# Patient Record
Sex: MALE | Race: BLACK OR AFRICAN AMERICAN | Employment: OTHER | URBAN - METROPOLITAN AREA
[De-identification: names, ages, dates, MRNs, and addresses within clinical notes are randomized per-mention and may not be internally consistent; named-entity substitution may affect disease eponyms.]

---

## 2017-08-28 ENCOUNTER — HOSPITAL ENCOUNTER (INPATIENT)
Age: 72
LOS: 2 days | Discharge: HOME | DRG: 247 | End: 2017-08-30
Attending: EMERGENCY MEDICINE | Admitting: INTERNAL MEDICINE
Payer: MEDICARE

## 2017-08-28 ENCOUNTER — APPOINTMENT (OUTPATIENT)
Dept: GENERAL RADIOLOGY | Age: 72
DRG: 247 | End: 2017-08-28
Attending: EMERGENCY MEDICINE
Payer: MEDICARE

## 2017-08-28 DIAGNOSIS — R07.9 CHEST PAIN, UNSPECIFIED TYPE: Primary | ICD-10-CM

## 2017-08-28 PROBLEM — I20.0 UNSTABLE ANGINA (HCC): Status: ACTIVE | Noted: 2017-08-28

## 2017-08-28 LAB
ALBUMIN SERPL-MCNC: 3 G/DL (ref 3.2–4.6)
ALBUMIN/GLOB SERPL: 0.5 {RATIO} (ref 1.2–3.5)
ALP SERPL-CCNC: 105 U/L (ref 50–136)
ALT SERPL-CCNC: 110 U/L (ref 12–65)
ANION GAP SERPL CALC-SCNC: 8 MMOL/L (ref 7–16)
AST SERPL-CCNC: 113 U/L (ref 15–37)
BASOPHILS # BLD: 0 K/UL (ref 0–0.2)
BASOPHILS NFR BLD: 0 % (ref 0–2)
BILIRUB SERPL-MCNC: 0.5 MG/DL (ref 0.2–1.1)
BUN SERPL-MCNC: 14 MG/DL (ref 8–23)
CALCIUM SERPL-MCNC: 7.9 MG/DL (ref 8.3–10.4)
CHLORIDE SERPL-SCNC: 107 MMOL/L (ref 98–107)
CO2 SERPL-SCNC: 25 MMOL/L (ref 21–32)
CREAT SERPL-MCNC: 1.3 MG/DL (ref 0.8–1.5)
DIFFERENTIAL METHOD BLD: ABNORMAL
EOSINOPHIL # BLD: 0.1 K/UL (ref 0–0.8)
EOSINOPHIL NFR BLD: 2 % (ref 0.5–7.8)
ERYTHROCYTE [DISTWIDTH] IN BLOOD BY AUTOMATED COUNT: 14.8 % (ref 11.9–14.6)
GLOBULIN SER CALC-MCNC: 6 G/DL (ref 2.3–3.5)
GLUCOSE SERPL-MCNC: 88 MG/DL (ref 65–100)
HCT VFR BLD AUTO: 34.6 % (ref 41.1–50.3)
HGB BLD-MCNC: 12.2 G/DL (ref 13.6–17.2)
IMM GRANULOCYTES # BLD: 0 K/UL (ref 0–0.5)
IMM GRANULOCYTES NFR BLD: 0.5 % (ref 0–5)
LYMPHOCYTES # BLD: 1.1 K/UL (ref 0.5–4.6)
LYMPHOCYTES NFR BLD: 28 % (ref 13–44)
MCH RBC QN AUTO: 26.9 PG (ref 26.1–32.9)
MCHC RBC AUTO-ENTMCNC: 35.3 G/DL (ref 31.4–35)
MCV RBC AUTO: 76.4 FL (ref 79.6–97.8)
MONOCYTES # BLD: 0.7 K/UL (ref 0.1–1.3)
MONOCYTES NFR BLD: 17 % (ref 4–12)
NEUTS SEG # BLD: 2 K/UL (ref 1.7–8.2)
NEUTS SEG NFR BLD: 53 % (ref 43–78)
PLATELET # BLD AUTO: 173 K/UL (ref 150–450)
PMV BLD AUTO: 10.6 FL (ref 10.8–14.1)
POTASSIUM SERPL-SCNC: 4.4 MMOL/L (ref 3.5–5.1)
PROT SERPL-MCNC: 9 G/DL (ref 6.3–8.2)
RBC # BLD AUTO: 4.53 M/UL (ref 4.23–5.67)
SODIUM SERPL-SCNC: 140 MMOL/L (ref 136–145)
TROPONIN I BLD-MCNC: 0.04 NG/ML (ref 0–0.08)
WBC # BLD AUTO: 3.8 K/UL (ref 4.3–11.1)

## 2017-08-28 PROCEDURE — 84484 ASSAY OF TROPONIN QUANT: CPT

## 2017-08-28 PROCEDURE — 65660000000 HC RM CCU STEPDOWN

## 2017-08-28 PROCEDURE — 96374 THER/PROPH/DIAG INJ IV PUSH: CPT | Performed by: EMERGENCY MEDICINE

## 2017-08-28 PROCEDURE — 74011250636 HC RX REV CODE- 250/636: Performed by: NURSE PRACTITIONER

## 2017-08-28 PROCEDURE — 99285 EMERGENCY DEPT VISIT HI MDM: CPT | Performed by: EMERGENCY MEDICINE

## 2017-08-28 PROCEDURE — 85025 COMPLETE CBC W/AUTO DIFF WBC: CPT | Performed by: EMERGENCY MEDICINE

## 2017-08-28 PROCEDURE — 71010 XR CHEST PORT: CPT

## 2017-08-28 PROCEDURE — 80053 COMPREHEN METABOLIC PANEL: CPT | Performed by: EMERGENCY MEDICINE

## 2017-08-28 PROCEDURE — 93005 ELECTROCARDIOGRAM TRACING: CPT | Performed by: EMERGENCY MEDICINE

## 2017-08-28 PROCEDURE — 74011250637 HC RX REV CODE- 250/637: Performed by: EMERGENCY MEDICINE

## 2017-08-28 RX ORDER — HEPARIN SODIUM 5000 [USP'U]/ML
4000 INJECTION, SOLUTION INTRAVENOUS; SUBCUTANEOUS ONCE
Status: COMPLETED | OUTPATIENT
Start: 2017-08-28 | End: 2017-08-28

## 2017-08-28 RX ORDER — HEPARIN SODIUM 5000 [USP'U]/100ML
12-25 INJECTION, SOLUTION INTRAVENOUS
Status: DISCONTINUED | OUTPATIENT
Start: 2017-08-28 | End: 2017-08-29

## 2017-08-28 RX ADMIN — HEPARIN SODIUM AND DEXTROSE 12 UNITS/KG/HR: 5000; 5 INJECTION INTRAVENOUS at 23:45

## 2017-08-28 RX ADMIN — NITROGLYCERIN 1 INCH: 20 OINTMENT TOPICAL at 21:50

## 2017-08-28 RX ADMIN — HEPARIN SODIUM 4000 UNITS: 5000 INJECTION, SOLUTION INTRAVENOUS; SUBCUTANEOUS at 23:45

## 2017-08-28 NOTE — IP AVS SNAPSHOT
Jarad Elders 
 
 
 [address] 
[phone] Patient: Cristiane Camarillo MRN: [id number] ZNV:1/0/0630 You are allergic to the following No active allergies Recent Documentation Height Weight BMI Smoking Status 1.727 m 63 kg 21.13 kg/m2 Light Tobacco Smoker Emergency Contacts Name Discharge Info Relation Home Work Mobile Patti Quesada  Other Relative [6] [phone] Angle Lee  Child [2] [phone] About your hospitalization You were admitted on:  August 28, 2017 You last received care in the:  [facility] 3 TELEMETRY You were discharged on:  August 30, 2017 Unit phone number:  [phone] Why you were hospitalized Your primary diagnosis was:  Unstable Angina (Hcc) Your diagnoses also included:  Hyperlipidemia, Coronary Atherosclerosis Of Native Coronary Vessel Providers Seen During Your Hospitalizations Provider Role Specialty Primary office phone Raul Haywood MD Attending Provider Emergency Medicine [phone] Lucy Metcalf DO Attending Provider Cardiology [phone] Your Primary Care Physician (PCP) Primary Care Physician Office Phone Office Fax OTHER, PHYS ** None ** ** None ** Follow-up Information Follow up With Details Comments Contact Info Muna Alonso MD On 9/6/2017 Follow up on Wednesday, September 6th at 9:00am THE [facility] AT [place]). [address] [facility] [address] [facility] [address] 
[phone] Primary Care Physician  As needed Your Appointments Wednesday September 06, 2017  9:00 AM EDT TRANSITIONAL CARE MANAGEMENT with Muna Alonso MD  
[address] ([address] [place] [address]) 2 [facility] 
[address]  
[phone] Current Discharge Medication List  
  
START taking these medications Dose & Instructions Dispensing Information Comments Morning Noon Evening Bedtime  
 aspirin 81 mg chewable tablet Dose:  81 mg Take 1 Tab by mouth daily. Refills:  0  
     
  
   
   
   
  
 atorvastatin 80 mg tablet Commonly known as:  LIPITOR Dose:  80 mg Take 1 Tab by mouth nightly. Quantity:  30 Tab Refills:  11  
     
   
   
   
  
  
 lisinopril 5 mg tablet Commonly known as:  Jorgito Odalys Dose:  5 mg Take 1 Tab by mouth daily. Quantity:  30 Tab Refills:  6  
     
  
   
   
   
  
 metoprolol tartrate 25 mg tablet Commonly known as:  LOPRESSOR Dose:  25 mg Take 1 Tab by mouth two (2) times a day. Quantity:  60 Tab Refills:  6  
     
  
   
   
  
   
  
 prasugrel 10 mg tablet Commonly known as:  EFFIENT Dose:  10 mg Take 1 Tab by mouth daily. Quantity:  30 Tab Refills:  11 CONTINUE these medications which have NOT CHANGED Dose & Instructions Dispensing Information Comments Morning Noon Evening Bedtime  
 nitroglycerin 0.4 mg SL tablet Commonly known as:  NITROSTAT Notes to Patient:  As needed. Dose:  0.4 mg  
1 Tab by SubLINGual route every five (5) minutes as needed for Chest Pain. Quantity:  2 Bottle Refills:  4 Where to Get Your Medications Information on where to get these meds will be given to you by the nurse or doctor. ! Ask your nurse or doctor about these medications  
  atorvastatin 80 mg tablet  
 lisinopril 5 mg tablet  
 metoprolol tartrate 25 mg tablet  
 nitroglycerin 0.4 mg SL tablet  
 prasugrel 10 mg tablet Discharge Instructions Cardiac Catheterization/Angiography Discharge Instructions *Check the puncture site frequently for swelling or bleeding.  If you see any bleeding, lie down and apply pressure over the area with a clean town or washcloth. Notify your doctor for any redness, swelling, drainage or oozing from the puncture site. Notify your doctor for any fever or chills. *If the leg or arm with the puncture becomes cold, numb or painful, call [facility] Cardiology at  [phone]. *Activity should be limited for the next 48 hours. Climb stairs as little as possible and avoid any stooping, bending or strenuous activity for 48 hours. No heavy lifting (anything over 10 pounds) for three days. *Do not drive for 48 hours. *You may resume your usual diet. Drink more fluids than usual. 
 
*Have a responsible person drive you home and stay with you for at least 24 hours after your heart catheterization/angiography. *You may remove the bandage from your Right wrist in 24 hours. You may shower in 24 hours. No tub baths, hot tubs or swimming for one week. Do not place any lotions, creams, powders, ointments over the puncture site for one week. You may place a clean band-aid over the puncture site each day for 5 days. Change this daily. Percutaneous Coronary Intervention: What to Expect at [facility] Your Recovery Percutaneous coronary intervention (PCI) is the name for procedures that are used to open a narrowed or blocked coronary artery. The two most common PCI procedures are coronary angioplasty and coronary stent placement. Your groin or arm may have a bruise and feel sore for a day or two after a percutaneous coronary intervention (PCI). You can do light activities around the house, but nothing strenuous for several days. This care sheet gives you a general idea about how long it will take for you to recover. But each person recovers at a different pace. Follow the steps below to get better as quickly as possible. How can you care for yourself at home? Activity · If the doctor gave you a sedative: ¨ For 24 hours, don't do anything that requires attention to detail.  It takes time for the medicine's effects to completely wear off. ¨ For your safety, do not drive or operate any machinery that could be dangerous. Wait until the medicine wears off and you can think clearly and react easily. · Do not do strenuous exercise and do not lift, pull, or push anything heavy until your doctor says it is okay. This may be for a day or two. You can walk around the house and do light activity, such as cooking. · If the catheter was placed in your groin, try not to walk up stairs for the first couple of days. · If the catheter was placed in your arm near your wrist, do not bend your wrist deeply for the first couple of days. Be careful using your hand to get into and out of a chair or bed. · Carry your stent identification card with you at all times. · If your doctor recommends it, get more exercise. Walking is a good choice. Bit by bit, increase the amount you walk every day. Try for at least 30 minutes on most days of the week. Diet · Drink plenty of fluids to help your body flush out the dye. If you have kidney, heart, or liver disease and have to limit fluids, talk with your doctor before you increase the amount of fluids you drink. · Keep eating a heart-healthy diet that has lots of fruits, vegetables, and whole grains. If you have not been eating this way, talk to your doctor. You also may want to talk to a dietitian. This expert can help you to learn about healthy foods and plan meals. Medicines · Your doctor will tell you if and when you can restart your medicines. He or she will also give you instructions about taking any new medicines. · If you take blood thinners, such as warfarin (Coumadin), clopidogrel (Plavix), or aspirin, be sure to talk to your doctor. He or she will tell you if and when to start taking those medicines again. Make sure that you understand exactly what your doctor wants you to do. · Your doctor will prescribe blood-thinning medicines.  You will likely take aspirin plus another antiplatelet, such as clopidogrel (Plavix). It is very important that you take these medicines exactly as directed. These medicines help keep the coronary artery open and reduce your risk of a heart attack. · Call your doctor if you think you are having a problem with your medicine. Care of the catheter site · For 1 or 2 days, keep a bandage over the spot where the catheter was inserted. The bandage probably will fall off in this time. · Put ice or a cold pack on the area for 10 to 20 minutes at a time to help with soreness or swelling. Put a thin cloth between the ice and your skin. · You may shower 24 to 48 hours after the procedure, if your doctor okays it. Pat the incision dry. · Do not soak the catheter site until it is healed. Don't take a bath for 1 week, or until your doctor tells you it is okay. Follow-up care is a key part of your treatment and safety. Be sure to make and go to all appointments, and call your doctor if you are having problems. It's also a good idea to know your test results and keep a list of the medicines you take. When should you call for help? Call 911 anytime you think you may need emergency care. For example, call if: 
· You passed out (lost consciousness). · You have severe trouble breathing. · You have sudden chest pain and shortness of breath, or you cough up blood. · You have symptoms of a heart attack, such as: ¨ Chest pain or pressure. ¨ Sweating. ¨ Shortness of breath. ¨ Nausea or vomiting. ¨ Pain that spreads from the chest to the neck, jaw, or one or both shoulders or arms. ¨ Dizziness or lightheadedness. ¨ A fast or uneven pulse. After calling 911, chew 1 adult-strength aspirin. Wait for an ambulance. Do not try to drive yourself. · You have been diagnosed with angina, and you have angina symptoms that do not go away with rest or are not getting better within 5 minutes after you take one dose of nitroglycerin. Call your doctor now or seek immediate medical care if: 
· You are bleeding from the area where the catheter was put in your artery. · You have a fast-growing, painful lump at the catheter site. · You have signs of infection, such as: 
¨ Increased pain, swelling, warmth, or redness. ¨ Red streaks leading from the catheter site. ¨ Pus draining from the catheter site. ¨ A fever. · Your leg or arm looks blue or feels cold, numb, or tingly. Watch closely for changes in your health, and be sure to contact your doctor if you have any problems. Where can you learn more? Go to http://huan-nando.info/. Enter Z502 in the search box to learn more about \"Percutaneous Coronary Intervention: What to Expect at Home. \" Current as of: January 13, 2017 Content Version: 11.3 © 1503-4584 [employer]. Care instructions adapted under license by [employer] (which disclaims liability or warranty for this information). If you have questions about a medical condition or this instruction, always ask your healthcare professional. [place] [address] any warranty or liability for your use of this information. Heart Attack: Care Instructions Your Care Instructions A heart attack (myocardial infarction, or MI) occurs when one or more of the coronary arteries, which supply the heart with oxygen-rich blood, is blocked. A blockage usually occurs when plaque inside the artery breaks open and a blood clot forms in the artery. After a heart attack, you may be worried about your future. Over the next several weeks, your heart will start to heal. Though it can be hard to break old habits, you can prevent another heart attack by making some lifestyle changes and by taking medicines. You may use this information for ideas about what to do at home to speed your recovery. Follow-up care is a key part of your treatment and safety.  Be sure to make and go to all appointments, and call your doctor if you are having problems. It's also a good idea to know your test results and keep a list of the medicines you take. How can you care for yourself at home? Activity · Until your doctor says it is okay, do not do strenuous exercise. And do not lift, pull, or push anything heavy. Ask your doctor what types of activities are safe for you. · If your doctor has not set you up with a cardiac rehabilitation (rehab) program, talk to him or her about whether that is right for you. Cardiac rehab includes supervised exercise. It also includes help with diet and lifestyle changes and emotional support. It may reduce your risk of future heart problems. · Increase your activities slowly. Take short rest breaks when you get tired. · If your doctor recommends it, get more exercise. Walking is a good choice. Bit by bit, increase the amount you walk every day. Try for at least 30 minutes on most days of the week. You also may want to swim, bike, or do other activities. Talk with your doctor before you start an exercise program to make sure it is safe for you. · Ask your doctor when you can drive, go back to work, and do other daily activities again. · You can have sex as soon as you feel ready for it. Often this means when you can easily walk around or climb stairs. Talk with your doctor if you have any concerns. If you are taking nitroglycerin, do not take erection-enhancing medicine such as sildenafil (Viagra), tadalafil (Cialis), or vardenafil (Levitra) . Lifestyle changes · Do not smoke. Smoking increases your risk of another heart attack. If you need help quitting, talk to your doctor about stop-smoking programs and medicines. These can increase your chances of quitting for good. · Eat a heart-healthy diet that is low in saturated fat and salt, and is full of fruits, vegetables and whole-grains.  Eat at least two servings of fish each week. You may get more details about how to eat healthy. But these tips can help you get started. · Stay at a healthy weight, or lose weight if you need to. Medicines · Be safe with medicines. Take your medicines exactly as prescribed. Call your doctor if you think you are having a problem with your medicine. You will get more details on the specific medicines your doctor prescribes. Do not stop taking your medicine unless your doctor tells you to. Not taking your medicine might raise your risk of having another heart attack. · You may need several medicines to help lower your risk of another heart attack. These include: ¨ Blood pressure medicines such as angiotensin-converting enzyme (ACE) inhibitors, ARBs (angiotensin II receptor blockers), and beta-blockers. ¨ Cholesterol medicine called statins. ¨ Aspirin and other blood thinners. These prevent blood clots that can cause a heart attack. · If your doctor has given you nitroglycerin, keep it with you at all times. If you have angina symptoms, such as chest pain or pressure, sit down and rest. Take the first dose of nitroglycerin as directed. If symptoms get worse or are not getting better within 5 minutes, call 911 right away. Stay on the phone. The emergency  will tell you what to do. · Do not take any over-the-counter medicines, vitamins, or herbal products without talking to your doctor first. 
Staying healthy · Manage other health conditions such as high blood pressure and diabetes. · Avoid colds and flu. Get a pneumococcal vaccine shot. If you have had one before, ask your doctor whether you need another dose. Get the flu vaccine every year. If you must be around people with colds or flu, wash your hands often. · Be sure to tell your doctor about any angina symptoms you have had, even if they went away. Pay attention to your angina symptoms. Know what is typical for you and learn how to control it. Know when to call for help. · Talk to your family, friends, or a counselor about your feelings. It is normal to feel frightened, angry, hopeless, helpless, and even guilty. Talking openly about bad feelings can help you cope. If you have symptoms of depression, talk to your doctor. When should you call for help? Call 911 anytime you think you may need emergency care. For example, call if: 
· You have symptoms of a heart attack. These may include: ¨ Chest pain or pressure, or a strange feeling in the chest. 
¨ Sweating. ¨ Shortness of breath. ¨ Nausea or vomiting. ¨ Pain, pressure, or a strange feeling in the back, neck, jaw, or upper belly or in one or both shoulders or arms. ¨ Lightheadedness or sudden weakness. ¨ A fast or irregular heartbeat. After you call 911, the  may tell you to chew 1 adult-strength or 2 to 4 low-dose aspirin. Wait for an ambulance. Do not try to drive yourself. · You have angina symptoms (such as chest pain or pressure) that do not go away with rest or are not getting better within 5 minutes after you take a dose of nitroglycerin. · You passed out (lost consciousness). · You feel like you are having another heart attack. Call your doctor now or seek immediate medical care if: 
· You are having angina symptoms, such as chest pain or pressure, more often than usual, or the symptoms are different or worse than usual. 
· You have new or increased shortness of breath. · You are dizzy or lightheaded, or you feel like you may faint. Watch closely for changes in your health, and be sure to contact your doctor if you have any problems. Where can you learn more? Go to http://huan-nando.info/. Enter [id number] in the search box to learn more about \"Heart Attack: Care Instructions. \" Current as of: August 8, 2016 Content Version: 11.3 © 3297-4970 [employer].  Care instructions adapted under license by [employer] (which disclaims liability or warranty for this information). If you have questions about a medical condition or this instruction, always ask your healthcare professional. [address] any warranty or liability for your use of this information. Reducing Heart Attack Risk With Daily Medicine: Care Instructions Your Care Instructions Heart disease is the number one cause of death. If you are at risk for heart disease, there are many medicines that can reduce your risk. These include: · ACE inhibitors. These are a type of blood pressure medicine. They can reduce the risk of heart attacks and strokes if you are at high risk. · Statin medicines. These lower cholesterol. They can also reduce the risk of heart disease and strokes. · Aspirin. It can help certain people lower their risk of a heart attack or stroke. · Beta-blocker medicines. These are a type of blood pressure and heart medicine. They can reduce the chance of early death if you have had a heart attack. All medicines can cause side effects. So it is important to understand the pros and cons of any medicine you take. It is also important to take your medicines exactly as your doctor tells you to. Follow-up care is a key part of your treatment and safety. Be sure to make and go to all appointments, and call your doctor if you are having problems. It's also a good idea to know your test results and keep a list of the medicines you take. ACE inhibitors ACE (angiotensin-converting enzyme) inhibitors are used for three main reasons. They lower blood pressure, protect the kidneys, and prevent heart attacks and strokes. Examples include benazepril (Lotensin), lisinopril (Prinivil, Zestril), and ramipril (Altace). Before you start taking an ACE inhibitor, make sure your doctor knows if: 
· You are taking a water pill (diuretic). · You are taking potassium pills or using salt substitutes. · You are pregnant or breastfeeding. · You have had a kidney transplant or other kidney problems. ACE inhibitors can cause side effects. Call your doctor right away if you have: · Trouble breathing. · Swelling in your face, head, neck, or tongue. · Dizziness or lightheadedness. · A dry cough. Statins Statins lower cholesterol. Examples include atorvastatin (Lipitor), lovastatin (Mevacor), pravastatin (Pravachol), and simvastatin (Zocor). Before you start taking a statin, make sure your doctor knows if: 
· You have had a kidney transplant or other kidney problems. · You have liver disease. · You take any other prescription medicine, over-the-counter medicine, vitamins, supplements, or herbal remedies. · You are pregnant or breastfeeding. Statins can cause side effects. Call your doctor right away if you have: · New, severe muscle aches. · Brown urine. Aspirin Taking an aspirin every day can lower your risk for a heart attack. A heart attack occurs when a blood vessel in the heart gets blocked. When this happens, oxygen can't get to the heart muscle, and part of the heart dies. Aspirin can help prevent blood clots that can block the blood vessels. Talk to your doctor before you start taking aspirin every day. He or she may recommend that you take one low-dose aspirin (81 mg) tablet each day, with a meal and a full glass of water. Taking aspirin isn't right for everyone, because it can cause serious bleeding. And you may not be able to use aspirin if you: 
· Have asthma. · Have an ulcer or other stomach problem. · Take some other medicine (called a blood thinner) that prevents blood clots. · Are allergic to aspirin. Before having a surgery or procedure, tell your doctor or dentist that you take aspirin. He or she will tell you if you should stop taking aspirin beforehand. Make sure that you understand exactly what your doctor wants you to do. Aspirin can cause side effects. Call your doctor right away if you have: · Unusual bleeding or bruising. · Nausea, vomiting, or heartburn. · Black or bloody stools. Beta-blockers Beta-blockers are used for three main reasons. They lower blood pressure, relieve angina symptoms (such as chest pain or pressure), and reduce the chances of a second heart attack. They include atenolol (Tenormin), carvedilol (Coreg), and metoprolol (Lopressor). Before you start taking a beta-blocker, make sure your doctor knows if you have: · Severe asthma or frequent asthma attacks. · A very slow pulse (less than 55 beats a minute). Beta-blockers can cause side effects. Call your doctor right away if you have: · Wheezing or trouble breathing. · Dizziness or lightheadedness. · Asthma that gets worse. When should you call for help? Call 911 anytime you think you may need emergency care. For example, call if: 
· You passed out (lost consciousness). Call your doctor now or seek immediate medical care if: 
· You are wheezing or have trouble breathing. · You have swelling in your face, head, neck, or tongue. · You are dizzy or lightheaded, or you feel like you may faint. · You have severe muscle pain, weakness, or brown urine. · You have vision problems. · You have new bruises or blood spots under your skin. · Your stools are black and tarlike or have streaks of blood. Watch closely for changes in your health, and be sure to contact your doctor if: 
· You have ringing in your ears. · You feel very tired. · You have gas, constipation, or an upset stomach. Where can you learn more? Go to http://huan-nando.info/. Enter R428 in the search box to learn more about \"Reducing Heart Attack Risk With Daily Medicine: Care Instructions. \" Current as of: September 21, 2016 Content Version: 11.3 © 9697-8292 [employer].  Care instructions adapted under license by [employer] (which disclaims liability or warranty for this information). If you have questions about a medical condition or this instruction, always ask your healthcare professional. [address] any warranty or liability for your use of this information. Heart-Healthy Diet: Care Instructions Your Care Instructions A heart-healthy diet has lots of vegetables, fruits, nuts, beans, and whole grains, and is low in salt. It limits foods that are high in saturated fat, such as meats, cheeses, and fried foods. It may be hard to change your diet, but even small changes can lower your risk of heart attack and heart disease. Follow-up care is a key part of your treatment and safety. Be sure to make and go to all appointments, and call your doctor if you are having problems. It's also a good idea to know your test results and keep a list of the medicines you take. How can you care for yourself at home? Watch your portions · Learn what a serving is. A \"serving\" and a \"portion\" are not always the same thing. Make sure that you are not eating larger portions than are recommended. For example, a serving of pasta is ½ cup. A serving size of meat is 2 to 3 ounces. A 3-ounce serving is about the size of a deck of cards. Measure serving sizes until you are good at [place]" them. Keep in mind that restaurants often serve portions that are 2 or 3 times the size of one serving. · To keep your energy level up and keep you from feeling hungry, eat often but in smaller portions. · Eat only the number of calories you need to stay at a healthy weight. If you need to lose weight, eat fewer calories than your body burns (through exercise and other physical activity). Eat more fruits and vegetables · Eat a variety of fruit and vegetables every day. Dark green, deep orange, red, or yellow fruits and vegetables are especially good for you. Examples include spinach, carrots, peaches, and berries. · Keep carrots, celery, and other veggies handy for snacks. Buy fruit that is in season and store it where you can see it so that you will be tempted to eat it. · Cook dishes that have a lot of veggies in them, such as stir-fries and soups. Limit saturated and trans fat · Read food labels, and try to avoid saturated and trans fats. They increase your risk of heart disease. Trans fat is found in many processed foods such as cookies and crackers. · Use olive or canola oil when you cook. Try cholesterol-lowering spreads, such as Benecol or Take Control. · Bake, broil, grill, or steam foods instead of frying them. · Choose lean meats instead of high-fat meats such as hot dogs and sausages. Cut off all visible fat when you prepare meat. · Eat fish, skinless poultry, and meat alternatives such as soy products instead of high-fat meats. Soy products, such as tofu, may be especially good for your heart. · Choose low-fat or fat-free milk and dairy products. Eat fish · Eat at least two servings of fish a week. Certain fish, such as salmon and tuna, contain omega-3 fatty acids, which may help reduce your risk of heart attack. Eat foods high in fiber · Eat a variety of grain products every day. Include whole-grain foods that have lots of fiber and nutrients. Examples of whole-grain foods include oats, whole wheat bread, and brown rice. · Buy whole-grain breads and cereals, instead of white bread or pastries. Limit salt and sodium · Limit how much salt and sodium you eat to help lower your blood pressure. · Taste food before you salt it. Add only a little salt when you think you need it. With time, your taste buds will adjust to less salt. · Eat fewer snack items, fast foods, and other high-salt, processed foods. Check food labels for the amount of sodium in packaged foods. · Choose low-sodium versions of canned goods (such as soups, vegetables, and beans). Limit sugar · Limit drinks and foods with added sugar. These include candy, desserts, and soda pop. Limit alcohol · Limit alcohol to no more than 2 drinks a day for men and 1 drink a day for women. Too much alcohol can cause health problems. When should you call for help? Watch closely for changes in your health, and be sure to contact your doctor if: 
· You would like help planning heart-healthy meals. Where can you learn more? Go to http://huan-nando.info/. Enter V137 in the search box to learn more about \"Heart-Healthy Diet: Care Instructions. \" Current as of: April 3, 2017 Content Version: 11.3 © 6397-9083 [employer]. Care instructions adapted under license by [employer] (which disclaims liability or warranty for this information). If you have questions about a medical condition or this instruction, always ask your healthcare professional. [address] any warranty or liability for your use of this information. Heart-Healthy Diet: Care Instructions Your Care Instructions A heart-healthy diet has lots of vegetables, fruits, nuts, beans, and whole grains, and is low in salt. It limits foods that are high in saturated fat, such as meats, cheeses, and fried foods. It may be hard to change your diet, but even small changes can lower your risk of heart attack and heart disease. Follow-up care is a key part of your treatment and safety. Be sure to make and go to all appointments, and call your doctor if you are having problems. It's also a good idea to know your test results and keep a list of the medicines you take. How can you care for yourself at home? Watch your portions · Learn what a serving is. A \"serving\" and a \"portion\" are not always the same thing. Make sure that you are not eating larger portions than are recommended. For example, a serving of pasta is ½ cup.  A serving size of meat is 2 to 3 ounces. A 3-ounce serving is about the size of a deck of cards. Measure serving sizes until you are good at [place]" them. Keep in mind that restaurants often serve portions that are 2 or 3 times the size of one serving. · To keep your energy level up and keep you from feeling hungry, eat often but in smaller portions. · Eat only the number of calories you need to stay at a healthy weight. If you need to lose weight, eat fewer calories than your body burns (through exercise and other physical activity). Eat more fruits and vegetables · Eat a variety of fruit and vegetables every day. Dark green, deep orange, red, or yellow fruits and vegetables are especially good for you. Examples include spinach, carrots, peaches, and berries. · Keep carrots, celery, and other veggies handy for snacks. Buy fruit that is in season and store it where you can see it so that you will be tempted to eat it. · Cook dishes that have a lot of veggies in them, such as stir-fries and soups. Limit saturated and trans fat · Read food labels, and try to avoid saturated and trans fats. They increase your risk of heart disease. Trans fat is found in many processed foods such as cookies and crackers. · Use olive or canola oil when you cook. Try cholesterol-lowering spreads, such as Benecol or Take Control. · Bake, broil, grill, or steam foods instead of frying them. · Choose lean meats instead of high-fat meats such as hot dogs and sausages. Cut off all visible fat when you prepare meat. · Eat fish, skinless poultry, and meat alternatives such as soy products instead of high-fat meats. Soy products, such as tofu, may be especially good for your heart. · Choose low-fat or fat-free milk and dairy products. Eat fish · Eat at least two servings of fish a week. Certain fish, such as salmon and tuna, contain omega-3 fatty acids, which may help reduce your risk of heart attack. Eat foods high in fiber · Eat a variety of grain products every day. Include whole-grain foods that have lots of fiber and nutrients. Examples of whole-grain foods include oats, whole wheat bread, and brown rice. · Buy whole-grain breads and cereals, instead of white bread or pastries. Limit salt and sodium · Limit how much salt and sodium you eat to help lower your blood pressure. · Taste food before you salt it. Add only a little salt when you think you need it. With time, your taste buds will adjust to less salt. · Eat fewer snack items, fast foods, and other high-salt, processed foods. Check food labels for the amount of sodium in packaged foods. · Choose low-sodium versions of canned goods (such as soups, vegetables, and beans). Limit sugar · Limit drinks and foods with added sugar. These include candy, desserts, and soda pop. Limit alcohol · Limit alcohol to no more than 2 drinks a day for men and 1 drink a day for women. Too much alcohol can cause health problems. When should you call for help? Watch closely for changes in your health, and be sure to contact your doctor if: 
· You would like help planning heart-healthy meals. Where can you learn more? Go to http://huan-nando.info/. Enter V137 in the search box to learn more about \"Heart-Healthy Diet: Care Instructions. \" Current as of: April 3, 2017 Content Version: 11.3 © 0433-7511 [employer]. Care instructions adapted under license by [employer] (which disclaims liability or warranty for this information). If you have questions about a medical condition or this instruction, always ask your healthcare professional. [place] [address] any warranty or liability for your use of this information. Heart Attack: Care Instructions Your Care Instructions A heart attack (myocardial infarction, or MI) occurs when one or more of the coronary arteries, which supply the heart with oxygen-rich blood, is blocked. A blockage usually occurs when plaque inside the artery breaks open and a blood clot forms in the artery. After a heart attack, you may be worried about your future. Over the next several weeks, your heart will start to heal. Though it can be hard to break old habits, you can prevent another heart attack by making some lifestyle changes and by taking medicines. You may use this information for ideas about what to do at home to speed your recovery. Follow-up care is a key part of your treatment and safety. Be sure to make and go to all appointments, and call your doctor if you are having problems. It's also a good idea to know your test results and keep a list of the medicines you take. How can you care for yourself at home? Activity · Until your doctor says it is okay, do not do strenuous exercise. And do not lift, pull, or push anything heavy. Ask your doctor what types of activities are safe for you. · If your doctor has not set you up with a cardiac rehabilitation (rehab) program, talk to him or her about whether that is right for you. Cardiac rehab includes supervised exercise. It also includes help with diet and lifestyle changes and emotional support. It may reduce your risk of future heart problems. · Increase your activities slowly. Take short rest breaks when you get tired. · If your doctor recommends it, get more exercise. Walking is a good choice. Bit by bit, increase the amount you walk every day. Try for at least 30 minutes on most days of the week. You also may want to swim, bike, or do other activities. Talk with your doctor before you start an exercise program to make sure it is safe for you. · Ask your doctor when you can drive, go back to work, and do other daily activities again. · You can have sex as soon as you feel ready for it. Often this means when you can easily walk around or climb stairs.  Talk with your doctor if you have any concerns. If you are taking nitroglycerin, do not take erection-enhancing medicine such as sildenafil (Viagra), tadalafil (Cialis), or vardenafil (Levitra) . Lifestyle changes · Do not smoke. Smoking increases your risk of another heart attack. If you need help quitting, talk to your doctor about stop-smoking programs and medicines. These can increase your chances of quitting for good. · Eat a heart-healthy diet that is low in saturated fat and salt, and is full of fruits, vegetables and whole-grains. Eat at least two servings of fish each week. You may get more details about how to eat healthy. But these tips can help you get started. · Stay at a healthy weight, or lose weight if you need to. Medicines · Be safe with medicines. Take your medicines exactly as prescribed. Call your doctor if you think you are having a problem with your medicine. You will get more details on the specific medicines your doctor prescribes. Do not stop taking your medicine unless your doctor tells you to. Not taking your medicine might raise your risk of having another heart attack. · You may need several medicines to help lower your risk of another heart attack. These include: ¨ Blood pressure medicines such as angiotensin-converting enzyme (ACE) inhibitors, ARBs (angiotensin II receptor blockers), and beta-blockers. ¨ Cholesterol medicine called statins. ¨ Aspirin and other blood thinners. These prevent blood clots that can cause a heart attack. · If your doctor has given you nitroglycerin, keep it with you at all times. If you have angina symptoms, such as chest pain or pressure, sit down and rest. Take the first dose of nitroglycerin as directed. If symptoms get worse or are not getting better within 5 minutes, call 911 right away. Stay on the phone. The emergency  will tell you what to do.  
· Do not take any over-the-counter medicines, vitamins, or herbal products without talking to your doctor first. 
 Staying healthy · Manage other health conditions such as high blood pressure and diabetes. · Avoid colds and flu. Get a pneumococcal vaccine shot. If you have had one before, ask your doctor whether you need another dose. Get the flu vaccine every year. If you must be around people with colds or flu, wash your hands often. · Be sure to tell your doctor about any angina symptoms you have had, even if they went away. Pay attention to your angina symptoms. Know what is typical for you and learn how to control it. Know when to call for help. · Talk to your family, friends, or a counselor about your feelings. It is normal to feel frightened, angry, hopeless, helpless, and even guilty. Talking openly about bad feelings can help you cope. If you have symptoms of depression, talk to your doctor. When should you call for help? Call 911 anytime you think you may need emergency care. For example, call if: 
· You have symptoms of a heart attack. These may include: ¨ Chest pain or pressure, or a strange feeling in the chest. 
¨ Sweating. ¨ Shortness of breath. ¨ Nausea or vomiting. ¨ Pain, pressure, or a strange feeling in the back, neck, jaw, or upper belly or in one or both shoulders or arms. ¨ Lightheadedness or sudden weakness. ¨ A fast or irregular heartbeat. After you call 911, the  may tell you to chew 1 adult-strength or 2 to 4 low-dose aspirin. Wait for an ambulance. Do not try to drive yourself. · You have angina symptoms (such as chest pain or pressure) that do not go away with rest or are not getting better within 5 minutes after you take a dose of nitroglycerin. · You passed out (lost consciousness). · You feel like you are having another heart attack.  
Call your doctor now or seek immediate medical care if: 
· You are having angina symptoms, such as chest pain or pressure, more often than usual, or the symptoms are different or worse than usual. 
 · You have new or increased shortness of breath. · You are dizzy or lightheaded, or you feel like you may faint. Watch closely for changes in your health, and be sure to contact your doctor if you have any problems. Where can you learn more? Go to http://huan-nando.info/. Enter [id number] in the search box to learn more about \"Heart Attack: Care Instructions. \" Current as of: August 8, 2016 Content Version: 11.3 © 5711-0276 [employer]. Care instructions adapted under license by [employer] (which disclaims liability or warranty for this information). If you have questions about a medical condition or this instruction, always ask your healthcare professional. [address] any warranty or liability for your use of this information. DISCHARGE SUMMARY from Nurse The following personal items are in your possession at time of discharge: 
 
Dental Appliances: Uppers Home Medications: None Jewelry: None Clothing: At bedside, With patient Other Valuables: None PATIENT INSTRUCTIONS: 
 
 
F-face looks uneven A-arms unable to move or move unevenly S-speech slurred or non-existent T-time-call 911 as soon as signs and symptoms begin-DO NOT go Back to bed or wait to see if you get better-TIME IS BRAIN. Warning Signs of HEART ATTACK Call 911 if you have these symptoms: 
? Chest discomfort. Most heart attacks involve discomfort in the center of the chest that lasts more than a few minutes, or that goes away and comes back. It can feel like uncomfortable pressure, squeezing, fullness, or pain. ? Discomfort in other areas of the upper body. Symptoms can include pain or discomfort in one or both arms, the back, neck, jaw, or stomach. ? Shortness of breath with or without chest discomfort. ? Other signs may include breaking out in a cold sweat, nausea, or lightheadedness. Don't wait more than five minutes to call [address]! Fast action can save your life. Calling 911 is almost always the fastest way to get lifesaving treatment. Emergency Medical Services staff can begin treatment when they arrive  up to an hour sooner than if someone gets to the hospital by car. The discharge information has been reviewed with the patient. The patient verbalized understanding. Discharge medications reviewed with the patient and appropriate educational materials and side effects teaching were provided. Discharge Orders Procedure Order Date Status Priority Quantity Spec Type Associated Dx REFERRAL TO [facility] 08/30/17 0940 Normal Routine 1 Mama Announcement We are excited to announce that we are making your provider's discharge notes available to you in Mama. You will see these notes when they are completed and signed by the physician that discharged you from your recent hospital stay. If you have any questions or concerns about any information you see in Mama, please call the Health Information Department where you were seen or reach out to your Primary Care Provider for more information about your plan of care. Introducing [facility] SERVICES! [facility] introduces Mama patient portal. Now you can access parts of your medical record, email your doctor's office, and request medication refills online. 1. In your internet browser, go to [email or web address]://[email or web address]. [employer]hart 2. Click on the First Time User? Click Here link in the Sign In box. You will see the New Member Sign Up page. 3. Enter your Fanattac Access Code exactly as it appears below. You will not need to use this code after youve completed the sign-up process. If you do not sign up before the expiration date, you must request a new code. · Fanattac Access Code: [email or web address] Expires: 11/26/2017  9:37 PM 
 
4. Enter the last four digits of your Social Security Number (xxxx) and Date of Birth (mm/dd/yyyy) as indicated and click Submit. You will be taken to the next sign-up page. 5. Create a Fanattac ID. This will be your Fanattac login ID and cannot be changed, so think of one that is secure and easy to remember. 6. Create a Fanattac password. You can change your password at any time. 7. Enter your Password Reset Question and Answer. This can be used at a later time if you forget your password. 8. Enter your e-mail address. You will receive e-mail notification when new information is available in [address]. 9. Click Sign Up. You can now view and download portions of your medical record. 10. Click the Download Summary menu link to download a portable copy of your medical information. If you have questions, please visit the Frequently Asked Questions section of the Fanattac website. Remember, Fanattac is NOT to be used for urgent needs. For medical emergencies, dial 911. Now available from your iPhone and Android! General Information Please provide this summary of care documentation to your next provider. Patient Signature:  ____________________________________________________________ Date:  ____________________________________________________________  
  
[place] Thomas Provider Signature:  ____________________________________________________________ Date:  ____________________________________________________________

## 2017-08-28 NOTE — IP AVS SNAPSHOT
Home 
 
 
 [address] [place] [address] 
[phone] Patient: Daniel Roberts MRN: [id number] Current Discharge Medication List  
  
START taking these medications Dose & Instructions Dispensing Information Comments Morning Noon Evening Bedtime  
 aspirin 81 mg chewable tablet Dose:  81 mg Take 1 Tab by mouth daily. Refills:  0  
     
  
   
   
   
  
 atorvastatin 80 mg tablet Commonly known as:  LIPITOR Dose:  80 mg Take 1 Tab by mouth nightly. Quantity:  30 Tab Refills:  11  
     
   
   
   
  
  
 lisinopril 5 mg tablet Commonly known as:  [place] Fairy Dose:  5 mg Take 1 Tab by mouth daily. Quantity:  30 Tab Refills:  6  
     
  
   
   
   
  
 metoprolol tartrate 25 mg tablet Commonly known as:  LOPRESSOR Dose:  25 mg Take 1 Tab by mouth two (2) times a day. Quantity:  60 Tab Refills:  6  
     
  
   
   
  
   
  
 prasugrel 10 mg tablet Commonly known as:  EFFIENT Dose:  10 mg Take 1 Tab by mouth daily. Quantity:  30 Tab Refills:  11 CONTINUE these medications which have NOT CHANGED Dose & Instructions Dispensing Information Comments Morning Noon Evening Bedtime  
 nitroglycerin 0.4 mg SL tablet Commonly known as:  NITROSTAT Notes to Patient:  As needed. Dose:  0.4 mg  
1 Tab by SubLINGual route every five (5) minutes as needed for Chest Pain. Quantity:  2 Bottle Refills:  4 Where to Get Your Medications Information on where to get these meds will be given to you by the nurse or doctor. ! Ask your nurse or doctor about these medications  
  atorvastatin 80 mg tablet  
 lisinopril 5 mg tablet  
 metoprolol tartrate 25 mg tablet  
 nitroglycerin 0.4 mg SL tablet  
 prasugrel 10 mg tablet

## 2017-08-29 LAB
ANION GAP SERPL CALC-SCNC: 8 MMOL/L (ref 7–16)
APTT PPP: 101.7 SEC (ref 23.5–31.7)
APTT PPP: 84.3 SEC (ref 23.5–31.7)
ATRIAL RATE: 69 BPM
BUN SERPL-MCNC: 14 MG/DL (ref 8–23)
CALCIUM SERPL-MCNC: 7.7 MG/DL (ref 8.3–10.4)
CALCULATED P AXIS, ECG09: 56 DEGREES
CALCULATED R AXIS, ECG10: 12 DEGREES
CALCULATED T AXIS, ECG11: 69 DEGREES
CHLORIDE SERPL-SCNC: 109 MMOL/L (ref 98–107)
CHOLEST SERPL-MCNC: 116 MG/DL
CO2 SERPL-SCNC: 24 MMOL/L (ref 21–32)
CREAT SERPL-MCNC: 1.24 MG/DL (ref 0.8–1.5)
DIAGNOSIS, 93000: NORMAL
ERYTHROCYTE [DISTWIDTH] IN BLOOD BY AUTOMATED COUNT: 14.7 % (ref 11.9–14.6)
GLUCOSE SERPL-MCNC: 104 MG/DL (ref 65–100)
HCT VFR BLD AUTO: 31.6 % (ref 41.1–50.3)
HDLC SERPL-MCNC: 41 MG/DL (ref 40–60)
HDLC SERPL: 2.8 {RATIO}
HGB BLD-MCNC: 11.1 G/DL (ref 13.6–17.2)
LDLC SERPL CALC-MCNC: 55 MG/DL
LIPID PROFILE,FLP: NORMAL
MCH RBC QN AUTO: 26.5 PG (ref 26.1–32.9)
MCHC RBC AUTO-ENTMCNC: 35.1 G/DL (ref 31.4–35)
MCV RBC AUTO: 75.4 FL (ref 79.6–97.8)
P-R INTERVAL, ECG05: 162 MS
PLATELET # BLD AUTO: 182 K/UL (ref 150–450)
PMV BLD AUTO: 10.8 FL (ref 10.8–14.1)
POTASSIUM SERPL-SCNC: 3.5 MMOL/L (ref 3.5–5.1)
Q-T INTERVAL, ECG07: 396 MS
QRS DURATION, ECG06: 92 MS
QTC CALCULATION (BEZET), ECG08: 424 MS
RBC # BLD AUTO: 4.19 M/UL (ref 4.23–5.67)
SODIUM SERPL-SCNC: 141 MMOL/L (ref 136–145)
TRIGL SERPL-MCNC: 100 MG/DL (ref 35–150)
TROPONIN I SERPL-MCNC: 0.67 NG/ML (ref 0.02–0.05)
VENTRICULAR RATE, ECG03: 69 BPM
VLDLC SERPL CALC-MCNC: 20 MG/DL (ref 6–23)
WBC # BLD AUTO: 3.5 K/UL (ref 4.3–11.1)

## 2017-08-29 PROCEDURE — 74011250637 HC RX REV CODE- 250/637: Performed by: NURSE PRACTITIONER

## 2017-08-29 PROCEDURE — C1874 STENT, COATED/COV W/DEL SYS: HCPCS

## 2017-08-29 PROCEDURE — 77030029997 HC DEV COM RDL R BND TELE -B

## 2017-08-29 PROCEDURE — 99407 BEHAV CHNG SMOKING > 10 MIN: CPT

## 2017-08-29 PROCEDURE — 74011000258 HC RX REV CODE- 258: Performed by: INTERNAL MEDICINE

## 2017-08-29 PROCEDURE — 85730 THROMBOPLASTIN TIME PARTIAL: CPT | Performed by: INTERNAL MEDICINE

## 2017-08-29 PROCEDURE — 74011250637 HC RX REV CODE- 250/637: Performed by: INTERNAL MEDICINE

## 2017-08-29 PROCEDURE — 027034Z DILATION OF CORONARY ARTERY, ONE ARTERY WITH DRUG-ELUTING INTRALUMINAL DEVICE, PERCUTANEOUS APPROACH: ICD-10-PCS | Performed by: INTERNAL MEDICINE

## 2017-08-29 PROCEDURE — C1769 GUIDE WIRE: HCPCS

## 2017-08-29 PROCEDURE — 93458 L HRT ARTERY/VENTRICLE ANGIO: CPT

## 2017-08-29 PROCEDURE — 84484 ASSAY OF TROPONIN QUANT: CPT | Performed by: NURSE PRACTITIONER

## 2017-08-29 PROCEDURE — 4A023N7 MEASUREMENT OF CARDIAC SAMPLING AND PRESSURE, LEFT HEART, PERCUTANEOUS APPROACH: ICD-10-PCS | Performed by: INTERNAL MEDICINE

## 2017-08-29 PROCEDURE — 80048 BASIC METABOLIC PNL TOTAL CA: CPT | Performed by: NURSE PRACTITIONER

## 2017-08-29 PROCEDURE — C1887 CATHETER, GUIDING: HCPCS

## 2017-08-29 PROCEDURE — 77030015766

## 2017-08-29 PROCEDURE — 74011250636 HC RX REV CODE- 250/636: Performed by: INTERNAL MEDICINE

## 2017-08-29 PROCEDURE — 74011000250 HC RX REV CODE- 250: Performed by: INTERNAL MEDICINE

## 2017-08-29 PROCEDURE — B2151ZZ FLUOROSCOPY OF LEFT HEART USING LOW OSMOLAR CONTRAST: ICD-10-PCS | Performed by: INTERNAL MEDICINE

## 2017-08-29 PROCEDURE — 85027 COMPLETE CBC AUTOMATED: CPT | Performed by: NURSE PRACTITIONER

## 2017-08-29 PROCEDURE — 65660000000 HC RM CCU STEPDOWN

## 2017-08-29 PROCEDURE — 80061 LIPID PANEL: CPT | Performed by: NURSE PRACTITIONER

## 2017-08-29 PROCEDURE — 99152 MOD SED SAME PHYS/QHP 5/>YRS: CPT

## 2017-08-29 PROCEDURE — 36415 COLL VENOUS BLD VENIPUNCTURE: CPT | Performed by: NURSE PRACTITIONER

## 2017-08-29 PROCEDURE — B2111ZZ FLUOROSCOPY OF MULTIPLE CORONARY ARTERIES USING LOW OSMOLAR CONTRAST: ICD-10-PCS | Performed by: INTERNAL MEDICINE

## 2017-08-29 PROCEDURE — 99153 MOD SED SAME PHYS/QHP EA: CPT

## 2017-08-29 PROCEDURE — 74011636320 HC RX REV CODE- 636/320: Performed by: INTERNAL MEDICINE

## 2017-08-29 PROCEDURE — 77030004534 HC CATH ANGI DX INFN CARD -A

## 2017-08-29 PROCEDURE — 74011250636 HC RX REV CODE- 250/636: Performed by: NURSE PRACTITIONER

## 2017-08-29 PROCEDURE — 92928 PRQ TCAT PLMT NTRAC ST 1 LES: CPT

## 2017-08-29 PROCEDURE — 74011250636 HC RX REV CODE- 250/636

## 2017-08-29 PROCEDURE — C1894 INTRO/SHEATH, NON-LASER: HCPCS

## 2017-08-29 PROCEDURE — 77030012468 HC VLV BLEEDBK CNTRL ABBT -B

## 2017-08-29 RX ORDER — ONDANSETRON 2 MG/ML
4 INJECTION INTRAMUSCULAR; INTRAVENOUS
Status: DISCONTINUED | OUTPATIENT
Start: 2017-08-29 | End: 2017-08-30 | Stop reason: HOSPADM

## 2017-08-29 RX ORDER — GUAIFENESIN 100 MG/5ML
81 LIQUID (ML) ORAL DAILY
Status: DISCONTINUED | OUTPATIENT
Start: 2017-08-29 | End: 2017-08-30 | Stop reason: HOSPADM

## 2017-08-29 RX ORDER — METOPROLOL TARTRATE 25 MG/1
25 TABLET, FILM COATED ORAL 2 TIMES DAILY
Status: DISCONTINUED | OUTPATIENT
Start: 2017-08-29 | End: 2017-08-30 | Stop reason: HOSPADM

## 2017-08-29 RX ORDER — SODIUM CHLORIDE 0.9 % (FLUSH) 0.9 %
5-10 SYRINGE (ML) INJECTION AS NEEDED
Status: DISCONTINUED | OUTPATIENT
Start: 2017-08-29 | End: 2017-08-30 | Stop reason: HOSPADM

## 2017-08-29 RX ORDER — GUAIFENESIN 100 MG/5ML
81 LIQUID (ML) ORAL DAILY
Status: DISCONTINUED | OUTPATIENT
Start: 2017-08-30 | End: 2017-08-30

## 2017-08-29 RX ORDER — LIDOCAINE HYDROCHLORIDE 20 MG/ML
60-200 INJECTION, SOLUTION INFILTRATION; PERINEURAL
Status: DISCONTINUED | OUTPATIENT
Start: 2017-08-29 | End: 2017-08-29

## 2017-08-29 RX ORDER — MORPHINE SULFATE 2 MG/ML
2 INJECTION, SOLUTION INTRAMUSCULAR; INTRAVENOUS
Status: DISCONTINUED | OUTPATIENT
Start: 2017-08-29 | End: 2017-08-30 | Stop reason: HOSPADM

## 2017-08-29 RX ORDER — PHENYLEPHRINE HYDROCHLORIDE 10 MG/ML
100 INJECTION INTRAVENOUS
Status: DISCONTINUED | OUTPATIENT
Start: 2017-08-29 | End: 2017-08-29

## 2017-08-29 RX ORDER — MIDAZOLAM HYDROCHLORIDE 1 MG/ML
.5-2 INJECTION, SOLUTION INTRAMUSCULAR; INTRAVENOUS
Status: DISCONTINUED | OUTPATIENT
Start: 2017-08-29 | End: 2017-08-29

## 2017-08-29 RX ORDER — HEPARIN SODIUM 200 [USP'U]/100ML
3 INJECTION, SOLUTION INTRAVENOUS CONTINUOUS
Status: DISCONTINUED | OUTPATIENT
Start: 2017-08-29 | End: 2017-08-29

## 2017-08-29 RX ORDER — PRASUGREL 10 MG/1
10 TABLET, FILM COATED ORAL DAILY
Status: DISCONTINUED | OUTPATIENT
Start: 2017-08-30 | End: 2017-08-30 | Stop reason: HOSPADM

## 2017-08-29 RX ORDER — SODIUM CHLORIDE 0.9 % (FLUSH) 0.9 %
5-10 SYRINGE (ML) INJECTION EVERY 8 HOURS
Status: DISCONTINUED | OUTPATIENT
Start: 2017-08-29 | End: 2017-08-30 | Stop reason: HOSPADM

## 2017-08-29 RX ORDER — SODIUM CHLORIDE 9 MG/ML
75 INJECTION, SOLUTION INTRAVENOUS CONTINUOUS
Status: DISCONTINUED | OUTPATIENT
Start: 2017-08-29 | End: 2017-08-30 | Stop reason: HOSPADM

## 2017-08-29 RX ORDER — FENTANYL CITRATE 50 UG/ML
25-100 INJECTION, SOLUTION INTRAMUSCULAR; INTRAVENOUS
Status: DISCONTINUED | OUTPATIENT
Start: 2017-08-29 | End: 2017-08-29

## 2017-08-29 RX ORDER — PRASUGREL 10 MG/1
60 TABLET, FILM COATED ORAL ONCE
Status: COMPLETED | OUTPATIENT
Start: 2017-08-29 | End: 2017-08-29

## 2017-08-29 RX ORDER — NITROGLYCERIN 0.4 MG/1
0.4 TABLET SUBLINGUAL
Status: DISCONTINUED | OUTPATIENT
Start: 2017-08-29 | End: 2017-08-30 | Stop reason: HOSPADM

## 2017-08-29 RX ADMIN — NITROGLYCERIN 1 INCH: 20 OINTMENT TOPICAL at 01:39

## 2017-08-29 RX ADMIN — LIDOCAINE HYDROCHLORIDE 60 MG: 20 INJECTION, SOLUTION INFILTRATION; PERINEURAL at 11:05

## 2017-08-29 RX ADMIN — METOPROLOL TARTRATE 25 MG: 25 TABLET ORAL at 21:54

## 2017-08-29 RX ADMIN — IOPAMIDOL 120 ML: 755 INJECTION, SOLUTION INTRAVENOUS at 11:34

## 2017-08-29 RX ADMIN — Medication 10 ML: at 13:38

## 2017-08-29 RX ADMIN — PHENYLEPHRINE HYDROCHLORIDE 100 MCG: 10 INJECTION INTRAVENOUS at 11:14

## 2017-08-29 RX ADMIN — HEPARIN SODIUM 3 UNITS/HR: 200 INJECTION, SOLUTION INTRAVENOUS at 11:08

## 2017-08-29 RX ADMIN — ASPIRIN 81 MG 81 MG: 81 TABLET ORAL at 08:43

## 2017-08-29 RX ADMIN — SODIUM CHLORIDE 75 ML/HR: 900 INJECTION, SOLUTION INTRAVENOUS at 01:40

## 2017-08-29 RX ADMIN — FENTANYL CITRATE 50 MCG: 50 INJECTION, SOLUTION INTRAMUSCULAR; INTRAVENOUS at 11:05

## 2017-08-29 RX ADMIN — NITROGLYCERIN 1 INCH: 20 OINTMENT TOPICAL at 05:43

## 2017-08-29 RX ADMIN — HEPARIN SODIUM 2 ML: 10000 INJECTION, SOLUTION INTRAVENOUS; SUBCUTANEOUS at 11:08

## 2017-08-29 RX ADMIN — METOPROLOL TARTRATE 25 MG: 25 TABLET ORAL at 01:39

## 2017-08-29 RX ADMIN — Medication 10 ML: at 01:40

## 2017-08-29 RX ADMIN — Medication 10 ML: at 05:44

## 2017-08-29 RX ADMIN — METOPROLOL TARTRATE 25 MG: 25 TABLET ORAL at 08:44

## 2017-08-29 RX ADMIN — HEPARIN SODIUM 3 UNITS/HR: 200 INJECTION, SOLUTION INTRAVENOUS at 11:05

## 2017-08-29 RX ADMIN — BIVALIRUDIN 1.75 MG/KG/HR: 250 INJECTION, POWDER, LYOPHILIZED, FOR SOLUTION INTRAVENOUS at 11:19

## 2017-08-29 RX ADMIN — MIDAZOLAM HYDROCHLORIDE 2 MG: 1 INJECTION, SOLUTION INTRAMUSCULAR; INTRAVENOUS at 11:05

## 2017-08-29 RX ADMIN — PRASUGREL HYDROCHLORIDE 60 MG: 10 TABLET, FILM COATED ORAL at 11:34

## 2017-08-29 NOTE — PROGRESS NOTES
TRANSFER - IN REPORT:    Verbal report received from Velma Dominguez RN on Aldean Bickers being received from ED for routine progression of care      Report consisted of patients Situation, Background, Assessment and Recommendations(SBAR). Information from the following report(s) ED Summary and Cardiac Rhythm SR was reviewed with the receiving nurse. Opportunity for questions and clarification was provided. Assessment completed upon patients arrival to unit and care assumed. Patients skin clean and dry with no visible signs of breakdown. Bilateral Heels dry and flaky but intact. Sacrum clean and dry. Patient oriented to room and call light.  Pt encouraged to call with needs

## 2017-08-29 NOTE — PROCEDURES
[address] 43       Name:  Desi Mcdonald   MR#:  [id number]   :  1945   Account #:  [de-identified]   Date of Adm:  2017       PROCEDURE:   1. Left heart catheterization. 2. Selective coronary angiography. 3. Left ventriculogram with percutaneous intervention of infarct   related OM. COMPLICATIONS: None. INDICATIONS: Non-STEMI. TIME: 11:05 to 11:35. SEDATION: Two mg of Versed and 50 mcg fentanyl were given by   Francine Clark RN.    HEMODYNAMICS: Aortic pressure 120/66. LVEDP of 12. TOTAL CONTRAST: 120 mL. ACCESS: Right radial access was used. A TIG 4, JL 3.5 and   pigtail were used for diagnostic images. Intervention was done with a JL 3.5 guide and a 3.0 x 20 Synergy   drug-eluting stent. FINDINGS: Left ventriculogram done in SEWELL projection shows   global hypokinesis, ejection fraction 40%. No gradient on   pullback. There is slow filling of all the coronaries indicative   of diffuse endothelial dysfunction. The left main arises normally, bifurcates into an LAD and   circumflex. Left main is large and normal.      The LAD courses to the apex, supplies 2 diagonals. The LAD and   diagonals have diffuse moderate 30% plaquing, but no high-grade   or culprit lesions. The circumflex artery courses in the AV groove. It is a large   caliber vessel that supplies significant small OM1,   a significant OM2, and significant OM3. The circumflex has   diffuse mild nonobstructive disease. OM1 and OM2 have mild to   moderate disease. OM3 which is actually in the mid portion of   the circumflex has a subtotal occlusion with DIRK 1 flow. The   terminal circumflex has mild to moderate 30% to 40% disease. The right coronary artery arises in a normal fashion. It is a   large, dominant artery, again with slow filling and diffuse   moderate 30% disease. There are areas in the mid right of 50%   areas of stenosis.       The patient was given therapeutic Angiomax dose and a Prowater   wire was placed into the terminal portion of the OM3, i.e. the   culprit infarct-related artery. Direct stenting was performed with   a 2.5 x 20 Synergy LENA deployed to 22 atmospheres with excellent   results and total effacement of the stenosis and area of   occlusion. There was DIRK-3 flow after stenting. CONCLUSIONS: Diffuse moderate coronary artery disease with an   occluded mid vessel OM. This vessel underwent stenting with a   2.5 x 20 Synergy LENA. The patient will be treated with dual   antiplatelet therapy.         MD Carolina Cabral / Gray Barrera   D:  08/29/2017   11:43   T:  08/29/2017   11:57   Job #:  [id number]

## 2017-08-29 NOTE — PROGRESS NOTES
Patient returned to room 309 via hospital transport and cath lab RN. Right radial site remains intact. Sumaya JOLLY present and agreed with site.

## 2017-08-29 NOTE — PROGRESS NOTES
8/29/2017 6:42 AM    Admit Date: 8/28/2017    Admit Diagnosis: Unstable angina (HCC)      Subjective:    Patient with ACS. ccc 4 angina.  For cath today    Objective:      Visit Vitals    BP 99/56 (BP 1 Location: Left arm, BP Patient Position: At rest)    Pulse (!) 51    Temp 97.4 °F (36.3 °C)    Resp 16    Ht 5' 8\" (1.727 m)    Wt 63.1 kg (139 lb 1.6 oz)    SpO2 98%    BMI 21.15 kg/m2       ROS:  General ROS: negative for - chills  Hematological and Lymphatic ROS: negative for - blood clots or jaundice  Respiratory ROS: no cough, shortness of breath, or wheezing  Cardiovascular ROS: no chest pain or dyspnea on exertion  Gastrointestinal ROS: no abdominal pain, change in bowel habits, or black or bloody stools  Neurological ROS: no TIA or stroke symptoms    Physical Exam:    Physical Examination: General appearance - alert, well appearing, and in no distress  Mental status - alert, oriented to person, place, and time  Eyes - pupils equal and reactive, extraocular eye movements intact  Neck/lymph - supple, no significant adenopathy  Chest/CV - clear to auscultation, no wheezes, rales or rhonchi, symmetric air entry  Heart - normal rate, regular rhythm, normal S1, S2, no murmurs, rubs, clicks or gallops  Abdomen/GI - soft, nontender, nondistended, no masses or organomegaly  Musculoskeletal - no joint tenderness, deformity or swelling  Extremities - peripheral pulses normal, no pedal edema, no clubbing or cyanosis  Skin - normal coloration and turgor, no rashes, no suspicious skin lesions noted    Current Facility-Administered Medications   Medication Dose Route Frequency    sodium chloride (NS) flush 5-10 mL  5-10 mL IntraVENous Q8H    sodium chloride (NS) flush 5-10 mL  5-10 mL IntraVENous PRN    aspirin chewable tablet 81 mg  81 mg Oral DAILY    nitroglycerin (NITROBID) 2 % ointment 1 Inch  1 Inch Topical Q6H    nitroglycerin (NITROSTAT) tablet 0.4 mg  0.4 mg SubLINGual Q5MIN PRN    morphine injection 2 mg  2 mg IntraVENous Q4H PRN    ondansetron (ZOFRAN) injection 4 mg  4 mg IntraVENous Q4H PRN    0.9% sodium chloride infusion  75 mL/hr IntraVENous CONTINUOUS    metoprolol tartrate (LOPRESSOR) tablet 25 mg  25 mg Oral BID    heparin 25,000 units in dextrose 500 mL infusion  12-25 Units/kg/hr IntraVENous TITRATE       Data Review:   @LABRCNT(Na,K,BUN,CREA,WBC,HGB,HCT,PLT,INR,TRP,TCHOL*,Triglyceride*,LDL*,LDLCPOC HDL*,HDL])@    TELEMETRY: nsr    Assessment/Plan:     Principal Problem:    Unstable angina ([facility] [address] (8/28/2017) Risk and benefits discussed. Risk of bleeding, infection, vascular injury, kidney failure or CVA reviewed. Risk of emergent surgery or death discussed. Active Problems:    Coronary atherosclerosis of native coronary vessel () The current medical regimen is effective;  continue present plan and medications. Hyperlipidemia () The current medical regimen is effective;  continue present plan and medications.             Prabhu oCrdova MD

## 2017-08-29 NOTE — PROGRESS NOTES
Radial compression band removed at 1725 after slowly reducing air from 8 cc to zero as per hospital protocol. No bleeding or hematoma noted. 2 x 2 gauze with tegaderm placed over puncture site. The affected extremity is warm and dry to the touch. Frequent vital signs documented per flowheet. Patient instructed to call if any bleeding noted on gauze. Patient verbalized understanding the nursing instructions.

## 2017-08-29 NOTE — PROGRESS NOTES
Problem: Unstable angina/NSTEMI: Day of Admission/Day 1  Goal: Activity/Safety  Outcome: Resolved/Met Date Met:  08/29/17  Bed low and locked, call light in reach, gripper socks on

## 2017-08-29 NOTE — PROGRESS NOTES
Patient received from [facility] cath lab RN. Patient alert with no complaints. Right radial site intact with no bleeding or hematoma noted. Will continue to monitor patient until completion of angiomax. Will return to room 309 at that time.

## 2017-08-29 NOTE — PROGRESS NOTES
TRANSFER - IN REPORT:    Verbal report received from Monica Vazquez RN on Pawan Gentile being received from cath lab for routine post - op      Report consisted of patients Situation, Background, Assessment and Recommendations(SBAR). Information from the following report(s) SBAR, Kardex, Procedure Summary and MAR was reviewed with the receiving nurse. Opportunity for questions and clarification was provided. Assessment completed upon patients arrival to unit and care assumed. Patient received back to floor, reassessment completed, right radial cath site assessed with cath lab nurse, patient educated on limited movement of right arm. Bed in low and locked position, family at bed side, call light within reach.

## 2017-08-29 NOTE — ED TRIAGE NOTES
Pt arrives via EMS for c/o chest pain. States first episode started around 5 pm. States was moving logs around when pain started. States took one nitro and pain went away. States second episode at 830 pm which was relieved with one nitro. EMS states 324 asa received.

## 2017-08-29 NOTE — ED PROVIDER NOTES
HPI Comments: 67 marrow history of CAD, prior MI with stent iin 2014 here with complain of chest pain. Was in his yard lifting some logs when he started having midsternal chest pain with associated diaphoresis and felt nauseous. He stopped took sublingual nitroglycerin and it went away. This episode occurred at 4 PM.  Shortly after that chest pain returned which he took another sublingual nitroglycerin and it again resolved. He decided to come to the emergency department. On the way in started having chest pain again. Currently 5 out of 10. Denies any fever, coughing, leg swelling. Was previously on a blood thinner but is no longer on blood thinner. Patient is a 67 y.o. male presenting with chest pain. The history is provided by the patient. Chest Pain (Angina)    Associated symptoms include diaphoresis. Pertinent negatives include no cough and no shortness of breath. 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 [facility]UL[facility]. PET positive.  Navigational bronch 10/16/13--non-diagnositic     Near syncope 1/21/2015    NSTEMI (non-ST elevated myocardial infarction) ([facility] [address] 12/31/2014    Pulmonary tuberculosis     S/P PTCA (percutaneous transluminal coronary angioplasty)     Shortness of breath 1/21/2015    TB peritonitis 5/2/2014       Past Surgical History:   Procedure Laterality Date    ABDOMEN SURGERY PROC UNLISTED      hernia    CHEST SURGERY PROCEDURE UNLISTED  11/2013    cervical mediastinal bx    HX OTHER SURGICAL      LUNG NODULE BIOPSY, TB    HX PTCA      TN LEFT HEART CATH,PERCUTANEOUS  12/31/2014    Xience stents to 2nd OM and pCirc         Family History:   Problem Relation Age of Onset    Hypertension Father      STROKE    Stroke Mother      HYPERTENSION    Hypertension Sister      3 LIVING SISTERS    Heart Disease Sister      stent- no MI    Other Sister     Other Child      6 LOCAL, ONE IN [place] IS RN    Other Brother        Social History     Social History    Marital status: UNKNOWN     Spouse name: N/A    Number of children: N/A    Years of education: N/A     Occupational History    Not on file. Social History Main Topics    Smoking status: Light Tobacco Smoker     Packs/day: 1.00     Years: 45.00     Types: Cigarettes    Smokeless tobacco: Never Used      Comment: QUIT 12/14    Alcohol use No      Comment: QUIT 12/14    Drug use: Yes     Special: Marijuana    Sexual activity: Not on file      Comment: not since November     Other Topics Concern    Not on file     Social History Narrative    1/21/15:  PATIENT IS SINGLE, LIVES WITH BROTHER. RETIRED FROM [employer]. HAS SEVEN CHILDREN, 6 LOCAL. ONE DAUGHTER IN [place] IS AN RN.           ALLERGIES: Review of patient's allergies indicates no known allergies. Review of Systems   Constitutional: Positive for diaphoresis. HENT: Negative. Eyes: Negative. Respiratory: Negative for cough, shortness of breath and wheezing. Cardiovascular: Positive for chest pain. Gastrointestinal: Negative. Genitourinary: Negative. Musculoskeletal: Negative. Skin: Negative. Neurological: Negative. Psychiatric/Behavioral: Negative.         Vitals:    08/28/17 2141 08/28/17 2159 08/28/17 2219 08/28/17 2226   BP: 144/86 141/89 141/86    Pulse: 68 64 62 65   Resp: 20 15  17   Temp: 98.5 °F (36.9 °C)      SpO2: 99% 98% 96% 98%   Weight: 68 kg (150 lb)      Height: 5' 8\" (1.727 m)               Physical Exam   Constitutional: He is oriented to person, place, and time. He appears well-developed and well-nourished. No distress. HENT:   Head: Normocephalic and atraumatic. Eyes: Conjunctivae and EOM are normal. Pupils are equal, round, and reactive to light. Neck: Normal range of motion. Neck supple. Cardiovascular: Normal rate, regular rhythm, normal heart sounds and intact distal pulses. Exam reveals no friction rub. No murmur heard. Pulmonary/Chest: Effort normal and breath sounds normal. No respiratory distress. He has no wheezes. He has no rales. Abdominal: Soft. Bowel sounds are normal. He exhibits no distension. There is no tenderness. There is no rebound and no guarding. Musculoskeletal: Normal range of motion. He exhibits no edema or deformity. Neurological: He is alert and oriented to person, place, and time. No cranial nerve deficit. Coordination normal.   Skin: Skin is warm and dry. No rash noted. He is not diaphoretic. No erythema. No pallor. Psychiatric: He has a normal mood and affect. MDM  Number of Diagnoses or Management Options  Diagnosis management comments: eKG obtained and interpreted by me. Rate of 69 sinus rhythm normal axis and interval.  There is no ST changes consistent with acute STEMI ischemia. Nonspecific T-wave changes. No ectopy or Brugada noted. Troponin negative. Chest x-ray without signs of pneumonia, consolidation. No pneumothorax. He has no clinical history that is consistent with pneumonia. Last cardiology visit appeared to be in April 2016. Nitropaste placed. 4 baby aspirin has been given by EMS. He is now pain-free. Multiple risk factor. Recommend admission.   Spoke with cardiology who has agreed with the plan and will come to evaluate the patient. Dragon voice recognition software was used to create this note. Although the note has been reviewed and corrected where necessary, additional errors may have been overlooked and remain in the text. Amount and/or Complexity of Data Reviewed  Clinical lab tests: reviewed and ordered  Tests in the radiology section of CPT®: ordered and reviewed      ED Course     Results for orders placed or performed during the hospital encounter of 90/85/06   METABOLIC PANEL, COMPREHENSIVE   Result Value Ref Range    Sodium 140 136 - 145 mmol/L    Potassium 4.4 3.5 - 5.1 mmol/L    Chloride 107 98 - 107 mmol/L    CO2 25 21 - 32 mmol/L    Anion gap 8 7 - 16 mmol/L    Glucose 88 65 - 100 mg/dL    BUN 14 8 - 23 MG/DL    Creatinine 1.30 0.8 - 1.5 MG/DL    GFR est AA >60 >60 ml/min/1.73m2    GFR est non-AA 58 (L) >60 ml/min/1.73m2    Calcium 7.9 (L) 8.3 - 10.4 MG/DL    Bilirubin, total 0.5 0.2 - 1.1 MG/DL    ALT (SGPT) 110 (H) 12 - 65 U/L    AST (SGOT) 113 (H) 15 - 37 U/L    Alk. phosphatase 105 50 - 136 U/L    Protein, total 9.0 (H) 6.3 - 8.2 g/dL    Albumin 3.0 (L) 3.2 - 4.6 g/dL    Globulin 6.0 (H) 2.3 - 3.5 g/dL    A-G Ratio 0.5 (L) 1.2 - 3.5     CBC WITH AUTOMATED DIFF   Result Value Ref Range    WBC 3.8 (L) 4.3 - 11.1 K/uL    RBC 4.53 4.23 - 5.67 M/uL    HGB 12.2 (L) 13.6 - 17.2 g/dL    HCT 34.6 (L) 41.1 - 50.3 %    MCV 76.4 (L) 79.6 - 97.8 FL    MCH 26.9 26.1 - 32.9 PG    MCHC 35.3 (H) 31.4 - 35.0 g/dL    RDW 14.8 (H) 11.9 - 14.6 %    PLATELET 784 593 - 524 K/uL    MPV 10.6 (L) 10.8 - 14.1 FL    DF AUTOMATED      NEUTROPHILS 53 43 - 78 %    LYMPHOCYTES 28 13 - 44 %    MONOCYTES 17 (H) 4.0 - 12.0 %    EOSINOPHILS 2 0.5 - 7.8 %    BASOPHILS 0 0.0 - 2.0 %    IMMATURE GRANULOCYTES 0.5 0.0 - 5.0 %    ABS. NEUTROPHILS 2.0 1.7 - 8.2 K/UL    ABS. LYMPHOCYTES 1.1 0.5 - 4.6 K/UL    ABS. MONOCYTES 0.7 0.1 - 1.3 K/UL    ABS. EOSINOPHILS 0.1 0.0 - 0.8 K/UL    ABS. BASOPHILS 0.0 0.0 - 0.2 K/UL    ABS. IMM.  GRANS. 0.0 0.0 - 0.5 K/UL   POC TROPONIN-I   Result Value Ref Range    Troponin-I (POC) 0.04 0.0 - 0.08 ng/ml   EKG, 12 LEAD, INITIAL   Result Value Ref Range    Ventricular Rate 69 BPM    Atrial Rate 69 BPM    P-R Interval 162 ms    QRS Duration 92 ms    Q-T Interval 396 ms    QTC Calculation (Bezet) 424 ms    Calculated P Axis 56 degrees    Calculated R Axis 12 degrees    Calculated T Axis 69 degrees    Diagnosis       !! AGE AND GENDER SPECIFIC ECG ANALYSIS !!   Normal sinus rhythm  Nonspecific T wave abnormality  Abnormal ECG  When compared with ECG of 01-JAN-2015 06:45,  T wave inversion no longer evident in Anterior leads           Procedures

## 2017-08-29 NOTE — PROGRESS NOTES
Verbal and bedside report received from Riley Cooney RN and Sarika Aparicio RN.  Right radial site visualized at bedside

## 2017-08-29 NOTE — PROGRESS NOTES
Bedside and Verbal shift change report given to Stan Bashir RN (oncoming nurse) by self   Maria Teresa Argue nurse). Report included the following information SBAR, Kardex, Procedure Summary, Intake/Output and MAR.

## 2017-08-29 NOTE — PROCEDURES
Cardiac Catheterization Procedure Note    Patient ID:     Name: Bird Colmenares   Medical Record Number: [id number]   YOB: 1945    Date of Procedure: 8/29/2017    Physician: Zane Pastrana MD    Referring mic    Indications: This is a 67 yrs male who presents with nstemi. Blood loss less than 5 ml    Sedation. Pt received 2 mg versed and 25 mcg fentanyl for monitored conscious sedation in 2 15 minute intervals. Specimen: None    No complications    No assistants    Time out, Mallampati, and ASA performed    Procedure:  After informed consent, patient was prepped and draped in the usual sterile fashion. The right wrist was infiltrated with lidocaine. The right radial artery was accessed via the modified Seldinger technique with a 6 Serbian sheath. 120cc Visipaque contrast were utilized for the entire procedure. no closure device used    Catheters. FINDINGS    Left Ventricle: 40  LVEDP: 12    Left Main:ok    Left Anterior descending coronary artery: diffuse mild disease    Left Circumflex coronary artery: mild disease.  OM1 occluded    Right coronary artery: diffuse moderate disease      Graft anatomy: na    Intervention if done: stent to om1 2.5x20 dieter    Conclusions: one vessel stenting    Recommentations: dapt    No complications      Signed By: Zane Pastrana MD

## 2017-08-29 NOTE — PROGRESS NOTES
Spoke with Kristen Alvarez in Lab regarding Patients PTT that was drawn at 0600. Per Kristen Alvarez, she would check on PTT.  Will pass to next shift to continue to monitor for results

## 2017-08-29 NOTE — PROGRESS NOTES
Bedside and Verbal shift change report given to Self (oncoming nurse) by Jalil Layton RN (offgoing nurse). Report included the following information SBAR, Kardex, ED Summary, Procedure Summary, Intake/Output, MAR, Recent Results and Cardiac Rhythm NSR.

## 2017-08-29 NOTE — ED NOTES
TRANSFER - OUT REPORT:    Verbal report given to Itzel Shanks RN(name) on Karina Barr  being transferred to [facility](unit) for routine progression of care       Report consisted of patients Situation, Background, Assessment and   Recommendations(SBAR). Information from the following report(s) SBAR, ED Summary, [facility] and Recent Results was reviewed with the receiving nurse. Lines:   Peripheral IV 01/23/15 Left Forearm (Active)       Peripheral IV 08/28/17 Left Arm (Active)        Opportunity for questions and clarification was provided.       Patient transported with:   Registered Nurse

## 2017-08-29 NOTE — H&P
[address] Cardiology History & Physical      Date of  Admission: 8/28/2017  9:42 PM     Primary Care Physician: Unknown  Primary Cardiologist: Dr. Pantoja Most  Admitting Physician: Dr. Shantanu Heller    CC: chest pain     HPI:  Shagufta Vazquez is a 67 y.o. male with past medical history of CAD with POBA in 2016 and previous stents, GIB d/t esophageal tears, hyperlipidemia, and elevated liver enzymes who presented to the ER via EMS with complaints of chest pain. Patient states started tonight around 5pm while moving logs in his yard. He took a SL nitro with resolution of pain. His pain returned around 8pm tonight and again resolved with SL nitro. Associated symptoms include diaphoresis, nausea/vomiting/ and shortness of breath. Upon arrival to the ER, patient continued to complain of 5/10 substernal chest pain. He was treated with topical nitro. Patient states that he took 324mg ASA at home per EMS instruction. He is currently chest pain free. States that he is taking no medications on a daily basis. He was taken off of Plavix in 2016 due to epistaxis. Reports no other issues with bleeding since stopping Plavix. 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 GHULAM. PET positive.  Navigational bronch 10/16/13--non-diagnositic     Near syncope 1/21/2015    NSTEMI (non-ST elevated myocardial infarction) ([facility] [address] 12/31/2014    Pulmonary tuberculosis     S/P PTCA (percutaneous transluminal coronary angioplasty)     Shortness of breath 1/21/2015    TB peritonitis 5/2/2014      Past Surgical History:   Procedure Laterality Date    ABDOMEN SURGERY PROC UNLISTED      hernia    CHEST SURGERY PROCEDURE UNLISTED  11/2013    cervical mediastinal bx    HX OTHER SURGICAL      LUNG NODULE BIOPSY, TB    HX PTCA      PA LEFT HEART CATH,PERCUTANEOUS  12/31/2014    Xience stents to 2nd OM and pCirc       No Known Allergies   Social History     Social History    Marital status: UNKNOWN     Spouse name: N/A    Number of children: N/A    Years of education: N/A     Occupational History    Not on file. Social History Main Topics    Smoking status: Light Tobacco Smoker     Packs/day: 1.00     Years: 45.00     Types: Cigarettes    Smokeless tobacco: Never Used      Comment: QUIT 12/14    Alcohol use No      Comment: QUIT 12/14    Drug use: Yes     Special: Marijuana    Sexual activity: Not on file      Comment: not since November     Other Topics Concern    Not on file     Social History Narrative    1/21/15:  PATIENT IS SINGLE, LIVES WITH BROTHER. RETIRED FROM [employer]. HAS SEVEN CHILDREN, 6 LOCAL. ONE DAUGHTER IN [place] IS AN RN.        Family History   Problem Relation Age of Onset    Hypertension Father      STROKE    Stroke Mother      HYPERTENSION    Hypertension Sister      4 LIVING SISTERS    Heart Disease Sister      stent- no MI    Other Sister     Other Child      6 LOCAL, ONE IN [place] IS RN    Other Brother         Current Facility-Administered Medications   Medication Dose Route Frequency    heparin (porcine) injection 4,000 Units  4,000 Units IntraVENous ONCE    heparin 25,000 units in dextrose 500 mL infusion  12-25 Units/kg/hr IntraVENous TITRATE     Current Outpatient Prescriptions   Medication Sig    nitroglycerin (NITROSTAT) 0.4 mg SL tablet 1 tablet by SubLINGual route every five (5) minutes as needed for Chest Pain. Review of Systems    Review of Systems   Respiratory: Positive for shortness of breath. Cardiovascular: Positive for chest pain. Gastrointestinal: Positive for nausea and vomiting. All other systems reviewed and are negative. Subjective:     Visit Vitals    /82    Pulse (!) 59    Temp 98.5 °F (36.9 °C)    Resp 18    Ht 5' 8\" (1.727 m)    Wt 68 kg (150 lb)    SpO2 98%    BMI 22.81 kg/m2     Physical Exam   Constitutional: He is oriented to person, place, and time and well-developed, well-nourished, and in no distress. Eyes: Pupils are equal, round, and reactive to light. Neck: Normal range of motion. Neck supple. Cardiovascular: Normal rate and regular rhythm. Pulmonary/Chest: Effort normal and breath sounds normal.   Abdominal: Soft. Bowel sounds are normal.   Musculoskeletal: Normal range of motion. Neurological: He is alert and oriented to person, place, and time. Skin: Skin is warm and dry.    Psychiatric: Affect normal.       Cardiographics  Telemetry: normal sinus rhythm  ECG: normal EKG, normal sinus rhythm  Echocardiogram: Not done    Labs:   Recent Results (from the past 24 hour(s))   POC TROPONIN-I    Collection Time: 08/28/17  9:40 PM   Result Value Ref Range    Troponin-I (POC) 0.04 0.0 - 9.74 ng/ml   METABOLIC PANEL, COMPREHENSIVE    Collection Time: 08/28/17  9:42 PM   Result Value Ref Range    Sodium 140 136 - 145 mmol/L    Potassium 4.4 3.5 - 5.1 mmol/L    Chloride 107 98 - 107 mmol/L    CO2 25 21 - 32 mmol/L    Anion gap 8 7 - 16 mmol/L    Glucose 88 65 - 100 mg/dL    BUN 14 8 - 23 MG/DL    Creatinine 1.30 0.8 - 1.5 MG/DL    GFR est AA >60 >60 ml/min/1.73m2    GFR est non-AA 58 (L) >60 ml/min/1.73m2    Calcium 7.9 (L) 8.3 - 10.4 MG/DL    Bilirubin, total 0.5 0.2 - 1.1 MG/DL    ALT (SGPT) 110 (H) 12 - 65 U/L    AST (SGOT) 113 (H) 15 - 37 U/L    Alk. phosphatase 105 50 - 136 U/L    Protein, total 9.0 (H) 6.3 - 8.2 g/dL    Albumin 3.0 (L) 3.2 - 4.6 g/dL    Globulin 6.0 (H) 2.3 - 3.5 g/dL    A-G Ratio 0.5 (L) 1.2 - 3.5     CBC WITH AUTOMATED DIFF    Collection Time: 08/28/17  9:42 PM   Result Value Ref Range    WBC 3.8 (L) 4.3 - 11.1 K/uL    RBC 4.53 4.23 - 5.67 M/uL    HGB 12.2 (L) 13.6 - 17.2 g/dL    HCT 34.6 (L) 41.1 - 50.3 %    MCV 76.4 (L) 79.6 - 97.8 FL    MCH 26.9 26.1 - 32.9 PG    MCHC 35.3 (H) 31.4 - 35.0 g/dL    RDW 14.8 (H) 11.9 - 14.6 %    PLATELET 952 000 - 543 K/uL    MPV 10.6 (L) 10.8 - 14.1 FL    DF AUTOMATED      NEUTROPHILS 53 43 - 78 %    LYMPHOCYTES 28 13 - 44 %    MONOCYTES 17 (H) 4.0 - 12.0 %    EOSINOPHILS 2 0.5 - 7.8 %    BASOPHILS 0 0.0 - 2.0 %    IMMATURE GRANULOCYTES 0.5 0.0 - 5.0 %    ABS. NEUTROPHILS 2.0 1.7 - 8.2 K/UL    ABS. LYMPHOCYTES 1.1 0.5 - 4.6 K/UL    ABS. MONOCYTES 0.7 0.1 - 1.3 K/UL    ABS. EOSINOPHILS 0.1 0.0 - 0.8 K/UL    ABS. BASOPHILS 0.0 0.0 - 0.2 K/UL    ABS. IMM. GRANS. 0.0 0.0 - 0.5 K/UL   EKG, 12 LEAD, INITIAL    Collection Time: 08/28/17  9:42 PM   Result Value Ref Range    Ventricular Rate 69 BPM    Atrial Rate 69 BPM    P-R Interval 162 ms    QRS Duration 92 ms    Q-T Interval 396 ms    QTC Calculation (Bezet) 424 ms    Calculated P Axis 56 degrees    Calculated R Axis 12 degrees    Calculated T Axis 69 degrees    Diagnosis       !! AGE AND GENDER SPECIFIC ECG ANALYSIS !! Normal sinus rhythm  Nonspecific T wave abnormality  Abnormal ECG  When compared with ECG of 01-JAN-2015 06:45,  T wave inversion no longer evident in Anterior leads         Patient has been seen and examined by Dr. Salas Anand and he agrees with the following assessment and plan:     Assessment/Plan:          Unstable angina -- admit to telemetry. Initial troponin is negative. Follow serial cardiac enzymes. Start ASA 81mg daily, lopressor 25mg BID and IV heparin. Continue topical nitrates.  NPO after midnight with IV hydration. Plan for [address] [place] [address] with possible PCI in the AM.       Coronary atherosclerosis of native coronary vessel -- last [facility] was in 2014 with PCI to circumflex. See above      Hyperlipidemia -- has taken lipitor 40mg. Liver enzymes slightly elevated today. Check lipid panel in the AM .       History of GIB -- due to esophageal tears in 2015.        Aida Santana, NP  8/28/2017 11:39 PM

## 2017-08-29 NOTE — PROGRESS NOTES
PTT drawn 1 HR after 4000 unit Heparin bolus and IV heparin that was started in ER. Received critical PTT of 101.7. Spoke with Isauro Hilliard in pharmacy who said to draw the correct PTT at 0545 as ordered which would be 6 hrs after the heparin bolus and heparin gtt were started.

## 2017-08-29 NOTE — PROGRESS NOTES
TRANSFER - OUT REPORT:    Verbal report given to cath lab, RN on Valeria Said  being transferred to cath lab for ordered procedure       Report consisted of patients Situation, Background, Assessment and Recommendations(SBAR). Information from the following report(s) SBAR, Kardex, Intake/Output and MAR was reviewed with the receiving nurse. Opportunity for questions and clarification was provided.

## 2017-08-29 NOTE — PROGRESS NOTES
TRANSFER - OUT REPORT:    Verbal report given to Geetha Medley RN (name) on Mary Stearns  being transferred to [address] [place] [address] ([facility]) for routine progression of care       Report consisted of patients Situation, Background, Assessment and   Recommendations(SBAR). Information from the following report(s) SBAR was reviewed with the receiving nurse. Lines:   Peripheral IV 08/28/17 Left Arm (Active)   Site Assessment Clean, dry, & intact 8/29/2017  7:48 AM   Phlebitis Assessment 0 8/29/2017  7:48 AM   Infiltration Assessment 0 8/29/2017  7:48 AM   Dressing Status Clean, dry, & intact 8/29/2017  7:48 AM   Dressing Type Tape;Transparent 8/29/2017  7:48 AM   Hub Color/Line Status Infusing;Patent 8/29/2017  7:48 AM       Peripheral IV 08/29/17 Right Antecubital (Active)   Site Assessment Clean, dry, & intact 8/29/2017  7:48 AM   Phlebitis Assessment 0 8/29/2017  7:48 AM   Infiltration Assessment 0 8/29/2017  7:48 AM   Dressing Status Clean, dry, & intact 8/29/2017  7:48 AM   Dressing Type Tape;Transparent 8/29/2017  7:48 AM   Hub Color/Line Status Infusing;Patent 8/29/2017  7:48 AM        Opportunity for questions and clarification was provided. Patient transported with:   Tech         Pt had [facility] via R radial approach. 1 stent placed to OM. Site closed with TR band and 8mL @ 1134. Pt received Versed 2mg IV, Fentanyl 50mcg IV, David rinse x 1 IV, Effient 60mg PO, Angiomax bolus IV, and Angiomax gtt to run until current bag is empty.

## 2017-08-30 VITALS
DIASTOLIC BLOOD PRESSURE: 73 MMHG | RESPIRATION RATE: 18 BRPM | BODY MASS INDEX: 21.07 KG/M2 | TEMPERATURE: 96.3 F | OXYGEN SATURATION: 99 % | HEIGHT: 68 IN | SYSTOLIC BLOOD PRESSURE: 122 MMHG | HEART RATE: 59 BPM | WEIGHT: 139 LBS

## 2017-08-30 LAB
ANION GAP SERPL CALC-SCNC: 8 MMOL/L (ref 7–16)
BUN SERPL-MCNC: 16 MG/DL (ref 8–23)
CALCIUM SERPL-MCNC: 8.5 MG/DL (ref 8.3–10.4)
CHLORIDE SERPL-SCNC: 107 MMOL/L (ref 98–107)
CO2 SERPL-SCNC: 24 MMOL/L (ref 21–32)
CREAT SERPL-MCNC: 1.27 MG/DL (ref 0.8–1.5)
GLUCOSE SERPL-MCNC: 89 MG/DL (ref 65–100)
POTASSIUM SERPL-SCNC: 3.8 MMOL/L (ref 3.5–5.1)
SODIUM SERPL-SCNC: 139 MMOL/L (ref 136–145)

## 2017-08-30 PROCEDURE — 36415 COLL VENOUS BLD VENIPUNCTURE: CPT | Performed by: PHYSICIAN ASSISTANT

## 2017-08-30 PROCEDURE — 74011250637 HC RX REV CODE- 250/637: Performed by: INTERNAL MEDICINE

## 2017-08-30 PROCEDURE — 93306 TTE W/DOPPLER COMPLETE: CPT

## 2017-08-30 PROCEDURE — 80048 BASIC METABOLIC PNL TOTAL CA: CPT | Performed by: PHYSICIAN ASSISTANT

## 2017-08-30 PROCEDURE — 74011250637 HC RX REV CODE- 250/637: Performed by: NURSE PRACTITIONER

## 2017-08-30 RX ORDER — GUAIFENESIN 100 MG/5ML
81 LIQUID (ML) ORAL DAILY
Status: SHIPPED | COMMUNITY
Start: 2017-08-30

## 2017-08-30 RX ORDER — METOPROLOL TARTRATE 25 MG/1
25 TABLET, FILM COATED ORAL 2 TIMES DAILY
Qty: 60 TAB | Refills: 6 | Status: ON HOLD | OUTPATIENT
Start: 2017-08-30 | End: 2021-04-27 | Stop reason: SDUPTHER

## 2017-08-30 RX ORDER — LISINOPRIL 5 MG/1
5 TABLET ORAL DAILY
Status: DISCONTINUED | OUTPATIENT
Start: 2017-08-30 | End: 2017-08-30 | Stop reason: HOSPADM

## 2017-08-30 RX ORDER — LISINOPRIL 5 MG/1
5 TABLET ORAL DAILY
Qty: 30 TAB | Refills: 6 | Status: SHIPPED | OUTPATIENT
Start: 2017-08-30 | End: 2017-09-21 | Stop reason: SINTOL

## 2017-08-30 RX ORDER — PRASUGREL 10 MG/1
10 TABLET, FILM COATED ORAL DAILY
Qty: 30 TAB | Refills: 11 | Status: SHIPPED | OUTPATIENT
Start: 2017-08-30 | End: 2021-04-02 | Stop reason: SINTOL

## 2017-08-30 RX ORDER — ATORVASTATIN CALCIUM 80 MG/1
80 TABLET, FILM COATED ORAL
Qty: 30 TAB | Refills: 11 | Status: SHIPPED | OUTPATIENT
Start: 2017-08-30

## 2017-08-30 RX ORDER — NITROGLYCERIN 0.4 MG/1
0.4 TABLET SUBLINGUAL
Qty: 2 BOTTLE | Refills: 4 | Status: SHIPPED | OUTPATIENT
Start: 2017-08-30

## 2017-08-30 RX ADMIN — Medication 5 ML: at 14:00

## 2017-08-30 RX ADMIN — Medication 10 ML: at 05:59

## 2017-08-30 RX ADMIN — PRASUGREL HYDROCHLORIDE 10 MG: 10 TABLET, FILM COATED ORAL at 08:07

## 2017-08-30 RX ADMIN — LISINOPRIL 5 MG: 5 TABLET ORAL at 08:08

## 2017-08-30 RX ADMIN — METOPROLOL TARTRATE 25 MG: 25 TABLET ORAL at 08:08

## 2017-08-30 RX ADMIN — ASPIRIN 81 MG 81 MG: 81 TABLET ORAL at 08:08

## 2017-08-30 NOTE — PROGRESS NOTES
Discharge instructions reviewed with patient. Prescriptions given for aspirin, effient, nitroglycerin, metoprolol, lisinopril and med info sheets provided for all new medications. Opportunity for questions provided. Patient voiced understanding of all discharge instructions. Two IVs and heart monitor removed. Patient ready for discharge.

## 2017-08-30 NOTE — DISCHARGE SUMMARY
[facility] Cardiology Discharge Summary     Patient ID:  Pawan Gentile  [id number]  2 y.o.  1945    Admit date: 8/28/2017    Discharge date:  8/30/2017    Admitting Physician: Serena Aparicio MD     Discharge Physician: CONNOR Wong/Dr. 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 Pain started around 5pm while he was moving logs in his yard. He took a SL NTG with resolution of his pain. His pain returned around 8pm and again resolved with SL NTG. He had associated diaphoresis, nausea/vomiting and shortness of breath. Upon arrival to the ER, patient continued to complain of 5/10 substernal chest pain. He was taken off of Plavix in 2016 due to epistaxis. Initial troponin was negative but subsequent troponin was elevated. He was started on IV Heparin and was admitted for further cardiac evaluation and treatment. C was planned. Patient underwent cardiac catheterization by Dr. Emma Florez. Patient was found to have an occluded OM1 that was stented with a 2.5 x 20mm Synergy LENA with 0% residual stenosis. Patient tolerated the procedure well and returned to the telemetry floor for recovery.  An echocardiogram was performed with report as follows:   -  Left ventricle: Systolic function was normal. Ejection fraction was estimated to be 55 %. Although no diagnostic regional wall motion abnormality was identified, this possibility cannot be completely excluded on the basis of this study. -  Left atrium: The atrium was mildly dilated. It was recommended that he stay another night for observation after NSTEMI but he was adamant that he needed to go home on 8/30. He was feeling well without any complaints of chest pain or shortness of breath. Patient's right radial cath site was clean, dry and intact without hematoma. Patient's labs were stable. Patient was seen and examined by Dr. Carol Rush and determined stable and ready for discharge. Patient was instructed on the importance of medication compliance including taking aspirin and Effient everyday without missing a dose. After receiving drug eluting stents, the patient will need to be on dual anti-platelet therapy for at least 1 year. For maximized medical therapy of CAD, patient will continue use of BB, ACE-I, and statin as well. The patient will follow up with [facility] Dr. Luis Kaur on September 6th at 9:00am [facility]) and has been referred to cardiac rehab. DISPOSITION: The patient is being discharged home in stable condition on a low saturated fat, low cholesterol and low salt diet. The patient is instructed to advance activities as tolerated to the limit of fatigue or shortness of breath. The patient is instructed to avoid all heavy lifting, straining, stooping or squatting for 3-5 days. The patient is instructed to watch the cath site for bleeding/oozing; if seen, the patient is instructed to apply firm pressure with a clean cloth and call [facility] at [phone]. The patient is instructed to watch for signs of infection which include: increasing area of redness, fever/hot to touch or purulent drainage at the catheterization site.  The patient is instructed not to soak in a bathtub for 7-10 days, but is cleared to shower. The patient is instructed to call the office or return to the ER for immediate evaluation for any shortness of breath or chest pain not relieved by NTG. Discharge Exam:   Visit Vitals    /75    Pulse 72    Temp 98.6 °F (37 °C)    Resp 18    Ht 5' 8\" (1.727 m)    Wt 63 kg (139 lb)    SpO2 98%    BMI 21.13 kg/m2         Physical Exam:  General: Well Developed, Well Nourished, No Acute Distress, Alert & Oriented  Neck: supple, no JVD  Heart: S1S2 with RRR  Lungs: Clear throughout auscultation bilaterally without adventitious sounds  Abd: soft, nontender, nondistended, with good bowel sounds  Ext: warm, no edema, calves supple/nontender, pulses 2+ bilaterally  Skin: warm and dry      Patient Instructions:   Current Discharge Medication List      START taking these medications    Details   aspirin 81 mg chewable tablet Take 1 Tab by mouth daily. prasugrel (EFFIENT) 10 mg tablet Take 1 Tab by mouth daily. Qty: 30 Tab, Refills: 11      lisinopril (PRINIVIL, ZESTRIL) 5 mg tablet Take 1 Tab by mouth daily. Qty: 30 Tab, Refills: 6      atorvastatin (LIPITOR) 80 mg tablet Take 1 Tab by mouth nightly. Qty: 30 Tab, Refills: 11      metoprolol tartrate (LOPRESSOR) 25 mg tablet Take 1 Tab by mouth two (2) times a day. Qty: 60 Tab, Refills: 6         CONTINUE these medications which have CHANGED    Details   nitroglycerin (NITROSTAT) 0.4 mg SL tablet 1 Tab by SubLINGual route every five (5) minutes as needed for Chest Pain.   Qty: 2 Bottle, Refills: 4             Signed:  Talita Gay PA-C  8/30/2017

## 2017-08-30 NOTE — DISCHARGE INSTRUCTIONS
Cardiac Catheterization/Angiography Discharge Instructions    *Check the puncture site frequently for swelling or bleeding. If you see any bleeding, lie down and apply pressure over the area with a clean town or washcloth. Notify your doctor for any redness, swelling, drainage or oozing from the puncture site. Notify your doctor for any fever or chills. *If the leg or arm with the puncture becomes cold, numb or painful, call [facility] at  [phone]. *Activity should be limited for the next 48 hours. Climb stairs as little as possible and avoid any stooping, bending or strenuous activity for 48 hours. No heavy lifting (anything over 10 pounds) for three days. *Do not drive for 48 hours. *You may resume your usual diet. Drink more fluids than usual.    *Have a responsible person drive you home and stay with you for at least 24 hours after your heart catheterization/angiography. *You may remove the bandage from your Right wrist in 24 hours. You may shower in 24 hours. No tub baths, hot tubs or swimming for one week. Do not place any lotions, creams, powders, ointments over the puncture site for one week. You may place a clean band-aid over the puncture site each day for 5 days. Change this daily. Percutaneous Coronary Intervention: What to Expect at [facility]    Percutaneous coronary intervention (PCI) is the name for procedures that are used to open a narrowed or blocked coronary artery. The two most common PCI procedures are coronary angioplasty and coronary stent placement. Your groin or arm may have a bruise and feel sore for a day or two after a percutaneous coronary intervention (PCI). You can do light activities around the house, but nothing strenuous for several days. This care sheet gives you a general idea about how long it will take for you to recover. But each person recovers at a different pace.  Follow the steps below to get better as quickly as possible. How can you care for yourself at home? Activity  · If the doctor gave you a sedative:  ¨ For 24 hours, don't do anything that requires attention to detail. It takes time for the medicine's effects to completely wear off. ¨ For your safety, do not drive or operate any machinery that could be dangerous. Wait until the medicine wears off and you can think clearly and react easily. · Do not do strenuous exercise and do not lift, pull, or push anything heavy until your doctor says it is okay. This may be for a day or two. You can walk around the house and do light activity, such as cooking. · If the catheter was placed in your groin, try not to walk up stairs for the first couple of days. · If the catheter was placed in your arm near your wrist, do not bend your wrist deeply for the first couple of days. Be careful using your hand to get into and out of a chair or bed. · Carry your stent identification card with you at all times. · If your doctor recommends it, get more exercise. Walking is a good choice. Bit by bit, increase the amount you walk every day. Try for at least 30 minutes on most days of the week. Diet  · Drink plenty of fluids to help your body flush out the dye. If you have kidney, heart, or liver disease and have to limit fluids, talk with your doctor before you increase the amount of fluids you drink. · Keep eating a heart-healthy diet that has lots of fruits, vegetables, and whole grains. If you have not been eating this way, talk to your doctor. You also may want to talk to a dietitian. This expert can help you to learn about healthy foods and plan meals. Medicines  · Your doctor will tell you if and when you can restart your medicines. He or she will also give you instructions about taking any new medicines. · If you take blood thinners, such as warfarin (Coumadin), clopidogrel (Plavix), or aspirin, be sure to talk to your doctor.  He or she will tell you if and when to start taking those medicines again. Make sure that you understand exactly what your doctor wants you to do. · Your doctor will prescribe blood-thinning medicines. You will likely take aspirin plus another antiplatelet, such as clopidogrel (Plavix). It is very important that you take these medicines exactly as directed. These medicines help keep the coronary artery open and reduce your risk of a heart attack. · Call your doctor if you think you are having a problem with your medicine. Care of the catheter site  · For 1 or 2 days, keep a bandage over the spot where the catheter was inserted. The bandage probably will fall off in this time. · Put ice or a cold pack on the area for 10 to 20 minutes at a time to help with soreness or swelling. Put a thin cloth between the ice and your skin. · You may shower 24 to 48 hours after the procedure, if your doctor okays it. Pat the incision dry. · Do not soak the catheter site until it is healed. Don't take a bath for 1 week, or until your doctor tells you it is okay. Follow-up care is a key part of your treatment and safety. Be sure to make and go to all appointments, and call your doctor if you are having problems. It's also a good idea to know your test results and keep a list of the medicines you take. When should you call for help? Call 911 anytime you think you may need emergency care. For example, call if:  · You passed out (lost consciousness). · You have severe trouble breathing. · You have sudden chest pain and shortness of breath, or you cough up blood. · You have symptoms of a heart attack, such as:  ¨ Chest pain or pressure. ¨ Sweating. ¨ Shortness of breath. ¨ Nausea or vomiting. ¨ Pain that spreads from the chest to the neck, jaw, or one or both shoulders or arms. ¨ Dizziness or lightheadedness. ¨ A fast or uneven pulse. After calling 911, chew 1 adult-strength aspirin. Wait for an ambulance. Do not try to drive yourself.   · You have been diagnosed with angina, and you have angina symptoms that do not go away with rest or are not getting better within 5 minutes after you take one dose of nitroglycerin. Call your doctor now or seek immediate medical care if:  · You are bleeding from the area where the catheter was put in your artery. · You have a fast-growing, painful lump at the catheter site. · You have signs of infection, such as:  ¨ Increased pain, swelling, warmth, or redness. ¨ Red streaks leading from the catheter site. ¨ Pus draining from the catheter site. ¨ A fever. · Your leg or arm looks blue or feels cold, numb, or tingly. Watch closely for changes in your health, and be sure to contact your doctor if you have any problems. Where can you learn more? Go to http://huan-nando.info/. Enter W271 in the search box to learn more about \"Percutaneous Coronary Intervention: What to Expect at Home. \"  Current as of: January 13, 2017  Content Version: 11.3  © 4219-8855 [employer]. Care instructions adapted under license by [employer] (which disclaims liability or warranty for this information). If you have questions about a medical condition or this instruction, always ask your healthcare professional. [place] [address] any warranty or liability for your use of this information. Heart Attack: Care Instructions  Your Care Instructions    A heart attack (myocardial infarction, or MI) occurs when one or more of the coronary arteries, which supply the heart with oxygen-rich blood, is blocked. A blockage usually occurs when plaque inside the artery breaks open and a blood clot forms in the artery. After a heart attack, you may be worried about your future. Over the next several weeks, your heart will start to heal. Though it can be hard to break old habits, you can prevent another heart attack by making some lifestyle changes and by taking medicines.  You may use this information for ideas about what to do at home to speed your recovery. Follow-up care is a key part of your treatment and safety. Be sure to make and go to all appointments, and call your doctor if you are having problems. It's also a good idea to know your test results and keep a list of the medicines you take. How can you care for yourself at home? Activity  · Until your doctor says it is okay, do not do strenuous exercise. And do not lift, pull, or push anything heavy. Ask your doctor what types of activities are safe for you. · If your doctor has not set you up with a cardiac rehabilitation (rehab) program, talk to him or her about whether that is right for you. Cardiac rehab includes supervised exercise. It also includes help with diet and lifestyle changes and emotional support. It may reduce your risk of future heart problems. · Increase your activities slowly. Take short rest breaks when you get tired. · If your doctor recommends it, get more exercise. Walking is a good choice. Bit by bit, increase the amount you walk every day. Try for at least 30 minutes on most days of the week. You also may want to swim, bike, or do other activities. Talk with your doctor before you start an exercise program to make sure it is safe for you. · Ask your doctor when you can drive, go back to work, and do other daily activities again. · You can have sex as soon as you feel ready for it. Often this means when you can easily walk around or climb stairs. Talk with your doctor if you have any concerns. If you are taking nitroglycerin, do not take erection-enhancing medicine such as sildenafil (Viagra), tadalafil (Cialis), or vardenafil (Levitra) . Lifestyle changes  · Do not smoke. Smoking increases your risk of another heart attack. If you need help quitting, talk to your doctor about stop-smoking programs and medicines. These can increase your chances of quitting for good.   · Eat a heart-healthy diet that is low in saturated fat and salt, and is full of fruits, vegetables and whole-grains. Eat at least two servings of fish each week. You may get more details about how to eat healthy. But these tips can help you get started. · Stay at a healthy weight, or lose weight if you need to. Medicines  · Be safe with medicines. Take your medicines exactly as prescribed. Call your doctor if you think you are having a problem with your medicine. You will get more details on the specific medicines your doctor prescribes. Do not stop taking your medicine unless your doctor tells you to. Not taking your medicine might raise your risk of having another heart attack. · You may need several medicines to help lower your risk of another heart attack. These include:  ¨ Blood pressure medicines such as angiotensin-converting enzyme (ACE) inhibitors, ARBs (angiotensin II receptor blockers), and beta-blockers. ¨ Cholesterol medicine called statins. ¨ Aspirin and other blood thinners. These prevent blood clots that can cause a heart attack. · If your doctor has given you nitroglycerin, keep it with you at all times. If you have angina symptoms, such as chest pain or pressure, sit down and rest. Take the first dose of nitroglycerin as directed. If symptoms get worse or are not getting better within 5 minutes, call 911 right away. Stay on the phone. The emergency  will tell you what to do. · Do not take any over-the-counter medicines, vitamins, or herbal products without talking to your doctor first.  Staying healthy  · Manage other health conditions such as high blood pressure and diabetes. · Avoid colds and flu. Get a pneumococcal vaccine shot. If you have had one before, ask your doctor whether you need another dose. Get the flu vaccine every year. If you must be around people with colds or flu, wash your hands often. · Be sure to tell your doctor about any angina symptoms you have had, even if they went away. Pay attention to your angina symptoms. Know what is typical for you and learn how to control it. Know when to call for help. · Talk to your family, friends, or a counselor about your feelings. It is normal to feel frightened, angry, hopeless, helpless, and even guilty. Talking openly about bad feelings can help you cope. If you have symptoms of depression, talk to your doctor. When should you call for help? Call 911 anytime you think you may need emergency care. For example, call if:  · You have symptoms of a heart attack. These may include:  ¨ Chest pain or pressure, or a strange feeling in the chest.  ¨ Sweating. ¨ Shortness of breath. ¨ Nausea or vomiting. ¨ Pain, pressure, or a strange feeling in the back, neck, jaw, or upper belly or in one or both shoulders or arms. ¨ Lightheadedness or sudden weakness. ¨ A fast or irregular heartbeat. After you call 911, the  may tell you to chew 1 adult-strength or 2 to 4 low-dose aspirin. Wait for an ambulance. Do not try to drive yourself. · You have angina symptoms (such as chest pain or pressure) that do not go away with rest or are not getting better within 5 minutes after you take a dose of nitroglycerin. · You passed out (lost consciousness). · You feel like you are having another heart attack. Call your doctor now or seek immediate medical care if:  · You are having angina symptoms, such as chest pain or pressure, more often than usual, or the symptoms are different or worse than usual.  · You have new or increased shortness of breath. · You are dizzy or lightheaded, or you feel like you may faint. Watch closely for changes in your health, and be sure to contact your doctor if you have any problems. Where can you learn more? Go to http://huan-nando.info/. Enter [id number] in the search box to learn more about \"Heart Attack: Care Instructions. \"  Current as of: August 8, 2016  Content Version: 11.3  © 4076-0370 [employer].  Care instructions adapted under license by [employer] (which disclaims liability or warranty for this information). If you have questions about a medical condition or this instruction, always ask your healthcare professional. [address] any warranty or liability for your use of this information. Reducing Heart Attack Risk With Daily Medicine: Care Instructions  Your Care Instructions    Heart disease is the number one cause of death. If you are at risk for heart disease, there are many medicines that can reduce your risk. These include:  · ACE inhibitors. These are a type of blood pressure medicine. They can reduce the risk of heart attacks and strokes if you are at high risk. · Statin medicines. These lower cholesterol. They can also reduce the risk of heart disease and strokes. · Aspirin. It can help certain people lower their risk of a heart attack or stroke. · Beta-blocker medicines. These are a type of blood pressure and heart medicine. They can reduce the chance of early death if you have had a heart attack. All medicines can cause side effects. So it is important to understand the pros and cons of any medicine you take. It is also important to take your medicines exactly as your doctor tells you to. Follow-up care is a key part of your treatment and safety. Be sure to make and go to all appointments, and call your doctor if you are having problems. It's also a good idea to know your test results and keep a list of the medicines you take. ACE inhibitors  ACE (angiotensin-converting enzyme) inhibitors are used for three main reasons. They lower blood pressure, protect the kidneys, and prevent heart attacks and strokes. Examples include benazepril (Lotensin), lisinopril (Prinivil, Zestril), and ramipril (Altace). Before you start taking an ACE inhibitor, make sure your doctor knows if:  · You are taking a water pill (diuretic).   · You are taking potassium pills or using salt substitutes. · You are pregnant or breastfeeding. · You have had a kidney transplant or other kidney problems. ACE inhibitors can cause side effects. Call your doctor right away if you have:  · Trouble breathing. · Swelling in your face, head, neck, or tongue. · Dizziness or lightheadedness. · A dry cough. Statins  Statins lower cholesterol. Examples include atorvastatin (Lipitor), lovastatin (Mevacor), pravastatin (Pravachol), and simvastatin (Zocor). Before you start taking a statin, make sure your doctor knows if:  · You have had a kidney transplant or other kidney problems. · You have liver disease. · You take any other prescription medicine, over-the-counter medicine, vitamins, supplements, or herbal remedies. · You are pregnant or breastfeeding. Statins can cause side effects. Call your doctor right away if you have:  · New, severe muscle aches. · Brown urine. Aspirin  Taking an aspirin every day can lower your risk for a heart attack. A heart attack occurs when a blood vessel in the heart gets blocked. When this happens, oxygen can't get to the heart muscle, and part of the heart dies. Aspirin can help prevent blood clots that can block the blood vessels. Talk to your doctor before you start taking aspirin every day. He or she may recommend that you take one low-dose aspirin (81 mg) tablet each day, with a meal and a full glass of water. Taking aspirin isn't right for everyone, because it can cause serious bleeding. And you may not be able to use aspirin if you:  · Have asthma. · Have an ulcer or other stomach problem. · Take some other medicine (called a blood thinner) that prevents blood clots. · Are allergic to aspirin. Before having a surgery or procedure, tell your doctor or dentist that you take aspirin. He or she will tell you if you should stop taking aspirin beforehand. Make sure that you understand exactly what your doctor wants you to do. Aspirin can cause side effects.  Call your doctor right away if you have:  · Unusual bleeding or bruising. · Nausea, vomiting, or heartburn. · Black or bloody stools. Beta-blockers  Beta-blockers are used for three main reasons. They lower blood pressure, relieve angina symptoms (such as chest pain or pressure), and reduce the chances of a second heart attack. They include atenolol (Tenormin), carvedilol (Coreg), and metoprolol (Lopressor). Before you start taking a beta-blocker, make sure your doctor knows if you have:  · Severe asthma or frequent asthma attacks. · A very slow pulse (less than 55 beats a minute). Beta-blockers can cause side effects. Call your doctor right away if you have:  · Wheezing or trouble breathing. · Dizziness or lightheadedness. · Asthma that gets worse. When should you call for help? Call 911 anytime you think you may need emergency care. For example, call if:  · You passed out (lost consciousness). Call your doctor now or seek immediate medical care if:  · You are wheezing or have trouble breathing. · You have swelling in your face, head, neck, or tongue. · You are dizzy or lightheaded, or you feel like you may faint. · You have severe muscle pain, weakness, or brown urine. · You have vision problems. · You have new bruises or blood spots under your skin. · Your stools are black and tarlike or have streaks of blood. Watch closely for changes in your health, and be sure to contact your doctor if:  · You have ringing in your ears. · You feel very tired. · You have gas, constipation, or an upset stomach. Where can you learn more? Go to http://huan-nando.info/. Enter R428 in the search box to learn more about \"Reducing Heart Attack Risk With Daily Medicine: Care Instructions. \"  Current as of: September 21, 2016  Content Version: 11.3  © 5302-9657 [employer].  Care instructions adapted under license by [employer] (which disclaims liability or warranty for this information). If you have questions about a medical condition or this instruction, always ask your healthcare professional. [address] any warranty or liability for your use of this information. Heart-Healthy Diet: Care Instructions  Your Care Instructions    A heart-healthy diet has lots of vegetables, fruits, nuts, beans, and whole grains, and is low in salt. It limits foods that are high in saturated fat, such as meats, cheeses, and fried foods. It may be hard to change your diet, but even small changes can lower your risk of heart attack and heart disease. Follow-up care is a key part of your treatment and safety. Be sure to make and go to all appointments, and call your doctor if you are having problems. It's also a good idea to know your test results and keep a list of the medicines you take. How can you care for yourself at home? Watch your portions  · Learn what a serving is. A \"serving\" and a \"portion\" are not always the same thing. Make sure that you are not eating larger portions than are recommended. For example, a serving of pasta is ½ cup. A serving size of meat is 2 to 3 ounces. A 3-ounce serving is about the size of a deck of cards. Measure serving sizes until you are good at Hand" them. Keep in mind that restaurants often serve portions that are 2 or 3 times the size of one serving. · To keep your energy level up and keep you from feeling hungry, eat often but in smaller portions. · Eat only the number of calories you need to stay at a healthy weight. If you need to lose weight, eat fewer calories than your body burns (through exercise and other physical activity). Eat more fruits and vegetables  · Eat a variety of fruit and vegetables every day. Dark green, deep orange, red, or yellow fruits and vegetables are especially good for you. Examples include spinach, carrots, peaches, and berries. · Keep carrots, celery, and other veggies handy for snacks.  Buy fruit that is in season and store it where you can see it so that you will be tempted to eat it. · Cook dishes that have a lot of veggies in them, such as stir-fries and soups. Limit saturated and trans fat  · Read food labels, and try to avoid saturated and trans fats. They increase your risk of heart disease. Trans fat is found in many processed foods such as cookies and crackers. · Use olive or canola oil when you cook. Try cholesterol-lowering spreads, such as Benecol or Take Control. · Bake, broil, grill, or steam foods instead of frying them. · Choose lean meats instead of high-fat meats such as hot dogs and sausages. Cut off all visible fat when you prepare meat. · Eat fish, skinless poultry, and meat alternatives such as soy products instead of high-fat meats. Soy products, such as tofu, may be especially good for your heart. · Choose low-fat or fat-free milk and dairy products. Eat fish  · Eat at least two servings of fish a week. Certain fish, such as salmon and tuna, contain omega-3 fatty acids, which may help reduce your risk of heart attack. Eat foods high in fiber  · Eat a variety of grain products every day. Include whole-grain foods that have lots of fiber and nutrients. Examples of whole-grain foods include oats, whole wheat bread, and brown rice. · Buy whole-grain breads and cereals, instead of white bread or pastries. Limit salt and sodium  · Limit how much salt and sodium you eat to help lower your blood pressure. · Taste food before you salt it. Add only a little salt when you think you need it. With time, your taste buds will adjust to less salt. · Eat fewer snack items, fast foods, and other high-salt, processed foods. Check food labels for the amount of sodium in packaged foods. · Choose low-sodium versions of canned goods (such as soups, vegetables, and beans). Limit sugar  · Limit drinks and foods with added sugar. These include candy, desserts, and soda pop.   Limit alcohol  · Limit alcohol to no more than 2 drinks a day for men and 1 drink a day for women. Too much alcohol can cause health problems. When should you call for help? Watch closely for changes in your health, and be sure to contact your doctor if:  · You would like help planning heart-healthy meals. Where can you learn more? Go to http://huan-nando.info/. Enter V137 in the search box to learn more about \"Heart-Healthy Diet: Care Instructions. \"  Current as of: April 3, 2017  Content Version: 11.3  © 8929-6062 [employer]. Care instructions adapted under license by [employer] (which disclaims liability or warranty for this information). If you have questions about a medical condition or this instruction, always ask your healthcare professional. [address] any warranty or liability for your use of this information. Heart-Healthy Diet: Care Instructions  Your Care Instructions    A heart-healthy diet has lots of vegetables, fruits, nuts, beans, and whole grains, and is low in salt. It limits foods that are high in saturated fat, such as meats, cheeses, and fried foods. It may be hard to change your diet, but even small changes can lower your risk of heart attack and heart disease. Follow-up care is a key part of your treatment and safety. Be sure to make and go to all appointments, and call your doctor if you are having problems. It's also a good idea to know your test results and keep a list of the medicines you take. How can you care for yourself at home? Watch your portions  · Learn what a serving is. A \"serving\" and a \"portion\" are not always the same thing. Make sure that you are not eating larger portions than are recommended. For example, a serving of pasta is ½ cup. A serving size of meat is 2 to 3 ounces. A 3-ounce serving is about the size of a deck of cards. Measure serving sizes until you are good at [place]" them.  Keep in mind that restaurants often serve portions that are 2 or 3 times the size of one serving. · To keep your energy level up and keep you from feeling hungry, eat often but in smaller portions. · Eat only the number of calories you need to stay at a healthy weight. If you need to lose weight, eat fewer calories than your body burns (through exercise and other physical activity). Eat more fruits and vegetables  · Eat a variety of fruit and vegetables every day. Dark green, deep orange, red, or yellow fruits and vegetables are especially good for you. Examples include spinach, carrots, peaches, and berries. · Keep carrots, celery, and other veggies handy for snacks. Buy fruit that is in season and store it where you can see it so that you will be tempted to eat it. · Cook dishes that have a lot of veggies in them, such as stir-fries and soups. Limit saturated and trans fat  · Read food labels, and try to avoid saturated and trans fats. They increase your risk of heart disease. Trans fat is found in many processed foods such as cookies and crackers. · Use olive or canola oil when you cook. Try cholesterol-lowering spreads, such as Benecol or Take Control. · Bake, broil, grill, or steam foods instead of frying them. · Choose lean meats instead of high-fat meats such as hot dogs and sausages. Cut off all visible fat when you prepare meat. · Eat fish, skinless poultry, and meat alternatives such as soy products instead of high-fat meats. Soy products, such as tofu, may be especially good for your heart. · Choose low-fat or fat-free milk and dairy products. Eat fish  · Eat at least two servings of fish a week. Certain fish, such as salmon and tuna, contain omega-3 fatty acids, which may help reduce your risk of heart attack. Eat foods high in fiber  · Eat a variety of grain products every day. Include whole-grain foods that have lots of fiber and nutrients.  Examples of whole-grain foods include oats, whole wheat bread, and brown rice. · Buy whole-grain breads and cereals, instead of white bread or pastries. Limit salt and sodium  · Limit how much salt and sodium you eat to help lower your blood pressure. · Taste food before you salt it. Add only a little salt when you think you need it. With time, your taste buds will adjust to less salt. · Eat fewer snack items, fast foods, and other high-salt, processed foods. Check food labels for the amount of sodium in packaged foods. · Choose low-sodium versions of canned goods (such as soups, vegetables, and beans). Limit sugar  · Limit drinks and foods with added sugar. These include candy, desserts, and soda pop. Limit alcohol  · Limit alcohol to no more than 2 drinks a day for men and 1 drink a day for women. Too much alcohol can cause health problems. When should you call for help? Watch closely for changes in your health, and be sure to contact your doctor if:  · You would like help planning heart-healthy meals. Where can you learn more? Go to http://huan-nando.info/. Enter V137 in the search box to learn more about \"Heart-Healthy Diet: Care Instructions. \"  Current as of: April 3, 2017  Content Version: 11.3  © 0066-3454 [employer]. Care instructions adapted under license by [employer] (which disclaims liability or warranty for this information). If you have questions about a medical condition or this instruction, always ask your healthcare professional. [place] [address] any warranty or liability for your use of this information. Heart Attack: Care Instructions  Your Care Instructions    A heart attack (myocardial infarction, or MI) occurs when one or more of the coronary arteries, which supply the heart with oxygen-rich blood, is blocked. A blockage usually occurs when plaque inside the artery breaks open and a blood clot forms in the artery.   After a heart attack, you may be worried about your future. Over the next several weeks, your heart will start to heal. Though it can be hard to break old habits, you can prevent another heart attack by making some lifestyle changes and by taking medicines. You may use this information for ideas about what to do at home to speed your recovery. Follow-up care is a key part of your treatment and safety. Be sure to make and go to all appointments, and call your doctor if you are having problems. It's also a good idea to know your test results and keep a list of the medicines you take. How can you care for yourself at home? Activity  · Until your doctor says it is okay, do not do strenuous exercise. And do not lift, pull, or push anything heavy. Ask your doctor what types of activities are safe for you. · If your doctor has not set you up with a cardiac rehabilitation (rehab) program, talk to him or her about whether that is right for you. Cardiac rehab includes supervised exercise. It also includes help with diet and lifestyle changes and emotional support. It may reduce your risk of future heart problems. · Increase your activities slowly. Take short rest breaks when you get tired. · If your doctor recommends it, get more exercise. Walking is a good choice. Bit by bit, increase the amount you walk every day. Try for at least 30 minutes on most days of the week. You also may want to swim, bike, or do other activities. Talk with your doctor before you start an exercise program to make sure it is safe for you. · Ask your doctor when you can drive, go back to work, and do other daily activities again. · You can have sex as soon as you feel ready for it. Often this means when you can easily walk around or climb stairs. Talk with your doctor if you have any concerns. If you are taking nitroglycerin, do not take erection-enhancing medicine such as sildenafil (Viagra), tadalafil (Cialis), or vardenafil (Levitra) . Lifestyle changes  · Do not smoke.  Smoking increases your risk of another heart attack. If you need help quitting, talk to your doctor about stop-smoking programs and medicines. These can increase your chances of quitting for good. · Eat a heart-healthy diet that is low in saturated fat and salt, and is full of fruits, vegetables and whole-grains. Eat at least two servings of fish each week. You may get more details about how to eat healthy. But these tips can help you get started. · Stay at a healthy weight, or lose weight if you need to. Medicines  · Be safe with medicines. Take your medicines exactly as prescribed. Call your doctor if you think you are having a problem with your medicine. You will get more details on the specific medicines your doctor prescribes. Do not stop taking your medicine unless your doctor tells you to. Not taking your medicine might raise your risk of having another heart attack. · You may need several medicines to help lower your risk of another heart attack. These include:  ¨ Blood pressure medicines such as angiotensin-converting enzyme (ACE) inhibitors, ARBs (angiotensin II receptor blockers), and beta-blockers. ¨ Cholesterol medicine called statins. ¨ Aspirin and other blood thinners. These prevent blood clots that can cause a heart attack. · If your doctor has given you nitroglycerin, keep it with you at all times. If you have angina symptoms, such as chest pain or pressure, sit down and rest. Take the first dose of nitroglycerin as directed. If symptoms get worse or are not getting better within 5 minutes, call 911 right away. Stay on the phone. The emergency  will tell you what to do. · Do not take any over-the-counter medicines, vitamins, or herbal products without talking to your doctor first.  Staying healthy  · Manage other health conditions such as high blood pressure and diabetes. · Avoid colds and flu. Get a pneumococcal vaccine shot.  If you have had one before, ask your doctor whether you need another dose. Get the flu vaccine every year. If you must be around people with colds or flu, wash your hands often. · Be sure to tell your doctor about any angina symptoms you have had, even if they went away. Pay attention to your angina symptoms. Know what is typical for you and learn how to control it. Know when to call for help. · Talk to your family, friends, or a counselor about your feelings. It is normal to feel frightened, angry, hopeless, helpless, and even guilty. Talking openly about bad feelings can help you cope. If you have symptoms of depression, talk to your doctor. When should you call for help? Call 911 anytime you think you may need emergency care. For example, call if:  · You have symptoms of a heart attack. These may include:  ¨ Chest pain or pressure, or a strange feeling in the chest.  ¨ Sweating. ¨ Shortness of breath. ¨ Nausea or vomiting. ¨ Pain, pressure, or a strange feeling in the back, neck, jaw, or upper belly or in one or both shoulders or arms. ¨ Lightheadedness or sudden weakness. ¨ A fast or irregular heartbeat. After you call 911, the  may tell you to chew 1 adult-strength or 2 to 4 low-dose aspirin. Wait for an ambulance. Do not try to drive yourself. · You have angina symptoms (such as chest pain or pressure) that do not go away with rest or are not getting better within 5 minutes after you take a dose of nitroglycerin. · You passed out (lost consciousness). · You feel like you are having another heart attack. Call your doctor now or seek immediate medical care if:  · You are having angina symptoms, such as chest pain or pressure, more often than usual, or the symptoms are different or worse than usual.  · You have new or increased shortness of breath. · You are dizzy or lightheaded, or you feel like you may faint. Watch closely for changes in your health, and be sure to contact your doctor if you have any problems. Where can you learn more?   Go to http://halima.info/. Enter [id number] in the search box to learn more about \"Heart Attack: Care Instructions. \"  Current as of: August 8, 2016  Content Version: 11.3  © 0522-9821 [employer]. Care instructions adapted under license by [employer] (which disclaims liability or warranty for this information). If you have questions about a medical condition or this instruction, always ask your healthcare professional. [place] [address] any warranty or liability for your use of this information. DISCHARGE SUMMARY from Nurse    The following personal items are in your possession at time of discharge:    Dental Appliances: Uppers        Home Medications: None  Jewelry: None  Clothing: At bedside, With patient  Other Valuables: None             PATIENT INSTRUCTIONS:    After general anesthesia or intravenous sedation, for 24 hours or while taking prescription Narcotics:  · Limit your activities  · Do not drive and operate hazardous machinery  · Do not make important personal or business decisions  · Do  not drink alcoholic beverages  · If you have not urinated within 8 hours after discharge, please contact your surgeon on call. Report the following to your surgeon:  · Excessive pain, swelling, redness or odor of or around the surgical area  · Temperature over 100.5  · Nausea and vomiting lasting longer than 4 hours or if unable to take medications  · Any signs of decreased circulation or nerve impairment to extremity: change in color, persistent  numbness, tingling, coldness or increase pain  · Any questions        What to do at Home:  Recommended activity: No lifting or Strenuous exercise for five days. If you experience any of the following symptoms unrelieved chest pain or shortness of breath, please follow up with [facility] at [phone]. *  Please give a list of your current medications to your Primary Care Provider.     *  Please update this list whenever your medications are discontinued, doses are      changed, or new medications (including over-the-counter products) are added. *  Please carry medication information at all times in case of emergency situations. These are general instructions for a healthy lifestyle:    No smoking/ No tobacco products/ Avoid exposure to second hand smoke    Surgeon General's Warning:  Quitting smoking now greatly reduces serious risk to your health. Obesity, smoking, and sedentary lifestyle greatly increases your risk for illness    A healthy diet, regular physical exercise & weight monitoring are important for maintaining a healthy lifestyle    You may be retaining fluid if you have a history of heart failure or if you experience any of the following symptoms:  Weight gain of 3 pounds or more overnight or 5 pounds in a week, increased swelling in our hands or feet or shortness of breath while lying flat in bed. Please call your doctor as soon as you notice any of these symptoms; do not wait until your next office visit. Recognize signs and symptoms of STROKE:    F-face looks uneven    A-arms unable to move or move unevenly    S-speech slurred or non-existent    T-time-call 911 as soon as signs and symptoms begin-DO NOT go       Back to bed or wait to see if you get better-TIME IS BRAIN. Warning Signs of HEART ATTACK     Call 911 if you have these symptoms:   Chest discomfort. Most heart attacks involve discomfort in the center of the chest that lasts more than a few minutes, or that goes away and comes back. It can feel like uncomfortable pressure, squeezing, fullness, or pain.  Discomfort in other areas of the upper body. Symptoms can include pain or discomfort in one or both arms, the back, neck, jaw, or stomach.  Shortness of breath with or without chest discomfort.  Other signs may include breaking out in a cold sweat, nausea, or lightheadedness.   Don't wait more than five minutes to call [address]! Fast action can save your life. Calling 911 is almost always the fastest way to get lifesaving treatment. Emergency Medical Services staff can begin treatment when they arrive -- up to an hour sooner than if someone gets to the hospital by car. The discharge information has been reviewed with the patient. The patient verbalized understanding. Discharge medications reviewed with the patient and appropriate educational materials and side effects teaching were provided.

## 2017-08-30 NOTE — PROGRESS NOTES
Verbal bedside report given to Pablito Whitten (R) and Nava Pedersen, oncoming RN. Patient's situation, background, assessment and recommendations provided. Opportunity for questions provided.

## 2017-08-30 NOTE — PROGRESS NOTES
Problem: Falls - Risk of  Goal: *Absence of Falls  Document [place] Fall Risk and appropriate interventions in the flowsheet.    Outcome: Progressing Towards Goal  Fall Risk Interventions:              Medication Interventions: Patient to call before getting OOB

## 2017-08-30 NOTE — PROGRESS NOTES
Bedside and Verbal shift change report given to self (oncoming nurse) by Ashley Darling RN (offgoing nurse). Report included the following information SBAR, Kardex, MAR and Recent Results.

## 2017-09-22 ENCOUNTER — HOSPITAL ENCOUNTER (OUTPATIENT)
Dept: LAB | Age: 72
Discharge: HOME | End: 2017-09-22
Payer: MEDICARE

## 2017-09-22 DIAGNOSIS — R53.82 CHRONIC FATIGUE AND MALAISE: ICD-10-CM

## 2017-09-22 DIAGNOSIS — R06.02 SHORTNESS OF BREATH: ICD-10-CM

## 2017-09-22 DIAGNOSIS — R55 NEAR SYNCOPE: ICD-10-CM

## 2017-09-22 DIAGNOSIS — R53.81 CHRONIC FATIGUE AND MALAISE: ICD-10-CM

## 2017-09-22 DIAGNOSIS — D50.9 IRON DEFICIENCY ANEMIA, UNSPECIFIED IRON DEFICIENCY ANEMIA TYPE: ICD-10-CM

## 2017-09-22 LAB
BASOPHILS # BLD: 0 K/UL (ref 0–0.2)
BASOPHILS NFR BLD: 0 % (ref 0–2)
DIFFERENTIAL METHOD BLD: ABNORMAL
EOSINOPHIL # BLD: 0.1 K/UL (ref 0–0.8)
EOSINOPHIL NFR BLD: 4 % (ref 0.5–7.8)
ERYTHROCYTE [DISTWIDTH] IN BLOOD BY AUTOMATED COUNT: 14.5 % (ref 11.9–14.6)
HCT VFR BLD AUTO: 37.1 % (ref 41.1–50.3)
HGB BLD-MCNC: 13 G/DL (ref 13.6–17.2)
IMM GRANULOCYTES # BLD: 0 K/UL (ref 0–0.5)
IMM GRANULOCYTES NFR BLD: 0.3 % (ref 0–5)
LYMPHOCYTES # BLD: 1.9 K/UL (ref 0.5–4.6)
LYMPHOCYTES NFR BLD: 52 % (ref 13–44)
MCH RBC QN AUTO: 27.1 PG (ref 26.1–32.9)
MCHC RBC AUTO-ENTMCNC: 35 G/DL (ref 31.4–35)
MCV RBC AUTO: 77.3 FL (ref 79.6–97.8)
MONOCYTES # BLD: 0.5 K/UL (ref 0.1–1.3)
MONOCYTES NFR BLD: 14 % (ref 4–12)
NEUTS SEG # BLD: 1.1 K/UL (ref 1.7–8.2)
NEUTS SEG NFR BLD: 30 % (ref 43–78)
PLATELET # BLD AUTO: 217 K/UL (ref 150–450)
PMV BLD AUTO: 10.7 FL (ref 10.8–14.1)
RBC # BLD AUTO: 4.8 M/UL (ref 4.23–5.67)
WBC # BLD AUTO: 3.7 K/UL (ref 4.3–11.1)

## 2017-09-22 PROCEDURE — 85025 COMPLETE CBC W/AUTO DIFF WBC: CPT | Performed by: INTERNAL MEDICINE

## 2017-09-22 PROCEDURE — 36415 COLL VENOUS BLD VENIPUNCTURE: CPT | Performed by: INTERNAL MEDICINE

## 2018-05-07 ENCOUNTER — HOSPITAL ENCOUNTER (EMERGENCY)
Age: 73
Discharge: HOME | End: 2018-05-07
Payer: COMMERCIAL

## 2018-05-07 ENCOUNTER — APPOINTMENT (OUTPATIENT)
Dept: GENERAL RADIOLOGY | Age: 73
End: 2018-05-07
Payer: COMMERCIAL

## 2018-05-07 VITALS
DIASTOLIC BLOOD PRESSURE: 96 MMHG | SYSTOLIC BLOOD PRESSURE: 167 MMHG | TEMPERATURE: 98 F | OXYGEN SATURATION: 98 % | HEART RATE: 74 BPM | BODY MASS INDEX: 21.98 KG/M2 | WEIGHT: 145 LBS | HEIGHT: 68 IN | RESPIRATION RATE: 16 BRPM

## 2018-05-07 DIAGNOSIS — M54.50 ACUTE LEFT-SIDED LOW BACK PAIN WITHOUT SCIATICA: Primary | ICD-10-CM

## 2018-05-07 LAB
ALBUMIN SERPL-MCNC: 3.2 G/DL (ref 3.2–4.6)
ALBUMIN/GLOB SERPL: 0.5 {RATIO} (ref 1.2–3.5)
ALP SERPL-CCNC: 111 U/L (ref 50–136)
ALT SERPL-CCNC: 101 U/L (ref 12–65)
ANION GAP SERPL CALC-SCNC: 4 MMOL/L (ref 7–16)
AST SERPL-CCNC: 99 U/L (ref 15–37)
BASOPHILS # BLD: 0 K/UL (ref 0–0.2)
BASOPHILS NFR BLD: 0 % (ref 0–2)
BILIRUB SERPL-MCNC: 0.5 MG/DL (ref 0.2–1.1)
BUN SERPL-MCNC: 20 MG/DL (ref 8–23)
CALCIUM SERPL-MCNC: 8.4 MG/DL (ref 8.3–10.4)
CHLORIDE SERPL-SCNC: 107 MMOL/L (ref 98–107)
CO2 SERPL-SCNC: 29 MMOL/L (ref 21–32)
CREAT SERPL-MCNC: 1.34 MG/DL (ref 0.8–1.5)
DIFFERENTIAL METHOD BLD: ABNORMAL
EOSINOPHIL # BLD: 0.1 K/UL (ref 0–0.8)
EOSINOPHIL NFR BLD: 4 % (ref 0.5–7.8)
ERYTHROCYTE [DISTWIDTH] IN BLOOD BY AUTOMATED COUNT: 14.4 % (ref 11.9–14.6)
GLOBULIN SER CALC-MCNC: 6 G/DL (ref 2.3–3.5)
GLUCOSE SERPL-MCNC: 105 MG/DL (ref 65–100)
HCT VFR BLD AUTO: 37 % (ref 41.1–50.3)
HGB BLD-MCNC: 12.5 G/DL (ref 13.6–17.2)
IMM GRANULOCYTES # BLD: 0 K/UL (ref 0–0.5)
IMM GRANULOCYTES NFR BLD AUTO: 0 % (ref 0–5)
LYMPHOCYTES # BLD: 1.3 K/UL (ref 0.5–4.6)
LYMPHOCYTES NFR BLD: 37 % (ref 13–44)
MCH RBC QN AUTO: 26.4 PG (ref 26.1–32.9)
MCHC RBC AUTO-ENTMCNC: 33.8 G/DL (ref 31.4–35)
MCV RBC AUTO: 78.2 FL (ref 79.6–97.8)
MONOCYTES # BLD: 0.4 K/UL (ref 0.1–1.3)
MONOCYTES NFR BLD: 13 % (ref 4–12)
NEUTS SEG # BLD: 1.6 K/UL (ref 1.7–8.2)
NEUTS SEG NFR BLD: 46 % (ref 43–78)
PLATELET # BLD AUTO: 146 K/UL (ref 150–450)
PMV BLD AUTO: 10.8 FL (ref 10.8–14.1)
POTASSIUM SERPL-SCNC: 4.1 MMOL/L (ref 3.5–5.1)
PROT SERPL-MCNC: 9.2 G/DL (ref 6.3–8.2)
RBC # BLD AUTO: 4.73 M/UL (ref 4.23–5.67)
SODIUM SERPL-SCNC: 140 MMOL/L (ref 136–145)
WBC # BLD AUTO: 3.4 K/UL (ref 4.3–11.1)

## 2018-05-07 PROCEDURE — 81003 URINALYSIS AUTO W/O SCOPE: CPT | Performed by: PHYSICIAN ASSISTANT

## 2018-05-07 PROCEDURE — 72100 X-RAY EXAM L-S SPINE 2/3 VWS: CPT

## 2018-05-07 PROCEDURE — 80053 COMPREHEN METABOLIC PANEL: CPT | Performed by: PHYSICIAN ASSISTANT

## 2018-05-07 PROCEDURE — 85025 COMPLETE CBC W/AUTO DIFF WBC: CPT | Performed by: PHYSICIAN ASSISTANT

## 2018-05-07 PROCEDURE — 99284 EMERGENCY DEPT VISIT MOD MDM: CPT | Performed by: PHYSICIAN ASSISTANT

## 2018-05-07 RX ORDER — CYCLOBENZAPRINE HCL 5 MG
5 TABLET ORAL 2 TIMES DAILY
Qty: 14 TAB | Refills: 0 | Status: SHIPPED | OUTPATIENT
Start: 2018-05-07

## 2018-05-07 NOTE — DISCHARGE INSTRUCTIONS
Learning About Relief for Back Pain  What is back tension and strain? Back strain happens when you overstretch, or pull, a muscle in your back. You may hurt your back in an accident or when you exercise or lift something. Most back pain will get better with rest and time. You can take care of yourself at home to help your back heal.  What can you do first to relieve back pain? When you first feel back pain, try these steps:  · Walk. Take a short walk (10 to 20 minutes) on a level surface (no slopes, hills, or stairs) every 2 to 3 hours. Walk only distances you can manage without pain, especially leg pain. · Relax. Find a comfortable position for rest. Some people are comfortable on the floor or a medium-firm bed with a small pillow under their head and another under their knees. Some people prefer to lie on their side with a pillow between their knees. Don't stay in one position for too long. · Try heat or ice. Try using a heating pad on a low or medium setting, or take a warm shower, for 15 to 20 minutes every 2 to 3 hours. Or you can buy single-use heat wraps that last up to 8 hours. You can also try an ice pack for 10 to 15 minutes every 2 to 3 hours. You can use an ice pack or a bag of frozen vegetables wrapped in a thin towel. There is not strong evidence that either heat or ice will help, but you can try them to see if they help. You may also want to try switching between heat and cold. · Take pain medicine exactly as directed. ¨ If the doctor gave you a prescription medicine for pain, take it as prescribed. ¨ If you are not taking a prescription pain medicine, ask your doctor if you can take an over-the-counter medicine. What else can you do? · Stretch and exercise. Exercises that increase flexibility may relieve your pain and make it easier for your muscles to keep your spine in a good, neutral position. And don't forget to keep walking. · Do self-massage.  You can use self-massage to unwind after work or school or to energize yourself in the morning. You can easily massage your feet, hands, or neck. Self-massage works best if you are in comfortable clothes and are sitting or lying in a comfortable position. Use oil or lotion to massage bare skin. · Reduce stress. Back pain can lead to a vicious [place]: Distress about the pain tenses the muscles in your back, which in turn causes more pain. Learn how to relax your mind and your muscles to lower your stress. Where can you learn more? Go to http://huan-nando.info/. Enter S125 in the search box to learn more about \"Learning About Relief for Back Pain. \"  Current as of: March 21, 2017  Content Version: 11.4  © 7489-8985 [employer]. Care instructions adapted under license by [employer] (which disclaims liability or warranty for this information). If you have questions about a medical condition or this instruction, always ask your healthcare professional. [place] [address] any warranty or liability for your use of this information.

## 2018-05-07 NOTE — ED TRIAGE NOTES
Patient reports left flank pain \"for the last couple of days. \" Denies trauma or history of kidney stones.

## 2018-05-07 NOTE — ED NOTES
I have reviewed discharge instructions with the patient. The patient verbalized understanding. Patient left ED via Discharge Method: ambulatory to Home with self. Opportunity for questions and clarification provided. Patient given 1 scripts. To continue your aftercare when you leave the hospital, you may receive an automated call from our care team to check in on how you are doing. This is a free service and part of our promise to provide the best care and service to meet your aftercare needs.  If you have questions, or wish to unsubscribe from this service please call [phone]. Thank you for Choosing our [facility] Emergency Department.

## 2018-05-07 NOTE — ED PROVIDER NOTES
Patient is a 75 y.o. male presenting with back pain. The history is provided by the patient. Back Pain    This is a new problem. The current episode started more than 2 days ago. The problem has not changed since onset. The problem occurs constantly. Patient reports not work related injury. The pain is associated with no known injury. The pain is present in the lumbar spine and left side. The quality of the pain is described as aching. The pain does not radiate. The pain is at a severity of 6/10. The pain is mild. The symptoms are aggravated by certain positions. The pain is the same all the time. Pertinent negatives include no paresthesias, no paresis and no tingling. He has tried nothing for the symptoms. The treatment provided no relief. Risk factors include a sedentary lifestyle. 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 [facility]. PET positive.  Navigational bronch 10/16/13--non-diagnositic     Near syncope 1/21/2015    NSTEMI (non-ST elevated myocardial infarction) ([facility] 12/31/2014    Pulmonary tuberculosis     S/P PTCA (percutaneous transluminal coronary angioplasty)     Shortness of breath 1/21/2015    TB peritonitis 5/2/2014       Past Surgical History:   Procedure Laterality Date    ABDOMEN SURGERY PROC UNLISTED      hernia    CHEST SURGERY PROCEDURE UNLISTED  11/2013    cervical mediastinal bx    HX OTHER SURGICAL      LUNG NODULE BIOPSY, TB    HX PTCA      TX LEFT HEART CATH,PERCUTANEOUS  12/31/2014    Xience stents to 2nd OM and pCirc         Family History:   Problem Relation Age of Onset    Hypertension Father      STROKE    Stroke Mother      HYPERTENSION    Hypertension Sister      3 LIVING SISTERS    Heart Disease Sister      stent- no MI    Other Sister     Other Child      6 LOCAL, ONE IN [place] IS RN    Other Brother        Social History     Social History    Marital status: SINGLE     Spouse name: N/A    Number of children: N/A    Years of education: N/A     Occupational History    Not on file. Social History Main Topics    Smoking status: Light Tobacco Smoker     Packs/day: 1.00     Years: 45.00     Types: Cigarettes    Smokeless tobacco: Never Used      Comment: QUIT 12/14    Alcohol use No      Comment: QUIT 12/14    Drug use: Yes     Special: Marijuana    Sexual activity: Not on file      Comment: not since November     Other Topics Concern    Not on file     Social History Narrative    1/21/15:  PATIENT IS SINGLE, LIVES WITH BROTHER. RETIRED FROM [email or web address]. HAS SEVEN CHILDREN, 6 LOCAL. ONE DAUGHTER IN [place] IS AN RN.           ALLERGIES: Review of patient's allergies indicates no known allergies. Review of Systems   Musculoskeletal: Positive for back pain. Neurological: Negative for tingling and paresthesias. All other systems reviewed and are negative. Vitals:    05/07/18 1255   BP: 151/82   Pulse: 98   Resp: 16   Temp: 98 °F (36.7 °C)   SpO2: 98%   Weight: 65.8 kg (145 lb)   Height: 5' 8\" (1.727 m)            Physical Exam   Constitutional: He is oriented to person, place, and time. He appears well-developed and well-nourished. No distress. HENT:   Head: Normocephalic and atraumatic.    Right Ear: External ear normal.   Left Ear: External ear normal. Eyes: Conjunctivae and EOM are normal. Pupils are equal, round, and reactive to light. Neck: Normal range of motion. Neck supple. Cardiovascular: Normal rate and regular rhythm. Pulmonary/Chest: Effort normal and breath sounds normal. No respiratory distress. He has no wheezes. Abdominal: Soft. Bowel sounds are normal. There is no tenderness. There is no rebound and no guarding. Musculoskeletal: He exhibits tenderness. He exhibits no edema. Left  Lower back area pain to move from sitting to standing, pt states pain better once he is up and moving, no skin changes no pain into buttocks or legs    Neurological: He is alert and oriented to person, place, and time. Skin: Skin is warm. Nursing note and vitals reviewed.        MDM  Number of Diagnoses or Management Options  Diagnosis management comments: Urine dip -  Cbc cmp noaml l spine x rays -  Will treat muscular pain stressed follow up with pmd        Amount and/or Complexity of Data Reviewed  Clinical lab tests: ordered and reviewed  Tests in the radiology section of CPT®: ordered and reviewed  Review and summarize past medical records: yes    Risk of Complications, Morbidity, and/or Mortality  Presenting problems: moderate  Diagnostic procedures: moderate  Management options: low    Patient Progress  Patient progress: improved        ED Course       Procedures

## 2019-07-16 ENCOUNTER — APPOINTMENT (OUTPATIENT)
Dept: GENERAL RADIOLOGY | Age: 74
End: 2019-07-16
Attending: EMERGENCY MEDICINE
Payer: MEDICARE

## 2019-07-16 ENCOUNTER — APPOINTMENT (OUTPATIENT)
Dept: CT IMAGING | Age: 74
End: 2019-07-16
Attending: EMERGENCY MEDICINE
Payer: MEDICARE

## 2019-07-16 ENCOUNTER — HOSPITAL ENCOUNTER (EMERGENCY)
Age: 74
Discharge: HOME | End: 2019-07-16
Attending: EMERGENCY MEDICINE
Payer: MEDICARE

## 2019-07-16 VITALS
HEART RATE: 75 BPM | SYSTOLIC BLOOD PRESSURE: 115 MMHG | WEIGHT: 135 LBS | OXYGEN SATURATION: 99 % | DIASTOLIC BLOOD PRESSURE: 78 MMHG | HEIGHT: 68 IN | RESPIRATION RATE: 20 BRPM | TEMPERATURE: 97.4 F | BODY MASS INDEX: 20.46 KG/M2

## 2019-07-16 DIAGNOSIS — S16.1XXA STRAIN OF NECK MUSCLE, INITIAL ENCOUNTER: Primary | ICD-10-CM
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
## 2019-07-16 DIAGNOSIS — S39.012A BACK STRAIN, INITIAL ENCOUNTER: ICD-10-CM

## 2019-07-16 PROCEDURE — 99283 EMERGENCY DEPT VISIT LOW MDM: CPT | Performed by: EMERGENCY MEDICINE

## 2019-07-16 PROCEDURE — 72100 X-RAY EXAM L-S SPINE 2/3 VWS: CPT

## 2019-07-16 PROCEDURE — 72170 X-RAY EXAM OF PELVIS: CPT

## 2019-07-16 PROCEDURE — 72125 CT NECK SPINE W/O DYE: CPT

## 2019-07-16 PROCEDURE — 72070 X-RAY EXAM THORAC SPINE 2VWS: CPT

## 2019-07-16 NOTE — ED NOTES
I have reviewed discharge instructions with the patient. The patient verbalized understanding. Patient left ED via Discharge Method: ambulatory to Home with (insert name of family/friend, self). Opportunity for questions and clarification provided. Patient given 0 scripts. To continue your aftercare when you leave the hospital, you may receive an automated call from our care team to check in on how you are doing. This is a free service and part of our promise to provide the best care and service to meet your aftercare needs.  If you have questions, or wish to unsubscribe from this service please call [phone]. Thank you for Choosing our [facility].
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
## 2021-04-02 ENCOUNTER — APPOINTMENT (OUTPATIENT)
Dept: CT IMAGING | Age: 76
DRG: 023 | End: 2021-04-02
Attending: STUDENT IN AN ORGANIZED HEALTH CARE EDUCATION/TRAINING PROGRAM
Payer: MEDICARE

## 2021-04-02 ENCOUNTER — APPOINTMENT (OUTPATIENT)
Dept: GENERAL RADIOLOGY | Age: 76
DRG: 023 | End: 2021-04-02
Attending: STUDENT IN AN ORGANIZED HEALTH CARE EDUCATION/TRAINING PROGRAM
Payer: MEDICARE

## 2021-04-02 ENCOUNTER — HOSPITAL ENCOUNTER (INPATIENT)
Age: 76
LOS: 25 days | Discharge: NURSING, REHAB OR LONG-TERM CARE | DRG: 023 | End: 2021-04-27
Attending: STUDENT IN AN ORGANIZED HEALTH CARE EDUCATION/TRAINING PROGRAM | Admitting: INTERNAL MEDICINE
Payer: MEDICARE

## 2021-04-02 DIAGNOSIS — I10 HYPERTENSION, UNSPECIFIED TYPE: ICD-10-CM

## 2021-04-02 DIAGNOSIS — I61.0 BASAL GANGLIA HEMORRHAGE (HCC): Primary | ICD-10-CM

## 2021-04-02 DIAGNOSIS — R77.8 ELEVATED TROPONIN: ICD-10-CM

## 2021-04-02 DIAGNOSIS — E78.00 PURE HYPERCHOLESTEROLEMIA: ICD-10-CM

## 2021-04-02 DIAGNOSIS — I25.10 ATHEROSCLEROSIS OF NATIVE CORONARY ARTERY WITHOUT ANGINA PECTORIS, UNSPECIFIED WHETHER NATIVE OR TRANSPLANTED HEART: ICD-10-CM

## 2021-04-02 DIAGNOSIS — I25.10 CAD IN NATIVE ARTERY: ICD-10-CM

## 2021-04-02 DIAGNOSIS — R53.1 ACUTE LEFT-SIDED WEAKNESS: ICD-10-CM

## 2021-04-02 DIAGNOSIS — G91.1 OBSTRUCTIVE HYDROCEPHALUS (HCC): ICD-10-CM

## 2021-04-02 PROBLEM — I61.9 HEMORRHAGIC CEREBROVASCULAR ACCIDENT (CVA) (HCC): Status: ACTIVE | Noted: 2021-04-02

## 2021-04-02 LAB
ALBUMIN SERPL-MCNC: 3.4 G/DL (ref 3.2–4.6)
ALBUMIN/GLOB SERPL: 0.6 {RATIO} (ref 1.2–3.5)
ALP SERPL-CCNC: 119 U/L (ref 50–136)
ALT SERPL-CCNC: 131 U/L (ref 12–65)
ANION GAP SERPL CALC-SCNC: 3 MMOL/L (ref 7–16)
AST SERPL-CCNC: 114 U/L (ref 15–37)
BASOPHILS # BLD: 0 K/UL (ref 0–0.2)
BASOPHILS NFR BLD: 0 % (ref 0–2)
BILIRUB DIRECT SERPL-MCNC: 0.2 MG/DL
BILIRUB SERPL-MCNC: 0.5 MG/DL (ref 0.2–1.1)
BUN SERPL-MCNC: 20 MG/DL (ref 8–23)
CALCIUM SERPL-MCNC: 8.3 MG/DL (ref 8.3–10.4)
CHLORIDE SERPL-SCNC: 109 MMOL/L (ref 98–107)
CO2 SERPL-SCNC: 30 MMOL/L (ref 21–32)
CREAT SERPL-MCNC: 1.37 MG/DL (ref 0.8–1.5)
DIFFERENTIAL METHOD BLD: ABNORMAL
EOSINOPHIL # BLD: 0.1 K/UL (ref 0–0.8)
EOSINOPHIL NFR BLD: 2 % (ref 0.5–7.8)
ERYTHROCYTE [DISTWIDTH] IN BLOOD BY AUTOMATED COUNT: 14.2 % (ref 11.9–14.6)
GLOBULIN SER CALC-MCNC: 5.9 G/DL (ref 2.3–3.5)
GLUCOSE BLD STRIP.AUTO-MCNC: 133 MG/DL (ref 65–100)
GLUCOSE SERPL-MCNC: 128 MG/DL (ref 65–100)
HCT VFR BLD AUTO: 37.4 % (ref 41.1–50.3)
HGB BLD-MCNC: 12.5 G/DL (ref 13.6–17.2)
IMM GRANULOCYTES # BLD AUTO: 0 K/UL (ref 0–0.5)
IMM GRANULOCYTES NFR BLD AUTO: 0 % (ref 0–5)
INR BLD: 1 (ref 0.9–1.2)
LYMPHOCYTES # BLD: 0.5 K/UL (ref 0.5–4.6)
LYMPHOCYTES NFR BLD: 15 % (ref 13–44)
MCH RBC QN AUTO: 27.1 PG (ref 26.1–32.9)
MCHC RBC AUTO-ENTMCNC: 33.4 G/DL (ref 31.4–35)
MCV RBC AUTO: 81.1 FL (ref 79.6–97.8)
MONOCYTES # BLD: 0.4 K/UL (ref 0.1–1.3)
MONOCYTES NFR BLD: 12 % (ref 4–12)
NEUTS SEG # BLD: 2.2 K/UL (ref 1.7–8.2)
NEUTS SEG NFR BLD: 70 % (ref 43–78)
NRBC # BLD: 0 K/UL (ref 0–0.2)
PLATELET # BLD AUTO: 170 K/UL (ref 150–450)
PMV BLD AUTO: 10.5 FL (ref 9.4–12.3)
POTASSIUM SERPL-SCNC: 4.1 MMOL/L (ref 3.5–5.1)
PROT SERPL-MCNC: 9.3 G/DL (ref 6.3–8.2)
PT BLD: 12.5 SECS (ref 9.6–11.6)
RBC # BLD AUTO: 4.61 M/UL (ref 4.23–5.6)
SERVICE CMNT-IMP: ABNORMAL
SODIUM SERPL-SCNC: 142 MMOL/L (ref 136–145)
TROPONIN-HIGH SENSITIVITY: 139 PG/ML (ref 0–14)
WBC # BLD AUTO: 3.2 K/UL (ref 4.3–11.1)

## 2021-04-02 PROCEDURE — 85025 COMPLETE CBC W/AUTO DIFF WBC: CPT

## 2021-04-02 PROCEDURE — 85610 PROTHROMBIN TIME: CPT

## 2021-04-02 PROCEDURE — 70450 CT HEAD/BRAIN W/O DYE: CPT

## 2021-04-02 PROCEDURE — 96365 THER/PROPH/DIAG IV INF INIT: CPT

## 2021-04-02 PROCEDURE — 99285 EMERGENCY DEPT VISIT HI MDM: CPT

## 2021-04-02 PROCEDURE — 74011000258 HC RX REV CODE- 258: Performed by: STUDENT IN AN ORGANIZED HEALTH CARE EDUCATION/TRAINING PROGRAM

## 2021-04-02 PROCEDURE — 96366 THER/PROPH/DIAG IV INF ADDON: CPT

## 2021-04-02 PROCEDURE — 93005 ELECTROCARDIOGRAM TRACING: CPT | Performed by: STUDENT IN AN ORGANIZED HEALTH CARE EDUCATION/TRAINING PROGRAM

## 2021-04-02 PROCEDURE — 65610000006 HC RM INTENSIVE CARE

## 2021-04-02 PROCEDURE — 74011000250 HC RX REV CODE- 250: Performed by: STUDENT IN AN ORGANIZED HEALTH CARE EDUCATION/TRAINING PROGRAM

## 2021-04-02 PROCEDURE — 80076 HEPATIC FUNCTION PANEL: CPT

## 2021-04-02 PROCEDURE — 82962 GLUCOSE BLOOD TEST: CPT

## 2021-04-02 PROCEDURE — 84484 ASSAY OF TROPONIN QUANT: CPT

## 2021-04-02 PROCEDURE — 71045 X-RAY EXAM CHEST 1 VIEW: CPT

## 2021-04-02 PROCEDURE — 74011250636 HC RX REV CODE- 250/636: Performed by: STUDENT IN AN ORGANIZED HEALTH CARE EDUCATION/TRAINING PROGRAM

## 2021-04-02 PROCEDURE — 80048 BASIC METABOLIC PNL TOTAL CA: CPT

## 2021-04-02 RX ORDER — LABETALOL HYDROCHLORIDE 5 MG/ML
20 INJECTION, SOLUTION INTRAVENOUS ONCE
Status: DISCONTINUED | OUTPATIENT
Start: 2021-04-02 | End: 2021-04-02

## 2021-04-02 RX ORDER — SODIUM CHLORIDE 0.9 % (FLUSH) 0.9 %
5-40 SYRINGE (ML) INJECTION AS NEEDED
Status: DISCONTINUED | OUTPATIENT
Start: 2021-04-02 | End: 2021-04-27 | Stop reason: HOSPADM

## 2021-04-02 RX ORDER — SODIUM CHLORIDE 0.9 % (FLUSH) 0.9 %
10 SYRINGE (ML) INJECTION
Status: ACTIVE | OUTPATIENT
Start: 2021-04-02 | End: 2021-04-03

## 2021-04-02 RX ORDER — SODIUM CHLORIDE 0.9 % (FLUSH) 0.9 %
5-40 SYRINGE (ML) INJECTION EVERY 8 HOURS
Status: DISCONTINUED | OUTPATIENT
Start: 2021-04-02 | End: 2021-04-27 | Stop reason: HOSPADM

## 2021-04-02 RX ADMIN — SODIUM CHLORIDE 10 MG/HR: 900 INJECTION, SOLUTION INTRAVENOUS at 21:06

## 2021-04-02 RX ADMIN — SODIUM CHLORIDE 15 MG/HR: 900 INJECTION, SOLUTION INTRAVENOUS at 20:56

## 2021-04-02 RX ADMIN — SODIUM CHLORIDE 5 MG/HR: 900 INJECTION, SOLUTION INTRAVENOUS at 18:28

## 2021-04-02 RX ADMIN — DESMOPRESSIN ACETATE 24.32 MCG: 4 SOLUTION INTRAVENOUS at 21:18

## 2021-04-02 RX ADMIN — Medication 10 ML: at 22:00
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
## 2021-04-03 ENCOUNTER — APPOINTMENT (OUTPATIENT)
Dept: CT IMAGING | Age: 76
DRG: 023 | End: 2021-04-03
Attending: INTERNAL MEDICINE
Payer: MEDICARE

## 2021-04-03 ENCOUNTER — APPOINTMENT (OUTPATIENT)
Dept: ULTRASOUND IMAGING | Age: 76
DRG: 023 | End: 2021-04-03
Attending: INTERNAL MEDICINE
Payer: MEDICARE

## 2021-04-03 ENCOUNTER — APPOINTMENT (OUTPATIENT)
Dept: CT IMAGING | Age: 76
DRG: 023 | End: 2021-04-03
Attending: FAMILY MEDICINE
Payer: MEDICARE

## 2021-04-03 LAB
ANION GAP SERPL CALC-SCNC: 3 MMOL/L (ref 7–16)
ATRIAL RATE: 74 BPM
ATRIAL RATE: 78 BPM
BASOPHILS # BLD: 0 K/UL (ref 0–0.2)
BASOPHILS NFR BLD: 0 % (ref 0–2)
BUN SERPL-MCNC: 19 MG/DL (ref 8–23)
CALCIUM SERPL-MCNC: 8.2 MG/DL (ref 8.3–10.4)
CALCULATED P AXIS, ECG09: 71 DEGREES
CALCULATED P AXIS, ECG09: 76 DEGREES
CALCULATED R AXIS, ECG10: -61 DEGREES
CALCULATED R AXIS, ECG10: 69 DEGREES
CALCULATED T AXIS, ECG11: 79 DEGREES
CALCULATED T AXIS, ECG11: 88 DEGREES
CHLORIDE SERPL-SCNC: 108 MMOL/L (ref 98–107)
CHOLEST SERPL-MCNC: 157 MG/DL
CO2 SERPL-SCNC: 26 MMOL/L (ref 21–32)
CREAT SERPL-MCNC: 1.16 MG/DL (ref 0.8–1.5)
DIAGNOSIS, 93000: NORMAL
DIAGNOSIS, 93000: NORMAL
DIFFERENTIAL METHOD BLD: ABNORMAL
EOSINOPHIL # BLD: 0 K/UL (ref 0–0.8)
EOSINOPHIL NFR BLD: 1 % (ref 0.5–7.8)
ERYTHROCYTE [DISTWIDTH] IN BLOOD BY AUTOMATED COUNT: 13.6 % (ref 11.9–14.6)
EST. AVERAGE GLUCOSE BLD GHB EST-MCNC: 123 MG/DL
GLUCOSE SERPL-MCNC: 108 MG/DL (ref 65–100)
HAV IGM SER QL: NONREACTIVE
HBA1C MFR BLD: 5.9 % (ref 4.2–6.3)
HBV CORE IGM SER QL: NONREACTIVE
HBV SURFACE AG SER QL: NONREACTIVE
HCT VFR BLD AUTO: 33 % (ref 41.1–50.3)
HCV AB SER QL: REACTIVE
HDLC SERPL-MCNC: 55 MG/DL (ref 40–60)
HDLC SERPL: 2.9 {RATIO}
HGB BLD-MCNC: 11.3 G/DL (ref 13.6–17.2)
IMM GRANULOCYTES # BLD AUTO: 0 K/UL (ref 0–0.5)
IMM GRANULOCYTES NFR BLD AUTO: 0 % (ref 0–5)
LDLC SERPL CALC-MCNC: 92.2 MG/DL
LIPID PROFILE,FLP: NORMAL
LYMPHOCYTES # BLD: 1.2 K/UL (ref 0.5–4.6)
LYMPHOCYTES NFR BLD: 25 % (ref 13–44)
MCH RBC QN AUTO: 26.8 PG (ref 26.1–32.9)
MCHC RBC AUTO-ENTMCNC: 34.2 G/DL (ref 31.4–35)
MCV RBC AUTO: 78.4 FL (ref 79.6–97.8)
MONOCYTES # BLD: 0.5 K/UL (ref 0.1–1.3)
MONOCYTES NFR BLD: 11 % (ref 4–12)
NEUTS SEG # BLD: 3.1 K/UL (ref 1.7–8.2)
NEUTS SEG NFR BLD: 63 % (ref 43–78)
NRBC # BLD: 0 K/UL (ref 0–0.2)
P-R INTERVAL, ECG05: 156 MS
P-R INTERVAL, ECG05: 160 MS
PLATELET # BLD AUTO: 154 K/UL (ref 150–450)
PMV BLD AUTO: 10.6 FL (ref 9.4–12.3)
POTASSIUM SERPL-SCNC: 3.9 MMOL/L (ref 3.5–5.1)
Q-T INTERVAL, ECG07: 356 MS
Q-T INTERVAL, ECG07: 388 MS
QRS DURATION, ECG06: 94 MS
QRS DURATION, ECG06: 96 MS
QTC CALCULATION (BEZET), ECG08: 402 MS
QTC CALCULATION (BEZET), ECG08: 430 MS
RBC # BLD AUTO: 4.21 M/UL (ref 4.23–5.6)
SODIUM SERPL-SCNC: 137 MMOL/L (ref 136–145)
TRIGL SERPL-MCNC: 49 MG/DL (ref 35–150)
TROPONIN-HIGH SENSITIVITY: 2687.3 PG/ML (ref 0–14)
TROPONIN-HIGH SENSITIVITY: 5234.5 PG/ML (ref 0–14)
TSH SERPL DL<=0.005 MIU/L-ACNC: 0.7 UIU/ML (ref 0.36–3.74)
VENTRICULAR RATE, ECG03: 74 BPM
VENTRICULAR RATE, ECG03: 77 BPM
VLDLC SERPL CALC-MCNC: 9.8 MG/DL (ref 6–23)
WBC # BLD AUTO: 4.8 K/UL (ref 4.3–11.1)

## 2021-04-03 PROCEDURE — 2709999900 HC NON-CHARGEABLE SUPPLY

## 2021-04-03 PROCEDURE — 74011000250 HC RX REV CODE- 250: Performed by: STUDENT IN AN ORGANIZED HEALTH CARE EDUCATION/TRAINING PROGRAM

## 2021-04-03 PROCEDURE — 86803 HEPATITIS C AB TEST: CPT

## 2021-04-03 PROCEDURE — 74011250637 HC RX REV CODE- 250/637: Performed by: NEUROLOGICAL SURGERY

## 2021-04-03 PROCEDURE — 74011250636 HC RX REV CODE- 250/636: Performed by: FAMILY MEDICINE

## 2021-04-03 PROCEDURE — 80061 LIPID PANEL: CPT

## 2021-04-03 PROCEDURE — 93005 ELECTROCARDIOGRAM TRACING: CPT | Performed by: FAMILY MEDICINE

## 2021-04-03 PROCEDURE — 97530 THERAPEUTIC ACTIVITIES: CPT

## 2021-04-03 PROCEDURE — 74011000250 HC RX REV CODE- 250: Performed by: FAMILY MEDICINE

## 2021-04-03 PROCEDURE — 85025 COMPLETE CBC W/AUTO DIFF WBC: CPT

## 2021-04-03 PROCEDURE — 74011250636 HC RX REV CODE- 250/636: Performed by: STUDENT IN AN ORGANIZED HEALTH CARE EDUCATION/TRAINING PROGRAM

## 2021-04-03 PROCEDURE — 70450 CT HEAD/BRAIN W/O DYE: CPT

## 2021-04-03 PROCEDURE — 80048 BASIC METABOLIC PNL TOTAL CA: CPT

## 2021-04-03 PROCEDURE — 65610000006 HC RM INTENSIVE CARE

## 2021-04-03 PROCEDURE — 84443 ASSAY THYROID STIM HORMONE: CPT

## 2021-04-03 PROCEDURE — 97112 NEUROMUSCULAR REEDUCATION: CPT

## 2021-04-03 PROCEDURE — 36415 COLL VENOUS BLD VENIPUNCTURE: CPT

## 2021-04-03 PROCEDURE — 83036 HEMOGLOBIN GLYCOSYLATED A1C: CPT

## 2021-04-03 PROCEDURE — 84484 ASSAY OF TROPONIN QUANT: CPT

## 2021-04-03 PROCEDURE — 76705 ECHO EXAM OF ABDOMEN: CPT

## 2021-04-03 PROCEDURE — 97165 OT EVAL LOW COMPLEX 30 MIN: CPT

## 2021-04-03 PROCEDURE — 87521 HEPATITIS C PROBE&RVRS TRNSC: CPT

## 2021-04-03 PROCEDURE — 97162 PT EVAL MOD COMPLEX 30 MIN: CPT

## 2021-04-03 PROCEDURE — 80074 ACUTE HEPATITIS PANEL: CPT

## 2021-04-03 PROCEDURE — C8929 TTE W OR WO FOL WCON,DOPPLER: HCPCS

## 2021-04-03 PROCEDURE — 99223 1ST HOSP IP/OBS HIGH 75: CPT | Performed by: INTERNAL MEDICINE

## 2021-04-03 RX ORDER — LIDOCAINE 4 G/100G
1 PATCH TOPICAL EVERY 24 HOURS
Status: DISCONTINUED | OUTPATIENT
Start: 2021-04-03 | End: 2021-04-27 | Stop reason: HOSPADM

## 2021-04-03 RX ORDER — MORPHINE SULFATE 2 MG/ML
0.5 INJECTION, SOLUTION INTRAMUSCULAR; INTRAVENOUS
Status: DISCONTINUED | OUTPATIENT
Start: 2021-04-03 | End: 2021-04-03

## 2021-04-03 RX ORDER — ACETAMINOPHEN 325 MG/1
650 TABLET ORAL
Status: DISPENSED | OUTPATIENT
Start: 2021-04-03 | End: 2021-04-06

## 2021-04-03 RX ADMIN — SODIUM CHLORIDE 3 MG/HR: 900 INJECTION, SOLUTION INTRAVENOUS at 14:37

## 2021-04-03 RX ADMIN — ACETAMINOPHEN 650 MG: 325 TABLET ORAL at 17:26

## 2021-04-03 RX ADMIN — Medication 10 ML: at 05:32

## 2021-04-03 RX ADMIN — MORPHINE SULFATE 0.5 MG: 2 INJECTION, SOLUTION INTRAMUSCULAR; INTRAVENOUS at 10:47

## 2021-04-03 RX ADMIN — PERFLUTREN 1 ML: 6.52 INJECTION, SUSPENSION INTRAVENOUS at 10:50

## 2021-04-03 NOTE — ED NOTES
TRANSFER - OUT REPORT:    Verbal report given to Rolanda Miranda RN(name) on Jessicae Sessions  being transferred to 334(unit) for routine progression of care       Report consisted of patients Situation, Background, Assessment and   Recommendations(SBAR). Information from the following report(s) ED Summary was reviewed with the receiving nurse. Lines:   Peripheral IV 04/02/21 Right Antecubital (Active)   Site Assessment Clean, dry, & intact 04/02/21 1803   Phlebitis Assessment 0 04/02/21 1803   Infiltration Assessment 0 04/02/21 1803   Dressing Status Clean, dry, & intact 04/02/21 1803        Opportunity for questions and clarification was provided.       Patient transported with:   Monitor

## 2021-04-03 NOTE — PROGRESS NOTES
Speech therapy note  Attempted to see pt this am for a bedside swallow evaluation, however, NPO for a procedure.      Prudence Delgado MA/UNIQUE/SLP

## 2021-04-03 NOTE — PROGRESS NOTES
Reviewed the repeat head CT. Clot essentially unchanged. Minimal mass affect. He remains awake responsive, but extremely hemiparetic. Recommend continued conservative care and repeat CT in 3-5 days.     Isidro Gibbs MD  Neurosurgery  [phone]

## 2021-04-03 NOTE — H&P
Admit date: 2021   Name:  Terra Laughlin   Age:  67 y.o.   :  1945   MRN:  [id number]   PCP:  Shagufta Mccormack MD   Provider:  Tyler Lewis MD      ASSESSMENT AND PLAN  57-year-old male with a past medical history of hypertension, coronary artery disease, arthritis, GERD, hyperlipidemia, COPD, presents in the setting of left-sided weakness and a fall. 1.  Acute right basal ganglia hematoma with right intraventricular hemorrhage. ICH score of 1 with 13% mortality. Neurosurgery was called by ED physician and they reviewed imaging and recommended to hold off on CTA for now and they will see the patient tomorrow morning. Continue nicardipine drip to maintain a blood pressure lower than 140/90. Neurochecks every hour. Hold antiplatelet therapy. Desmopressin was ordered in the emergency department since patient is currently on aspirin and Effient. Obtain echocardiogram.  N.p.o. for now. Hold statin for now in the setting of transaminitis. Monitor monitoring. A1c, TSH, lipid panel. SLP, PT and OT evaluation. CT brain as needed for changes in neurologic status and repeat in the morning. 2.  Transaminitis. Patient does have history of hepatitis C and unclear if this was treated. Right upper quadrant ultrasound. Will obtain hepatitis panel. Monitor LFTs. 3.  Coronary artery disease. Hold aspirin and Effient for now in the setting of hemorrhagic CVA. 4.  Hypertension. Hold metoprolol since currently on nicardipine gtt. 5.  Hyperlipidemia. Hold statin for now in the setting of transaminitis. 6.  Elevated troponin likely due to demand ischemia. No chest pain, EKG no ischemic changes. Trend troponin. EKG prn.     DVT prophylaxis avoid heparin products in the setting of hemorrhagic CVA    Full code    Patient and daughter aware of plan      CHIEF COMPLAINT:  Left sided weakness + Fall       HISTORY OF PRESENT ILLNESS:  57-year-old male with a past medical history of hypertension, coronary artery disease, arthritis, GERD, hyperlipidemia, COPD, presents in the setting of left-sided weakness and a fall. Per patient he was in his usual state of health until earlier today he noticed his left side and got weak and after that he fell and hit his head against the floor but denies any loss of consciousness. The daughter at bedside also noted some right-sided facial droop. Otherwise no blurry vision, no slurring speech, no trouble finding words. Also denies any fever or chills, no chest pain, no shortness of breath, no abdominal pain, no nausea vomiting or diarrhea. Here in the emergency department Code S was activated and CT of the head showed acute right basal ganglia hematoma with right intraventricular hemorrhage. She was found to be hypertensive as well he was a started on nicardipine drip. Neurosurgery was called by ED physician and reviewed imaging and will see him tomorrow. Will admit the patient to the ICU for further management. 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 GHULAM. PET positive.  Navigational bronch 10/16/13--non-diagnositic     Near syncope 1/21/2015    NSTEMI (non-ST elevated myocardial infarction) ([facility] 12/31/2014    Pulmonary tuberculosis     S/P PTCA (percutaneous transluminal coronary angioplasty)     Shortness of breath 1/21/2015    TB peritonitis 5/2/2014       Past Surgical History:   Procedure Laterality Date    HX OTHER SURGICAL      LUNG NODULE BIOPSY, TB    HX PTCA      UT ABDOMEN SURGERY PROC UNLISTED      hernia    UT CHEST SURGERY PROCEDURE UNLISTED  11/2013    cervical mediastinal bx    UT LEFT HEART CATH,PERCUTANEOUS  12/31/2014    Xience stents to 2nd OM and pCirc          HOME MEDICATION:  Prior to Admission medications    Medication Sig Start Date End Date Taking? Authorizing Provider   cyclobenzaprine (FLEXERIL) 5 mg tablet Take 1 Tab by mouth two (2) times a day. 5/7/18   CONNOR Trinidad   aspirin 81 mg chewable tablet Take 1 Tab by mouth daily. 8/30/17   Gisell Romo PA   nitroglycerin (NITROSTAT) 0.4 mg SL tablet 1 Tab by SubLINGual route every five (5) minutes as needed for Chest Pain. 8/30/17   Gisell Romo PA   atorvastatin (LIPITOR) 80 mg tablet Take 1 Tab by mouth nightly. 8/30/17   Gisell Romo PA   metoprolol tartrate (LOPRESSOR) 25 mg tablet Take 1 Tab by mouth two (2) times a day.  8/30/17   Jens Romo PA         REVIEW OF SYSTEMS:  14 ROS negative except from stated on HPI      Social History     Tobacco Use    Smoking status: Light Tobacco Smoker     Packs/day: 1.00     Years: 45.00     Pack years: 45.00     Types: Cigarettes    Smokeless tobacco: Never Used    Tobacco comment: QUIT 12/14   Substance Use Topics    Alcohol use: No     Alcohol/week: 5.0 standard drinks     Types: 6 Cans of beer per week     Comment: QUIT 12/14    Drug use: Yes     Types: Marijuana         Family History   Problem Relation Age of Onset    Hypertension Father         STROKE    Stroke Mother         HYPERTENSION    Hypertension Sister         4 LIVING SISTERS    Heart Disease Sister         stent- no MI    Other Sister     Other Child         6 LOCAL, ONE IN [place] IS RN    Other Brother No Known Allergies      Vitals:    04/02/21 1950 04/02/21 2010 04/02/21 2020 04/02/21 2030   BP: (!) 119/59 132/66 126/65 124/63   Pulse: 83 83 83 80   Resp: 27 (!) 33 (!) 45 (!) 35   Temp:       SpO2:       Weight:       Height:             PHYSICAL EXAM:  General: Alert, oriented, NAD  HEENT: NC/AT, EOM are intact  Neck: supple, no JVD  Cardiovascular: RRR, S1, S2, no murmurs  Respiratory: Lungs are clear, no wheezes or rales  Abdomen: Soft, NT, ND  Back: No CVA tenderness, no paraspinal tenderness  Extremities: LE without pedal edema, no erythema  Neuro: A&O, left-sided hemiparesis  Skin: no rash or ulcers  Psych: good mood and affect      I have personally reviewed patients laboratory data showing  Lab Results   Component Value Date    WBC 3.2 (L) 04/02/2021    HGB 12.5 (L) 04/02/2021    HCT 37.4 (L) 04/02/2021    MCV 81.1 04/02/2021     04/02/2021     No results found for: CKMB  Lab Results   Component Value Date     (H) 04/02/2021     04/02/2021    K 4.1 04/02/2021    CO2 30 04/02/2021     (H) 04/02/2021    BUN 20 04/02/2021         I have personally reviewed patients imaging showing  XR CHEST PORT   Final Result   Negative for acute change. CT CODE NEURO HEAD WO CONTRAST   Final Result      Acute right basal ganglia hematoma likely on the basis of hypertension. Right intraventricular hemorrhage.       Date of Dictation: 4/2/2021 6:17 PM             I have personally reviewed EKG showing sinus rhythm, no acute ischemic changes     Likely length of stay more than 2 midnights

## 2021-04-03 NOTE — PROGRESS NOTES
Review of the HCT shows a moderate sized intracerebral hemorrhage in the right thalamus region with extension into ventricles. No hydrocephalus or major midline shift. Recommend conservative management.  Repeat CT in AM.    Kal Cantu MD  Neurosurgery  [phone]

## 2021-04-03 NOTE — [facility] IDG VOLUNTEER NOTES
ACUTE PHYSICAL THERAPY GOALS: 
(Developed with and agreed upon by patient and/or caregiver.) HELD goals post evaluation and initial treatment with post CT scan indicating increased bleed. Interim goal: 1. Upon safe recommendation patient to demonstrate transfer to sitting EOB and dangling to MOD A x 1-2. PHYSICAL THERAPY ASSESSMENT: Initial Assessment and PM PT Treatment Day # 1 Sarah Potter is a 67 y.o. male PRIMARY DIAGNOSIS: Hemorrhagic cerebrovascular accident (CVA) ([facility] Hemorrhagic cerebrovascular accident (CVA) ([facility] [I61.9] Reason for Referral: ICD-10: Treatment Diagnosis: Generalized Muscle Weakness (M62.81) Difficulty in walking, Not elsewhere classified (R26.2) Hemiplegia and hemiparesis following cerebral infarction affecting left non-dominant side (X38.085) [place] / [place] / Product Type: Medicare /  
 
ASSESSMENT:  
 
REHAB RECOMMENDATIONS:  
Recommendation to date pending progress: 
Setting:  Short-term Rehab Equipment:  To Be Determined PRIOR LEVEL OF FUNCTION: 
(Prior to Hospitalization) INITIAL/CURRENT LEVEL OF FUNCTION: 
(Most Recently Demonstrated) Bed Mobility: 
 Independent Sit to Stand:  Independent Transfers:  Independent Gait/Mobility: 
 Independent Bed Mobility:  Maximal Assistance x 2 Sit to Stand: 
 Unable to perform Transfers:  Maximal Assistance x 2 Gait/Mobility: 
 Unable to perform ASSESSMENT: 
Mr. Jessi Marlow is a pleasant frail elderly male with above diagnosis and decreased affect and awareness this afternoon with increased lethargy, somnolence and neurological yawning. He demonstrates with decreased transfers, ambulation and mobility below his prior functional baseline. All transfers are currently limited at MAX A x 2  out of bed to sit dangling right side of bed. He is unable to fully stand at this time.   Nurse and PT are in agreement for further request of neurological workup . Post PT CT scan indicates further creep of bleeding and hemorrhage. PT held thus until further neurosurgical workup. August Forth then returns to supine with MAX A x 2. Skilled PT is indicated for this patient's functional mobility deficits. SUBJECTIVE:  
Mr. Hannah López states, \"I am so sleepy. \" SOCIAL HISTORY/LIVING ENVIRONMENT: 
Home Environment: Private residence # Steps to Enter: 5 One/Two Story Residence: One story Living Alone: No 
Support Systems: Family member(s) OBJECTIVE:  
 
PAIN: VITAL SIGNS: LINES/DRAINS:  
Pre Treatment: Pain Screen Pain Scale 1: Numeric (0 - 10) Pain Intensity 1: 0 Post Treatment: no pain reported. IV 
O2 Device: Room air GROSS EVALUATION: 
 Within Functional Limits Abnormal/ Functional Abnormal/ Non-Functional (see comments) Not Tested Comments: AROM [] [] [x] [] Left sided hemiparesis PROM [] [] [x] [] Strength [] [] [x] [] Balance [] [] [x] [] Posture [] [] [x] [] Sensation [] [] [x] [] Coordination [] [] [x] [] Tone [] [] [x] [] Edema [] [] [x] [] Activity Tolerance [] [] [x] []   
 [] [] [] [] COGNITION/ 
PERCEPTION: Intact Impaired  
(see comments) Comments:  
Orientation [] [x] Vision [] [x] Hearing [] [] Command Following [] [x] Safety Awareness [] [x]   
 [] [] MOBILITY: I Mod I S SBA CGA Min Mod Max Total  NT x2 Comments:  
Bed Mobility Rolling [] [] [] [] [] [] [] [x] [] [] [x] Seated dangling only Supine to Sit [] [] [] [] [] [] [] [x] [] [] [x] Scooting [] [] [] [] [] [] [] [x] [] [] [x] Sit to Supine [] [] [] [] [] [] [] [x] [] [] [x] Transfers Sit to Stand [] [] [] [] [] [] [] [] [] [x] [x] Unable today Bed to Chair [] [] [] [] [] [] [] [] [] [x] [x] Stand to Sit [] [] [] [] [] [] [] [] [] [x] [x] I=Independent, Mod I=Modified Independent, S=Supervision, SBA=Standby Assistance, CGA=Contact Guard Assistance,  
Min=Minimal Assistance, Mod=Moderate Assistance, Max=Maximal Assistance, Total=Total Assistance, NT=Not Tested GAIT: I Mod I S SBA CGA Min Mod Max Total  NT x2 Comments:  
Level of Assistance [] [] [] [] [] [] [] [] [] [x] [] Distance N/A    
DME N/A Gait Quality Unable today. Weightbearing Status N/A I=Independent, Mod I=Modified Independent, S=Supervision, SBA=Standby Assistance, CGA=Contact Guard Assistance,  
Min=Minimal Assistance, Mod=Moderate Assistance, Max=Maximal Assistance, Total=Total Assistance, NT=Not Tested [facility] Basic Mobility Inpatient Short Form How much difficulty does the patient currently have. .. Unable A Lot A Little None 1. Turning over in bed (including adjusting bedclothes, sheets and blankets)? [] 1   [x] 2   [] 3   [] 4  
2. Sitting down on and standing up from a chair with arms ( e.g., wheelchair, bedside commode, etc.)   [] 1   [x] 2   [] 3   [] 4  
3. Moving from lying on back to sitting on the side of the bed? [] 1   [x] 2   [] 3   [] 4 How much help from another person does the patient currently need. .. Total A Lot A Little None 4. Moving to and from a bed to a chair (including a wheelchair)? [x] 1   [] 2   [] 3   [] 4  
5. Need to walk in hospital room? [x] 1   [] 2   [] 3   [] 4  
6. Climbing 3-5 steps with a railing? [x] 1   [] 2   [] 3   [] 4  
© 2007, Trustees of [address] [place] [address] [address] [address], under license to [employer]. All rights reserved Score:  Initial: 9 Most Recent: X (Date: -- ) Interpretation of Tool:  Represents activities that are increasingly more difficult (i.e. Bed mobility, Transfers, Gait). PLAN:  
FREQUENCY/DURATION: PT Plan of Care: Hold(post evaluation . New scan indicates increased bleed.   ) for duration of hospital stay or until stated goals are met, whichever comes first. 
 
PROBLEM LIST:  
(Skilled intervention is medically necessary to address:) 1. Decreased ADL/Functional Activities 2. Decreased Activity Tolerance 3. Decreased AROM/PROM 4. Decreased Balance 5. Decreased Cognition 6. Decreased Coordination 7. Decreased Gait Ability 8. Decreased Strength 9. Decreased Transfer Abilities INTERVENTIONS PLANNED:  
(Benefits and precautions of physical therapy have been discussed with the patient.) 1. Therapeutic Activity 2. Therapeutic Exercise/HEP 3. Neuromuscular Re-education 4. Gait Training 5. Manual Therapy 6. Education TREATMENT:  
 
EVALUATION: High Complexity : (Untimed Charge) TREATMENT:  
($$ Neuromuscular Re-education: 23-37 mins    ) Neuromuscular Re-education (23 Minutes): Neuromuscular Re-education included Balance Training, Coordination training, Hemibody awareness training and Sitting balance training to improve Balance, Coordination, Functional Mobility, Postural Control and Proprioception. TREATMENT GRID: 
 Date: 
 Date: 
 Date: Activity/Exercise Parameters Parameters Parameters AFTER TREATMENT POSITION/PRECAUTIONS: 
Bed, Needs within reach and RN notified INTERDISCIPLINARY COLLABORATION: 
RN/PCT and PT/PTA TOTAL TREATMENT DURATION: 
PT Patient Time In/Time Out Time In: 1320 Time Out: 3420 Maira Mays PT

## 2021-04-03 NOTE — PROGRESS NOTES
ACUTE OT GOALS:  (Developed with and agreed upon by patient and/or caregiver.)  1. Complete functional transfers (I.e. toilet/bedside commode) with contact guard assistance. 2.  Complete gentle active assisted L UE ROM focusing more on finger/wrist extensors and shoulder external rotators. 3.  Utilize L UE as a support for supine to sitting and sit to standing at least 20% of the time. 4.  Complete self-feeding (once cleared by SLP/MD/RN) with standby assistance. 5.  Complete grooming/oral care with standby assistance. Time frame: 4 - 7 visits. OCCUPATIONAL THERAPY ASSESSMENT: Initial Assessment and Daily Note OT Treatment Day # 1    Jess Morales is a 67 y.o. male   PRIMARY DIAGNOSIS: Hemorrhagic cerebrovascular accident (CVA) ([facility]  Hemorrhagic cerebrovascular accident (CVA) ([facility] [I61.9]       Reason for Referral:    ICD-10: Treatment Diagnosis: Unspecified Lack of Coordination (R27.9)  Hemiplegia and hemiparesis following cerebral infarction affecting left dominant side (O49.091)  [place]: [place] / Product Type: Medicare /   ASSESSMENT:     REHAB RECOMMENDATIONS:   Recommendation to date pending progress:  [address] [place] [address] vs. Short-term Rehab  Equipment:    To Be Determined     PRIOR LEVEL OF FUNCTION:  (Prior to Hospitalization)  INITIAL/CURRENT LEVEL OF FUNCTION:  (Based on today's evaluation)   Bathing:   Independent  Dressing:   Independent  Feeding/Grooming:   Independent  Toileting:   Independent  Functional Mobility:   Independent Bathing:   Maximal Assistance  Dressing:   Total Assistance  Feeding/Grooming:   Moderate Assistance  Toileting:   Total Assistance  Functional Mobility:   Maximal Assistance     ASSESSMENT:  Mr. Mesfin Perez is a L handed man s/p hemorrhagic CVA and falls affecting the L hemalatha-body and his independence with ADL.   He is typically independent and currently requires maximal to total assistance for ADL. His L UE is grossly 2+ to 3-/5 in strength and displays tone in the shoulder internal rotators (I.e. pectoralis major/subscapularis, etc. ). His sitting and standing balance was poor to fair requiring significant support on the L side of his body. He was able to visually track to the L and the R. He requires cues to engage the L side. He does have some sensation to light touch in the L hemalatha-body. He did complain of 8/10 pain in his L LE (prior to bed mobility) and the RN gave some morphine. He would be a great candidate for inpatient rehab. Shona Boas presents with the following problems and would benefit from occupational therapy to maximize independence with self-care,instrumental activities of daily living, and functional transfers/mobility for activities of daily living/instrumental activities of daily living via the stated goals. SUBJECTIVE:   Mr. Eduin Allen states, \"I worked in [employer] all my life. \"    SOCIAL HISTORY/LIVING ENVIRONMENT: Lives with brother. Does not drive. Takes the bus to pay bills, etc. Totally independent per patient. Home Environment: Private residence  # Steps to Enter: 5  One/Two Story Residence: One story  Living Alone: No  Support Systems: Family member(s)    OBJECTIVE:     PAIN: VITAL SIGNS: LINES/DRAINS:   Pre Treatment: Pain Screen  Pain Scale 1: Numeric (0 - 10)  Pain Intensity 1: 8  Pain Location 1: Leg  Pain Orientation 1: Left  Post Treatment:    IV and Purewick  O2 Device: Room air     GROSS EVALUATION:   Within Functional Limits Abnormal/ Functional Abnormal/ Non-Functional (see comments) Not Tested Comments:   AROM [] [x] [x] []  L UE hemiparesis   PROM [] [x] [] []    Strength [] [x] [] [] L UE grossly 2+ to 3-/5 in strength. Balance [] [] [x] [] Leans L. Posture [] [] [x] []    Sensation [] [x] [x] []    Coordination [] [] [x] []    Tone [] [x] [] [] Internal rotators.    Edema [] [] [] []    Activity Tolerance [] [x] [] []     [] [] [] []      COGNITION/  PERCEPTION: Intact Impaired   (see comments) Comments:   Orientation [x] []    Vision [] [] Able to track L and R. Hearing [] []    Judgment/ Insight [] []    Attention [] []    Memory [x] [] Long term grossly intact. Command Following [x] []    Emotional Regulation [] []     [] []      ACTIVITIES OF DAILY LIVING: I Mod I S SBA CGA Min Mod Max Total NT Comments   BASIC ADLs:              Bathing/ Showering [] [] [] [] [] [] [] [] [] [x]    Toileting [] [] [] [] [] [] [] [] [] [x]    Dressing [] [] [] [] [] [] [] [] [x] []    Feeding [] [] [] [] [] [] [] [] [] [x]    Grooming [] [] [] [] [] [] [] [] [] [x]    Personal Device Care [] [] [] [] [] [] [] [] [] [x]    Functional Mobility [] [] [] [] [] [] [] [x] [] []    I=Independent, Mod I=Modified Independent, S=Supervision, SBA=Standby Assistance, CGA=Contact Guard Assistance,   Min=Minimal Assistance, Mod=Moderate Assistance, Max=Maximal Assistance, Total=Total Assistance, NT=Not Tested    MOBILITY: I Mod I S SBA CGA Min Mod Max Total  NT x2 Comments:   Supine to sit [] [] [] [] [] [] [] [x] [] [] []    Sit to supine [] [] [] [] [] [] [] [] [x] [] [x]    Sit to stand [] [] [] [] [] [] [x] [] [] [] [x]    Bed to chair [] [] [] [] [] [] [] [] [] [x] []    I=Independent, Mod I=Modified Independent, S=Supervision, SBA=Standby Assistance, CGA=Contact Guard Assistance,   Min=Minimal Assistance, Mod=Moderate Assistance, Max=Maximal Assistance, Total=Total Assistance, NT=Not Tested    [employer] 6 Clicks   Daily Activity Inpatient Short Form        How much help from another person does the patient currently need. .. Total A Lot A Little None   1. Putting on and taking off regular lower body clothing? [x] 1   [] 2   [] 3   [] 4   2. Bathing (including washing, rinsing, drying)? [x] 1   [] 2   [] 3   [] 4   3. Toileting, which includes using toilet, bedpan or urinal?   [x] 1   [] 2   [] 3   [] 4   4.   Putting on and taking off regular upper body clothing? [] 1   [x] 2   [] 3   [] 4   5. Taking care of personal grooming such as brushing teeth? [] 1   [x] 2   [] 3   [] 4   6. Eating meals? [] 1   [x] 2   [] 3   [] 4   © 2007, Trustees of [facility] [employer], under license to [employer]. All rights reserved     Score:  Initial: 9 Most Recent: X (Date: -- )   Interpretation of Tool:  Represents activities that are increasingly more difficult (i.e. Bed mobility, Transfers, Gait). PLAN:   FREQUENCY/DURATION: OT Plan of Care: 4 times/week for duration of hospital stay or until stated goals are met, whichever comes first.    PROBLEM LIST:   (Skilled intervention is medically necessary to address:)  1. Decreased ADL/Functional Activities  2. Decreased Activity Tolerance  3. Decreased AROM/PROM  4. Decreased Balance  5. Decreased Coordination  6. Decreased Strength  7. Decreased Transfer Abilities  8. Increased Pain   INTERVENTIONS PLANNED:   (Benefits and precautions of occupational therapy have been discussed with the patient.)  1. Self Care Training  2. Therapeutic Activity  3. Therapeutic Exercise/HEP  4. Neuromuscular Re-education  5. Education     TREATMENT:     EVALUATION: Moderate Complexity : (Untimed Charge)    TREATMENT:   ( $$ Therapeutic Activity: 8-22 mins   )  Therapeutic Activity (9  Minutes): Therapeutic activity included Rolling, Supine to Sit, Sit to Supine, Scooting, Standing balance and L side step in standing to improve functional Mobility, Strength, ROM and Activity tolerance.     TREATMENT GRID:  N/A    AFTER TREATMENT POSITION/PRECAUTIONS:  Bed, Needs within reach and RN notified    INTERDISCIPLINARY COLLABORATION:  RN/PCT and OT/LYNCH    TOTAL TREATMENT DURATION:  OT Patient Time In/Time Out  Time In: 1036  Time Out: 1202 [address] [place] [address] TANVIR Rebollar, OTR/L

## 2021-04-03 NOTE — H&P
[facility] Cardiology Initial Cardiac Evaluation   Primary Cardiologist: Dr. Maile Caputo (last seen 2017)  Attending Cardiologist: Dr. Chuyita Salmeron     Date of  Admission: 4/2/2021  5:50 PM     HPI:  Layo Cruz is a 67 y.o. male with h/o CAD s/p PCI OM 2017, HTN, dyslipidemia, TB, Hep C, COPD, OA and GERD who presented to [facility] ED on 4/2/21 with c/o left-sided weakness and fall. CT showed a moderate sized intracerebral hemorrhage in the right thalamus region with extension into ventricles. Neurosurgery recommend conservative management and repeat CT on 4/3. Repeat CT showed clot essentially unchanged, minimal mass affect and neurosurgeon recommended continued conservative care and repeat CT in 3-5 days. Due to elevated troponin, cardiology was asked to evaluate patient. Pt had NSTEMI in 2017 and had PCI OM and has not been seen in office since 2017. He had an echo in 2017 showing preserved EF 55%. Echo has been ordered this admission and is pending. HS troponin was elevated at 139-2,687.3-5,234.5. Pt reports no recent CP, SOB, SELLERS, palpitations, LE swelling, orthopnea or syncope. Pt answers questions on exam but didn't open eyes and appears lethargic. 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 GHULAM. PET positive.  Navigational bronch 10/16/13--non-diagnositic     Near syncope 1/21/2015    NSTEMI (non-ST elevated myocardial infarction) ([facility] [address] 12/31/2014    Pulmonary tuberculosis     S/P PTCA (percutaneous transluminal coronary angioplasty)     Shortness of breath 1/21/2015    TB peritonitis 5/2/2014      Past Surgical History:   Procedure Laterality Date    HX OTHER SURGICAL      LUNG NODULE BIOPSY, TB    HX PTCA      MD ABDOMEN SURGERY PROC UNLISTED      hernia    MD CHEST SURGERY PROCEDURE UNLISTED  11/2013    cervical mediastinal bx    MD LEFT HEART CATH,PERCUTANEOUS  12/31/2014    Xience stents to 2nd OM and pCirc       No Known Allergies   Social History     Socioeconomic History    Marital status: SINGLE     Spouse name: Not on file    Number of children: Not on file    Years of education: Not on file    Highest education level: Not on file   Occupational History    Not on file   Social Needs    Financial resource strain: Not on file    Food insecurity     Worry: Not on file     Inability: Not on file    Transportation needs     Medical: Not on file     Non-medical: Not on file   Tobacco Use    Smoking status: Light Tobacco Smoker     Packs/day: 1.00     Years: 45.00     Pack years: 45.00     Types: Cigarettes    Smokeless tobacco: Never Used    Tobacco comment: QUIT 12/14   Substance and Sexual Activity    Alcohol use: No     Alcohol/week: 5.0 standard drinks     Types: 6 Cans of beer per week     Comment: QUIT 12/14    Drug use: Yes     Types: Marijuana    Sexual activity: Not on file     Comment: not since November   Lifestyle    Physical activity     Days per week: Not on file     Minutes per session: Not on file    Stress: Not on file   Relationships    Social connections     Talks on phone: Not on file     Gets together: Not on file     Attends Holiness service: Not on file     Active member of club or organization: Not on file     Attends meetings of clubs or organizations: Not on file     Relationship status: Not on file    Intimate partner violence     Fear of current or ex partner: Not on file     Emotionally abused: Not on file     Physically abused: Not on file     Forced sexual activity: Not on file   Other Topics Concern    Not on file   Social History Narrative    1/21/15:  PATIENT IS SINGLE, LIVES WITH BROTHER. RETIRED FROM [employer]. HAS SEVEN CHILDREN, 6 LOCAL. ONE DAUGHTER IN [place] IS AN RN.        Family History   Problem Relation Age of Onset    Hypertension Father         STROKE    Stroke Mother         HYPERTENSION    Hypertension Sister         4 LIVING SISTERS    Heart Disease Sister         stent- no MI    Other Sister     Other Child         6 LOCAL, ONE IN [place] IS RN    Other Brother         Current Facility-Administered Medications   Medication Dose Route Frequency    lidocaine 4 % patch 1 Patch  1 Patch TransDERmal Q24H    morphine injection 0.5 mg  0.5 mg IntraVENous Q4H PRN    niCARdipine (CARDENE) 25 mg in 0.9% sodium chloride (MBP/ADV) 250 mL infusion  0-15 mg/hr IntraVENous TITRATE    sodium chloride (NS) flush 5-40 mL  5-40 mL IntraVENous Q8H    sodium chloride (NS) flush 5-40 mL  5-40 mL IntraVENous PRN       Review of symptoms:  General: no recent weight loss/gain, +left sided weakness, +fatigue, denies fever or chills   Skin: no rashes, lumps, or other skin changes   HEENT: no headache, dizziness, lightheadedness, vision changes, hearing changes, tinnitus, vertigo, sinus pressure/pain, bleeding gums, sore throat, or hoarseness   Neck: no swollen glands, goiter, pain or stiffness   Respiratory: no cough, sputum, hemoptysis, dyspnea, wheezing   Cardiovascular: no chest pain or discomfort, palpitations, dyspnea, orthopnea, paroxysmal nocturnal dyspnea, peripheral edema   Gastrointestinal: no trouble swallowing, heartburn, change of appetite, nausea, change in bowel habits, pain with defecation, rectal bleeding or black/tarry stools, hemorrhoids, constipation, diarrhea, abdominal pain, jaundice, liver or gallbladder problems   Urinary: no frequency, urgency , hematuria, burning/pain with urination, recent flank pain, polyuria, nocturia, or difficulty urinating   Peripheral Vascular: no claudication, leg cramps, prior DVTs, swelling of calves, legs, or feet, color change, or swelling with redness or tenderness   Musculoskeletal: no muscle or joint pain/stiffness, joint swelling, erythema of joints, or back pain   Psychiatric: no depression, mental disorders, or excessive stress   Neurological: no history of CVA, dizziness, no sensory or motor loss, seizures, syncope, tremors, numbness, tingling, no changes in mood, attention, or speech, no changes in orientation, memory, insight, or judgment. no headache, vertigo. Hematologic: no anemia, easy bruising or bleeding   Endocrine: no diabetes, thyroid problems, heat or cold intolerance, excessive sweating, polyuria, polydipsia        Subjective:   Physical Exam    Visit Vitals  /79   Pulse 79   Temp 98.1 °F (36.7 °C)   Resp 18   Ht 5' 8\" (1.727 m)   Wt 60.8 kg (134 lb)   SpO2 99%   BMI 20.37 kg/m²     General Appearance:  Well developed, well nourished, alert but didn't open eyes on exam, appears lethargic   Ears/Nose/Mouth/Throat:   Hearing grossly normal.         Neck: Supple. Chest:   Lungs fairly clear to auscultation bilaterally. Cardiovascular:  Regular rate and rhythm, S1, S2    Abdomen:   Soft, non-tender, bowel sounds are active. Extremities: No edema bilaterally. Skin: Warm and dry.            Labs:   Recent Results (from the past 24 hour(s))   CBC WITH AUTOMATED DIFF    Collection Time: 04/02/21  6:03 PM   Result Value Ref Range    WBC 3.2 (L) 4.3 - 11.1 K/uL    RBC 4.61 4.23 - 5.6 M/uL    HGB 12.5 (L) 13.6 - 17.2 g/dL    HCT 37.4 (L) 41.1 - 50.3 %    MCV 81.1 79.6 - 97.8 FL    MCH 27.1 26.1 - 32.9 PG    MCHC 33.4 31.4 - 35.0 g/dL    RDW 14.2 11.9 - 14.6 %    PLATELET 543 150 - 450 K/uL    MPV 10.5 9.4 - 12.3 FL    ABSOLUTE NRBC 0.00 0.0 - 0.2 K/uL    DF AUTOMATED      NEUTROPHILS 70 43 - 78 %    LYMPHOCYTES 15 13 - 44 %    MONOCYTES 12 4.0 - 12.0 %    EOSINOPHILS 2 0.5 - 7.8 %    BASOPHILS 0 0.0 - 2.0 %    IMMATURE GRANULOCYTES 0 0.0 - 5.0 %    ABS. NEUTROPHILS 2.2 1.7 - 8.2 K/UL    ABS. LYMPHOCYTES 0.5 0.5 - 4.6 K/UL    ABS. MONOCYTES 0.4 0.1 - 1.3 K/UL    ABS. EOSINOPHILS 0.1 0.0 - 0.8 K/UL    ABS. BASOPHILS 0.0 0.0 - 0.2 K/UL    ABS. IMM. GRANS. 0.0 0.0 - 0.5 K/UL   METABOLIC PANEL, BASIC    Collection Time: 04/02/21  6:03 PM   Result Value Ref Range    Sodium 142 136 - 145 mmol/L    Potassium 4.1 3.5 - 5.1 mmol/L    Chloride 109 (H) 98 - 107 mmol/L    CO2 30 21 - 32 mmol/L    Anion gap 3 (L) 7 - 16 mmol/L    Glucose 128 (H) 65 - 100 mg/dL    BUN 20 8 - 23 MG/DL    Creatinine 1.37 0.8 - 1.5 MG/DL    GFR est AA >60 >60 ml/min/1.73m2    GFR est non-AA 54 (L) >60 ml/min/1.73m2    Calcium 8.3 8.3 - 10.4 MG/DL   TROPONIN-HIGH SENSITIVITY    Collection Time: 04/02/21  6:03 PM   Result Value Ref Range    Troponin-High Sensitivity 139.0 (HH) 0 - 14 pg/mL   HEPATIC FUNCTION PANEL    Collection Time: 04/02/21  6:03 PM   Result Value Ref Range    Protein, total 9.3 (H) 6.3 - 8.2 g/dL    Albumin 3.4 3.2 - 4.6 g/dL    Globulin 5.9 (H) 2.3 - 3.5 g/dL    A-G Ratio 0.6 (L) 1.2 - 3.5      Bilirubin, total 0.5 0.2 - 1.1 MG/DL    Bilirubin, direct 0.2 <0.4 MG/DL    Alk.  phosphatase 119 50 - 136 U/L    AST (SGOT) 114 (H) 15 - 37 U/L    ALT (SGPT) 131 (H) 12 - 65 U/L   GLUCOSE, POC    Collection Time: 04/02/21  6:07 PM   Result Value Ref Range    Glucose (POC) 133 (H) 65 - 100 mg/dL    Performed by Neena    POC PT/INR    Collection Time: 04/02/21  6:09 PM   Result Value Ref Range    Prothrombin time (POC) 12.5 (H) 9.6 - 11.6 SECS    INR (POC) 1.0 0.9 - 1.2     EKG, 12 LEAD, INITIAL    Collection Time: 04/02/21  6:26 PM   Result Value Ref Range    Ventricular Rate 77 BPM    Atrial Rate 78 BPM    P-R Interval 156 ms    QRS Duration 96 ms    Q-T Interval 356 ms    QTC Calculation (Bezet) 402 ms    Calculated P Axis 76 degrees    Calculated R Axis 69 degrees    Calculated T Axis 79 degrees    Diagnosis       Normal sinus rhythm  Normal ECG  When compared with ECG of 28-AUG-2017 21:42,  Questionable change in QRS axis  Nonspecific T wave abnormality, improved in Lateral leads  Confirmed by Caitlin Reeves ([id number]) on 4/3/2021 7:32:01 AM     TSH 3RD GENERATION    Collection Time: 04/03/21  4:25 AM   Result Value Ref Range    TSH 0.697 0.358 - 3.740 uIU/mL   HEMOGLOBIN A1C WITH EAG    Collection Time: 04/03/21  4:25 AM   Result Value Ref Range    Hemoglobin A1c 5.9 4.20 - 6.30 %    Est. average glucose 123 mg/dL   LIPID PANEL    Collection Time: 04/03/21  4:25 AM   Result Value Ref Range    LIPID PROFILE          Cholesterol, total 157 <200 MG/DL    Triglyceride 49 35 - 150 MG/DL    HDL Cholesterol 55 40 - 60 MG/DL    LDL, calculated 92.2 <100 MG/DL    VLDL, calculated 9.8 6.0 - 23.0 MG/DL    CHOL/HDL Ratio 2.9     HEPATITIS PANEL, ACUTE    Collection Time: 04/03/21  4:25 AM   Result Value Ref Range    Hepatitis A, IgM NONREACTIVE NR      Hepatitis B core, IgM NONREACTIVE NR      Hep B Surface Ag NONREACTIVE NR      Hepatitis C virus Ab REACTIVE (A) NR     TROPONIN-HIGH SENSITIVITY    Collection Time: 04/03/21  4:25 AM   Result Value Ref Range    Troponin-High Sensitivity 2,687.3 (HH) 0 - 14 pg/mL   CBC WITH AUTOMATED DIFF    Collection Time: 04/03/21  4:25 AM   Result Value Ref Range    WBC 4.8 4.3 - 11.1 K/uL    RBC 4.21 (L) 4.23 - 5.6 M/uL    HGB 11.3 (L) 13.6 - 17.2 g/dL    HCT 33.0 (L) 41.1 - 50.3 %    MCV 78.4 (L) 79.6 - 97.8 FL    MCH 26.8 26.1 - 32.9 PG    MCHC 34.2 31.4 - 35.0 g/dL    RDW 13.6 11.9 - 14.6 %    PLATELET 962 868 - 753 K/uL    MPV 10.6 9.4 - 12.3 FL    ABSOLUTE NRBC 0.00 0.0 - 0.2 K/uL    DF AUTOMATED      NEUTROPHILS 63 43 - 78 %    LYMPHOCYTES 25 13 - 44 %    MONOCYTES 11 4.0 - 12.0 % EOSINOPHILS 1 0.5 - 7.8 %    BASOPHILS 0 0.0 - 2.0 %    IMMATURE GRANULOCYTES 0 0.0 - 5.0 %    ABS. NEUTROPHILS 3.1 1.7 - 8.2 K/UL    ABS. LYMPHOCYTES 1.2 0.5 - 4.6 K/UL    ABS. MONOCYTES 0.5 0.1 - 1.3 K/UL    ABS. EOSINOPHILS 0.0 0.0 - 0.8 K/UL    ABS. BASOPHILS 0.0 0.0 - 0.2 K/UL    ABS. IMM. GRANS. 0.0 0.0 - 0.5 K/UL   METABOLIC PANEL, BASIC    Collection Time: 04/03/21  4:25 AM   Result Value Ref Range    Sodium 137 136 - 145 mmol/L    Potassium 3.9 3.5 - 5.1 mmol/L    Chloride 108 (H) 98 - 107 mmol/L    CO2 26 21 - 32 mmol/L    Anion gap 3 (L) 7 - 16 mmol/L    Glucose 108 (H) 65 - 100 mg/dL    BUN 19 8 - 23 MG/DL    Creatinine 1.16 0.8 - 1.5 MG/DL    GFR est AA >60 >60 ml/min/1.73m2    GFR est non-AA >60 >60 ml/min/1.73m2    Calcium 8.2 (L) 8.3 - 10.4 MG/DL   EKG, 12 LEAD, SUBSEQUENT    Collection Time: 04/03/21  8:21 AM   Result Value Ref Range    Ventricular Rate 74 BPM    Atrial Rate 74 BPM    P-R Interval 160 ms    QRS Duration 94 ms    Q-T Interval 388 ms    QTC Calculation (Bezet) 430 ms    Calculated P Axis 71 degrees    Calculated R Axis -61 degrees    Calculated T Axis 88 degrees    Diagnosis       Sinus rhythm with occasional Premature ventricular complexes  Left anterior fascicular block  ST & T wave abnormality, consider lateral ischemia  Abnormal ECG  When compared with ECG of 02-APR-2021 18:26,  Premature ventricular complexes are now Present  Left anterior fascicular block is now Present  Confirmed by Allie Reyes ([id number]) on 4/3/2021 12:27:42 PM     TROPONIN-HIGH SENSITIVITY    Collection Time: 04/03/21  8:45 AM   Result Value Ref Range    Troponin-High Sensitivity 5,234.5 (HH) 0 - 14 pg/mL       Pt was seen and examined by Dr. Jodi Covington, he agrees with the following A&P.      Assessment/Plan:        Diagnosis    Hemorrhagic cerebrovascular accident (CVA)- conservative treatment per neurosurgeon, lethargic RN to contact neuro team for recommendations    Elevated troponin- in setting of fall and hemorrhagic CVA, no CP or SOB- conservative treatment, ECG OK, agree with echo (pending), start ASA 81 mg daily when OK with neurosurgery    CAD S/P PCI OM 2017- Pt hasn't taken Effient in \"a long time\" -- OK to hold ASA in setting of hemorrhagic CVA, stent is 3years old, resume ASA when OK with neurosurgery team    H/o Pulmonary tuberculosis s/p treatment    HTN (hypertension)- continue home meds, BP control per primary team    Hyperlipidemia- holding statin with elevated LFTs    Iron deficiency anemia- per primary team    COPD- inhalers pr primary team    GERD- PPI per primary team    OA       Thank you for allowing us to participate in the care of this patient, we will continue to follow along with you.     Tyra Abarca PA-C

## 2021-04-03 NOTE — PROGRESS NOTES
Hospitalist Progress Note     Admit Date:  2021  5:50 PM   Name:  Adilene Sherwood   Age:  67 y.o.  :  1945   MRN:  [id number]   PCP:  Claudia Sher MD  Treatment Team: Attending Provider: Alma Zamora MD; Primary Nurse: Meg Michael, RN; Physical Therapist: Joselo Duran PT; Consulting Provider: Reyna Lyman MD; Utilization Review: Suze Rivas; Consulting Provider: Claire Ca MD  1. Acute right basal ganglia hematoma with right intraventricular hemorrhage. ICH score of 1 with 13% mortality. Neurosurgery was called by ED physician and they reviewed imaging and recommended to hold off on CTA for now and they will see the patient tomorrow morning. Continue nicardipine drip to maintain a blood pressure lower than 140/90. Neurochecks every hour. Hold antiplatelet therapy. Desmopressin was ordered in the emergency department since patient is currently on aspirin and Effient. Obtain echocardiogram.  N.p.o. for now. Hold statin for now in the setting of transaminitis. Monitor monitoring. A1c, TSH, lipid panel. SLP, PT and OT evaluation. CT brain as needed for changes in neurologic status and repeat in the morning. 4/3/2021 later on today patient had a repeat CT head which showed worsening bleed. Neurosurgery informed.     2. Transaminitis. History of hepatitis C. Ultrasound abdomen showed possible gallstones, no signs of cholecystitis.     3. Coronary artery disease. Hold aspirin and Effient for now in the setting of hemorrhagic CVA. Elevated troponin. Repeat EKG. cardiology consult     4. Hypertension. Hold metoprolol since currently on nicardipine gtt.     5. Hyperlipidemia.   Hold statin for now in the setting of transaminitis.        DVT prophylaxis SCD     Full code  Subjective:   65-year-old male with a past medical history of hypertension, coronary artery disease, arthritis, GERD, hyperlipidemia, COPD, presents in the setting of left-sided weakness and a fall. Per patient he was in his usual state of health until earlier today he noticed his left side and got weak and after that he fell and hit his head against the floor but denies any loss of consciousness. The daughter at bedside also noted some right-sided facial droop. Otherwise no blurry vision, no slurring speech, no trouble finding words. Also denies any fever or chills, no chest pain, no shortness of breath, no abdominal pain, no nausea vomiting or diarrhea. Here in the emergency department Code S was activated and CT of the head showed acute right basal ganglia hematoma with right intraventricular hemorrhage. She was found to be hypertensive as well he was a started on nicardipine drip. Neurosurgery was called by ED physician and reviewed imaging and will see him tomorrow. Will admit the patient to the ICU for further management. 4/3/2021  Awake alert. Complains of mild left leg pain. Unable to move left upper and left lower extremity.       Objective:     Patient Vitals for the past 24 hrs:   Temp Pulse Resp BP SpO2   04/03/21 1431  79   99 %   04/03/21 1416  80   99 %   04/03/21 1401  75  127/79 98 %   04/03/21 1346  76   98 %   04/03/21 1331  79  129/74 90 %   04/03/21 1316  73  134/65 98 %   04/03/21 1301  76   98 %   04/03/21 1300  75  122/67 98 %   04/03/21 1245  79  121/70 98 %   04/03/21 1230  82  121/69 98 %   04/03/21 1215  75  124/65 97 %   04/03/21 1200  98  126/61 99 %   04/03/21 1145  73  137/70 97 %   04/03/21 1135  70  128/73 98 %   04/03/21 1130  71   98 %   04/03/21 1120  69  121/70 97 %   04/03/21 1115  71   97 %   04/03/21 1105 98.1 °F (36.7 °C) 72  118/67 97 %   04/03/21 1100  73   97 %   04/03/21 1050  76  125/70 98 %   04/03/21 1035  72  116/62 98 %   04/03/21 1020  76   97 %   04/03/21 1016  68  (!) 115/59 98 %   04/03/21 1005  74   99 %   04/03/21 1001  72  126/67 97 %   04/03/21 0946  73  128/69 98 %   04/03/21 0931  74  133/69 100 %   04/03/21 0916  76  125/61 99 %   04/03/21 0901  72  125/70 98 %   04/03/21 0846  72  127/69 98 %   04/03/21 0831  68  137/68 98 %   04/03/21 0820 98.1 °F (36.7 °C) 68 18 134/74    04/03/21 0816  68  128/67 98 %   04/03/21 0801  69   98 %   04/03/21 0800  68  134/74 97 %   04/03/21 0745  69  (!) 143/74 97 %   04/03/21 0730  68  128/70 97 %   04/03/21 0715  71  (!) 140/76 97 %   04/03/21 0701 98.1 °F (36.7 °C)       04/03/21 0700  70  135/72 99 %   04/03/21 0645  68  (!) 149/75 98 %   04/03/21 0630    137/77    04/03/21 0600  66  (!) 149/79 98 %   04/03/21 0545  76  (!) 166/78 100 %   04/03/21 0530  85  (!) 143/88 100 %   04/03/21 0515  64  (!) 144/84 99 %   04/03/21 0500  68  (!) 151/81 99 %   04/03/21 0430  74  (!) 151/82 100 %   04/03/21 0415  67  138/78 99 %   04/03/21 0400  68  125/70 98 %   04/03/21 0345  68  128/69 99 %   04/03/21 0330  66  139/82 99 %   04/03/21 0315  68  130/74 100 %   04/03/21 0300 98.8 °F (37.1 °C) 66 18 129/73 99 %   04/03/21 0245  68  130/72 100 %   04/03/21 0230  69  137/72 100 %   04/03/21 0215  76  (!) 145/90 (!) 82 %   04/03/21 0200  76  129/75 100 %   04/03/21 0145  66  130/72 98 %   04/03/21 0130  75  130/75 100 %   04/03/21 0115  74  131/76 100 %   04/03/21 0100  78  134/80 100 %   04/03/21 0045  71  121/69 99 %   04/03/21 0030  75  (!) 140/73 99 %   04/03/21 0015  77  134/69 99 %   04/03/21 0000  76  130/70 99 %   04/02/21 2345  77  126/66 98 %   04/02/21 2330  87  128/79 98 %   04/02/21 2315  78  126/67 98 %   04/02/21 2300 98.7 °F (37.1 °C) 76 22 126/71 98 %   04/02/21 2246  73  120/65 98 %   04/02/21 2154  68 (!) 38 (!) 90/53 100 %   04/02/21 2150  72 (!) 42 (!) 93/51 98 %   04/02/21 2146  67 (!) 33 (!) 98/54 100 %   04/02/21 2142  72 22 (!) 111/59 100 %   04/02/21 2138  75 16 119/61 100 %   04/02/21 2134  75 25 127/68 100 %   04/02/21 2131  74  125/69    04/02/21 2130  75 15 125/69 100 %   04/02/21 2126  72 21 127/69 100 %   04/02/21 2111  61  (!) 73/43    04/02/21 2101  68 29 (!) 99/58    04/02/21 2100  66 (!) 41 (!) 110/58    04/02/21 2030  80 (!) 35 124/63    04/02/21 2022  74 28 124/63    04/02/21 2020  83 (!) 45 126/65    04/02/21 2010  83 (!) 33 132/66    04/02/21 1950  83 27 (!) 119/59    04/02/21 1940  81 28 (!) 126/59    04/02/21 1910  80  135/69    04/02/21 1903  79  139/67    04/02/21 1901  80  (!) 143/70 99 %   04/02/21 1851  81  (!) 140/70    04/02/21 1850  86  (!) 140/70 97 %   04/02/21 1840  84  (!) 148/76 97 %   04/02/21 1838  83  (!) 164/80 98 %   04/02/21 1837  82  (!) 174/89    04/02/21 1830  81 16 (!) 174/89 100 %   04/02/21 1819  77  (!) 167/84 97 %   04/02/21 1801 98.7 °F (37.1 °C)  18     04/02/21 1755  86  (!) 199/96 100 %     Oxygen Therapy  O2 Sat (%): 99 % (04/03/21 1431)  Pulse via Oximetry: 81 beats per minute (04/03/21 1431)  O2 Device: Room air (04/03/21 0701)    Intake/Output Summary (Last 24 hours) at 4/3/2021 1603  Last data filed at 4/3/2021 1301  Gross per 24 hour   Intake 728.33 ml   Output 600 ml   Net 128.33 ml         General:    Well nourished. Alert. HEENT-pupils equal normal right neck supple throat normal  CV:   RRR. No murmur, rub, or gallop. Lungs:   Clear to auscultation bilaterally. No wheezing, rhonchi, or rales. Abdomen:   Soft, nontender, nondistended. CNS-left hemiplegia. Normal power right upper and right lower extremity. Extremities: Warm and dry. No cyanosis or edema. Skin:     No rashes or jaundice. Data Review:  I have reviewed all labs, meds, telemetry events, and studies from the last 24 hours.     Recent Results (from the past 24 hour(s))   CBC WITH AUTOMATED DIFF    Collection Time: 04/02/21  6:03 PM   Result Value Ref Range    WBC 3.2 (L) 4.3 - 11.1 K/uL    RBC 4.61 4.23 - 5.6 M/uL    HGB 12.5 (L) 13.6 - 17.2 g/dL    HCT 37.4 (L) 41.1 - 50.3 %    MCV 81.1 79.6 - 97.8 FL    MCH 27.1 26.1 - 32.9 PG    MCHC 33.4 31.4 - 35.0 g/dL    RDW 14.2 11.9 - 14.6 %    PLATELET 029 083 - 438 K/uL    MPV 10.5 9.4 - 12.3 FL    ABSOLUTE NRBC 0.00 0.0 - 0.2 K/uL    DF AUTOMATED      NEUTROPHILS 70 43 - 78 %    LYMPHOCYTES 15 13 - 44 %    MONOCYTES 12 4.0 - 12.0 %    EOSINOPHILS 2 0.5 - 7.8 %    BASOPHILS 0 0.0 - 2.0 %    IMMATURE GRANULOCYTES 0 0.0 - 5.0 %    ABS. NEUTROPHILS 2.2 1.7 - 8.2 K/UL    ABS. LYMPHOCYTES 0.5 0.5 - 4.6 K/UL    ABS. MONOCYTES 0.4 0.1 - 1.3 K/UL    ABS. EOSINOPHILS 0.1 0.0 - 0.8 K/UL    ABS. BASOPHILS 0.0 0.0 - 0.2 K/UL    ABS. IMM. GRANS. 0.0 0.0 - 0.5 K/UL   METABOLIC PANEL, BASIC    Collection Time: 04/02/21  6:03 PM   Result Value Ref Range    Sodium 142 136 - 145 mmol/L    Potassium 4.1 3.5 - 5.1 mmol/L    Chloride 109 (H) 98 - 107 mmol/L    CO2 30 21 - 32 mmol/L    Anion gap 3 (L) 7 - 16 mmol/L    Glucose 128 (H) 65 - 100 mg/dL    BUN 20 8 - 23 MG/DL    Creatinine 1.37 0.8 - 1.5 MG/DL    GFR est AA >60 >60 ml/min/1.73m2    GFR est non-AA 54 (L) >60 ml/min/1.73m2    Calcium 8.3 8.3 - 10.4 MG/DL   TROPONIN-HIGH SENSITIVITY    Collection Time: 04/02/21  6:03 PM   Result Value Ref Range    Troponin-High Sensitivity 139.0 (HH) 0 - 14 pg/mL   HEPATIC FUNCTION PANEL    Collection Time: 04/02/21  6:03 PM   Result Value Ref Range    Protein, total 9.3 (H) 6.3 - 8.2 g/dL    Albumin 3.4 3.2 - 4.6 g/dL    Globulin 5.9 (H) 2.3 - 3.5 g/dL    A-G Ratio 0.6 (L) 1.2 - 3.5      Bilirubin, total 0.5 0.2 - 1.1 MG/DL    Bilirubin, direct 0.2 <0.4 MG/DL    Alk.  phosphatase 119 50 - 136 U/L    AST (SGOT) 114 (H) 15 - 37 U/L    ALT (SGPT) 131 (H) 12 - 65 U/L   GLUCOSE, POC    Collection Time: 04/02/21  6:07 PM   Result Value Ref Range    Glucose (POC) 133 (H) 65 - 100 mg/dL    Performed by Neena    POC PT/INR    Collection Time: 04/02/21  6:09 PM   Result Value Ref Range    Prothrombin time (POC) 12.5 (H) 9.6 - 11.6 SECS    INR (POC) 1.0 0.9 - 1.2     EKG, 12 LEAD, INITIAL    Collection Time: 04/02/21  6:26 PM   Result Value Ref Range    Ventricular Rate 77 BPM    Atrial Rate 78 BPM    P-R Interval 156 ms    QRS Duration 96 ms    Q-T Interval 356 ms    QTC Calculation (Bezet) 402 ms    Calculated P Axis 76 degrees    Calculated R Axis 69 degrees    Calculated T Axis 79 degrees    Diagnosis       Normal sinus rhythm  Normal ECG  When compared with ECG of 28-AUG-2017 21:42,  Questionable change in QRS axis  Nonspecific T wave abnormality, improved in Lateral leads  Confirmed by Flory Van ([id number]) on 4/3/2021 7:32:01 AM     TSH 3RD GENERATION    Collection Time: 04/03/21  4:25 AM   Result Value Ref Range    TSH 0.697 0.358 - 3.740 uIU/mL   HEMOGLOBIN A1C WITH EAG    Collection Time: 04/03/21  4:25 AM   Result Value Ref Range    Hemoglobin A1c 5.9 4.20 - 6.30 %    Est. average glucose 123 mg/dL   LIPID PANEL    Collection Time: 04/03/21  4:25 AM   Result Value Ref Range    LIPID PROFILE          Cholesterol, total 157 <200 MG/DL    Triglyceride 49 35 - 150 MG/DL    HDL Cholesterol 55 40 - 60 MG/DL    LDL, calculated 92.2 <100 MG/DL    VLDL, calculated 9.8 6.0 - 23.0 MG/DL    CHOL/HDL Ratio 2.9     HEPATITIS PANEL, ACUTE    Collection Time: 04/03/21  4:25 AM   Result Value Ref Range    Hepatitis A, IgM NONREACTIVE NR      Hepatitis B core, IgM NONREACTIVE NR      Hep B Surface Ag NONREACTIVE NR      Hepatitis C virus Ab REACTIVE (A) NR     TROPONIN-HIGH SENSITIVITY    Collection Time: 04/03/21  4:25 AM   Result Value Ref Range    Troponin-High Sensitivity 2,687.3 (HH) 0 - 14 pg/mL   CBC WITH AUTOMATED DIFF    Collection Time: 04/03/21  4:25 AM   Result Value Ref Range    WBC 4.8 4.3 - 11.1 K/uL    RBC 4.21 (L) 4.23 - 5.6 M/uL    HGB 11.3 (L) 13.6 - 17.2 g/dL    HCT 33.0 (L) 41.1 - 50.3 %    MCV 78.4 (L) 79.6 - 97.8 FL    MCH 26.8 26.1 - 32.9 PG    MCHC 34.2 31.4 - 35.0 g/dL    RDW 13.6 11.9 - 14.6 %    PLATELET 583 805 - 313 K/uL    MPV 10.6 9.4 - 12.3 FL    ABSOLUTE NRBC 0.00 0.0 - 0.2 K/uL    DF AUTOMATED      NEUTROPHILS 63 43 - 78 %    LYMPHOCYTES 25 13 - 44 %    MONOCYTES 11 4.0 - 12.0 %    EOSINOPHILS 1 0.5 - 7.8 %    BASOPHILS 0 0.0 - 2.0 %    IMMATURE GRANULOCYTES 0 0.0 - 5.0 %    ABS. NEUTROPHILS 3.1 1.7 - 8.2 K/UL    ABS. LYMPHOCYTES 1.2 0.5 - 4.6 K/UL    ABS. MONOCYTES 0.5 0.1 - 1.3 K/UL    ABS. EOSINOPHILS 0.0 0.0 - 0.8 K/UL    ABS. BASOPHILS 0.0 0.0 - 0.2 K/UL    ABS. IMM.  GRANS. 0.0 0.0 - 0.5 K/UL   METABOLIC PANEL, BASIC    Collection Time: 04/03/21  4:25 AM   Result Value Ref Range    Sodium 137 136 - 145 mmol/L    Potassium 3.9 3.5 - 5.1 mmol/L    Chloride 108 (H) 98 - 107 mmol/L    CO2 26 21 - 32 mmol/L    Anion gap 3 (L) 7 - 16 mmol/L    Glucose 108 (H) 65 - 100 mg/dL    BUN 19 8 - 23 MG/DL    Creatinine 1.16 0.8 - 1.5 MG/DL    GFR est AA >60 >60 ml/min/1.73m2    GFR est non-AA >60 >60 ml/min/1.73m2    Calcium 8.2 (L) 8.3 - 10.4 MG/DL   EKG, 12 LEAD, SUBSEQUENT    Collection Time: 04/03/21  8:21 AM   Result Value Ref Range    Ventricular Rate 74 BPM    Atrial Rate 74 BPM    P-R Interval 160 ms    QRS Duration 94 ms    Q-T Interval 388 ms    QTC Calculation (Bezet) 430 ms    Calculated P Axis 71 degrees    Calculated R Axis -61 degrees    Calculated T Axis 88 degrees    Diagnosis       Sinus rhythm with occasional Premature ventricular complexes  Left anterior fascicular block  ST & T wave abnormality, consider lateral ischemia  Abnormal ECG  When compared with ECG of 02-APR-2021 18:26,  Premature ventricular complexes are now Present  Left anterior fascicular block is now Present  Confirmed by Emmett Patel ([id number]) on 4/3/2021 12:27:42 PM     TROPONIN-HIGH SENSITIVITY    Collection Time: 04/03/21  8:45 AM   Result Value Ref Range    Troponin-High Sensitivity 5,234.5 (HH) 0 - 14 pg/mL        All Micro Results     None          Current Meds:  Current Facility-Administered Medications   Medication Dose Route Frequency    lidocaine 4 % patch 1 Patch  1 Patch TransDERmal Q24H    niCARdipine (CARDENE) 25 mg in 0.9% sodium chloride (MBP/ADV) 250 mL infusion  0-15 mg/hr IntraVENous TITRATE    sodium chloride (NS) flush 5-40 mL  5-40 mL IntraVENous Q8H    sodium chloride (NS) flush 5-40 mL  5-40 mL IntraVENous PRN       Other Studies (last 24 hours):  Ct Head Wo Cont    Result Date: 4/3/2021  NONCONTRAST HEAD CT CLINICAL HISTORY:  Follow-up intracranial hemorrhage. TECHNIQUE:  Axial images were obtained with spiral technique. Radiation dose reduction was achieved using one or all of the following techniques: automated exposure control, weight-based dosing, iterative reconstruction. COMPARISON:  CT yesterday and 0617 hours today. REPORT:   Standard noncontrast head CT demonstrates continued increase in size of the right thalamic hemorrhage with maximum diameter now measured at approximately 3.6 cm compared with 2.9 cm earlier today. Moreover, there is new bilateral lateral intraventricular extension of hemorrhage, primarily localized to the atria. There is associated slight increase in bilateral lateral ventriculomegaly with biatrial diameter now measuring approximately 4.2 cm compared with 3.8 cm this morning. Mass effect is little changed with midline shift from right to left of approximately 5 mm compared with 4 mm this morning. Extensive small vessel ischemic disease and small focal lateral right frontal infarct appear unchanged. INTERVAL INCREASE IN SIZE OF THE RIGHT THALAMIC HEMORRHAGE WITH NEW BILATERAL LATERAL INTRAVENTRICULAR EXTENSION AND SLIGHT WORSENING OF VENTRICULOMEGALY. Ct Head Wo Cont    Result Date: 4/3/2021  EXAM: CT HEAD WO CONT HISTORY: .  TECHNIQUE: Axial images of the brain were performed without the administration of intravenous contrast. Images were obtained axial plane and coronal reformatted images were submitted. CT scan performed using appropriate/available dose optimization/reduction/ALARA techniques.  COMPARISON: None. FINDINGS: The known right thalamic hemorrhage is again observed. There is surrounding edema. The body of this hemorrhage has not changed significantly. Currently it measures 2.4 x 2.9 cm when previously it measured 2.4 x 2.7 cm. There has been interval increase in an area of adjacent or extension of the hemorrhage posteriorly. Previously it measured 4.5 mm in width. Currently it measures 1.5 cm in width and 1.3 cm AP. There is persistent intraventricular hemorrhage in the posterior horn of the right lateral ventricle. Interval development of a small amount of hemorrhage in the left posterior horn. There is mild midline shift of the posterior septum pellucidum of approximately 4.1 mm. The lateral ventricles are mildly dilated, stable. Chronic ischemic white matter changes are again observed. Question small chronic infarction in the right frontal lobe. Interval increase in the size of the right thalamic hemorrhage. Interval development of mild right to left midline shift of the posterior aspect of the septum pellucidum. Stable hemorrhage in the posterior horn of the right lateral ventricle. Interval development of a small amount of hemorrhage in the posterior horn of the left lateral ventricle. Other findings as above. [address] [place] [address]    Result Date: 4/3/2021  RIGHT UPPER QUADRANT ULTRASOUND. HISTORY: Transaminitis. COMPARISON: No relevant comparison studies available. FINDINGS: Ultrasonographic Ramon's sign: is reported as negative Gallstones: There are intraluminal echogenic foci, probably stones. Gallbladder Wall: not thickened. Common Bile Duct: is not dilated, 4 mm. Intrahepatic Biliary Tree: is not dilated. Liver: Uniform parenchyma Included portion of the pancreas and right kidney: are unremarkable. Vasculature: Aorta: Normal caliber. IVC:  Patent. Portal vein: Hepatopedal flow. Probable gallstones. No biliary tree obstruction. No findings to suggest acute cholecystitis.      Xr Chest Port    Result Date: 4/2/2021  CHEST X-RAY, one view. HISTORY:  Chest pain. TECHNIQUE:  AP upright portable view. COMPARISON: August 2017 FINDINGS: Lungs: Atelectasis or scar right base, similar to prior exam. Costophrenic angles: are sharp. Heart size: is normal. Pulmonary vasculature: is unremarkable. Aorta: Arch calcifications. Included portion of the upper abdomen: is unremarkable. Bones: No gross bony lesions. Other: None. Negative for acute change. Ct Code Neuro Head Wo Contrast    Result Date: 4/2/2021  CT HEAD WITHOUT CONTRAST HISTORY:  CVA. COMPARISON: None. TECHNIQUE: Axial imaging was performed without intravenous contrast utilizing 5mm slice thickness. Sagittal and coronal reformats were performed. Radiation dose reduction techniques were used for this study. Our CT scanner uses one or all of the following: Automated exposure control, adjustment of the MAS or KUB according to patient's size and iterative reconstruction. FINDINGS:    *BRAIN:    -  There are no early signs of territorial or lacunar infarction by CT.    -  2.7 x 2.4 cm acute hemorrhage into the right basal ganglia and thalamus. Right lateral ventricle intraventricular hemorrhage.    -  No gross white matter abnormality by CT. *VISUALIZED PARANASAL SINUSES: Well aerated. *MASTOIDS:  Clear. *CALVARIUM AND SCALP: Unremarkable. Acute right basal ganglia hematoma likely on the basis of hypertension. Right intraventricular hemorrhage.  Date of Dictation: 4/2/2021 6:17 PM       Assessment and Plan:     Hospital Problems as of 4/3/2021 Date Reviewed: 9/6/2017          Codes Class Noted - Resolved POA    * (Principal) Hemorrhagic cerebrovascular accident (CVA) ([facility] [address] ICD-10-CM: I61.9  ICD-9-CM: 388  4/2/2021 - Present Unknown        Pulmonary tuberculosis ICD-10-CM: A15.0  ICD-9-CM: 011.90  Unknown - Present Yes        Coronary atherosclerosis of native coronary vessel ICD-10-CM: I25.10  ICD-9-CM: 414.01  Unknown - Present Yes Hyperlipidemia ICD-10-CM: E78.5  ICD-9-CM: 272.4  Unknown - Present Yes                Signed:  Julio Bull MD

## 2021-04-03 NOTE — PROGRESS NOTES
ACUTE PHYSICAL THERAPY GOALS:  (Developed with and agreed upon by patient and/or caregiver.)  HELD goals post evaluation and initial treatment with post CT scan indicating increased bleed. Interim goal:     1. Upon safe recommendation patient to demonstrate transfer to sitting EOB and dangling to MOD A x 1-2. PHYSICAL THERAPY ASSESSMENT: Initial Assessment and PM PT Treatment Day # 1      Han Rodriguez is a 67 y.o. male   PRIMARY DIAGNOSIS: Hemorrhagic cerebrovascular accident (CVA) ([facility]  Hemorrhagic cerebrovascular accident (CVA) ([facility] [I61.9]       Reason for Referral:    ICD-10: Treatment Diagnosis: Generalized Muscle Weakness (M62.81)  Difficulty in walking, Not elsewhere classified (R26.2)  Hemiplegia and hemiparesis following cerebral infarction affecting left non-dominant side (N95.502)  [place]PAT[place] / [place] / Product Type: Medicare /     ASSESSMENT:     REHAB RECOMMENDATIONS:   Recommendation to date pending progress:  Setting:   Short-term Rehab  Equipment:    To Be Determined     PRIOR LEVEL OF FUNCTION:  (Prior to Hospitalization) INITIAL/CURRENT LEVEL OF FUNCTION:  (Most Recently Demonstrated)   Bed Mobility:   Independent  Sit to Stand:   Independent  Transfers:   Independent  Gait/Mobility:   Independent Bed Mobility:   Maximal Assistance x 2  Sit to Stand:   Unable to perform  Transfers:   Maximal Assistance x 2  Gait/Mobility:   Unable to perform     ASSESSMENT:  Mr. Davon Staton is a pleasant frail elderly male with above diagnosis and decreased affect and awareness this afternoon with increased lethargy, somnolence and neurological yawning. He demonstrates with decreased transfers, ambulation and mobility below his prior functional baseline. All transfers are currently limited at MAX A x 2  out of bed to sit dangling right side of bed. He is unable to fully stand at this time.   Nurse and PT are in agreement for further request of neurological workup . Post PT CT scan indicates further creep of bleeding and hemorrhage. PT held thus until further neurosurgical workup. Shellia Ice then returns to supine with MAX A x 2. Skilled PT is indicated for this patient's functional mobility deficits. SUBJECTIVE:   Mr. Isacc Stark states, \"I am so sleepy. \"    SOCIAL HISTORY/LIVING ENVIRONMENT:  Home Environment: Private residence  # Steps to Enter: 5  One/Two Story Residence: One story  Living Alone: No  Support Systems: Family member(s)  OBJECTIVE:     PAIN: VITAL SIGNS: LINES/DRAINS:   Pre Treatment: Pain Screen  Pain Scale 1: Numeric (0 - 10)  Pain Intensity 1: 0  Post Treatment: no pain reported.       IV  O2 Device: Room air     GROSS EVALUATION:   Within Functional Limits Abnormal/ Functional Abnormal/ Non-Functional (see comments) Not Tested Comments:   AROM [] [] [x] [] Left sided hemiparesis    PROM [] [] [x] []    Strength [] [] [x] []    Balance [] [] [x] []    Posture [] [] [x] []    Sensation [] [] [x] []    Coordination [] [] [x] []    Tone [] [] [x] []    Edema [] [] [x] []    Activity Tolerance [] [] [x] []     [] [] [] []      COGNITION/  PERCEPTION: Intact Impaired   (see comments) Comments:   Orientation [] [x]    Vision [] [x]    Hearing [] []    Command Following [] [x]    Safety Awareness [] [x]     [] []      MOBILITY: I Mod I S SBA CGA Min Mod Max Total  NT x2 Comments:   Bed Mobility    Rolling [] [] [] [] [] [] [] [x] [] [] [x] Seated dangling only     Supine to Sit [] [] [] [] [] [] [] [x] [] [] [x]    Scooting [] [] [] [] [] [] [] [x] [] [] [x]    Sit to Supine [] [] [] [] [] [] [] [x] [] [] [x]    Transfers    Sit to Stand [] [] [] [] [] [] [] [] [] [x] [x] Unable today    Bed to Chair [] [] [] [] [] [] [] [] [] [x] [x]    Stand to Sit [] [] [] [] [] [] [] [] [] [x] [x]    I=Independent, Mod I=Modified Independent, S=Supervision, SBA=Standby Assistance, CGA=Contact Guard Assistance,   Min=Minimal Assistance, Mod=Moderate Assistance, Max=Maximal Assistance, Total=Total Assistance, NT=Not Tested  GAIT: I Mod I S SBA CGA Min Mod Max Total  NT x2 Comments:   Level of Assistance [] [] [] [] [] [] [] [] [] [x] []    Distance N/A     DME N/A    Gait Quality Unable today. Weightbearing Status N/A     I=Independent, Mod I=Modified Independent, S=Supervision, SBA=Standby Assistance, CGA=Contact Guard Assistance,   Min=Minimal Assistance, Mod=Moderate Assistance, Max=Maximal Assistance, Total=Total Assistance, NT=Not [facility]       How much difficulty does the patient currently have. .. Unable A Lot A Little None   1. Turning over in bed (including adjusting bedclothes, sheets and blankets)? [] 1   [x] 2   [] 3   [] 4   2. Sitting down on and standing up from a chair with arms ( e.g., wheelchair, bedside commode, etc.)   [] 1   [x] 2   [] 3   [] 4   3. Moving from lying on back to sitting on the side of the bed? [] 1   [x] 2   [] 3   [] 4   How much help from another person does the patient currently need. .. Total A Lot A Little None   4. Moving to and from a bed to a chair (including a wheelchair)? [x] 1   [] 2   [] 3   [] 4   5. Need to walk in hospital room? [x] 1   [] 2   [] 3   [] 4   6. Climbing 3-5 steps with a railing? [x] 1   [] 2   [] 3   [] 4   © 2007, Trustees of [address] [place] [address] [address] [address], under license to [employer]. All rights reserved     Score:  Initial: 9 Most Recent: X (Date: -- )    Interpretation of Tool:  Represents activities that are increasingly more difficult (i.e. Bed mobility, Transfers, Gait). PLAN:   FREQUENCY/DURATION: PT Plan of Care: Hold(post evaluation .   New scan indicates increased bleed.   ) for duration of hospital stay or until stated goals are met, whichever comes first.    PROBLEM LIST:   (Skilled intervention is medically necessary to address:)  1. Decreased ADL/Functional Activities  2. Decreased Activity Tolerance  3. Decreased AROM/PROM  4. Decreased Balance  5. Decreased Cognition  6. Decreased Coordination  7. Decreased Gait Ability  8. Decreased Strength  9. Decreased Transfer Abilities   INTERVENTIONS PLANNED:   (Benefits and precautions of physical therapy have been discussed with the patient.)  1. Therapeutic Activity  2. Therapeutic Exercise/HEP  3. Neuromuscular Re-education  4. Gait Training  5. Manual Therapy  6. Education     TREATMENT:     EVALUATION: High Complexity : (Untimed Charge)    TREATMENT:   ($$ Neuromuscular Re-education: 23-37 mins    )  Neuromuscular Re-education (23 Minutes): Neuromuscular Re-education included Balance Training, Coordination training, Hemibody awareness training and Sitting balance training to improve Balance, Coordination, Functional Mobility, Postural Control and Proprioception.     TREATMENT GRID:   Date:   Date:   Date:     Activity/Exercise Parameters Parameters Parameters                                                   AFTER TREATMENT POSITION/PRECAUTIONS:  Bed, Needs within reach and RN notified    INTERDISCIPLINARY COLLABORATION:  RN/PCT and PT/PTA    TOTAL TREATMENT DURATION:  PT Patient Time In/Time Out  Time In: 1320  Time Out: [address] PT

## 2021-04-03 NOTE — PROGRESS NOTES
TRANSFER - IN REPORT:    Verbal report received from Farnaz RN(name) on Callie Dey  being received from ED(unit) for routine progression of care      Report consisted of patients Situation, Background, Assessment and   Recommendations(SBAR). Information from the following report(s) SBAR, Kardex, ED Summary, Intake/Output, MAR and Recent Results was reviewed with the receiving nurse. Opportunity for questions and clarification was provided. Assessment completed upon patients arrival to unit and care assumed. [facility] completed at bedside.

## 2021-04-04 ENCOUNTER — APPOINTMENT (OUTPATIENT)
Dept: CT IMAGING | Age: 76
DRG: 023 | End: 2021-04-04
Attending: FAMILY MEDICINE
Payer: MEDICARE

## 2021-04-04 PROBLEM — R77.8 ELEVATED TROPONIN: Status: ACTIVE | Noted: 2021-04-04

## 2021-04-04 PROBLEM — R53.1 ACUTE LEFT-SIDED WEAKNESS: Status: ACTIVE | Noted: 2021-04-02

## 2021-04-04 LAB
ANION GAP SERPL CALC-SCNC: 5 MMOL/L (ref 7–16)
BUN SERPL-MCNC: 17 MG/DL (ref 8–23)
CALCIUM SERPL-MCNC: 8.8 MG/DL (ref 8.3–10.4)
CHLORIDE SERPL-SCNC: 107 MMOL/L (ref 98–107)
CO2 SERPL-SCNC: 25 MMOL/L (ref 21–32)
CREAT SERPL-MCNC: 1.1 MG/DL (ref 0.8–1.5)
GLUCOSE SERPL-MCNC: 108 MG/DL (ref 65–100)
POTASSIUM SERPL-SCNC: 3.8 MMOL/L (ref 3.5–5.1)
SODIUM SERPL-SCNC: 137 MMOL/L (ref 138–145)

## 2021-04-04 PROCEDURE — 74011000250 HC RX REV CODE- 250: Performed by: FAMILY MEDICINE

## 2021-04-04 PROCEDURE — 99233 SBSQ HOSP IP/OBS HIGH 50: CPT | Performed by: INTERNAL MEDICINE

## 2021-04-04 PROCEDURE — 74011250636 HC RX REV CODE- 250/636: Performed by: STUDENT IN AN ORGANIZED HEALTH CARE EDUCATION/TRAINING PROGRAM

## 2021-04-04 PROCEDURE — 65610000006 HC RM INTENSIVE CARE

## 2021-04-04 PROCEDURE — 92610 EVALUATE SWALLOWING FUNCTION: CPT

## 2021-04-04 PROCEDURE — 70450 CT HEAD/BRAIN W/O DYE: CPT

## 2021-04-04 PROCEDURE — 36415 COLL VENOUS BLD VENIPUNCTURE: CPT

## 2021-04-04 PROCEDURE — 80048 BASIC METABOLIC PNL TOTAL CA: CPT

## 2021-04-04 PROCEDURE — 74011250637 HC RX REV CODE- 250/637: Performed by: NEUROLOGICAL SURGERY

## 2021-04-04 PROCEDURE — 99222 1ST HOSP IP/OBS MODERATE 55: CPT | Performed by: PHYSICIAN ASSISTANT

## 2021-04-04 PROCEDURE — 74011000250 HC RX REV CODE- 250: Performed by: STUDENT IN AN ORGANIZED HEALTH CARE EDUCATION/TRAINING PROGRAM

## 2021-04-04 RX ADMIN — SODIUM CHLORIDE 2 MG/HR: 900 INJECTION, SOLUTION INTRAVENOUS at 02:00

## 2021-04-04 RX ADMIN — SODIUM CHLORIDE 5 MG/HR: 900 INJECTION, SOLUTION INTRAVENOUS at 21:34

## 2021-04-04 RX ADMIN — ACETAMINOPHEN 650 MG: 325 TABLET ORAL at 11:30

## 2021-04-04 RX ADMIN — SODIUM CHLORIDE 5 MG/HR: 900 INJECTION, SOLUTION INTRAVENOUS at 10:46

## 2021-04-04 RX ADMIN — Medication 10 ML: at 21:35

## 2021-04-04 NOTE — PROGRESS NOTES
Problem: Dysphagia (Adult)  Goal: *Acute Goals and Plan of Care (Insert Text)  Description: STG: Pt will demonstrate zero signs/sx of aspiration with mechanical soft textures with nectar thick liquids with no straws with 90% accuracy during all meals. STG: Pt will complete a full speech, language and cognitive evaluation without assistance with 100% accuracy during session. STG: Pt will complete a Modified barium swallow study with zero signs/sx of aspiration  with 100% accuracy during swallow study. LTG: Pt will demonstrate zero signs/sx of aspiration with least restrictive diet with 100% accuracy during all meals. Outcome: Progressing Towards Goal   SPEECH LANGUAGE PATHOLOGY: DYSPHAGIA- Initial Assessment    NAME/AGE/GENDER: Jess Morales is a 67 y.o. male  DATE: 4/4/2021  PRIMARY DIAGNOSIS: [facility] [I61.9]       ICD-10: Treatment Diagnosis: R13.12 Dysphagia, Oropharyngeal Phase    RECOMMENDATIONS   DIET:   Mechanical Soft  Nectar thick Liquids no straws    MEDICATIONS: With thickened liquids  Whole in puree  Crushed in puree     ASPIRATION PRECAUTIONS  Slow rate of intake  Small bites/sips  Upright at 90 degrees during meal     COMPENSATORY STRATEGIES/MODIFICATIONS  Cup/sip     RECOMMENDATIONS for CONTINUED SPEECH THERAPY:   YES: Anticipate need for ongoing speech therapy during this hospitalization. ASSESSMENT   Patient presents with oropharyngeal characterized by positive signs/sx of aspiration with thin liquids; cough/throat clear. Pt tolerated nectar thick by cup. Pt with positive signs/sx of aspiration with nectar thick via straw; cough/throat clear. Pt tolerated pureed, mixed and mechanical soft. Solid not assessed. Recommend full ST evaluation. Recommend mechanical soft textures with nectar thick liquids with no straw and medications with nectar thick or whole in pureed.       EDUCATION:  Recommendations discussed with Patient and RN  CONTINUATION OF SKILLED SERVICES/MEDICAL NECESSITY:  Patient is expected to demonstrate progress in  swallow strength, swallow timeliness, swallow function, diet tolerance, and swallow safety in order to  improve swallow safety, work toward diet advancement, and decrease aspiration risk. REHABILITATION POTENTIAL FOR STATED GOALS: Good    PLAN    FREQUENCY/DURATION: Continue to follow patient 3 times a week for duration of hospital stay to address above goals. - Recommendations for next treatment session: Next treatment session will address diet tolerance, po trials, Modified Barium Swallow Study, and assessment of cognitive-linguistic abilities     SUBJECTIVE   alert    Pain: Pain Scale 1: Numeric (0 - 10)  Pain Intensity 1: 0    History of Present Injury/Illness: Mr. 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), Chronic fatigue and malaise (1/21/2015), Chronic obstructive pulmonary disease ([facility] [address] [facility] Coronary atherosclerosis of native coronary vessel, Dyspnea, GERD (gastroesophageal reflux disease), GI bleed (1/21/2015), Hepatitis C (>10 yrs ago), Hyperlipidemia, Hypertension, Iron deficiency anemia (1/25/2015), Lung nodule, Lung nodule, multiple (10/4/2013), Near syncope (1/21/2015), NSTEMI (non-ST elevated myocardial infarction) ([facility] [address] (12/31/2014), Pulmonary tuberculosis, S/P PTCA (percutaneous transluminal coronary angioplasty), Shortness of breath (1/21/2015), and TB peritonitis (5/2/2014). He also has no past medical history of COPD. Delsa Severance He also  has a past surgical history that includes pr chest surgery procedure unlisted (11/2013); pr abdomen surgery proc unlisted; pr left heart cath,percutaneous (12/31/2014); hx other surgical; and hx ptca.  PRECAUTIONS/ALLERGIES: Patient has no known allergies. Problem List:  (Impairments causing functional limitations):  CVA  dysphagia    Previous Dysphagia: NONE REPORTED  Diet Prior to Evaluation: MS/thin    Orientation:  Person    Cognitive-Linguistic Screening:  Alertness  Alert  Speech Production:   Fully intelligible  Expressive Language:  Fluent speech  Receptive Language: Follows commands  Cognition:   Needs assessment  Prior Level of Function: unknown  Recommendations: Given results of screening, further evaluation is indicated to assess cognitive abilities. OBJECTIVE   Oral Motor:   Labial: No impairment  Dentition: Upper Dentures  Oral Hygiene: Dry  Lingual: No impairment    Dysphagia evaluation:   Patient consumed trials of mechanical soft textures with thin liquid breakfast tray. Pt with positive signs/sx of aspiration with thin liquids via cup and straw and nectar thick via straw; cough/throat clear. Pt tolerated nectar thick via cup, pureed, mixed and soft solid with no overt signs/sx of aspiration. Increased mastication with chewable textures. Timely swallow. Minimal oral residue. Recommend mechanical soft textures with nectar thick liquids with medications as tolerated. Recommend a Modified barium swallow study to rule out aspiration and fully assess pharyngeal swallow when able. Recommend a fully ST evaluation.      Tool Used: Dysphagia Outcome and Severity Scale (DAYAMI)    Score Comments   Normal Diet  [] 7 With no strategies or extra time needed   Functional Swallow  [] 6 May have mild oral or pharyngeal delay   Mild Dysphagia  [] 5 Which may require one diet consistency restricted    Mild-Moderate Dysphagia  [x] 4 With 1-2 diet consistencies restricted   Moderate Dysphagia  [] 3 With 2 or more diet consistencies restricted   Moderate-Severe Dysphagia  [] 2 With partial PO strategies (trials with ST only)   Severe Dysphagia  [] 1 With inability to tolerate any PO safely      Score:  Initial: 4 Most Recent:  (Date 04/04/21 ) Interpretation of Tool: The Dysphagia Outcome and Severity Scale (DAYAMI) is a simple, easy-to-use, 7-point scale developed to systematically rate the functional severity of dysphagia based on objective assessment and make recommendations for diet level, independence level, and type of nutrition. Current Medications:   No current facility-administered medications on file prior to encounter. Current Outpatient Medications on File Prior to Encounter   Medication Sig Dispense Refill    cyclobenzaprine (FLEXERIL) 5 mg tablet Take 1 Tab by mouth two (2) times a day. 14 Tab 0    aspirin 81 mg chewable tablet Take 1 Tab by mouth daily. nitroglycerin (NITROSTAT) 0.4 mg SL tablet 1 Tab by SubLINGual route every five (5) minutes as needed for Chest Pain. 2 Bottle 4    atorvastatin (LIPITOR) 80 mg tablet Take 1 Tab by mouth nightly. 30 Tab 11    metoprolol tartrate (LOPRESSOR) 25 mg tablet Take 1 Tab by mouth two (2) times a day.  60 Tab 6        INTERDISCIPLINARY COLLABORATION: RN    After treatment position/precautions:  Upright in bed  RN notified    Total Treatment Duration:   Time In: 8494  Time Out: [address] MA/CCC/SLP

## 2021-04-04 NOTE — PROGRESS NOTES
[facility] CARDIOLOGY PROGRESS NOTE           4/4/2021 12:18 PM    Admit Date: 4/2/2021      Subjective: The patient does not report angina, shortness of breath at rest, or palpitations. ROS:  Constitutional:   Negative for fevers and unexplained weight loss. Eyes:   Negative for visual disturbance. ENT:   Negative for significant hearing loss and tinnitus. Respiratory:   Negative for hemoptysis. Cardiovascular:   Negative except as noted in HPI. Gastrointestinal:   Negative for melena and abdominal pain. Genitourinary:   Negative for hematuria, renal stones. Integumentary:   Negative for rash or non-healing wounds  Hematologic/Lymphatic:   Negative for excessive bleeding hx or clotting disorder. Musculoskeletal:  Negative for active, unexplained/severe joint pain. Neurological:   Negative for stroke. Behavioral/Psych:   Negative for suicidal ideations. Endocrine:   Negative for uncontrolled diabetic symptoms including polyuria, polydipsia and poor wound healing. Objective:      Vitals:    04/04/21 0845 04/04/21 0900 04/04/21 0915 04/04/21 0930   BP: (!) 151/80 135/77 134/75 (!) 165/88   Pulse: 90 86 87 (!) 117   Resp:       Temp:       SpO2: 96% 96% 97% 98%   Weight:       Height:           Physical Exam:  General-No Acute Distress; left hemiparesis  Neck- supple, no JVD  CV- regular rate and rhythm no RG  Lung- clear bilaterally  Abd- soft, nontender, nondistended  Ext- no edema bilaterally.   Skin- warm and dry    Data Review:   Recent Labs     04/04/21  0402 04/03/21  0425 04/02/21  1809 04/02/21  1803   * 137  --  142   K 3.8 3.9  --  4.1   BUN 17 19  --  20   CREA 1.10 1.16  --  1.37   * 108*  --  128*   WBC  --  4.8  --  3.2*   HGB  --  11.3*  --  12.5*   HCT  --  33.0*  --  37.4*   PLT  --  154  --  170   INR  --   --  1.0  --    CHOL  --  157  --   --    LDLC  --  92.2  --   --    HDL  --  55  --   --        Assessment/Plan:     Elevated troponin  - In the setting of intracranial hemorrhage (worsened per recent CT report: NSGY states findings unchanged and plans on no EVD at this time)  - Patient is without angina or anginal equivalents: elevation likely represents myocardial injury with no ischemia in the setting of stroke (ECHO with normal EF and no RWMAs)  - Follow-up neurosurgery recommendations  [facility] ordered another CT based on concerning cranial nerve exam findings     CAD in native artery  - Withhold antiplatelet therapy at this time in the setting of intracranial hemorrhage    Hypertension  - Currently on a Cardene drip in the setting of intracranial hemorrhage  - Management per hospitalist/subspecialty care at this time        Hernán Patrick MD

## 2021-04-04 NOTE — CONSULTS
Laura Curtis MD  Medical Director  [address] [place] [address], [address] [facility]  Tel: [phone]       Physical Medicine & Rehabilitation Consult    Subjective:     Date of Consultation:  April 4, 2021  Referring Physician: Hospitalist service / Shadia Ham MD    Adilene Sherwood is a 67 y.o. Black male who is being evaluated at the request of Hospitalist service for consideration for inpatient rehabilitation following hemorrhagic stroke. HPI: The patient is a right-hand dominant 65yo BM with PMH including CAD s/p PCI/stent, HTN, HL, COPD, pulmonary TB s/p treatment, GERD, FRANSISCO, VIET. He presented to the ED 4/2/2021 with lower extremity weakness and multiple recent falls, with head strike without LOC earlier that day. Patient exhibited left hemibody weakness in the ED, and Code S was initiated. Head CT with acute right basal ganglia hematoma with right intraventricular hemorrhage. Neurosurgery reviewed imaging, declined intervention and recommended ICU admission with conservative management. Repeat head CT (4/3 AM) with slight interval increase in right thalamic hemorrhage, stable hematoma. OT evaluation in AM noted patient required maximal to total assistance for ADLs. Cardiology was consulted due to elevated troponin and recommended conservative treatment, resume ASA per Neurosurgery. PT evaluation in afternoon 4/3 noted patient with increased lethargy and somnolence, requiring maximal assistance x 2 for bed mobility and transfer, unable and unsafe to stand, and recommended further neurological work-up. Repeat head CT (4/3 PM) with interval increase in right thalamic hemorrhage, new bilateral IVH extension and worsening ventriculomegaly. SLP evaluation 4/4 AM recommended mechanical soft diet, nectar thick liquids. Patient is somnolent and lethargic during PM&R consult, alerts briefly to voice and touch but quickly returns to sleep.  Per EMR, he was previously functionally independent with ADLs, IADLs and mobility, walking up to 5mi daily because he does not drive. He lives in a one-story private residence with his brother. He is retired but previously worked in textiles. Per ICU staff, patient's level of consciousness and interaction has deteriorated significantly from yesterday AM, when he was joking with staff, saying he needed to eat and \"hurry up and get better so I can get out of here. 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 GHULAM. PET positive.  Navigational bronch 10/16/13--non-diagnositic     Near syncope 1/21/2015    NSTEMI (non-ST elevated myocardial infarction) ([facility] [address] 12/31/2014    Pulmonary tuberculosis     S/P PTCA (percutaneous transluminal coronary angioplasty)     Shortness of breath 1/21/2015    TB peritonitis 5/2/2014      Past Surgical History:   Procedure Laterality Date    HX OTHER SURGICAL      LUNG NODULE BIOPSY, TB    HX PTCA      MI ABDOMEN SURGERY PROC UNLISTED      hernia    MI CHEST SURGERY PROCEDURE UNLISTED  11/2013    cervical mediastinal bx    NV LEFT HEART CATH,PERCUTANEOUS  12/31/2014    Xience stents to 2nd OM and pCirc      Family History   Problem Relation Age of Onset    Hypertension Father         STROKE    Stroke Mother         HYPERTENSION    Hypertension Sister         3 LIVING SISTERS    Heart Disease Sister         stent- no MI    Other Sister     Other Child         6 LOCAL, ONE IN [place] IS RN    Other Brother       Social History     Tobacco Use    Smoking status: Light Tobacco Smoker     Packs/day: 1.00     Years: 45.00     Pack years: 45.00     Types: Cigarettes    Smokeless tobacco: Never Used    Tobacco comment: QUIT 12/14   Substance Use Topics    Alcohol use: No     Alcohol/week: 5.0 standard drinks     Types: 6 Cans of beer per week     Comment: QUIT 12/14     Prior to Admission medications    Medication Sig Start Date End Date Taking? Authorizing Provider   cyclobenzaprine (FLEXERIL) 5 mg tablet Take 1 Tab by mouth two (2) times a day. 5/7/18   CONNOR Sorensen   aspirin 81 mg chewable tablet Take 1 Tab by mouth daily. 8/30/17   Chen Romo PA   nitroglycerin (NITROSTAT) 0.4 mg SL tablet 1 Tab by SubLINGual route every five (5) minutes as needed for Chest Pain. 8/30/17   Chen Romo PA   atorvastatin (LIPITOR) 80 mg tablet Take 1 Tab by mouth nightly. 8/30/17   Chen Romo PA   metoprolol tartrate (LOPRESSOR) 25 mg tablet Take 1 Tab by mouth two (2) times a day. 8/30/17   Destiney Romo PA     No Known Allergies     Review of Systems:   Comprehensive ROS was performed, accuracy is questionable and detail was limited because of impaired patient participation due to his somnolence.   Constitutional: positive for fatigue, negative for fevers and chills  Eyes: positive for visual disturbance, blurry vision \"sometimes\"  Ears, nose, mouth, throat, and face: positive for rhinorrhea, seasonal allergies, negative for sore throat  Respiratory: positive for cough or sputum, negative for dyspnea on exertion  Cardiovascular: negative for chest pain, palpitations  Musculoskeletal:positive for muscle weakness  Neurological: positive for weakness, negative for headaches and dizziness    Objective:     Vitals:  Blood pressure (!) 165/88, pulse (!) 117, temperature 99.3 °F (37.4 °C), resp. rate 12, height 5' 8\" (1.727 m), weight 134 lb (60.8 kg), SpO2 98 %. Temp (24hrs), Av.7 °F (37.1 °C), Min:98.1 °F (36.7 °C), Max:99.3 °F (37.4 °C)    Intake and Output:   1901 -  0700  In: 1695.5 [P.O.:300;  I.V.:1395.5]  Out: 2420 [Urine:2420]    Physical Exam:  General: Somnolent, lethargic, alerts briefly to voice / touch but quickly returns to sleep   Skin:  Warm, moist with texture appropriate for age  Eyes:  Limited evaluation, patient unable to keep eyes open  HENT:  Normocephalic; nares patent, oral mucosa moist, neck supple; trachea midline  Respiratory: Lungs clear to auscultation bilaterally, breathing is non-labored   Chest:  Symmetric throughout one respiratory excursion; no supraclavicular lymphadenopathy  CV:  Regular rate and rhythm, no appreciable murmur  Abdomen: Soft, nontender, non-distended; bowel sounds normoactive   Extremities: R UE strength at biceps, triceps, finger flexion 4+/5, L UE with tone but no purposeful movement; lifts R LE off bed against gravity, strength at hip flexion 4-/5, knee flexion 4+/5, ankle DF/PF 4/5, L LE without purposeful movement; no edema, cyanosis, clubbing  Neurological: Oriented to person, year/month, city; impaired attention and focus, limited speech but no noted word finding deficits; trace left lower facial weakness; absent left shoulder shrug, cannot lift L UE to participate in evaluation for pronator drift, finger-to-nose, rapid alternating movements      Labs/Studies:  Recent Results (from the past 72 hour(s))   CBC WITH AUTOMATED DIFF    Collection Time: 21  6:03 PM   Result Value Ref Range    WBC 3.2 (L) 4.3 - 11.1 K/uL    RBC 4.61 4.23 - 5.6 M/uL    HGB 12.5 (L) 13.6 - 17.2 g/dL    HCT 37.4 (L) 41.1 - 50.3 %    MCV 81.1 79.6 - 97.8 FL    MCH 27.1 26.1 - 32.9 PG    MCHC 33.4 31.4 - 35.0 g/dL    RDW 14.2 11.9 - 14.6 %    PLATELET 177 344 - 646 K/uL    MPV 10.5 9.4 - 12.3 FL    ABSOLUTE NRBC 0.00 0.0 - 0.2 K/uL    DF AUTOMATED      NEUTROPHILS 70 43 - 78 %    LYMPHOCYTES 15 13 - 44 %    MONOCYTES 12 4.0 - 12.0 %    EOSINOPHILS 2 0.5 - 7.8 %    BASOPHILS 0 0.0 - 2.0 %    IMMATURE GRANULOCYTES 0 0.0 - 5.0 %    ABS. NEUTROPHILS 2.2 1.7 - 8.2 K/UL    ABS. LYMPHOCYTES 0.5 0.5 - 4.6 K/UL    ABS. MONOCYTES 0.4 0.1 - 1.3 K/UL    ABS. EOSINOPHILS 0.1 0.0 - 0.8 K/UL    ABS. BASOPHILS 0.0 0.0 - 0.2 K/UL    ABS. IMM. GRANS. 0.0 0.0 - 0.5 K/UL   METABOLIC PANEL, BASIC    Collection Time: 04/02/21  6:03 PM   Result Value Ref Range    Sodium 142 136 - 145 mmol/L    Potassium 4.1 3.5 - 5.1 mmol/L    Chloride 109 (H) 98 - 107 mmol/L    CO2 30 21 - 32 mmol/L    Anion gap 3 (L) 7 - 16 mmol/L    Glucose 128 (H) 65 - 100 mg/dL    BUN 20 8 - 23 MG/DL    Creatinine 1.37 0.8 - 1.5 MG/DL    GFR est AA >60 >60 ml/min/1.73m2    GFR est non-AA 54 (L) >60 ml/min/1.73m2    Calcium 8.3 8.3 - 10.4 MG/DL   TROPONIN-HIGH SENSITIVITY    Collection Time: 04/02/21  6:03 PM   Result Value Ref Range    Troponin-High Sensitivity 139.0 (HH) 0 - 14 pg/mL   HEPATIC FUNCTION PANEL    Collection Time: 04/02/21  6:03 PM   Result Value Ref Range    Protein, total 9.3 (H) 6.3 - 8.2 g/dL    Albumin 3.4 3.2 - 4.6 g/dL    Globulin 5.9 (H) 2.3 - 3.5 g/dL    A-G Ratio 0.6 (L) 1.2 - 3.5      Bilirubin, total 0.5 0.2 - 1.1 MG/DL    Bilirubin, direct 0.2 <0.4 MG/DL    Alk.  phosphatase 119 50 - 136 U/L    AST (SGOT) 114 (H) 15 - 37 U/L    ALT (SGPT) 131 (H) 12 - 65 U/L   GLUCOSE, POC    Collection Time: 04/02/21  6:07 PM   Result Value Ref Range    Glucose (POC) 133 (H) 65 - 100 mg/dL    Performed by Neena    POC PT/INR    Collection Time: 04/02/21  6:09 PM   Result Value Ref Range    Prothrombin time (POC) 12.5 (H) 9.6 - 11.6 SECS    INR (POC) 1.0 0.9 - 1.2     EKG, 12 LEAD, INITIAL    Collection Time: 04/02/21  6:26 PM   Result Value Ref Range    Ventricular Rate 77 BPM    Atrial Rate 78 BPM    P-R Interval 156 ms    QRS Duration 96 ms    Q-T Interval 356 ms    QTC Calculation (Bezet) 402 ms    Calculated P Axis 76 degrees    Calculated R Axis 69 degrees    Calculated T Axis 79 degrees    Diagnosis       Normal sinus rhythm  Normal ECG  When compared with ECG of 28-AUG-2017 21:42,  Questionable change in QRS axis  Nonspecific T wave abnormality, improved in Lateral leads  Confirmed by Sarita Roland ([id number]) on 4/3/2021 7:32:01 AM     TSH 3RD GENERATION    Collection Time: 04/03/21  4:25 AM   Result Value Ref Range    TSH 0.697 0.358 - 3.740 uIU/mL   HEMOGLOBIN A1C WITH EAG    Collection Time: 04/03/21  4:25 AM   Result Value Ref Range    Hemoglobin A1c 5.9 4.20 - 6.30 %    Est. average glucose 123 mg/dL   LIPID PANEL    Collection Time: 04/03/21  4:25 AM   Result Value Ref Range    LIPID PROFILE          Cholesterol, total 157 <200 MG/DL    Triglyceride 49 35 - 150 MG/DL    HDL Cholesterol 55 40 - 60 MG/DL    LDL, calculated 92.2 <100 MG/DL    VLDL, calculated 9.8 6.0 - 23.0 MG/DL    CHOL/HDL Ratio 2.9     HEPATITIS PANEL, ACUTE    Collection Time: 04/03/21  4:25 AM   Result Value Ref Range    Hepatitis A, IgM NONREACTIVE NR      Hepatitis B core, IgM NONREACTIVE NR      Hep B Surface Ag NONREACTIVE NR      Hepatitis C virus Ab REACTIVE (A) NR     TROPONIN-HIGH SENSITIVITY    Collection Time: 04/03/21  4:25 AM   Result Value Ref Range    Troponin-High Sensitivity 2,687.3 (HH) 0 - 14 pg/mL   CBC WITH AUTOMATED DIFF    Collection Time: 04/03/21  4:25 AM   Result Value Ref Range    WBC 4.8 4.3 - 11.1 K/uL    RBC 4.21 (L) 4.23 - 5.6 M/uL    HGB 11.3 (L) 13.6 - 17.2 g/dL    HCT 33.0 (L) 41.1 - 50.3 %    MCV 78.4 (L) 79.6 - 97.8 FL    MCH 26.8 26.1 - 32.9 PG    MCHC 34.2 31.4 - 35.0 g/dL    RDW 13.6 11.9 - 14.6 %    PLATELET 445 252 - 365 K/uL    MPV 10.6 9.4 - 12.3 FL    ABSOLUTE NRBC 0.00 0.0 - 0.2 K/uL    DF AUTOMATED      NEUTROPHILS 63 43 - 78 %    LYMPHOCYTES 25 13 - 44 %    MONOCYTES 11 4.0 - 12.0 %    EOSINOPHILS 1 0.5 - 7.8 %    BASOPHILS 0 0.0 - 2.0 %    IMMATURE GRANULOCYTES 0 0.0 - 5.0 %    ABS. NEUTROPHILS 3.1 1.7 - 8.2 K/UL    ABS. LYMPHOCYTES 1.2 0.5 - 4.6 K/UL    ABS. MONOCYTES 0.5 0.1 - 1.3 K/UL    ABS. EOSINOPHILS 0.0 0.0 - 0.8 K/UL    ABS. BASOPHILS 0.0 0.0 - 0.2 K/UL    ABS. IMM.  GRANS. 0.0 0.0 - 0.5 K/UL   METABOLIC PANEL, BASIC    Collection Time: 04/03/21  4:25 AM   Result Value Ref Range    Sodium 137 136 - 145 mmol/L    Potassium 3.9 3.5 - 5.1 mmol/L    Chloride 108 (H) 98 - 107 mmol/L    CO2 26 21 - 32 mmol/L    Anion gap 3 (L) 7 - 16 mmol/L    Glucose 108 (H) 65 - 100 mg/dL    BUN 19 8 - 23 MG/DL    Creatinine 1.16 0.8 - 1.5 MG/DL    GFR est AA >60 >60 ml/min/1.73m2    GFR est non-AA >60 >60 ml/min/1.73m2    Calcium 8.2 (L) 8.3 - 10.4 MG/DL   EKG, 12 LEAD, SUBSEQUENT    Collection Time: 04/03/21  8:21 AM   Result Value Ref Range    Ventricular Rate 74 BPM    Atrial Rate 74 BPM    P-R Interval 160 ms    QRS Duration 94 ms    Q-T Interval 388 ms    QTC Calculation (Bezet) 430 ms    Calculated P Axis 71 degrees    Calculated R Axis -61 degrees    Calculated T Axis 88 degrees    Diagnosis       Sinus rhythm with occasional Premature ventricular complexes  Left anterior fascicular block  ST & T wave abnormality, consider lateral ischemia  Abnormal ECG  When compared with ECG of 02-APR-2021 18:26,  Premature ventricular complexes are now Present  Left anterior fascicular block is now Present  Confirmed by Perfecto Colby ([id number]) on 4/3/2021 12:27:42 PM     TROPONIN-HIGH SENSITIVITY    Collection Time: 04/03/21  8:45 AM   Result Value Ref Range    Troponin-High Sensitivity 5,234.5 (HH) 0 - 14 pg/mL   METABOLIC PANEL, BASIC    Collection Time: 04/04/21  4:02 AM   Result Value Ref Range    Sodium 137 (L) 138 - 145 mmol/L    Potassium 3.8 3.5 - 5.1 mmol/L    Chloride 107 98 - 107 mmol/L    CO2 25 21 - 32 mmol/L    Anion gap 5 (L) 7 - 16 mmol/L    Glucose 108 (H) 65 - 100 mg/dL    BUN 17 8 - 23 MG/DL    Creatinine 1.10 0.8 - 1.5 MG/DL    GFR est AA >60 >60 ml/min/1.73m2    GFR est non-AA >60 >60 ml/min/1.73m2    Calcium 8.8 8.3 - 10.4 MG/DL       Functional Exam:   Per OT (4/3 AM): poor sitting and standing balance, requiring moderate assistance for feeding/grooming, maximal assistance for bathing, total assistance for dressing and toileting. Per PT (4/3 PM): required maximal assistance x 2 for bed mobility and transfers, unable and unsafe to attempt standing or mobility. Speech Assessment:  Dysphagia Screening  Vocal Quality/Secretions: Normal  History of Dysphagia: No  O2 Saturation: Normal  Alertness: Normal  Pre-Swallow Assessment Score: 0  Purees: No difficulty noted  Water by Cup: No difficulty noted  Water by Straw: No difficulty noted       Ambulation:  Activity and Safety  Activity Level: Bed Rest  Ambulate: No (Comment)  Activity: In bed, Resting quietly  Activity Assistance: Partial (one person)  Weight Bearing Status: WBAT (Weight Bearing as Tolerated)  Mode of Transportation: IV equipment, Stretcher  Repositioned: Head of bed elevated (degrees), Supine (back)  Patient Turned: Turn on right side  Head of Bed Elevated: HOB 45  Activity Response:  Tolerated well  Assistive Device: Fall prevention device  Safety Measures: Bed/Chair alarm on, Bed/Chair-Wheels locked, Bed in low position, Fall prevention (comment), Restraints, Call light within reach     Impression / Assessment:     Principal Problem:    Hemorrhagic cerebrovascular accident (CVA) ([facility] [address] (4/2/2021)    Active Problems:    Pulmonary tuberculosis ()      Coronary atherosclerosis of native coronary vessel ()      Hyperlipidemia ()      Plan / Recommendations / Medical Decision Making: Recommendations:   Continue Acute Rehab Program  Coordination of rehab / medical care  Counseling of PM&R care issues management  Monitoring and management of medical conditions per plan of care / orders     At initial EMR review 4/3 AM, patient appeared appropriate for [facility] admission and rehab. However, with extension of the IVH bleed and marked neurological deterioration yesterday afternoon, patient is currently requiring maximal assistance x 2 for bed mobility and transfers; he is unable and unsafe to attempt standing or mobility. At this point, he is unable to participate in 3h of intense daily therapies as required by Medicare and provided at [facility] and would be better served at lower level therapy such as that provided at skilled nursing facility. We will continue to follow loosely. Discussion with Family / Caregiver / Staff    Reviewed Therapies / Lizy Amezcua / Roque Winters / Records    Thank you very much for this referral. We appreciate the opportunity to participate in this patient's care. Esteban Gutierrez PA-C  Physician Assistant with [facility]  Tyra Negron MD, Medical Director

## 2021-04-04 NOTE — PROGRESS NOTES
ANYSW visited with pt and daughter at bedside. Pt lives with his brother and was active and independent prior to this hospitalization. Pt is lethargic today. Depending on progress with therapies anticipate that pt will need either acute or skilled rehab. Await final recommendations. [facility] eval.  Care Management Interventions  Mode of Transport at Discharge: Other (see comment)(Anton vs family)  Transition of Care Consult (CM Consult): Discharge Planning(Pt is insured by medicare with pharmacy benefits.  )  Discharge Durable Medical Equipment: No  Physical Therapy Consult: Yes  Occupational Therapy Consult: Yes  Speech Therapy Consult: Yes  Current Support Network: Relative's Home, Family Lives Nearby(Pt lives with his brother and is normally independent with all ADL's. Daughter Jonathan Floyd [phone] is also supportive.  )  Confirm Follow Up Transport: Family  The Plan for Transition of Care is Related to the Following Treatment Goals : Pt will need rehab services to return to his functional baseline.   The Patient and/or Patient Representative was Provided with a Choice of Provider and Agrees with the Discharge Plan?: Yes  Name of the Patient Representative Who was Provided with a Choice of Provider and Agrees with the Discharge Plan: pt/daughter  Freedom of Choice List was Provided with Basic Dialogue that Supports the Patient's Individualized Plan of Care/Goals, Treatment Preferences and Shares the Quality Data Associated with the Providers?: Yes   Resource Information Provided?: No  Discharge Location  [facility])

## 2021-04-04 NOTE — PROGRESS NOTES
S: right thalamic hemorrhage with intraventricular extension. Denies headache    O: Temp 99.3   P 117   /88       Lethargic, but opens eyes briefly to stimulation. Following simple commands. Knows he is in SFD,  left sided hemiparesis    A: Right thalamic intracerebral hemorrhage Repeat head CT yesterday afternoon showed no significant change in ventricular size from admission. Would not consider EVD as long as he is this responsive. P:  Continue supportive care.     Priyanka Cervantes MD  Neurosurgery  [phone]

## 2021-04-04 NOTE — PROGRESS NOTES
Hospitalist Progress Note     Admit Date:  2021  5:50 PM   Name:  Angela Cantrell   Age:  67 y.o.  :  1945   MRN:  [id number]   PCP:  Bowen Harrell MD  Treatment Team: Attending Provider: Nash Cope MD; Consulting Provider: Shazia Cho MD; Primary Nurse: Андрей Jay, RIK; Consulting Provider: Nan Bassett MD; Utilization Review: Kavita Walters; Consulting Provider: Jesus Hirsch MD; Staff Nurse: Andrea Irizarry RN  1. Acute right basal ganglia hematoma with right intraventricular hemorrhage. ICH score of 1 with 13% mortality. Neurosurgery was called by ED physician and they reviewed imaging and recommended to hold off on CTA for now and they will see the patient tomorrow morning. Continue nicardipine drip to maintain a blood pressure lower than 140/90. Neurochecks every hour. Hold antiplatelet therapy. Desmopressin was ordered in the emergency department since patient is currently on aspirin and Effient. Obtain echocardiogram.  N.p.o. for now. Hold statin for now in the setting of transaminitis. Monitor monitoring. A1c, TSH, lipid panel. SLP, PT and OT evaluation. CT brain as needed for changes in neurologic status and repeat in the morning. 4/3/2021 later on today patient had a repeat CT head which showed worsening bleed. Neurosurgery informed. No intervention  2021- new left sided vision deficit - will order repeat ct head  Cont Cardene drip     2. Transaminitis. History of hepatitis C. Ultrasound abdomen showed possible gallstones, no signs of cholecystitis.     3. Coronary artery disease. Hold aspirin and Effient for now in the setting of hemorrhagic CVA. Elevated troponin. Cardiology recommended aspirin when ok with neurosurgery     4. Hypertension. Hold metoprolol since currently on nicardipine gtt.     5. Hyperlipidemia.   Hold statin for now in the setting of transaminitis.        DVT prophylaxis SCD     Full code  Subjective: 65-year-old male with a past medical history of hypertension, coronary artery disease, arthritis, GERD, hyperlipidemia, COPD, presents in the setting of left-sided weakness and a fall. Per patient he was in his usual state of health until earlier today he noticed his left side and got weak and after that he fell and hit his head against the floor but denies any loss of consciousness. The daughter at bedside also noted some right-sided facial droop. Otherwise no blurry vision, no slurring speech, no trouble finding words. Also denies any fever or chills, no chest pain, no shortness of breath, no abdominal pain, no nausea vomiting or diarrhea. Here in the emergency department Code S was activated and CT of the head showed acute right basal ganglia hematoma with right intraventricular hemorrhage. She was found to be hypertensive as well he was a started on nicardipine drip. Neurosurgery was called by ED physician and reviewed imaging and will see him tomorrow. Will admit the patient to the ICU for further management. 4/3/2021  Awake alert. Complains of mild left leg pain. Unable to move left upper and left lower extremity.     4/4/2021  New Poor left sided vision  Calmer then yesterday, able to verbalize      Objective:     Patient Vitals for the past 24 hrs:   Temp Pulse Resp BP SpO2   04/04/21 1615  86  135/70 96 %   04/04/21 1530  93  136/77 97 %   04/04/21 1515  98  132/72 96 %   04/04/21 1500 98.1 °F (36.7 °C) 94  139/73 98 %   04/04/21 1445  83  130/71 97 %   04/04/21 1430  89  130/69 97 %   04/04/21 1415  95  135/70 98 %   04/04/21 1400  85  117/67 98 %   04/04/21 1345  82  128/69 97 %   04/04/21 1330  82  123/69 97 %   04/04/21 1315  87  109/65 97 %   04/04/21 1300  86  133/71 96 %   04/04/21 1245  87  128/69 96 %   04/04/21 1230  (!) 107  134/69 97 %   04/04/21 1215  93  125/69 97 %   04/04/21 1200  (!) 115  (!) 151/78 98 %   04/04/21 1145  95  139/67 97 % 04/04/21 1130  87  133/69 97 %   04/04/21 1115  91  139/67 97 %   04/04/21 1100 99.7 °F (37.6 °C) (!) 141  (!) 164/109 98 %   04/04/21 1045  (!) 103  132/73 98 %   04/04/21 1030  88  134/74 98 %   04/04/21 1015  99  129/65 96 %   04/04/21 1000  95  (!) 145/78 94 %   04/04/21 0945  (!) 105  (!) 157/88 97 %   04/04/21 0930  (!) 117  (!) 165/88 98 %   04/04/21 0915  87  134/75 97 %   04/04/21 0900  86  135/77 96 %   04/04/21 0845  90  (!) 151/80 96 %   04/04/21 0830  90  135/77 96 %   04/04/21 0815  (!) 109  (!) 150/82 97 %   04/04/21 0807 99.3 °F (37.4 °C) 93 12 (!) 145/75    04/04/21 0800  93  121/64 98 %   04/04/21 0745  93  (!) 145/75 97 %   04/04/21 0730  93  (!) 151/77 97 %   04/04/21 0715  96  (!) 166/78 97 %   04/04/21 0701 99.3 °F (37.4 °C)  12     04/04/21 0700  97  (!) 145/78 97 %   04/04/21 0645  (!) 105  (!) 143/76 96 %   04/04/21 0630  100  133/73 96 %   04/04/21 0601 98.4 °F (36.9 °C) 98 19 (!) 162/80 98 %   04/04/21 0530  97  139/81 98 %   04/04/21 0501  (!) 108  (!) 168/77 95 %   04/04/21 0430  87  (!) 151/81 97 %   04/04/21 0401  81 18 (!) 153/80 97 %   04/04/21 0400 98.5 °F (36.9 °C) 82 18 (!) 153/80 97 %   04/04/21 0330  85  (!) 145/82 96 %   04/04/21 0301  90  (!) 147/78 96 %   04/04/21 0230  82  (!) 147/80 95 %   04/04/21 0201  80 17 (!) 168/72 96 %   04/04/21 0200  78  136/72    04/04/21 0130  92  (!) 152/74 94 %   04/04/21 0101  96  (!) 157/84 96 %   04/04/21 0030  79  (!) 144/81 97 %   04/04/21 0001  76 19 (!) 158/80 97 %   04/03/21 2330 98.4 °F (36.9 °C) 78 19 (!) 143/77 96 %   04/03/21 2301  79  (!) 141/78 96 %   04/03/21 2230  87  135/81 98 %   04/03/21 2201  78 16 135/78 97 %   04/03/21 2130  77  131/73 95 %   04/03/21 2101  87  135/79 97 %   04/03/21 2030  78  134/77 95 %   04/03/21 2001  89 18 136/78 97 %   04/03/21 1931  74  135/71 98 %   04/03/21 1900 98.6 °F (37 °C) 80 17 139/74 98 %   04/03/21 1805  83  133/64 97 %   04/03/21 1750  87  130/63 99 %   04/03/21 1735  91  131/64 98 %   04/03/21 1720  91  139/75 96 %   04/03/21 1705  80  135/75 97 %   04/03/21 1650  79  138/73 98 %   04/03/21 1635  79  137/75 98 %     Oxygen Therapy  O2 Sat (%): 96 % (04/04/21 1615)  Pulse via Oximetry: 86 beats per minute (04/04/21 1615)  O2 Device: Room air (04/04/21 0701)    Intake/Output Summary (Last 24 hours) at 4/4/2021 1626  Last data filed at 4/4/2021 1500  Gross per 24 hour   Intake 1207.17 ml   Output 1770 ml   Net -562.83 ml         General:    Well nourished. Alert. calm   HEENT-pupils equal normal right neck supple throat normal  Left visual field deficit    CV:   RRR. No murmur, rub, or gallop. Lungs:   Clear to auscultation bilaterally. No wheezing, rhonchi, or rales. Abdomen:   Soft, nontender, nondistended. CNS-left hemiplegia. Normal power right upper and right lower extremity. Extremities: Warm and dry. No cyanosis or edema. Skin:     No rashes or jaundice. Data Review:  I have reviewed all labs, meds, telemetry events, and studies from the last 24 hours.     Recent Results (from the past 24 hour(s))   METABOLIC PANEL, BASIC    Collection Time: 04/04/21  4:02 AM   Result Value Ref Range    Sodium 137 (L) 138 - 145 mmol/L    Potassium 3.8 3.5 - 5.1 mmol/L    Chloride 107 98 - 107 mmol/L    CO2 25 21 - 32 mmol/L    Anion gap 5 (L) 7 - 16 mmol/L    Glucose 108 (H) 65 - 100 mg/dL    BUN 17 8 - 23 MG/DL    Creatinine 1.10 0.8 - 1.5 MG/DL    GFR est AA >60 >60 ml/min/1.73m2    GFR est non-AA >60 >60 ml/min/1.73m2    Calcium 8.8 8.3 - 10.4 MG/DL        All Micro Results     None          Current Meds:  Current Facility-Administered Medications   Medication Dose Route Frequency    lidocaine 4 % patch 1 Patch  1 Patch TransDERmal Q24H    acetaminophen (TYLENOL) tablet 650 mg  650 mg Oral Q6H PRN    niCARdipine (CARDENE) 25 mg in 0.9% sodium chloride (MBP/ADV) 250 mL infusion  0-15 mg/hr IntraVENous TITRATE    sodium chloride (NS) flush 5-40 mL  5-40 mL IntraVENous Q8H    sodium chloride (NS) flush 5-40 mL  5-40 mL IntraVENous PRN       Other Studies (last 24 hours):  Ct Head Wo Cont    Result Date: 4/4/2021  CT HEAD WITHOUT CONTRAST. INDICATION: Intracranial hemorrhage with worsening vision. COMPARISON: Yesterday's exam  TECHNIQUE:   5 mm axial scans from the skull base to the vertex. Our CT scanners use one or more of the following:  Automated exposure control, adjustment of the mA and or kV according to patient size, iterative reconstruction. FINDINGS:  Acute hemorrhage right thalamus and basal ganglia without significant change. Decreased midline shift, 6 mm. There is blood in the posterior horn of the lateral ventricles, right more than left. There is blood in the third ventricle. No new hemorrhage. Posterior fossa structures unremarkable. Stable acute intracranial hemorrhage with decreased midline shift, 6mm.         Assessment and Plan:     Hospital Problems as of 4/4/2021 Date Reviewed: 4/4/2021          Codes Class Noted - Resolved POA    Elevated troponin ICD-10-CM: R77.8  ICD-9-CM: 790.6  4/4/2021 - Present Unknown        * (Principal) Hemorrhagic cerebrovascular accident (CVA) ([facility] [address] ICD-10-CM: I61.9  ICD-9-CM: 306  4/2/2021 - Present Yes        Acute left-sided weakness ICD-10-CM: R53.1  ICD-9-CM: 728.87  4/2/2021 - Present Yes        Pulmonary tuberculosis ICD-10-CM: A15.0  ICD-9-CM: 011.90  Unknown - Present Yes        Coronary atherosclerosis of native coronary vessel ICD-10-CM: I25.10  ICD-9-CM: 414.01  Unknown - Present Yes        Hyperlipidemia ICD-10-CM: E78.5  ICD-9-CM: 272.4  Unknown - Present Yes                Signed:  Sharon Dobbs MD

## 2021-04-05 LAB
ANION GAP SERPL CALC-SCNC: 3 MMOL/L (ref 7–16)
BASOPHILS # BLD: 0 K/UL (ref 0–0.2)
BASOPHILS NFR BLD: 0 % (ref 0–2)
BUN SERPL-MCNC: 22 MG/DL (ref 8–23)
CALCIUM SERPL-MCNC: 8.8 MG/DL (ref 8.3–10.4)
CHLORIDE SERPL-SCNC: 110 MMOL/L (ref 98–107)
CO2 SERPL-SCNC: 24 MMOL/L (ref 21–32)
CREAT SERPL-MCNC: 1.12 MG/DL (ref 0.8–1.5)
DIFFERENTIAL METHOD BLD: ABNORMAL
EOSINOPHIL # BLD: 0 K/UL (ref 0–0.8)
EOSINOPHIL NFR BLD: 0 % (ref 0.5–7.8)
ERYTHROCYTE [DISTWIDTH] IN BLOOD BY AUTOMATED COUNT: 13.8 % (ref 11.9–14.6)
GLUCOSE SERPL-MCNC: 129 MG/DL (ref 65–100)
HCT VFR BLD AUTO: 37 % (ref 41.1–50.3)
HGB BLD-MCNC: 12.7 G/DL (ref 13.6–17.2)
IMM GRANULOCYTES # BLD AUTO: 0 K/UL (ref 0–0.5)
IMM GRANULOCYTES NFR BLD AUTO: 0 % (ref 0–5)
LYMPHOCYTES # BLD: 1 K/UL (ref 0.5–4.6)
LYMPHOCYTES NFR BLD: 14 % (ref 13–44)
MCH RBC QN AUTO: 27.2 PG (ref 26.1–32.9)
MCHC RBC AUTO-ENTMCNC: 34.3 G/DL (ref 31.4–35)
MCV RBC AUTO: 79.2 FL (ref 79.6–97.8)
MONOCYTES # BLD: 0.8 K/UL (ref 0.1–1.3)
MONOCYTES NFR BLD: 12 % (ref 4–12)
NEUTS SEG # BLD: 5.1 K/UL (ref 1.7–8.2)
NEUTS SEG NFR BLD: 74 % (ref 43–78)
NRBC # BLD: 0 K/UL (ref 0–0.2)
PLATELET # BLD AUTO: 166 K/UL (ref 150–450)
PMV BLD AUTO: 11.8 FL (ref 9.4–12.3)
POTASSIUM SERPL-SCNC: 3.7 MMOL/L (ref 3.5–5.1)
RBC # BLD AUTO: 4.67 M/UL (ref 4.23–5.6)
SODIUM SERPL-SCNC: 137 MMOL/L (ref 136–145)
WBC # BLD AUTO: 7 K/UL (ref 4.3–11.1)

## 2021-04-05 PROCEDURE — 74011250636 HC RX REV CODE- 250/636: Performed by: STUDENT IN AN ORGANIZED HEALTH CARE EDUCATION/TRAINING PROGRAM

## 2021-04-05 PROCEDURE — 36415 COLL VENOUS BLD VENIPUNCTURE: CPT

## 2021-04-05 PROCEDURE — 74011000250 HC RX REV CODE- 250: Performed by: STUDENT IN AN ORGANIZED HEALTH CARE EDUCATION/TRAINING PROGRAM

## 2021-04-05 PROCEDURE — 97530 THERAPEUTIC ACTIVITIES: CPT

## 2021-04-05 PROCEDURE — 74011000250 HC RX REV CODE- 250: Performed by: FAMILY MEDICINE

## 2021-04-05 PROCEDURE — 92526 ORAL FUNCTION THERAPY: CPT

## 2021-04-05 PROCEDURE — 97164 PT RE-EVAL EST PLAN CARE: CPT

## 2021-04-05 PROCEDURE — 99232 SBSQ HOSP IP/OBS MODERATE 35: CPT | Performed by: INTERNAL MEDICINE

## 2021-04-05 PROCEDURE — 80048 BASIC METABOLIC PNL TOTAL CA: CPT

## 2021-04-05 PROCEDURE — 74011000250 HC RX REV CODE- 250: Performed by: PHYSICIAN ASSISTANT

## 2021-04-05 PROCEDURE — 65610000006 HC RM INTENSIVE CARE

## 2021-04-05 PROCEDURE — 85025 COMPLETE CBC W/AUTO DIFF WBC: CPT

## 2021-04-05 RX ORDER — METOPROLOL TARTRATE 5 MG/5ML
5 INJECTION INTRAVENOUS EVERY 6 HOURS
Status: DISCONTINUED | OUTPATIENT
Start: 2021-04-05 | End: 2021-04-06

## 2021-04-05 RX ADMIN — SODIUM CHLORIDE 5 MG/HR: 900 INJECTION, SOLUTION INTRAVENOUS at 01:28

## 2021-04-05 RX ADMIN — METOPROLOL TARTRATE 5 MG: 5 INJECTION INTRAVENOUS at 17:40

## 2021-04-05 RX ADMIN — Medication 10 ML: at 22:50

## 2021-04-05 RX ADMIN — Medication 10 ML: at 05:39

## 2021-04-05 RX ADMIN — METOPROLOL TARTRATE 5 MG: 5 INJECTION INTRAVENOUS at 23:07

## 2021-04-05 RX ADMIN — Medication 10 ML: at 14:00

## 2021-04-05 RX ADMIN — SODIUM CHLORIDE 5 MG/HR: 900 INJECTION, SOLUTION INTRAVENOUS at 06:58

## 2021-04-05 RX ADMIN — SODIUM CHLORIDE 5 MG/HR: 900 INJECTION, SOLUTION INTRAVENOUS at 12:24

## 2021-04-05 RX ADMIN — SODIUM CHLORIDE 10 MG/HR: 900 INJECTION, SOLUTION INTRAVENOUS at 00:03

## 2021-04-05 NOTE — PROGRESS NOTES
ACUTE PHYSICAL THERAPY GOALS:  (Developed with and agreed upon by patient and/or caregiver. )  LTG:  (1.)Mr. Carmelo Miner will move from supine to sit and sit to supine , scoot up and down and roll side to side in bed with MINIMAL ASSIST within 7 treatment day(s). (2.)Mr. Carmelo Miner will tolerate sitting up in recliner chair for 2 hours with stable vital signs to increase upright tolerance within 7 treatment day(s). (3.)Mr. Carmelo Miner will maintain static/dynamic sitting x 15 minutes with MINIMAL ASSIST for improved balance within 7 treatment days. (4.)Mr. Carmelo Miner will be able to hold head in cervical rotation left of midline for 2 minutes within 7 treatment days. (5.)Mr. Carmelo Miner will participate in therapeutic activity/exercises x 25 minutes for increased strength within 7 treatment days.       ________________________________________________________________________________________________       PHYSICAL THERAPY ASSESSMENT: Re-evaluation and AM PT Treatment Day # 1      Carter Potter is a 67 y.o. male   PRIMARY DIAGNOSIS: Hemorrhagic cerebrovascular accident (CVA) ([facility] [address]  Hemorrhagic cerebrovascular accident (CVA) ([facility] [address] [I61.9]       Reason for Referral:    ICD-10: Treatment Diagnosis: Generalized Muscle Weakness (M62.81)  Difficulty in walking, Not elsewhere classified (R26.2)  Hemiplegia and hemiparesis following cerebral infarction affecting left non-dominant side (X49.416)  [place]PAT[place] / [place] / Product Type: Medicare /     ASSESSMENT:     REHAB RECOMMENDATIONS:   Recommendation to date pending progress:  Setting:   STR v IRC  Equipment:    To Be Determined     PRIOR LEVEL OF FUNCTION:  (Prior to Hospitalization) INITIAL/CURRENT LEVEL OF FUNCTION:  (Most Recently Demonstrated)   Bed Mobility:   Independent  Sit to Stand:   Independent  Transfers:   Independent  Gait/Mobility:   Independent Bed Mobility:   Maximal Assistance x 2  Sit to Stand:   Unable to perform  Transfers:   Maximal Assistance x 2  Gait/Mobility:   Unable to perform     ASSESSMENT:  Mr. Franco Kunz was re-evaluated today and goals updated in POC. Pt performed bed mobility with maxA x 2 and poor sitting balance. Pt appears to have on AROM on L LE and increased tone as well. He has obvious L side neglect and was unable to come to midline with cervical rotation actively. Pt reported pain with passive L cervical rotation. Pt participated in seated EOB activity with constant support. Pt's goals updated to reflect status and he could continue to benefit from skilled PT.         SUBJECTIVE:   Mr. Franco Kunz states, \"[place]\"    SOCIAL HISTORY/LIVING ENVIRONMENT:  Home Environment: Private residence  # Steps to Enter: 5  One/Two Story Residence: One story  Living Alone: No  Support Systems: Family member(s)  OBJECTIVE:     PAIN: VITAL SIGNS: LINES/DRAINS:   Pre Treatment: Pain Screen  Pain Scale 1: Numeric (0 - 10)  Pain Intensity 1: 0  Post Treatment: no pain reported.       IV  O2 Device: Room air     GROSS EVALUATION:   Within Functional Limits Abnormal/ Functional Abnormal/ Non-Functional (see comments) Not Tested Comments:   AROM [] [] [x] [] Left sided hemiparesis    PROM [] [] [x] [] Increased tone   Strength [] [] [x] [] No AROM   Balance [] [] [x] [] Poor sitting   Posture [] [] [] []    Sensation [] [] [x] [] Decreased    Coordination [] [] [x] []    Tone [] [] [x] [] Increased tone   Edema [] [] [] []    Activity Tolerance [] [] [x] [] Fair    [] [] [] []      COGNITION/  PERCEPTION: Intact Impaired   (see comments) Comments:   Orientation [] [x]    Vision [] []    Hearing [] []    Command Following [] [x] Intermittent    Safety Awareness [] [x]     [] []      MOBILITY: I Mod I S SBA CGA Min Mod Max Total  NT x2 Comments:   Bed Mobility    Rolling [] [] [] [] [] [] [] [x] [] [] [x] Seated dangling only     Supine to Sit [] [] [] [] [] [] [] [x] [] [] [x]    Scooting [] [] [] [] [] [] [] [x] [] [] [x]    Sit to Supine [] [] [] [] [] [] [] [x] [] [] [x]    Transfers    Sit to Stand [] [] [] [] [] [] [] [] [] [x] []     Bed to Chair [] [] [] [] [] [] [] [] [] [x] []    Stand to Sit [] [] [] [] [] [] [] [] [] [x] []    I=Independent, Mod I=Modified Independent, S=Supervision, SBA=Standby Assistance, CGA=Contact Guard Assistance,   Min=Minimal Assistance, Mod=Moderate Assistance, Max=Maximal Assistance, Total=Total Assistance, NT=Not Tested  GAIT: I Mod I S SBA CGA Min Mod Max Total  NT x2 Comments:   Level of Assistance [] [] [] [] [] [] [] [] [] [x] []    Distance N/A     DME N/A    Gait Quality Unable today. Weightbearing Status N/A     I=Independent, Mod I=Modified Independent, S=Supervision, SBA=Standby Assistance, CGA=Contact Guard Assistance,   Min=Minimal Assistance, Mod=Moderate Assistance, Max=Maximal Assistance, Total=Total Assistance, NT=Not [facility]       How much difficulty does the patient currently have. .. Unable A Lot A Little None   1. Turning over in bed (including adjusting bedclothes, sheets and blankets)? [] 1   [x] 2   [] 3   [] 4   2. Sitting down on and standing up from a chair with arms ( e.g., wheelchair, bedside commode, etc.)   [] 1   [x] 2   [] 3   [] 4   3. Moving from lying on back to sitting on the side of the bed? [] 1   [x] 2   [] 3   [] 4   How much help from another person does the patient currently need. .. Total A Lot A Little None   4. Moving to and from a bed to a chair (including a wheelchair)? [x] 1   [] 2   [] 3   [] 4   5. Need to walk in hospital room? [x] 1   [] 2   [] 3   [] 4   6. Climbing 3-5 steps with a railing? [x] 1   [] 2   [] 3   [] 4   © 2007, Trustees of [address] [place] [address] [address] [id number], under license to [employer].  All rights reserved     Score:  Initial: 9 Most Recent: 9(Date:4/5/21)    Interpretation of Tool:  Represents activities that are increasingly more difficult (i.e. Bed mobility, Transfers, Gait). PLAN:   FREQUENCY/DURATION: PT Plan of Care: 3 times/week for duration of hospital stay or until stated goals are met, whichever comes first.    PROBLEM LIST:   (Skilled intervention is medically necessary to address:)  1. Decreased ADL/Functional Activities  2. Decreased Activity Tolerance  3. Decreased AROM/PROM  4. Decreased Balance  5. Decreased Cognition  6. Decreased Coordination  7. Decreased Gait Ability  8. Decreased Strength  9. Decreased Transfer Abilities   INTERVENTIONS PLANNED:   (Benefits and precautions of physical therapy have been discussed with the patient.)  1. Therapeutic Activity  2. Therapeutic Exercise/HEP  3. Neuromuscular Re-education  4. Gait Training  5. Manual Therapy  6. Education     TREATMENT:     Re-evaluation    TREATMENT:   ($$ Therapeutic Activity: 23-37 mins    )  Therapeutic Activity (23 Minutes): Therapeutic activity included Rolling, Supine to Sit, Scooting and Sitting balance  to improve functional Mobility, Strength and Activity tolerance.     TREATMENT GRID:  N/A    AFTER TREATMENT POSITION/PRECAUTIONS:  Bed, Needs within reach and RN notified    INTERDISCIPLINARY COLLABORATION:  RN/PCT, PT/PTA and OT/LYNCH    TOTAL TREATMENT DURATION:  PT Patient Time In/Time Out  Time In: 4444  Time Out: 600 Asa Flores, DPT

## 2021-04-05 NOTE — PROGRESS NOTES
Bedside and Verbal shift change report given to Yaneth Valencia RN (oncoming nurse) by Alfonso Gongora RN (offgoing nurse). Report included the following information SBAR, Kardex, ED Summary, Intake/Output, MAR, Recent Results, Cardiac Rhythm NSR and Alarm Parameters . Dual neuro assessment: Patient is alert, oriented x4, mild L facial droop. L arm and L leg flaccid with numbness and decreased sensation. Pupils 4 mm round and sluggish. Cardene infusing at 5 mg/hr to maintain SBP <140.

## 2021-04-05 NOTE — PROGRESS NOTES
NEUROSURGERY PROGRESS NOTE    S: right thalamic hemorrhage with intraventricular extension. Patient remains responsive but is resting this morning    O:  Visit Vitals  /75   Pulse 95   Temp 98.1 °F (36.7 °C)   Resp 14   Ht 5' 8\" (1.727 m)   Wt 134 lb (60.8 kg)   SpO2 97%   BMI 20.37 kg/m²     The patient is lethargic but opens his eyes to voice. Slight downgrades with the right eye pupils remain equal round and reactive to light  Follows commands in the right upper and right lower extremity with full strength. Withdrawal of the left lower extremity noxious stimuli and withdrawal of the left upper extremity noxious stimuli no volitional movement  Remains verbal and oriented to person place and situation. A/P: Right thalamic intracerebral hemorrhage. Patient underwent a repeat CT head without contrast yesterday afternoon at 1600 that demonstrated no significant interval change in the size of the hemorrhage or the ventriculomegaly. At this time the patient remained stable continues to follow commands briskly and remains verbal.  As long as he remains oriented and following commands would defer any CSF diversion via external ventricular drain unless the patient develops acute symptomatic hydrocephalus we will continue to follow along recommend every 2 hour neurochecks while in the neuro ICU.      Mala Mello MD

## 2021-04-05 NOTE — PROGRESS NOTES
Hospitalist Progress Note     Admit Date:  2021  5:50 PM   Name:  Cynthia Guerrero   Age:  67 y.o.  :  1945   MRN:  [id number]   PCP:  Mj Hope MD  Treatment Team: Attending Provider: Luis Antonio Jean MD; Consulting Provider: Chay Dunne, Sanam De La Paz MD; Primary Nurse: Suzette Worthy, RN; Consulting Provider: Sai Peralta MD; Utilization Review: Slime Domingo; Consulting Provider: Ortega Key MD; Staff Nurse: Enoch Zimmerman RN; Hospitalist: Radha Moon; Physical Therapist: Rehana Gill, PT, DPT     Assessment and Plan:      Acute right basal ganglia hematoma with right intraventricular hemorrhage -  ICH score of 1 with 13% mortality on admission. Neurosurgery has evaluated patient, no surgical intervention at this time. Continue nicardipine drip to maintain a blood pressure lower than 140/90 while NPO. Neurochecks every 2 hours. Hold antiplatelet therapy. Desmopressin was ordered in the emergency department since patient is currently on aspirin and Effient.   - N.p.o. for now. Per speech, MBS on .  - Holding statin for now in the setting of transaminitis. Monitor monitoring. A1c, TSH, lipid panel.   - Repeat CT head with no acute changes. Cont to monitor     Transaminitis. History of hepatitis C, untreated. Ultrasound abdomen showed possible gallstones, no signs of cholecystitis. - Repeat LFTs in Am     Coronary artery disease. Hold aspirin and Effient for now in the setting of hemorrhagic CVA. Elevated troponin. Cardiology recommended aspirin when ok with neurosurgery. Recs Bblocker when able to take PO. Echo with no WMA and nl LVEF.      Hypertension. Hold metoprolol since currently on nicardipine gtt.     Hyperlipidemia.   Hold statin for now in the setting of transaminitis.     DVT prophylaxis: SCD  Full code  Will need PT/OT, possible IPR evaluation once stable  Care d/w Dr. 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 Per patient he was in his usual state of health until earlier today he noticed his left side and got weak and after that he fell and hit his head against the floor but denies any loss of consciousness. The daughter at bedside also noted some right-sided facial droop. Otherwise no blurry vision, no slurring speech, no trouble finding words. Also denies any fever or chills, no chest pain, no shortness of breath, no abdominal pain, no nausea vomiting or diarrhea. Here in the emergency department Code S was activated and CT of the head showed acute right basal ganglia hematoma with right intraventricular hemorrhage. She was found to be hypertensive as well he was a started on nicardipine drip. Neurosurgery was called by ED physician and reviewed imaging and will see him tomorrow. Will admit the patient to the ICU for further management. 4/5/21:   Pt seen and examined. Continues to have L sided neglect. Appears to be [place]. He is able to answer questions appropriately. Has no new complaints. Notes he is retired. Lives with brother.      Objective:     Patient Vitals for the past 24 hrs:   Temp Pulse Resp BP SpO2   04/05/21 1601  99  135/77 97 %   04/05/21 1600  100   97 %   04/05/21 1546  (!) 103  133/72 98 %   04/05/21 1530  (!) 105  130/72 97 %   04/05/21 1515  (!) 102  (!) 140/76 98 %   04/05/21 1501 98.5 °F (36.9 °C) (!) 105  132/72 100 %   04/05/21 1446  (!) 106  (!) 124/56 97 %   04/05/21 1430  (!) 105  138/74 100 %   04/05/21 1415  93  139/71 99 %   04/05/21 1401  91  128/69 98 %   04/05/21 1347  (!) 102  (!) 140/66 100 %   04/05/21 1331  100  120/71 97 %   04/05/21 1315  97  138/71 97 %   04/05/21 1301  (!) 102 12 133/74 97 %   04/05/21 1246  100 12 135/73 98 %   04/05/21 1230  (!) 109 13 122/73 98 %   04/05/21 1215  88 12 125/70 98 %   04/05/21 1201  (!) 104 13 136/72 100 %   04/05/21 1146  94 15 131/67 98 %   04/05/21 1134    (!) 131/58    04/05/21 1130  97 13 131/68 97 %   04/05/21 1115  (!) 103 14 (!) 142/67 96 %   04/05/21 1101 98.8 °F (37.1 °C) 83 15 126/64 98 %   04/05/21 1046  88 15 122/68 98 %   04/05/21 1024   15     04/05/21 1015  (!) 102 14 129/73 97 %   04/05/21 1001  86 14 129/65 98 %   04/05/21 0946  93  132/65 98 %   04/05/21 0930  95  135/69 98 %   04/05/21 0901  100 14 (!) 141/79 95 %   04/05/21 0831  95  136/75 97 %   04/05/21 0801  100  139/75 98 %   04/05/21 0730  94  139/79 100 %   04/05/21 0702 98.1 °F (36.7 °C) (!) 101 14 131/75 97 %   04/05/21 0700  (!) 103   92 %   04/05/21 0645  98   96 %   04/05/21 0630  95  132/72 96 %   04/05/21 0603  (!) 106  (!) 150/80    04/05/21 0602  (!) 104  (!) 153/85    04/05/21 0600  (!) 115      04/05/21 0545  (!) 101   97 %   04/05/21 0530  90  (!) 119/57 97 %   04/05/21 0508  (!) 107   98 %   04/05/21 0507  (!) 106   97 %   04/05/21 0506  (!) 106   99 %   04/05/21 0505  (!) 108   98 %   04/05/21 0504  (!) 105   98 %   04/05/21 0503  (!) 111   98 %   04/05/21 0502  (!) 112  138/78 98 %   04/05/21 0501  (!) 108   98 %   04/05/21 0500  (!) 119   93 %   04/05/21 0459  (!) 167   97 %   04/05/21 0458  (!) 118   97 %   04/05/21 0457  (!) 104   96 %   04/05/21 0456  (!) 106   96 %   04/05/21 0455  (!) 109   96 %   04/05/21 0454  (!) 111   96 %   04/05/21 0453  (!) 113      04/05/21 0452  (!) 112      04/05/21 0451  (!) 106      04/05/21 0450  (!) 109   97 %   04/05/21 0449  (!) 128   96 %   04/05/21 0448  (!) 111   98 %   04/05/21 0447  (!) 109   98 %   04/05/21 0446  (!) 111   97 %   04/05/21 0445  (!) 107   99 %   04/05/21 0444  (!) 101   97 %   04/05/21 0443  (!) 130   98 %   04/05/21 0442  (!) 111   98 %   04/05/21 0441  (!) 109   98 %   04/05/21 0440  (!) 108   98 %   04/05/21 0439  (!) 106   97 %   04/05/21 0438  (!) 109   96 %   04/05/21 0437  (!) 106   98 %   04/05/21 0436  (!) 104   96 %   04/05/21 0435  (!) 130   98 %   04/05/21 0434  (!) 101   97 %   04/05/21 0433  (!) 105   96 %   04/05/21 0432  (!) 105   96 %   04/05/21 0431  (!) 106      04/05/21 0430  (!) 106  121/64    04/05/21 0415  (!) 107   97 %   04/05/21 0401  (!) 101  136/75 97 %   04/05/21 0400 98.3 °F (36.8 °C) (!) 106   93 %   04/05/21 0345  (!) 208   96 %   04/05/21 0331  (!) 112  138/63 96 %   04/05/21 0330  (!) 112   94 %   04/05/21 0315  97   95 %   04/05/21 0300  (!) 112   92 %   04/05/21 0245  (!) 108   96 %   04/05/21 0231  (!) 112  119/86 99 %   04/05/21 0230  (!) 114   95 %   04/05/21 0220  (!) 111   97 %   04/05/21 0219  (!) 110   96 %   04/05/21 0218  (!) 116   97 %   04/05/21 0217  (!) 109   94 %   04/05/21 0216  (!) 111   98 %   04/05/21 0215  (!) 108   97 %   04/05/21 0214  (!) 110   97 %   04/05/21 0213  (!) 112   98 %   04/05/21 0212  (!) 114   96 %   04/05/21 0211  (!) 116   96 %   04/05/21 0210  (!) 121   98 %   04/05/21 0209  (!) 124   97 %   04/05/21 0208  (!) 129   97 %   04/05/21 0207  (!) 125   95 %   04/05/21 0206  (!) 118   97 %   04/05/21 0205  (!) 120   99 %   04/05/21 0204  (!) 121   98 %   04/05/21 0203  (!) 123   98 %   04/05/21 0202  (!) 153  130/81 97 %   04/05/21 0201  (!) 130   99 %   04/05/21 0200  (!) 126   93 %   04/05/21 0159  (!) 121   98 %   04/05/21 0158  (!) 115   98 %   04/05/21 0157  (!) 116   98 %   04/05/21 0156  (!) 117   97 %   04/05/21 0155  (!) 118   98 %   04/05/21 0154  (!) 113   98 %   04/05/21 0153  (!) 114   97 %   04/05/21 0152  (!) 114   98 %   04/05/21 0151  (!) 107   98 %   04/05/21 0150  (!) 104   95 % 04/05/21 0149  91   97 %   04/05/21 0148  92   97 %   04/05/21 0147  92   97 %   04/05/21 0146  91   97 %   04/05/21 0145  92   97 %   04/05/21 0144  92   97 %   04/05/21 0143  92   96 %   04/05/21 0142  92   96 %   04/05/21 0141  93   96 %   04/05/21 0140  93   96 %   04/05/21 0139  (!) 102   96 %   04/05/21 0138  93   96 %   04/05/21 0137  93   96 %   04/05/21 0136  93   95 %   04/05/21 0135  94   96 %   04/05/21 0134  94   96 %   04/05/21 0133  96   95 %   04/05/21 0132  97   95 %   04/05/21 0131  97   95 %   04/05/21 0130  98  122/62 95 %   04/05/21 0129  100   95 %   04/05/21 0128  (!) 101   95 %   04/05/21 0127  (!) 185   97 %   04/05/21 0126  (!) 109   98 %   04/05/21 0125  (!) 115   95 %   04/05/21 0124  (!) 111   96 %   04/05/21 0123  (!) 106   95 %   04/05/21 0122  (!) 109   97 %   04/05/21 0121  (!) 180   97 %   04/05/21 0120  (!) 110   97 %   04/05/21 0119  (!) 111   97 %   04/05/21 0118  (!) 108   95 %   04/05/21 0117  (!) 105   96 %   04/05/21 0116  (!) 109  129/65 93 %   04/05/21 0115  (!) 107   96 %   04/05/21 0114  (!) 116   97 %   04/05/21 0113  (!) 121   97 %   04/05/21 0112  (!) 110   96 %   04/05/21 0111  (!) 119   97 %   04/05/21 0110  (!) 113   98 %   04/05/21 0109  (!) 109   96 %   04/05/21 0108  (!) 111   97 %   04/05/21 0107  (!) 107   99 %   04/05/21 0106  (!) 103   94 %   04/05/21 0105  (!) 101   96 %   04/05/21 0104  (!) 102   95 %   04/05/21 0103  (!) 101   96 %   04/05/21 0102  (!) 104   96 %   04/05/21 0101  (!) 102  (!) 141/73 96 %   04/05/21 0100  (!) 109   98 %   04/05/21 0059  (!) 114   98 %   04/05/21 0058  (!) 110   98 %   04/05/21 0057  (!) 113   96 %   04/05/21 0056  (!) 116   97 %   04/05/21 0055  (!) 122   96 %   04/05/21 0054  (!) 122   97 %   04/05/21 0053  (!) 126   96 %   04/05/21 0052  (!) 161   96 % 04/05/21 0051  (!) 123   96 %   04/05/21 0050  (!) 135   97 %   04/05/21 0049  (!) 111  134/75 97 %   04/05/21 0048  (!) 109   97 %   04/05/21 0047  (!) 110  (!) 155/75 95 %   04/05/21 0046  (!) 116   97 %   04/05/21 0045  (!) 168   95 %   04/05/21 0044  (!) 118   96 %   04/05/21 0043  (!) 119   98 %   04/05/21 0042  (!) 184   97 %   04/05/21 0041  (!) 109   97 %   04/05/21 0040  (!) 113   97 %   04/05/21 0039  (!) 111   97 %   04/05/21 0038  (!) 112   98 %   04/05/21 0037  (!) 112   97 %   04/05/21 0036  (!) 110   98 %   04/05/21 0035  (!) 113   98 %   04/05/21 0034  (!) 114   97 %   04/05/21 0033  (!) 112   98 %   04/05/21 0032  (!) 115   97 %   04/05/21 0031  (!) 115  139/68 98 %   04/05/21 0030  (!) 113   92 %   04/05/21 0029  (!) 116   98 %   04/05/21 0028  (!) 119   98 %   04/05/21 0027  (!) 120   98 %   04/05/21 0026  (!) 121   98 %   04/05/21 0025  (!) 138   98 %   04/05/21 0024  (!) 129   97 %   04/05/21 0023  (!) 121   99 %   04/05/21 0022  (!) 119   98 %   04/05/21 0021  (!) 113   97 %   04/05/21 0020  (!) 111   98 %   04/05/21 0019  (!) 112   97 %   04/05/21 0018  (!) 112   99 %   04/05/21 0017  (!) 118  (!) 153/73 98 %   04/05/21 0016  (!) 120  (!) 153/72    04/05/21 0015  (!) 122      04/05/21 0014  (!) 122      04/05/21 0013  (!) 117      04/05/21 0012  (!) 122      04/05/21 0011  (!) 126      04/05/21 0010  (!) 125      04/05/21 0009  (!) 124      04/05/21 0008  (!) 149      04/05/21 0007  (!) 216      04/05/21 0006  (!) 125   98 %   04/05/21 0005  (!) 126   97 %   04/05/21 0004  (!) 126   98 %   04/05/21 0003  (!) 122   97 %   04/05/21 0002  (!) 122  (!) 165/81 99 %   04/05/21 0001  (!) 117  (!) 171/88 95 %   04/05/21 0000 98.3 °F (36.8 °C) (!) 121   94 %   04/04/21 2359  (!) 110   100 %   04/04/21 2358  (!) 103   99 %   04/04/21 2357  93   97 %   04/04/21 2356  94   97 %   04/04/21 2355  93   96 %   04/04/21 2354  95   97 %   04/04/21 2353  93   97 %   04/04/21 2352  93   97 %   04/04/21 2351  94   96 %   04/04/21 2350  94   96 %   04/04/21 2349  96   96 %   04/04/21 2348  97   97 %   04/04/21 2347  97   96 %   04/04/21 2346  99   96 %   04/04/21 2345  (!) 101  118/60 96 %   04/04/21 2344  (!) 101   97 %   04/04/21 2343  100   97 %   04/04/21 2342  (!) 102   97 %   04/04/21 2341  (!) 103   96 %   04/04/21 2340  (!) 109   94 %   04/04/21 2339  (!) 107   97 %   04/04/21 2338  (!) 111   97 %   04/04/21 2337  (!) 110   96 %   04/04/21 2336  (!) 114   96 %   04/04/21 2335  (!) 125   98 %   04/04/21 2334  (!) 121   97 %   04/04/21 2333  (!) 118   98 %   04/04/21 2332  (!) 120   97 %   04/04/21 2331  (!) 122   99 %   04/04/21 2330  (!) 122  134/78 98 %   04/04/21 2329  (!) 120   96 %   04/04/21 2328  (!) 116   97 %   04/04/21 2327  (!) 118   96 %   04/04/21 2326  (!) 121   97 %   04/04/21 2325  (!) 118   96 %   04/04/21 2324  (!) 123   97 %   04/04/21 2323  (!) 124   96 %   04/04/21 2322  (!) 124   96 %   04/04/21 2321  (!) 123   98 %   04/04/21 2320  (!) 120   94 %   04/04/21 2319  (!) 110   99 %   04/04/21 2318  (!) 115   97 %   04/04/21 2317  (!) 111   97 %   04/04/21 2316  (!) 114  137/75 97 %   04/04/21 2315  (!) 108   93 %   04/04/21 2314  (!) 107   97 %   04/04/21 2313  (!) 110   95 %   04/04/21 2312  (!) 111   98 %   04/04/21 2311  (!) 109   98 %   04/04/21 2310  (!) 111   97 %   04/04/21 2309  (!) 114   97 %   04/04/21 2308  (!) 116   100 %   04/04/21 2307  (!) 113   98 %   04/04/21 2306  (!) 118   97 %   04/04/21 2305  (!) 145   96 %   04/04/21 2304  (!) 114   98 %   04/04/21 2303  (!) 113   98 %   04/04/21 2302  (!) 114   99 %   04/04/21 2301  (!) 127  135/72 97 %   04/04/21 2300  (!) 109   93 %   04/04/21 2245  (!) 110  (!) 140/75 98 %   04/04/21 2230  (!) 114  (!) 152/80 97 %   04/04/21 2215  (!) 129   99 %   04/04/21 2200  (!) 113   96 %   04/04/21 2145  (!) 110   93 %   04/04/21 2130  (!) 101  (!) 140/79 98 %   04/04/21 2000 98.4 °F (36.9 °C) 99   96 %   04/04/21 1945  96   95 %   04/04/21 1930  98  136/75 98 %   04/04/21 1915  (!) 105  122/62 98 %   04/04/21 1900  98   98 %   04/04/21 1830  100  (!) 142/80 98 %   04/04/21 1815  96  137/80 98 %   04/04/21 1800  93  137/73 95 %   04/04/21 1745  (!) 101  135/68 97 %   04/04/21 1730  (!) 104  137/71 96 %   04/04/21 1715  (!) 101  138/73 97 %   04/04/21 1700  (!) 104  (!) 152/80 96 %   04/04/21 1645  89  134/69 97 %   04/04/21 1630  82  130/72 96 %     Oxygen Therapy  O2 Sat (%): 98 % (04/05/21 1146)  Pulse via Oximetry: 96 beats per minute (04/05/21 1146)  O2 Device: Room air (04/05/21 1101)    Intake/Output Summary (Last 24 hours) at 4/5/2021 1209  Last data filed at 4/5/2021 0900  Gross per 24 hour   Intake 1920.17 ml   Output 950 ml   Net 970.17 ml         General:    Well nourished. Alert. calm   HEENT-pupils equal normal right neck supple throat normal  + Left visual field deficit. Gaze tends to trend to the right. + [place]  CV:   RRR. No murmur, rub, or gallop. Lungs:   Clear to auscultation bilaterally. No wheezing, rhonchi, or rales. Abdomen:   Soft, nontender, nondistended. Neuro: + left hemiplegia. 5/5 right upper and right lower extremity. Extremities: Warm and dry. No cyanosis or edema. Skin:     No rashes or jaundice. Data Review:  I have reviewed all labs, meds, telemetry events, and studies from the last 24 hours.     Recent Results (from the past 24 hour(s))   CBC WITH AUTOMATED DIFF    Collection Time: 04/05/21  3:16 AM   Result Value Ref Range    WBC 7.0 4.3 - 11.1 K/uL    RBC 4.67 4.23 - 5.6 M/uL    HGB 12.7 (L) 13.6 - 17.2 g/dL    HCT 37.0 (L) 41.1 - 50.3 % MCV 79.2 (L) 79.6 - 97.8 FL    MCH 27.2 26.1 - 32.9 PG    MCHC 34.3 31.4 - 35.0 g/dL    RDW 13.8 11.9 - 14.6 %    PLATELET 294 734 - 476 K/uL    MPV 11.8 9.4 - 12.3 FL    ABSOLUTE NRBC 0.00 0.0 - 0.2 K/uL    DF AUTOMATED      NEUTROPHILS 74 43 - 78 %    LYMPHOCYTES 14 13 - 44 %    MONOCYTES 12 4.0 - 12.0 %    EOSINOPHILS 0 (L) 0.5 - 7.8 %    BASOPHILS 0 0.0 - 2.0 %    IMMATURE GRANULOCYTES 0 0.0 - 5.0 %    ABS. NEUTROPHILS 5.1 1.7 - 8.2 K/UL    ABS. LYMPHOCYTES 1.0 0.5 - 4.6 K/UL    ABS. MONOCYTES 0.8 0.1 - 1.3 K/UL    ABS. EOSINOPHILS 0.0 0.0 - 0.8 K/UL    ABS. BASOPHILS 0.0 0.0 - 0.2 K/UL    ABS. IMM. GRANS. 0.0 0.0 - 0.5 K/UL   METABOLIC PANEL, BASIC    Collection Time: 04/05/21  3:16 AM   Result Value Ref Range    Sodium 137 136 - 145 mmol/L    Potassium 3.7 3.5 - 5.1 mmol/L    Chloride 110 (H) 98 - 107 mmol/L    CO2 24 21 - 32 mmol/L    Anion gap 3 (L) 7 - 16 mmol/L    Glucose 129 (H) 65 - 100 mg/dL    BUN 22 8 - 23 MG/DL    Creatinine 1.12 0.8 - 1.5 MG/DL    GFR est AA >60 >60 ml/min/1.73m2    GFR est non-AA >60 >60 ml/min/1.73m2    Calcium 8.8 8.3 - 10.4 MG/DL        All Micro Results     None          Current Meds:  Current Facility-Administered Medications   Medication Dose Route Frequency    lidocaine 4 % patch 1 Patch  1 Patch TransDERmal Q24H    acetaminophen (TYLENOL) tablet 650 mg  650 mg Oral Q6H PRN    niCARdipine (CARDENE) 25 mg in 0.9% sodium chloride (MBP/ADV) 250 mL infusion  0-15 mg/hr IntraVENous TITRATE    sodium chloride (NS) flush 5-40 mL  5-40 mL IntraVENous Q8H    sodium chloride (NS) flush 5-40 mL  5-40 mL IntraVENous PRN       Other Studies (last 24 hours):  Ct Head Wo Cont    Result Date: 4/4/2021  CT HEAD WITHOUT CONTRAST. INDICATION: Intracranial hemorrhage with worsening vision. COMPARISON: Yesterday's exam  TECHNIQUE:   5 mm axial scans from the skull base to the vertex.   Our CT scanners use one or more of the following:  Automated exposure control, adjustment of the mA and or kV according to patient size, iterative reconstruction. FINDINGS:  Acute hemorrhage right thalamus and basal ganglia without significant change. Decreased midline shift, 6 mm. There is blood in the posterior horn of the lateral ventricles, right more than left. There is blood in the third ventricle. No new hemorrhage. Posterior fossa structures unremarkable. Stable acute intracranial hemorrhage with decreased midline shift, 6mm.           Signed:  CONNOR Carty

## 2021-04-05 NOTE — PROGRESS NOTES
Evans Lott RN to assume care at this time, dual neuro assessment completed. Cardene infusing at 7.5 mg/hr.

## 2021-04-05 NOTE — PROGRESS NOTES
[facility] CARDIOLOGY PROGRESS NOTE           4/5/2021 8:12 AM    Admit Date: 4/2/2021      Subjective:   Patient awakens to stimuli. Still with left sided weakness. Denies chest pain. On room air. On Nifedipine for HTN.     ROS:  Cardiovascular:  As noted above    Objective:      Vitals:    04/05/21 0630 04/05/21 0645 04/05/21 0700 04/05/21 0702   BP: 132/72   131/75   Pulse: 95 98 (!) 103 (!) 101   Resp:    14   Temp:    98.1 °F (36.7 °C)   SpO2: 96% 96% 92% 97%   Weight:       Height:           Physical Exam:  General-No Acute Distress  Neck- supple, no JVD  CV- regular rate and rhythm no MRG  Lung- clear bilaterally  Abd- soft, nontender, nondistended  Ext- no edema bilaterally. Neuro:  Left sided weakness. Skin- warm and dry      Data Review:   Recent Labs     04/05/21  0316 04/04/21  0402 04/03/21  0425 04/02/21  1809    137* 137  --    K 3.7 3.8 3.9  --    BUN 22 17 19  --    CREA 1.12 1.10 1.16  --    * 108* 108*  --    WBC 7.0  --  4.8  --    HGB 12.7*  --  11.3*  --    HCT 37.0*  --  33.0*  --      --  154  --    INR  --   --   --  1.0   TRIGL  --   --  49  --    HDL  --   --  55  --       No results found for: Nelia Sorenson, PILLO, TNRHEA     Echo (4/2/21):     -  Left ventricle: Systolic function was normal. Ejection fraction was estimated in the range of 55 % to 60 %.    -  Atrial septum: Agitated saline contrast injection (bubble study) was performed. There was no right-to-left shunt, at rest or induced by the Valsalva maneuver. Assessment/Plan:     Principal Problem:    Hemorrhagic cerebrovascular accident (CVA) ([address] (4/2/2021)    Per neurosurgery. Holding ASA    Active Problems:    Coronary atherosclerosis of native coronary vessel ()    Stent in 2017. Holding ASA with bleed. Await neurosurgery recs in regards to risk and timing of resuming. Hyperlipidemia ()    Resume Lipitor when taking PO.         Acute left-sided weakness (4/2/2021)    Will follow. Elevated troponin (4/4/2021)    Likely supply/demand imbalance. Echo with normal LV function. No plans for ischemia evaluation. Start B-blocker when taking PO.                    Marva No MD  4/5/2021 8:12 AM

## 2021-04-05 NOTE — PROGRESS NOTES
Problem: Dysphagia (Adult)  Goal: *Acute Goals and Plan of Care (Insert Text)  Description: STG: Pt will demonstrate zero signs/sx of aspiration with mechanical soft textures with nectar thick liquids with no straws with 90% accuracy during all meals. Discontinued 4/5/21  STG: Pt will complete a full speech, language and cognitive evaluation without assistance with 100% accuracy during session. STG: Pt will complete a Modified barium swallow study with zero signs/sx of aspiration  with 100% accuracy during swallow study. LTG: Pt will demonstrate zero signs/sx of aspiration with least restrictive diet with 100% accuracy during all meals. Outcome: Progressing Towards Goal   SPEECH LANGUAGE PATHOLOGY: DYSPHAGIA- Daily Note 1    NAME/AGE/GENDER: Nimisha Kuhn is a 67 y.o. male  DATE: 4/5/2021  PRIMARY DIAGNOSIS: Hemorrhagic cerebrovascular accident (CVA) ([facility] [address] [I61.9]      ICD-10: Treatment Diagnosis: R13.12 Dysphagia, Oropharyngeal Phase    RECOMMENDATIONS   DIET:   NPO except meds in puree     MEDICATIONS: Whole in puree     ASPIRATION PRECAUTIONS  · Slow rate of intake  · Small bites/sips  · Upright at 90 degrees during meal     COMPENSATORY STRATEGIES/MODIFICATIONS  · n/a     RECOMMENDATIONS for CONTINUED SPEECH THERAPY:   YES: Anticipate need for ongoing speech therapy during this hospitalization and at next level of care. ASSESSMENT   Patient presents with s/sx oropharyngeal dysphagia. Concerns for airway compromise with nectar and honey thick liquids by tsp as evidenced by immediate throat clear and inconsistent delayed cough. Recommend downgrade to NPO except meds in puree. Will plan for modified barium swallow study 4/6 to objectively assess pharyngeal swallow.      EDUCATION:  · Recommendations discussed with Patient, MD and RN   · Messaged Dr. Teddy Holder via [employer] about recomendations/plan  CONTINUATION OF SKILLED SERVICES/MEDICAL NECESSITY:   Patient is expected to demonstrate progress in  swallow strength, swallow timeliness, swallow function, diet tolerance and swallow safety in order to  improve swallow safety, work toward diet advancement and decrease aspiration risk.  Patient continues to require skilled intervention due to dysphagia  REHABILITATION POTENTIAL FOR STATED GOALS: Excellent    PLAN    FREQUENCY/DURATION: Continue to follow patient 3 times a week for duration of hospital stay to address above goals. - Recommendations for next treatment session: Next treatment session will address Modified Barium Swallow Study    SUBJECTIVE   Alert. Asking for scotch   RN reports patient appeared to tolerate nectar thick liquid and puree portions of breakfast. Reports coughing some on eggs. Pain: Pain Scale 1: Numeric (0 - 10)  Pain Intensity 1: 0    History of Present Injury/Illness: Mr. 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), Chronic fatigue and malaise (1/21/2015), Chronic obstructive pulmonary disease ([facility] [address] Coronary atherosclerosis of native coronary vessel, Dyspnea, GERD (gastroesophageal reflux disease), GI bleed (1/21/2015), Hepatitis C (>10 yrs ago), Hyperlipidemia, Hypertension, Iron deficiency anemia (1/25/2015), Lung nodule, Lung nodule, multiple (10/4/2013), Near syncope (1/21/2015), NSTEMI (non-ST elevated myocardial infarction) ([facility] [address] (12/31/2014), Pulmonary tuberculosis, S/P PTCA (percutaneous transluminal coronary angioplasty), Shortness of breath (1/21/2015), and TB peritonitis (5/2/2014). He also has no past medical history of COPD. Ron Hua  He also  has a past surgical history that includes pr chest surgery procedure unlisted (11/2013); pr abdomen surgery proc unlisted; pr left heart cath,percutaneous (12/31/2014); hx other surgical; and hx ptca. PRECAUTIONS/ALLERGIES: Patient has no known allergies. Problem List:  (Impairments causing functional limitations):  1. CVA  2. dysphagia    Orientation:  Person  Place  Time    Cognitive-Linguistic Screening:   Alertness  o Alert   Speech Production:   o Some dysarthria, but able to understand   Expressive Language:  o Fluent speech   Receptive Language:  o Follows commands   Cognition:   o Needs assessment   Prior Level of Function: independent at baseline  Recommendations: Given results of screening, further evaluation is indicated to assess cognitive abilities given hemorrhagic CVA    OBJECTIVE   Dysphagia treatment(diet tolerance/trials)  Left sided facial weakness-mils. Patient consumed nectar thick liquid by tsp, honey thick liquid by tsp, and puree via tsp. Immediate throat clearing with nectar and honey thick liquids and inconsistent delayed cough. Ongoing delayed throat clear. No overt s/sx airway compromise with puree and single swallow upon palpation. Tool Used: Dysphagia Outcome and Severity Scale (DAYAMI)    Score Comments   Normal Diet  [] 7 With no strategies or extra time needed   Functional Swallow  [] 6 May have mild oral or pharyngeal delay   Mild Dysphagia  [] 5 Which may require one diet consistency restricted    Mild-Moderate Dysphagia  [x] 4 With 1-2 diet consistencies restricted   Moderate Dysphagia  [] 3 With 2 or more diet consistencies restricted   Moderate-Severe Dysphagia  [] 2 With partial PO strategies (trials with ST only)   Severe Dysphagia  [] 1 With inability to tolerate any PO safely      Score:  Initial: 4 Most Recent: 2(Date 04/05/21 )   Interpretation of Tool: The Dysphagia Outcome and Severity Scale (DAYAMI) is a simple, easy-to-use, 7-point scale developed to systematically rate the functional severity of dysphagia based on objective assessment and make recommendations for diet level, independence level, and type of nutrition. INTERDISCIPLINARY COLLABORATION: RN     After treatment position/precautions:  · Upright in bed  · RN notified    Total Treatment Duration:   Time In: 1010  Time Out: 0545 [facility], [facility] MEDICO DEL [facility] [place], [address] [facility]

## 2021-04-06 ENCOUNTER — APPOINTMENT (OUTPATIENT)
Dept: GENERAL RADIOLOGY | Age: 76
DRG: 023 | End: 2021-04-06
Attending: HOSPITALIST
Payer: MEDICARE

## 2021-04-06 PROBLEM — R74.8 ELEVATED LIVER ENZYMES: Status: ACTIVE | Noted: 2021-04-06

## 2021-04-06 PROBLEM — I69.391 DYSPHAGIA FOLLOWING CEREBRAL INFARCTION: Status: ACTIVE | Noted: 2021-04-06

## 2021-04-06 LAB
ALBUMIN SERPL-MCNC: 3.2 G/DL (ref 3.2–4.6)
ALBUMIN/GLOB SERPL: 0.5 {RATIO} (ref 1.2–3.5)
ALP SERPL-CCNC: 93 U/L (ref 50–136)
ALT SERPL-CCNC: 111 U/L (ref 12–65)
ANION GAP SERPL CALC-SCNC: 6 MMOL/L (ref 7–16)
AST SERPL-CCNC: 113 U/L (ref 15–37)
BASOPHILS # BLD: 0 K/UL (ref 0–0.2)
BASOPHILS NFR BLD: 0 % (ref 0–2)
BILIRUB DIRECT SERPL-MCNC: 0.4 MG/DL
BILIRUB SERPL-MCNC: 1.3 MG/DL (ref 0.2–1.1)
BUN SERPL-MCNC: 21 MG/DL (ref 8–23)
CALCIUM SERPL-MCNC: 9.2 MG/DL (ref 8.3–10.4)
CHLORIDE SERPL-SCNC: 108 MMOL/L (ref 98–107)
CO2 SERPL-SCNC: 24 MMOL/L (ref 21–32)
CREAT SERPL-MCNC: 0.92 MG/DL (ref 0.8–1.5)
DIFFERENTIAL METHOD BLD: ABNORMAL
EOSINOPHIL # BLD: 0 K/UL (ref 0–0.8)
EOSINOPHIL NFR BLD: 0 % (ref 0.5–7.8)
ERYTHROCYTE [DISTWIDTH] IN BLOOD BY AUTOMATED COUNT: 13.4 % (ref 11.9–14.6)
GLOBULIN SER CALC-MCNC: 6.3 G/DL (ref 2.3–3.5)
GLUCOSE SERPL-MCNC: 105 MG/DL (ref 65–100)
HCT VFR BLD AUTO: 40 % (ref 41.1–50.3)
HGB BLD-MCNC: 13.7 G/DL (ref 13.6–17.2)
IMM GRANULOCYTES # BLD AUTO: 0 K/UL (ref 0–0.5)
IMM GRANULOCYTES NFR BLD AUTO: 0 % (ref 0–5)
LYMPHOCYTES # BLD: 1.1 K/UL (ref 0.5–4.6)
LYMPHOCYTES NFR BLD: 19 % (ref 13–44)
MAGNESIUM SERPL-MCNC: 2.3 MG/DL (ref 1.8–2.4)
MCH RBC QN AUTO: 26.9 PG (ref 26.1–32.9)
MCHC RBC AUTO-ENTMCNC: 34.3 G/DL (ref 31.4–35)
MCV RBC AUTO: 78.4 FL (ref 79.6–97.8)
MONOCYTES # BLD: 0.7 K/UL (ref 0.1–1.3)
MONOCYTES NFR BLD: 11 % (ref 4–12)
NEUTS SEG # BLD: 4 K/UL (ref 1.7–8.2)
NEUTS SEG NFR BLD: 69 % (ref 43–78)
NRBC # BLD: 0 K/UL (ref 0–0.2)
PHOSPHATE SERPL-MCNC: 3.6 MG/DL (ref 2.3–3.7)
PLATELET # BLD AUTO: 167 K/UL (ref 150–450)
PMV BLD AUTO: 11 FL (ref 9.4–12.3)
POTASSIUM SERPL-SCNC: 3.8 MMOL/L (ref 3.5–5.1)
PROT SERPL-MCNC: 9.5 G/DL (ref 6.3–8.2)
RBC # BLD AUTO: 5.1 M/UL (ref 4.23–5.6)
SODIUM SERPL-SCNC: 138 MMOL/L (ref 136–145)
WBC # BLD AUTO: 5.8 K/UL (ref 4.3–11.1)

## 2021-04-06 PROCEDURE — 77030041247 HC PROTECTOR HEEL HEELMEDIX MDII -B

## 2021-04-06 PROCEDURE — 74011250637 HC RX REV CODE- 250/637: Performed by: INTERNAL MEDICINE

## 2021-04-06 PROCEDURE — 74011000302 HC RX REV CODE- 302: Performed by: INTERNAL MEDICINE

## 2021-04-06 PROCEDURE — 85025 COMPLETE CBC W/AUTO DIFF WBC: CPT

## 2021-04-06 PROCEDURE — 97112 NEUROMUSCULAR REEDUCATION: CPT

## 2021-04-06 PROCEDURE — 97168 OT RE-EVAL EST PLAN CARE: CPT

## 2021-04-06 PROCEDURE — 80076 HEPATIC FUNCTION PANEL: CPT

## 2021-04-06 PROCEDURE — 74230 X-RAY XM SWLNG FUNCJ C+: CPT

## 2021-04-06 PROCEDURE — 74011000250 HC RX REV CODE- 250: Performed by: INTERNAL MEDICINE

## 2021-04-06 PROCEDURE — 65610000006 HC RM INTENSIVE CARE

## 2021-04-06 PROCEDURE — 84100 ASSAY OF PHOSPHORUS: CPT

## 2021-04-06 PROCEDURE — 2709999900 HC NON-CHARGEABLE SUPPLY

## 2021-04-06 PROCEDURE — 86580 TB INTRADERMAL TEST: CPT | Performed by: INTERNAL MEDICINE

## 2021-04-06 PROCEDURE — 77030040829 HC CATH EXT URINE MDII -B

## 2021-04-06 PROCEDURE — 74011000250 HC RX REV CODE- 250: Performed by: PHYSICIAN ASSISTANT

## 2021-04-06 PROCEDURE — 92611 MOTION FLUOROSCOPY/SWALLOW: CPT

## 2021-04-06 PROCEDURE — 97530 THERAPEUTIC ACTIVITIES: CPT

## 2021-04-06 PROCEDURE — 36415 COLL VENOUS BLD VENIPUNCTURE: CPT

## 2021-04-06 PROCEDURE — 97535 SELF CARE MNGMENT TRAINING: CPT

## 2021-04-06 PROCEDURE — 80048 BASIC METABOLIC PNL TOTAL CA: CPT

## 2021-04-06 PROCEDURE — 83735 ASSAY OF MAGNESIUM: CPT

## 2021-04-06 PROCEDURE — 74011000250 HC RX REV CODE- 250: Performed by: FAMILY MEDICINE

## 2021-04-06 RX ORDER — OXYCODONE HYDROCHLORIDE 5 MG/1
5 TABLET ORAL
Status: DISCONTINUED | OUTPATIENT
Start: 2021-04-06 | End: 2021-04-22

## 2021-04-06 RX ORDER — ATORVASTATIN CALCIUM 40 MG/1
40 TABLET, FILM COATED ORAL
Status: DISCONTINUED | OUTPATIENT
Start: 2021-04-06 | End: 2021-04-07

## 2021-04-06 RX ORDER — ENALAPRILAT 1.25 MG/ML
1.25 INJECTION INTRAVENOUS
Status: DISCONTINUED | OUTPATIENT
Start: 2021-04-06 | End: 2021-04-27 | Stop reason: HOSPADM

## 2021-04-06 RX ORDER — METOPROLOL TARTRATE 25 MG/1
25 TABLET, FILM COATED ORAL EVERY 12 HOURS
Status: DISCONTINUED | OUTPATIENT
Start: 2021-04-06 | End: 2021-04-07

## 2021-04-06 RX ORDER — MORPHINE SULFATE 2 MG/ML
1 INJECTION, SOLUTION INTRAMUSCULAR; INTRAVENOUS
Status: DISCONTINUED | OUTPATIENT
Start: 2021-04-06 | End: 2021-04-22

## 2021-04-06 RX ORDER — METOPROLOL TARTRATE 5 MG/5ML
5 INJECTION INTRAVENOUS
Status: DISCONTINUED | OUTPATIENT
Start: 2021-04-06 | End: 2021-04-27 | Stop reason: HOSPADM

## 2021-04-06 RX ORDER — AMLODIPINE BESYLATE 5 MG/1
5 TABLET ORAL DAILY
Status: DISCONTINUED | OUTPATIENT
Start: 2021-04-06 | End: 2021-04-11

## 2021-04-06 RX ADMIN — METOPROLOL TARTRATE 5 MG: 5 INJECTION INTRAVENOUS at 05:21

## 2021-04-06 RX ADMIN — BARIUM SULFATE 15 ML: 400 SUSPENSION ORAL at 10:28

## 2021-04-06 RX ADMIN — BARIUM SULFATE 30 ML: 400 SUSPENSION ORAL at 10:28

## 2021-04-06 RX ADMIN — Medication 10 ML: at 05:21

## 2021-04-06 RX ADMIN — ATORVASTATIN CALCIUM 40 MG: 40 TABLET, FILM COATED ORAL at 23:03

## 2021-04-06 RX ADMIN — METOPROLOL TARTRATE 25 MG: 25 TABLET, FILM COATED ORAL at 14:36

## 2021-04-06 RX ADMIN — BARIUM SULFATE 30 ML: 980 POWDER, FOR SUSPENSION ORAL at 10:27

## 2021-04-06 RX ADMIN — Medication 10 ML: at 23:04

## 2021-04-06 RX ADMIN — METOPROLOL TARTRATE 5 MG: 5 INJECTION INTRAVENOUS at 18:39

## 2021-04-06 RX ADMIN — OXYCODONE 5 MG: 5 TABLET ORAL at 20:14

## 2021-04-06 RX ADMIN — Medication 20 ML: at 13:08

## 2021-04-06 RX ADMIN — METOPROLOL TARTRATE 25 MG: 25 TABLET, FILM COATED ORAL at 20:14

## 2021-04-06 RX ADMIN — METOPROLOL TARTRATE 5 MG: 5 INJECTION INTRAVENOUS at 12:28

## 2021-04-06 RX ADMIN — BARIUM SULFATE 15 ML: 400 PASTE ORAL at 10:28

## 2021-04-06 RX ADMIN — ENALAPRILAT 1.25 MG: 1.25 INJECTION INTRAVENOUS at 17:47

## 2021-04-06 RX ADMIN — TUBERCULIN PURIFIED PROTEIN DERIVATIVE 5 UNITS: 5 INJECTION, SOLUTION INTRADERMAL at 13:00

## 2021-04-06 RX ADMIN — AMLODIPINE BESYLATE 5 MG: 5 TABLET ORAL at 16:00

## 2021-04-06 NOTE — PROGRESS NOTES
Patient restless and has intermittently high blood pressure readings 834B/652R systolic with restlessness. Neuro assessments the same. MD notified. Will continue to monitor.

## 2021-04-06 NOTE — PROGRESS NOTES
ACUTE PHYSICAL THERAPY GOALS:  (Developed with and agreed upon by patient and/or caregiver.)  UPDATED 21  LTG:  (1.)Mr. Abraham Turner will move from supine to sit and sit to supine , scoot up and down and roll side to side in bed with MINIMAL ASSIST within 7 treatment day(s). (2.)Mr. Abraham Turner will tolerate sitting up in recliner chair for 2 hours with stable vital signs to increase upright tolerance within 7 treatment day(s). (3.)Mr. Abraham Turner will maintain static/dynamic sitting x 15 minutes with MINIMAL ASSIST for improved balance within 7 treatment days. (4.)Mr. Abraham Turner will be able to hold head in cervical rotation left of midline for 2 minutes within 7 treatment days. (5.)Mr. Abraham Turner will participate in therapeutic activity/exercises x 25 minutes for increased strength within 7 treatment days. PHYSICAL THERAPY: Daily Note and AM Treatment Day # 2    Renzo Marin is a 67 y.o. male   PRIMARY DIAGNOSIS: Hemorrhagic cerebrovascular accident (CVA) ([facility] [address]  Hemorrhagic cerebrovascular accident (CVA) ([facility] [address] [I61.9]         ASSESSMENT:     REHAB RECOMMENDATIONS: CURRENT LEVEL OF FUNCTION:  (Most Recently Demonstrated)   Recommendation to date pending progress:  [address] [place] [address] vs. Short-term Rehab  Equipment:    To Be Determined Bed Mobility:   Maximal Assistance x 2  Sit to Stand:   Moderate Assistance x 2  Transfers:   Moderate Assistance x 2  Gait/Mobility:   Unable to perform     ASSESSMENT:  Mr. Abraham Turner continues to present with weakness on L LE. He performed bed mobility with max-totalA x 2 today and poor sitting balance. Pt with heavy posterior pushing today and slightly less conversant. Pt given modA x 2 for STS transfers with poor standing balance and decreased activity tolerance. OT present to assist with ADLs while PT addressed mobility/transfers. Slight progress in mobility today. SUBJECTIVE:   Mr. Abraham Turner [place], [place]. \"    SOCIAL HISTORY/ LIVING ENVIRONMENT: See eval  Home Environment: Private residence  # Steps to Enter: 5  One/Two Story Residence: One story  Living Alone: No  Support Systems: Family member(s)  OBJECTIVE:     PAIN: VITAL SIGNS: LINES/DRAINS:   Pre Treatment: Pain Screen  Pain Scale 1: Numeric (0 - 10)  Pain Intensity 1: 0  Post Treatment: 0   IV and external male catheter  O2 Device: Room air     MOBILITY: I Mod I S SBA CGA Min Mod Max Total  NT x2 Comments:   Bed Mobility    Rolling [] [] [] [] [] [] [] [x] [x] [] [x]    Supine to Sit [] [] [] [] [] [] [] [x] [x] [] [x]    Scooting [] [] [] [] [] [] [] [] [x] [] [x]    Sit to Supine [] [] [] [] [] [] [] [x] [x] [] [x]    Transfers    Sit to Stand [] [] [] [] [] [] [x] [] [] [] [x]    Bed to Chair [] [] [] [] [] [] [] [] [] [x] []    Stand to Sit [] [] [] [] [] [] [] [x] [] [] [x]    I=Independent, Mod I=Modified Independent, S=Supervision, SBA=Standby Assistance, CGA=Contact Guard Assistance,   Min=Minimal Assistance, Mod=Moderate Assistance, Max=Maximal Assistance, Total=Total Assistance, NT=Not Tested    BALANCE: Good Fair+ Fair Fair- Poor NT Comments   Sitting Static [] [] [] [] [x] []    Sitting Dynamic [] [] [] [] [x] []              Standing Static [] [] [] [] [x] []    Standing Dynamic [] [] [] [] [x] []      GAIT: I Mod I S SBA CGA Min Mod Max Total  NT x2 Comments:   Level of Assistance [] [] [] [] [] [] [] [] [] [x] []    Distance NT    DME N/A    Gait Quality NT    Weightbearing  Status N/A     I=Independent, Mod I=Modified Independent, S=Supervision, SBA=Standby Assistance, CGA=Contact Guard Assistance,   Min=Minimal Assistance, Mod=Moderate Assistance, Max=Maximal Assistance, Total=Total Assistance, NT=Not Tested    PLAN:   FREQUENCY/DURATION: PT Plan of Care: 3 times/week for duration of hospital stay or until stated goals are met, whichever comes first.  TREATMENT:     TREATMENT:   ($$ Therapeutic Activity: 23-37 mins    )  Co-Treatment PT/OT necessary due to patient's decreased overall endurance/tolerance levels, as well as need for high level skilled assistance to complete functional transfers/mobility and functional tasks  Therapeutic Activity (23 Minutes): Therapeutic activity included Rolling, Supine to Sit, Sit to Supine, Scooting, Transfer Training, Sitting balance  and Standing balance to improve functional Mobility, Strength, ROM and Activity tolerance.     TREATMENT GRID:  N/A    AFTER TREATMENT POSITION/PRECAUTIONS:  Bed, Needs within reach and RN notified    INTERDISCIPLINARY COLLABORATION:  RN/PCT, PT/PTA and OT/LYNCH    TOTAL TREATMENT DURATION:  PT Patient Time In/Time Out  Time In: 6046  Time Out: [address] [facility], PT, DPT

## 2021-04-06 NOTE — PROGRESS NOTES
04/06/21 1900   NIH Stroke Scale   LOC 0   LOC Questions 0   LOC Commands 0   Best Gaze 0   Visual 1   Facial Palsy 1   Motor Right Arm 0   Motor Left Arm 4   Motor Right Leg 0   Motor Left Leg 4   Limb Ataxia 0   Sensory 2   Best Language 0   Dysarthria 0   Extinction and Inattention 1   Total 13

## 2021-04-06 NOTE — PROGRESS NOTES
ACUTE OT GOALS:  (Developed with and agreed upon by patient and/or caregiver.)  1. Complete functional transfers (I.e. toilet/bedside commode) with c min A. (UPDATED 2021)   2. Complete gentle active assisted L UE ROM focusing more on finger/wrist extensors and shoulder external rotators. CONTINUE TO ADDRESS (2021)  3. Utilize L UE as a support for supine to sitting and sit to standing at least 20% of the time. CONTINUE TO ADDRESS (2021)  4. Complete self-feeding (once cleared by SLP/MD/RN) with standby assistance. CONTINUE TO ADDRESS (2021)  5. Complete grooming/oral care with standby assistance. CONTINUE TO ADDRESS (2021)    NEW GOALS (Added 2021)  1. Patient will complete functional activity while seated EOB with min A and adaptive equipment as needed.   2. Patient will attend to L side of environment for 75% of session with min verbal cues from therapist.        OCCUPATIONAL THERAPY ASSESSMENT: Daily Note and Re-evaluation OT Treatment Day #  is a 67 y.o. 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 Short-term Rehab  Equipment:    To Be Determined     PRIOR LEVEL OF FUNCTION:  (Prior to Hospitalization)  INITIAL/CURRENT LEVEL OF FUNCTION:  (Based on today's evaluation)   Bathing:   Independent  Dressing:   Independent  Feeding/Grooming:   Independent  Toileting:   Independent  Functional Mobility:   Independent Bathing:   Maximal Assistance  Dressing:   Total Assistance  Feeding/Grooming:   Moderate Assistance  Toileting:   Total Assistance  Functional Mobility:   Maximal Assistance x 2     ASSESSMENT:  Mr. Jessi Marlow is a L handed man s/p hemorrhagic CVA and falls affecting the L hemalatha-body and his independence with ADL. Today, pt continues to present with deficits in overall strength, activity tolerance, ADL performance, and functional mobility. Required max A x 2 for supine to sit transfer to EOB. Poor sitting balance with constant support required to maintain upright position with midline orientation. Able to wash face with RUE however verbal cues to attend to L side of face. Total A for bowel hygiene. Max A x 2 to stand with max verbal and tactile cues for weight distribution/weight shifts. Goals updated to reflect pt's current status. Will continue OT efforts as indicated. SUBJECTIVE:   Mr. Jessi Marlow states, \"I walk 5 miles everyday around my neighborhood. \"    SOCIAL HISTORY/LIVING ENVIRONMENT: Lives with brother. Does not drive. Takes the bus to pay bills, etc. Totally independent per patient.    Home Environment: Private residence  # Steps to Enter: 5  One/Two Story Residence: One story  Living Alone: No  Support Systems: Family member(s)    OBJECTIVE:     PAIN: VITAL SIGNS: LINES/DRAINS:   Pre Treatment: Pain Screen  Pain Scale 1: Numeric (0 - 10)  Pain Intensity 1: 0  Post Treatment:    IV and external male catheter  O2 Device: Room air     GROSS EVALUATION:   Within Functional Limits Abnormal/ Functional Abnormal/ Non-Functional (see comments) Not Tested Comments:   AROM [] [x] [x] []  L UE hemiparesis   PROM [] [x] [] []    Strength [] [x] [] [] L UE grossly 2+ /5   Balance [] [] [x] [] L sided lean   Posture [] [] [x] []    Sensation [] [x] [x] []    Coordination [] [] [x] []    Tone [] [x] [] []    Edema [] [] [] []    Activity Tolerance [] [x] [] []     [] [] [] []      COGNITION/  PERCEPTION: Intact Impaired   (see comments) Comments:   Orientation [x] []    Vision [] [] Able to track L and R. But L body neglect   Hearing [] []    Judgment/ Insight [] []    Attention [] []    Memory [x] []    Command Following [x] []    Emotional Regulation [x] []     [] []      ACTIVITIES OF DAILY LIVING: I Mod I S SBA CGA Min Mod Max Total NT Comments   BASIC ADLs:              Bathing/ Showering [] [] [] [] [] [] [] [] [] [x]    Toileting [] [] [] [] [] [] [] [] [x] []    Dressing [] [] [] [] [] [] [] [] [] [x]    Feeding [] [] [] [] [] [] [] [] [] [x]    Grooming [] [] [x] [] [] [] [] [] [] [] Able to wash face with RUE   Personal Device Care [] [] [] [] [] [] [] [] [] [x]    Functional Mobility [] [] [] [] [] [] [] [x] [] [] X 2   I=Independent, Mod I=Modified Independent, S=Supervision, SBA=Standby Assistance, CGA=Contact Guard Assistance,   Min=Minimal Assistance, Mod=Moderate Assistance, Max=Maximal Assistance, Total=Total Assistance, NT=Not Tested    MOBILITY: I Mod I S SBA CGA Min Mod Max Total  NT x2 Comments:   Supine to sit [] [] [] [] [] [] [] [x] [] [] []    Sit to supine [] [] [] [] [] [] [] [] [x] [] [x]    Sit to stand [] [] [] [] [] [] [] [x] [] [] [x]    Bed to chair [] [] [] [] [] [] [] [] [] [] []    I=Independent, Mod I=Modified Independent, S=Supervision, SBA=Standby Assistance, CGA=Contact Guard Assistance,   Min=Minimal Assistance, Mod=Moderate Assistance, Max=Maximal Assistance, Total=Total Assistance, NT=Not [facility] 6 Clicks   Daily Activity Inpatient Short Form        How much help from another person does the patient currently need. .. Total A Lot A Little None   1. Putting on and taking off regular lower body clothing? [] 1   [x] 2   [] 3   [] 4   2. Bathing (including washing, rinsing, drying)? [] 1   [x] 2   [] 3   [] 4   3. Toileting, which includes using toilet, bedpan or urinal?   [x] 1   [] 2   [] 3   [] 4   4. Putting on and taking off regular upper body clothing?    [] 1   [x] 2   [] 3   [] 4   5. Taking care of personal grooming such as brushing teeth? [] 1   [x] 2   [] 3   [] 4   6. Eating meals? [] 1   [x] 2   [] 3   [] 4   © 2007, Trustees of [address] [place] [address] [address] [address], under license to [employer]. All rights reserved     Score:  Initial: 9 Most Recent: 11 (Date: 4/6/2021)   Interpretation of Tool:  Represents activities that are increasingly more difficult (i.e. Bed mobility, Transfers, Gait). PLAN:   FREQUENCY/DURATION: OT Plan of Care: 3 times/week for duration of hospital stay or until stated goals are met, whichever comes first.    PROBLEM LIST:   (Skilled intervention is medically necessary to address:)  1. Decreased ADL/Functional Activities  2. Decreased Activity Tolerance  3. Decreased AROM/PROM  4. Decreased Balance  5. Decreased Coordination  6. Decreased Strength  7. Decreased Transfer Abilities  8. Increased Pain   INTERVENTIONS PLANNED:   (Benefits and precautions of occupational therapy have been discussed with the patient.)  1. Self Care Training  2. Therapeutic Activity  3. Therapeutic Exercise/HEP  4. Neuromuscular Re-education  5. Education     TREATMENT:     EVALUATION: Re-evaluation    TREATMENT:   ($$ Self Care/Home Management: 8-22 mins$$ Neuromuscular Re-Education: 8-22 mins   )  Self Care (13 Minutes): Self care including Toileting and Grooming to decrease level of assistance required. Neuromuscular Re-education (10 Minutes): Neuromuscular Re-education included Balance Training, Coordination training, Hemibody awareness training, Postural training, Sitting balance training, Standing balance training and Visual field awareness training to improve Balance, Coordination and Postural Control.     TREATMENT GRID:  N/A    AFTER TREATMENT POSITION/PRECAUTIONS:  Bed, Needs within reach and RN notified    INTERDISCIPLINARY COLLABORATION:  RN/PCT, PT/PTA and OT/LYNCH    TOTAL TREATMENT DURATION:  OT Patient Time In/Time Out  Time In: 3927  Time Out: [address] [address]

## 2021-04-06 NOTE — PROGRESS NOTES
CM still following patient's chart. PT recommendations still pending. STR vs. IRC. RN notified to go ahead and place PPD just incase STR needed. CM will continue to follow.

## 2021-04-06 NOTE — PROGRESS NOTES
[facility] CARDIOLOGY PROGRESS NOTE           4/6/2021 8:12 AM    Admit Date: 4/2/2021      Subjective:   Patient lethargic but arousable. Still with left sided weakness. Denies chest pain. Failed swallow study. Has NG tube. ROS:  Cardiovascular:  As noted above    Objective:      Vitals:    04/06/21 1245 04/06/21 1301 04/06/21 1331 04/06/21 1401   BP: (!) 167/87 (!) 168/97 (!) 170/97 (!) 179/90   Pulse: 67 74 76 72   Resp:  14     Temp:  98 °F (36.7 °C)     SpO2: 99% 98% 99% 100%   Weight:       Height:           Physical Exam:  General-No Acute Distress  Neck- supple, no JVD  CV- regular rate and rhythm no MRG  Lung- clear bilaterally  Abd- soft, nontender, nondistended  Ext- no edema bilaterally. Neuro:  Left sided weakness. Skin- warm and dry      Data Review:   Recent Labs     04/06/21  0353 04/05/21  0316    137   K 3.8 3.7   BUN 21 22   CREA 0.92 1.12   * 129*   WBC 5.8 7.0   HGB 13.7 12.7*   HCT 40.0* 37.0*    166      No results found for: Corky Montague TROPT, TNIPOC     Echo (4/2/21):     -  Left ventricle: Systolic function was normal. Ejection fraction was estimated in the range of 55 % to 60 %.    -  Atrial septum: Agitated saline contrast injection (bubble study) was performed. There was no right-to-left shunt, at rest or induced by the Valsalva maneuver. Assessment/Plan:     Principal Problem:    Hemorrhagic cerebrovascular accident (CVA) ([address] (4/2/2021)    Per neurosurgery. Holding ASA    Active Problems:    Coronary atherosclerosis of native coronary vessel ()    Stent in 2017. Holding ASA with bleed. Await neurosurgery recs in regards to risk and timing of resuming. Hyperlipidemia ()    Resume Lipitor. Acute left-sided weakness (4/2/2021)    Will follow. Elevated troponin (4/4/2021)    Likely supply/demand imbalance. Echo with normal LV function. No plans for ischemia evaluation. Start B-blocker and amlodipine.   No Aspirin as above.                   Mikala Snow MD  4/6/2021 8:12 AM

## 2021-04-06 NOTE — PROGRESS NOTES
[facility]ist Progress Note    Patient: Maricruz Raya MRN: [id number]  SSN: [id number]    YOB: 1945  Age: 67 y.o. 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 Neurosurgery evaluated. No surgery indicated. He failed a swallow study and had silent aspiration on MBSS on 4/6.    4/6 - He is complaining of left arm pain. Cannot move left arm or leg. Denies    Review of systems negative except stated above. Assessment/Plan (MDM):      Active Medical Complaints and Diagnoses:    Principal Problem:    Hemorrhagic cerebrovascular accident (CVA) ([facility] (4/2/2021)  - CT stable  - Continue BP support - goal SBP <140  - Place NGT  - Restart Lopressor 25mg BID  - IV Lopressor PRN    Active Problems:    Acute left-sided weakness (4/2/2021)  - Due to #1  - PT/OT      Dysphagia following cerebral infarction (4/6/2021)  - Failed swallow study  - NGT in place --> start TFs  - Continue to assess  - May need PEG prior to discharge      Elevated troponin (4/4/2021)  - Supply/demand mismatch  - Cardiology following      Elevated liver enzymes (4/6/2021)  - Likely untreated Hep C  - Improving  - Continue to monitor      Iron deficiency anemia (1/25/2015)  - Stable  - Continue to monitor      Coronary atherosclerosis of native coronary vessel ()  - Will need to resume ASA when ok with neurosurgery  - Never was taking Effient  - Cardiology following      S/P PTCA (percutaneous transluminal coronary angioplasty) ()  - Will need to resume ASA when ok with neurosurgery  - Never was taking Effient  - Cardiology following      Otherwise all chronic medical issues appear stable with no changes. See assessment at bottom of progress note for complete acute, chronic, and resolved hospital medical issues. Diet: DIET NPO Except Meds  VTE ppx: SCDs    Objective:     Visit Vitals  BP (!) 168/97 (BP 1 Location: Left upper arm, BP Patient Position: Supine)   Pulse 74   Temp 98 °F (36.7 °C)   Resp 14   Ht 5' 8\" (1.727 m)   Wt 60.8 kg (134 lb)   SpO2 98%   BMI 20.37 kg/m²      Oxygen Therapy  O2 Sat (%): 98 % (04/06/21 1301)  Pulse via Oximetry: 76 beats per minute (04/06/21 1301)  O2 Device: Room air (04/06/21 1100)      Intake and Output:     Intake/Output Summary (Last 24 hours) at 4/6/2021 1352  Last data filed at 4/6/2021 0647  Gross per 24 hour   Intake 550.42 ml   Output 1700 ml   Net -1149.58 ml         Physical Exam:   GENERAL: alert, cooperative, no distress, appears stated age  EYE: conjunctivae/corneas clear. PERRL. THROAT & NECK: normal and no erythema or exudates noted. LUNG: clear to auscultation bilaterally  HEART: regular rate and rhythm, S1S2, no murmur, no JVD  ABDOMEN: soft, non-tender, non-distended. Bowel sounds normal.   EXTREMITIES:  No edema, 2+ pedal/radial pulses bilaterally  SKIN: no rash or abnormalities  NEUROLOGIC: A&Ox3. Cranial nerves 2-12 grossly intact. Left side 0/5 strength    Lab/Data Review:  Recent Results (from the past 24 hour(s))   HEPATIC FUNCTION PANEL    Collection Time: 04/06/21  3:53 AM   Result Value Ref Range    Protein, total 9.5 (H) 6.3 - 8.2 g/dL    Albumin 3.2 3.2 - 4.6 g/dL    Globulin 6.3 (H) 2.3 - 3.5 g/dL    A-G Ratio 0.5 (L) 1.2 - 3.5      Bilirubin, total 1.3 (H) 0.2 - 1.1 MG/DL    Bilirubin, direct 0.4 (H) <0.4 MG/DL    Alk.  phosphatase 93 50 - 136 U/L    AST (SGOT) 113 (H) 15 - 37 U/L    ALT (SGPT) 111 (H) 12 - 65 U/L   CBC WITH AUTOMATED DIFF    Collection Time: 04/06/21  3:53 AM   Result Value Ref Range WBC 5.8 4.3 - 11.1 K/uL    RBC 5.10 4.23 - 5.6 M/uL    HGB 13.7 13.6 - 17.2 g/dL    HCT 40.0 (L) 41.1 - 50.3 %    MCV 78.4 (L) 79.6 - 97.8 FL    MCH 26.9 26.1 - 32.9 PG    MCHC 34.3 31.4 - 35.0 g/dL    RDW 13.4 11.9 - 14.6 %    PLATELET 030 228 - 701 K/uL    MPV 11.0 9.4 - 12.3 FL    ABSOLUTE NRBC 0.00 0.0 - 0.2 K/uL    DF AUTOMATED      NEUTROPHILS 69 43 - 78 %    LYMPHOCYTES 19 13 - 44 %    MONOCYTES 11 4.0 - 12.0 %    EOSINOPHILS 0 (L) 0.5 - 7.8 %    BASOPHILS 0 0.0 - 2.0 %    IMMATURE GRANULOCYTES 0 0.0 - 5.0 %    ABS. NEUTROPHILS 4.0 1.7 - 8.2 K/UL    ABS. LYMPHOCYTES 1.1 0.5 - 4.6 K/UL    ABS. MONOCYTES 0.7 0.1 - 1.3 K/UL    ABS. EOSINOPHILS 0.0 0.0 - 0.8 K/UL    ABS. BASOPHILS 0.0 0.0 - 0.2 K/UL    ABS. IMM. GRANS. 0.0 0.0 - 0.5 K/UL   METABOLIC PANEL, BASIC    Collection Time: 04/06/21  3:53 AM   Result Value Ref Range    Sodium 138 136 - 145 mmol/L    Potassium 3.8 3.5 - 5.1 mmol/L    Chloride 108 (H) 98 - 107 mmol/L    CO2 24 21 - 32 mmol/L    Anion gap 6 (L) 7 - 16 mmol/L    Glucose 105 (H) 65 - 100 mg/dL    BUN 21 8 - 23 MG/DL    Creatinine 0.92 0.8 - 1.5 MG/DL    GFR est AA >60 >60 ml/min/1.73m2    GFR est non-AA >60 >60 ml/min/1.73m2    Calcium 9.2 8.3 - 10.4 MG/DL       SARS-CoV-2 Lab Results  \"Novel Coronavirus\" Test: No results found for: COV2NT   \"Emergent Disease\" Test: No results found for: EDPR  \"SARS-COV-2\" Test: No results found for: XGCOVT  \"Precision Labs\" Test: No results found for: RSLT  Rapid Test: No results found for: COVR        Imaging:  Xr Swallow Func Video    Result Date: 4/6/2021  Modified Barium Swallow, 4/6/2021 History: Hemorrhagic CVA. Technique: Real time fluoroscopic imaging was provided for the speech pathologist. Multiple consistencies of barium were given. No spot images were obtained as part of today's study. The total fluoroscopy time is 2.3 minutes. Findings: The patient's ability to swallow was evaluated with thin, and pudding consistencies.  The patient had significant difficulty initiating a swallow. There is significant residuals throughout the hypopharynx even with thin consistencies. Multiple episodes of penetration were seen with thin and pudding consistencies. In addition, there was an episode of aspiration at the conclusion of today's study with thin consistency through a straw. 1. Imaging findings as described above. Please see speech pathologist report for further details. Ct Head Wo Cont    Result Date: 4/4/2021  CT HEAD WITHOUT CONTRAST. INDICATION: Intracranial hemorrhage with worsening vision. COMPARISON: Yesterday's exam  TECHNIQUE:   5 mm axial scans from the skull base to the vertex. Our CT scanners use one or more of the following:  Automated exposure control, adjustment of the mA and or kV according to patient size, iterative reconstruction. FINDINGS:  Acute hemorrhage right thalamus and basal ganglia without significant change. Decreased midline shift, 6 mm. There is blood in the posterior horn of the lateral ventricles, right more than left. There is blood in the third ventricle. No new hemorrhage. Posterior fossa structures unremarkable. Stable acute intracranial hemorrhage with decreased midline shift, 6mm. Ct Head Wo Cont    Result Date: 4/3/2021  NONCONTRAST HEAD CT CLINICAL HISTORY:  Follow-up intracranial hemorrhage. TECHNIQUE:  Axial images were obtained with spiral technique. Radiation dose reduction was achieved using one or all of the following techniques: automated exposure control, weight-based dosing, iterative reconstruction. COMPARISON:  CT yesterday and 0617 hours today. REPORT:   Standard noncontrast head CT demonstrates continued increase in size of the right thalamic hemorrhage with maximum diameter now measured at approximately 3.6 cm compared with 2.9 cm earlier today. Moreover, there is new bilateral lateral intraventricular extension of hemorrhage, primarily localized to the atria.   There is associated slight increase in bilateral lateral ventriculomegaly with biatrial diameter now measuring approximately 4.2 cm compared with 3.8 cm this morning. Mass effect is little changed with midline shift from right to left of approximately 5 mm compared with 4 mm this morning. Extensive small vessel ischemic disease and small focal lateral right frontal infarct appear unchanged. INTERVAL INCREASE IN SIZE OF THE RIGHT THALAMIC HEMORRHAGE WITH NEW BILATERAL LATERAL INTRAVENTRICULAR EXTENSION AND SLIGHT WORSENING OF VENTRICULOMEGALY. Ct Head Wo Cont    Result Date: 4/3/2021  EXAM: CT HEAD WO CONT HISTORY: .  TECHNIQUE: Axial images of the brain were performed without the administration of intravenous contrast. Images were obtained axial plane and coronal reformatted images were submitted. CT scan performed using appropriate/available dose optimization/reduction/ALARA techniques. COMPARISON: None. FINDINGS: The known right thalamic hemorrhage is again observed. There is surrounding edema. The body of this hemorrhage has not changed significantly. Currently it measures 2.4 x 2.9 cm when previously it measured 2.4 x 2.7 cm. There has been interval increase in an area of adjacent or extension of the hemorrhage posteriorly. Previously it measured 4.5 mm in width. Currently it measures 1.5 cm in width and 1.3 cm AP. There is persistent intraventricular hemorrhage in the posterior horn of the right lateral ventricle. Interval development of a small amount of hemorrhage in the left posterior horn. There is mild midline shift of the posterior septum pellucidum of approximately 4.1 mm. The lateral ventricles are mildly dilated, stable. Chronic ischemic white matter changes are again observed. Question small chronic infarction in the right frontal lobe. Interval increase in the size of the right thalamic hemorrhage. Interval development of mild right to left midline shift of the posterior aspect of the septum pellucidum. Stable hemorrhage in the posterior horn of the right lateral ventricle. Interval development of a small amount of hemorrhage in the posterior horn of the left lateral ventricle. Other findings as above. [id number]    Result Date: 4/3/2021  RIGHT UPPER QUADRANT ULTRASOUND. HISTORY: Transaminitis. COMPARISON: No relevant comparison studies available. FINDINGS: Ultrasonographic Ramon's sign: is reported as negative Gallstones: There are intraluminal echogenic foci, probably stones. Gallbladder Wall: not thickened. Common Bile Duct: is not dilated, 4 mm. Intrahepatic Biliary Tree: is not dilated. Liver: Uniform parenchyma Included portion of the pancreas and right kidney: are unremarkable. Vasculature: Aorta: Normal caliber. IVC:  Patent. Portal vein: Hepatopedal flow. Probable gallstones. No biliary tree obstruction. No findings to suggest acute cholecystitis. Xr Chest Port    Result Date: 4/2/2021  CHEST X-RAY, one view. HISTORY:  Chest pain. TECHNIQUE:  AP upright portable view. COMPARISON: August 2017 FINDINGS: Lungs: Atelectasis or scar right base, similar to prior exam. Costophrenic angles: are sharp. Heart size: is normal. Pulmonary vasculature: is unremarkable. Aorta: Arch calcifications. Included portion of the upper abdomen: is unremarkable. Bones: No gross bony lesions. Other: None. Negative for acute change. Ct Code Neuro Head Wo Contrast    Result Date: 4/2/2021  CT HEAD WITHOUT CONTRAST HISTORY:  CVA. COMPARISON: None. TECHNIQUE: Axial imaging was performed without intravenous contrast utilizing 5mm slice thickness. Sagittal and coronal reformats were performed. Radiation dose reduction techniques were used for this study. Our CT scanner uses one or all of the following: Automated exposure control, adjustment of the MAS or KUB according to patient's size and iterative reconstruction.  FINDINGS:    *BRAIN:    -  There are no early signs of territorial or lacunar infarction by CT.    - 2.7 x 2.4 cm acute hemorrhage into the right basal ganglia and thalamus. Right lateral ventricle intraventricular hemorrhage.    -  No gross white matter abnormality by CT. *VISUALIZED PARANASAL SINUSES: Well aerated. *MASTOIDS:  Clear. *CALVARIUM AND SCALP: Unremarkable. Acute right basal ganglia hematoma likely on the basis of hypertension. Right intraventricular hemorrhage. Date of Dictation: 2021 6:17 PM     Results for orders placed or performed during the hospital encounter of 21   2D ECHO COMPLETE ADULT (TTE) W OR [address]  [address], [address] [facility]  [phone]    Transthoracic Echocardiogram  2D, M-mode, Doppler, and Color Doppler    Patient: Lupe Shelley  MR #: [id number]  : [id number]  Age: 67 years  Gender: Male  Study date: 2021  Account #: [de-identified]  Height: 68 in  Weight: 133.8 lb  BSA: 1.72 mï¾²  Status:Routine  Location: 334  BP: 137/ 77    Allergies: NO KNOWN ALLERGENS    Sonographer:  Phan Gottlieb, RCS  Group:  [facility]  Referring Physician:  Yasmin Lopez DO  Reading Physician:  Dr. Ericka Longo    INDICATIONS: TIA with transient neurological disturbance    PROCEDURE: This was a routine study. A transthoracic echocardiogram was  performed. The study included complete 2D imaging, M-mode, complete spectral  Doppler, and color Doppler. Intravenous contrast (Definity) was administered. Intravenous contrast (agitated saline) was administered. Image quality was  adequate. LEFT VENTRICLE: Size was normal. Systolic function was normal. Ejection  fraction was estimated in the range of 55 % to 60 %. There were no regional  wall motion abnormalities. Wall thickness was mildly increased. Left  ventricular diastolic function parameters were normal. Avg E/e': 4.54.     RIGHT VENTRICLE: The size was normal. Systolic function was normal. The  tricuspid jet envelope definition was inadequate for estimation of RV   systolic  pressure. LEFT ATRIUM: Size was normal.    ATRIAL SEPTUM: Agitated saline contrast injection (bubble study) was   performed. There was no right-to-left shunt, at rest or induced by the Valsalva   maneuver. RIGHT ATRIUM: Size was normal.    SYSTEMIC VEINS: IVC: The inferior vena cava was normal in size. The  respirophasic change in diameter was more than 50%. AORTIC VALVE: There is mild aortic annular calcification. The valve was  trileaflet. Leaflets exhibited mild sclerosis. MITRAL VALVE: Valve structure was normal.    TRICUSPID VALVE: The valve structure was normal. There was trivial  regurgitation. PULMONIC VALVE: Not well visualized. PERICARDIUM: There was no pericardial effusion. AORTA: The root exhibited normal size. SUMMARY:    -  Left ventricle: Systolic function was normal. Ejection fraction was  estimated in the range of 55 % to 60 %. -  Atrial septum: Agitated saline contrast injection (bubble study) was  performed. There was no right-to-left shunt, at rest or induced by the   Valsalva  maneuver. SYSTEM MEASUREMENT TABLES    2D  Ao Diam: 2.8 cm  LAEDV Index (A-L): 26.9 ml/m2  IVSd: 1.1 cm  LVIDd: 5.2 cm  LVPWd: 0.8 cm    PW  E/E' Av.5    Prepared and signed by    Dr. Nini Oh  Signed 2021 22:44:08         Cultures:   All Micro Results     None          Assessment:     Primary Diagnosis: Hemorrhagic cerebrovascular accident (CVA) [facility])    Hospital Problems as of 2021 Date Reviewed: 2021          Codes Class Noted - Resolved POA    * (Principal) Hemorrhagic cerebrovascular accident (CVA) ([facility] ICD-10-CM: I61.9  ICD-9-CM: 212  2021 - Present Yes        Dysphagia following cerebral infarction ICD-10-CM: I69.391  ICD-9-CM: 438.82  2021 - Present Yes        Acute left-sided weakness ICD-10-CM: R53.1  ICD-9-CM: 728.87  2021 - Present Yes        Elevated liver enzymes ICD-10-CM: R74.8  ICD-9-CM: 790.5 4/6/2021 - Present Yes        Elevated troponin ICD-10-CM: R77.8  ICD-9-CM: 790.6  4/4/2021 - Present Yes        Coronary atherosclerosis of native coronary vessel ICD-10-CM: I25.10  ICD-9-CM: 414.01  Unknown - Present Yes        Iron deficiency anemia ICD-10-CM: D50.9  ICD-9-CM: 280.9  1/25/2015 - Present Yes        S/P PTCA (percutaneous transluminal coronary angioplasty) ICD-10-CM: Z98.61  ICD-9-CM: V45.82  Unknown - Present Yes        Chronic tuberculosis ICD-10-CM: A15.9  ICD-9-CM: 011.90  Unknown - Present Yes        Hyperlipidemia ICD-10-CM: E78.5  ICD-9-CM: 272.4  Unknown - Present Yes        Microcytic anemia ICD-10-CM: D50.9  ICD-9-CM: 280.9  1/21/2015 - Present     Overview Signed 4/6/2016  9:52 AM by Ryne Beck     Iron deficiency                     Discharge Plan:     IRC vs STR    Signed By: Zenaida Maurice DO     April 6, 2021

## 2021-04-06 NOTE — PROGRESS NOTES
04/06/21 0700   NIH Stroke Scale   LOC 0   LOC Questions 0   LOC Commands 0   Best Gaze 0   Visual 1   Facial Palsy 1   Motor Right Arm 0   Motor Left Arm 4   Motor Right Leg 0   Motor Left Leg 4   Limb Ataxia 0   Sensory 2   Best Language 0   Dysarthria 0   Extinction and Inattention 1   Total 13

## 2021-04-06 NOTE — PROGRESS NOTES
SPEECH PATHOLOGY NOTE:    Modified barium swallow study completed. Severe oropharyngeal dysphagia. Impaired bolus formation and base of tongue retraction with oral holding for 10-22 seconds prior to swallow. Absent epiglottic inversion with very minimal hyolaryngeal elevation and excursion. Non-clearing penetration during the swallow with all liquids. Silent aspiration of honey thick liquids by tsp. Moderate pyriform residue and max vallecular residue despite consistency. Silent aspiration of thin and nectar thick liquids from pyriform residue that spilled below the cords after the swallow. Penetration during the swallow with puree and moderate-max pharyngeal residue. He was unable to follow any commands for double swallow, cough, or throat clear. Recommend strict NPO with Q 4 hour oral care. NG tube for short term nutrition/hydration/medication. Anticipate need for long term artificial nutrition given severity of dysphagia and limited ability to participate in exercises due to impaired command following. Results communicated to patient, RN, and MD. Full report to follow.      Doni [address] CCC-SLP

## 2021-04-06 NOTE — PROGRESS NOTES
Problem: Dysphagia (Adult)  Goal: *Acute Goals and Plan of Care (Insert Text)  Description: STG: Pt will demonstrate zero signs/sx of aspiration with mechanical soft textures with nectar thick liquids with no straws with 90% accuracy during all meals. Discontinued 4/5/21  STG: Pt will complete a full speech, language and cognitive evaluation without assistance with 100% accuracy during session. STG: Pt will complete a Modified barium swallow study with zero signs/sx of aspiration  with 100% accuracy during swallow study MET 4/6/21   LTG : Pt will demonstrate zero signs/sx of aspiration with least restrictive diet with 100% accuracy during all meals. Outcome: Progressing Towards Goal     SPEECH LANGUAGE PATHOLOGY: MODIFIED BARIUM SWALLOW STUDY  Initial Assessment    NAME/AGE/GENDER: Uche White is a 67 y.o. male  DATE: 4/6/2021  PRIMARY DIAGNOSIS: Hemorrhagic cerebrovascular accident ([facility]) ([facility] [I61.9]      ICD-10: Treatment Diagnosis: Oropharyngeal dysphagia (R13.12)  INTERDISCIPLINARY COLLABORATION: Radiologist, Robert Waite  PRECAUTIONS/ALLERGIES: Patient has no known allergies. RECOMMENDATIONS/PLAN   DIET:    Strict NPO    MEDICATIONS: Non-oral     COMPENSATORY STRATEGIES/MODIFICATIONS INCLUDING:  · Q 4 hour oral care with suction     OTHER RECOMMENDATIONS (including follow up treatment recommendations):   · Treatment to improve/facilitate oral/pharyngeal skills   · Training in laryngeal strengthening and coordination exercises  · Patient/family education  · Follow-up MBS as deemed necessary following therapy     RECOMMENDATIONS for CONTINUED SPEECH THERAPY:   YES: Anticipate need for ongoing speech therapy during this hospitalization and at next level of care. FREQUENCY/DURATION: Continue to follow patient 3 times a week for duration of hospital stay to address above goals. ASSESSMENT   Mr. Evelin Toney presents with Severe oropharyngeal dysphagia.  Impaired bolus formation and base of tongue retraction with oral holding for 10-22 seconds prior to swallow. Absent epiglottic inversion with very minimal hyolaryngeal elevation and excursion. Non-clearing penetration during the swallow with thin and nectar thick liquids. Silent aspiration of honey thick liquids by tsp during the swallow. Moderate pyriform residue and max vallecular residue after the swallow despite consistency. Silent aspiration of thin and nectar thick liquids from pyriform residue that spilled below the cords after the swallow. Penetration during the swallow with puree and moderate-max pharyngeal residue. He was unable to follow any commands for double swallow, cough, or throat clear.      Recommend strict NPO with Q 4 hour oral care. NG tube for short term nutrition/hydration/medication. Anticipate need for long term artificial nutrition given severity of dysphagia and limited ability to participate in exercises due to impaired command following    SUBJECTIVE   History of Present Injury/Illness: Mr. 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), Chronic fatigue and malaise (1/21/2015), Chronic obstructive pulmonary disease ([facility] Coronary atherosclerosis of native coronary vessel, Dyspnea, GERD (gastroesophageal reflux disease), GI bleed (1/21/2015), Hepatitis C (>10 yrs ago), Hyperlipidemia, Hypertension, Iron deficiency anemia (1/25/2015), Lung nodule, Lung nodule, multiple (10/4/2013), Near syncope (1/21/2015), NSTEMI (non-ST elevated myocardial infarction) ([facility] (12/31/2014), Pulmonary tuberculosis, S/P PTCA (percutaneous transluminal coronary angioplasty), Shortness of breath (1/21/2015), and TB peritonitis (5/2/2014). He also has no past medical history of COPD. Ron Hua  He also  has a past surgical history that includes pr chest surgery procedure unlisted (11/2013); pr abdomen surgery proc unlisted; pr left heart cath,percutaneous (12/31/2014); hx other surgical; and hx ptca. Pain:  Pain Intensity 1: 0  Pain Location 1: Leg, Generalized  Pain Orientation 1: Left  Pain Intervention(s) 1: Repositioned    Current dietary status prior to evaluation today:  NPO    Previous Modified Barium Swallow studies: N/A    Current Medications:   No current facility-administered medications on file prior to encounter. Current Outpatient Medications on File Prior to Encounter   Medication Sig Dispense Refill    cyclobenzaprine (FLEXERIL) 5 mg tablet Take 1 Tab by mouth two (2) times a day. 14 Tab 0    aspirin 81 mg chewable tablet Take 1 Tab by mouth daily.  nitroglycerin (NITROSTAT) 0.4 mg SL tablet 1 Tab by SubLINGual route every five (5) minutes as needed for Chest Pain. 2 Bottle 4    atorvastatin (LIPITOR) 80 mg tablet Take 1 Tab by mouth nightly. 30 Tab 11    metoprolol tartrate (LOPRESSOR) 25 mg tablet Take 1 Tab by mouth two (2) times a day. 61 Tab 6       OBJECTIVE   Orientation:    Person   Disoriented to situation    Oral Assessment:  Labial: No impairment  Lingual: No impairment  Dentition: Natural  Oral Hygiene: Adequate  Vocal Quality: WFL    Modified barium swallow study was performed in the radiology suite using c-arm with Mr. Isacc Stark in the lateral plane upright 90° in bed. To evaluate his swallow function, barium coated liquid and food was administered in the form of thin liquids, nectar-thick liquids, honey-thick liquids and pudding. Oral phase of swallow:    reduced bolus control   slow oral transit   reduced posterior propulsion skills   reduced oral containment   premature spillage to pharynx pre-swallow    Pharyngeal phase of swallow:    Swallows of thin liquids were incoordinated and triggered at pyriform sinuses.  There was deep laryngeal penetration observed during the swallow tsp and cup sips that did not trigger a cough or throat clear response and did not clear from laryngeal vestibule. There was silent aspiration observed after the swallow from pyriform residue that did not trigger a cough or throat clear response. Residue was observed after the swallow in valleculae and in pyriform sinuses that was moderate to severe.  Swallows of nectar-thick liquids were delayed. There was deep laryngeal penetration observed during the swallow from tsp that did not trigger a cough or throat clear response. There was silent aspiration observed after the swallow pyriform residue that did not trigger a cough or throat clear response. Residue was observed after the swallow in valleculae and in pyriform sinuses that was moderate.  Swallows of honey-thick liquids were delayed. There was silent aspiration observed during the swallow from tsp that did not trigger a cough or throat clear response.  Swallows of pudding were delayed. There was transient laryngeal penetration observed during the swallow from tsp that was cleared spontaneously during the swallow. Pharyngeal characteristics:   delayed pharyngeal swallow initiation   decreased retraction of base of tongue   reduced hyolaryngeal elevation/excursion   absent epiglottic inversion   reduced laryngeal closure  Attempted strategies:    none  Effective strategies:    none  Aspiration/Penetration Scale: 8 (Silent aspiration. Contrast passes below the folds/glottis with no effort to eject.)    Cervical esophageal phase of swallow:    decreased opening of upper segment of esophagus  **Distal esophagus not assessed due to limitations of MBS study. Assessment/Reassessment only, no treatment provided today.       Tool Used: Dysphagia Outcome and Severity Scale (DAYAMI)    Comments   Normal Diet With no strategies or extra time needed   Functional Swallow May have mild oral or pharyngeal delay   Mild Dysphagia Which may require one diet consistency restricted    Mild-Moderate Dysphagia With 1-2 diet consistencies restricted   Moderate Dysphagia With 2 or more diet consistencies restricted   Moderately Severe Dysphagia With partial PO strategies (trials with ST only)   Severe Dysphagia With inability to tolerate any PO safely      Score:  Initial: 1 Most Recent: x (Date:4/6/2021)   Interpretation of Tool: The Dysphagia Outcome and Severity Scale (DAYAMI) is a simple, easy-to-use, 7-point scale developed to systematically rate the functional severity of dysphagia based on objective assessment and make recommendations for diet level, independence level, and type of nutrition. Payor: [facility] / Plan: [place] [employer] [place] / Product Type: Managed Care Medicare /     Education:  · Recommendations discussed with patient, RN, and MD.    Safety:   After treatment position/precautions:  · Patient waiting in radiology holding bay for transport back to room.     Total Treatment Duration:  Time In: 1000   Time Out: 8080 EMILY Gurrola, CCC-SLP

## 2021-04-07 ENCOUNTER — APPOINTMENT (OUTPATIENT)
Dept: GENERAL RADIOLOGY | Age: 76
DRG: 023 | End: 2021-04-07
Attending: INTERNAL MEDICINE
Payer: MEDICARE

## 2021-04-07 ENCOUNTER — APPOINTMENT (OUTPATIENT)
Dept: CT IMAGING | Age: 76
DRG: 023 | End: 2021-04-07
Attending: INTERNAL MEDICINE
Payer: MEDICARE

## 2021-04-07 LAB
ANION GAP SERPL CALC-SCNC: 5 MMOL/L (ref 7–16)
BUN SERPL-MCNC: 31 MG/DL (ref 8–23)
CALCIUM SERPL-MCNC: 9.1 MG/DL (ref 8.3–10.4)
CHLORIDE SERPL-SCNC: 107 MMOL/L (ref 98–107)
CO2 SERPL-SCNC: 25 MMOL/L (ref 21–32)
COVID-19 RAPID TEST, COVR: NOT DETECTED
CREAT SERPL-MCNC: 0.95 MG/DL (ref 0.8–1.5)
GLUCOSE SERPL-MCNC: 107 MG/DL (ref 65–100)
HCT VFR BLD AUTO: 41.7 % (ref 41.1–50.3)
HGB BLD-MCNC: 14.1 G/DL (ref 13.6–17.2)
MM INDURATION POC: NORMAL (ref 0–5)
POTASSIUM SERPL-SCNC: 4.1 MMOL/L (ref 3.5–5.1)
PPD POC: NORMAL
SODIUM SERPL-SCNC: 137 MMOL/L (ref 138–145)
SOURCE, COVRS: NORMAL

## 2021-04-07 PROCEDURE — 65610000006 HC RM INTENSIVE CARE

## 2021-04-07 PROCEDURE — 74011250637 HC RX REV CODE- 250/637: Performed by: FAMILY MEDICINE

## 2021-04-07 PROCEDURE — 74011250636 HC RX REV CODE- 250/636: Performed by: STUDENT IN AN ORGANIZED HEALTH CARE EDUCATION/TRAINING PROGRAM

## 2021-04-07 PROCEDURE — 87635 SARS-COV-2 COVID-19 AMP PRB: CPT

## 2021-04-07 PROCEDURE — 70450 CT HEAD/BRAIN W/O DYE: CPT

## 2021-04-07 PROCEDURE — 74011250636 HC RX REV CODE- 250/636

## 2021-04-07 PROCEDURE — 74011000250 HC RX REV CODE- 250: Performed by: FAMILY MEDICINE

## 2021-04-07 PROCEDURE — 74011000250 HC RX REV CODE- 250: Performed by: STUDENT IN AN ORGANIZED HEALTH CARE EDUCATION/TRAINING PROGRAM

## 2021-04-07 PROCEDURE — 74011000250 HC RX REV CODE- 250: Performed by: INTERNAL MEDICINE

## 2021-04-07 PROCEDURE — 36415 COLL VENOUS BLD VENIPUNCTURE: CPT

## 2021-04-07 PROCEDURE — 61107 TDH PNXR IMPLT VENTR CATH: CPT | Performed by: NEUROLOGICAL SURGERY

## 2021-04-07 PROCEDURE — 99232 SBSQ HOSP IP/OBS MODERATE 35: CPT | Performed by: INTERNAL MEDICINE

## 2021-04-07 PROCEDURE — 009630Z DRAINAGE OF CEREBRAL VENTRICLE WITH DRAINAGE DEVICE, PERCUTANEOUS APPROACH: ICD-10-PCS | Performed by: NEUROLOGICAL SURGERY

## 2021-04-07 PROCEDURE — 80048 BASIC METABOLIC PNL TOTAL CA: CPT

## 2021-04-07 PROCEDURE — 74011250636 HC RX REV CODE- 250/636: Performed by: NEUROLOGICAL SURGERY

## 2021-04-07 PROCEDURE — 74011250637 HC RX REV CODE- 250/637: Performed by: INTERNAL MEDICINE

## 2021-04-07 PROCEDURE — 71045 X-RAY EXAM CHEST 1 VIEW: CPT

## 2021-04-07 PROCEDURE — 97530 THERAPEUTIC ACTIVITIES: CPT

## 2021-04-07 PROCEDURE — 92523 SPEECH SOUND LANG COMPREHEN: CPT

## 2021-04-07 PROCEDURE — 85018 HEMOGLOBIN: CPT

## 2021-04-07 PROCEDURE — 74011000250 HC RX REV CODE- 250: Performed by: NEUROLOGICAL SURGERY

## 2021-04-07 RX ORDER — ATORVASTATIN CALCIUM 40 MG/1
40 TABLET, FILM COATED ORAL
Status: DISCONTINUED | OUTPATIENT
Start: 2021-04-07 | End: 2021-04-22

## 2021-04-07 RX ORDER — FENTANYL CITRATE 50 UG/ML
INJECTION, SOLUTION INTRAMUSCULAR; INTRAVENOUS
Status: COMPLETED
Start: 2021-04-07 | End: 2021-04-07

## 2021-04-07 RX ORDER — METOPROLOL TARTRATE 25 MG/1
25 TABLET, FILM COATED ORAL EVERY 12 HOURS
Status: DISCONTINUED | OUTPATIENT
Start: 2021-04-07 | End: 2021-04-16

## 2021-04-07 RX ORDER — MIDAZOLAM HYDROCHLORIDE 1 MG/ML
2 INJECTION, SOLUTION INTRAMUSCULAR; INTRAVENOUS ONCE
Status: COMPLETED | OUTPATIENT
Start: 2021-04-07 | End: 2021-04-07

## 2021-04-07 RX ORDER — CEFAZOLIN SODIUM/WATER 2 G/20 ML
2 SYRINGE (ML) INTRAVENOUS EVERY 8 HOURS
Status: DISCONTINUED | OUTPATIENT
Start: 2021-04-07 | End: 2021-04-08

## 2021-04-07 RX ORDER — MIDAZOLAM HYDROCHLORIDE 1 MG/ML
INJECTION, SOLUTION INTRAMUSCULAR; INTRAVENOUS
Status: COMPLETED
Start: 2021-04-07 | End: 2021-04-07

## 2021-04-07 RX ORDER — FENTANYL CITRATE 50 UG/ML
50 INJECTION, SOLUTION INTRAMUSCULAR; INTRAVENOUS ONCE
Status: COMPLETED | OUTPATIENT
Start: 2021-04-07 | End: 2021-04-07

## 2021-04-07 RX ORDER — MANNITOL 250 MG/ML
20 INJECTION, SOLUTION INTRAVENOUS EVERY 6 HOURS
Status: DISCONTINUED | OUTPATIENT
Start: 2021-04-07 | End: 2021-04-07

## 2021-04-07 RX ADMIN — METOPROLOL TARTRATE 25 MG: 25 TABLET, FILM COATED ORAL at 22:37

## 2021-04-07 RX ADMIN — FENTANYL CITRATE 12.5 MCG: 50 INJECTION, SOLUTION INTRAMUSCULAR; INTRAVENOUS at 18:00

## 2021-04-07 RX ADMIN — ENALAPRILAT 1.25 MG: 1.25 INJECTION INTRAVENOUS at 05:33

## 2021-04-07 RX ADMIN — AMLODIPINE BESYLATE 5 MG: 5 TABLET ORAL at 08:09

## 2021-04-07 RX ADMIN — ACETAMINOPHEN 650 MG: 325 SUSPENSION ORAL at 23:09

## 2021-04-07 RX ADMIN — OXYCODONE 5 MG: 5 TABLET ORAL at 01:55

## 2021-04-07 RX ADMIN — Medication 10 ML: at 06:39

## 2021-04-07 RX ADMIN — MANNITOL: 20 INJECTION, SOLUTION INTRAVENOUS at 18:34

## 2021-04-07 RX ADMIN — MIDAZOLAM 0.5 MG: 1 INJECTION INTRAMUSCULAR; INTRAVENOUS at 18:00

## 2021-04-07 RX ADMIN — MIDAZOLAM HYDROCHLORIDE 0.5 MG: 1 INJECTION, SOLUTION INTRAMUSCULAR; INTRAVENOUS at 18:00

## 2021-04-07 RX ADMIN — Medication 10 ML: at 14:00

## 2021-04-07 RX ADMIN — METOPROLOL TARTRATE 5 MG: 5 INJECTION INTRAVENOUS at 06:39

## 2021-04-07 RX ADMIN — Medication 10 ML: at 22:37

## 2021-04-07 RX ADMIN — SODIUM CHLORIDE 5 MG/HR: 900 INJECTION, SOLUTION INTRAVENOUS at 08:45

## 2021-04-07 RX ADMIN — ATORVASTATIN CALCIUM 40 MG: 40 TABLET, FILM COATED ORAL at 22:37

## 2021-04-07 RX ADMIN — ENALAPRILAT 1.25 MG: 1.25 INJECTION INTRAVENOUS at 00:13

## 2021-04-07 RX ADMIN — SODIUM CHLORIDE 7.5 MG/HR: 900 INJECTION, SOLUTION INTRAVENOUS at 11:30

## 2021-04-07 RX ADMIN — CEFAZOLIN SODIUM 2 G: 100 INJECTION, POWDER, LYOPHILIZED, FOR SOLUTION INTRAVENOUS at 20:03

## 2021-04-07 RX ADMIN — FENTANYL CITRATE 12.5 MCG: 50 INJECTION INTRAMUSCULAR; INTRAVENOUS at 18:00

## 2021-04-07 RX ADMIN — ACETAMINOPHEN 650 MG: 325 SUSPENSION ORAL at 15:27

## 2021-04-07 RX ADMIN — METOPROLOL TARTRATE 25 MG: 25 TABLET, FILM COATED ORAL at 08:09

## 2021-04-07 RX ADMIN — METOPROLOL TARTRATE 5 MG: 5 INJECTION INTRAVENOUS at 01:26

## 2021-04-07 NOTE — PROGRESS NOTES
Problem: Dysphagia (Adult)  Goal: *Acute Goals and Plan of Care (Insert Text)  Description: STG: Pt will demonstrate zero signs/sx of aspiration with mechanical soft textures with nectar thick liquids with no straws with 90% accuracy during all meals. Discontinued 4/5/21  STG: Pt will complete a full speech, language and cognitive evaluation without assistance with 100% accuracy during session. STG: Pt will complete a Modified barium swallow study with zero signs/sx of aspiration  with 100% accuracy during swallow study MET 4/6/21   LTG : Pt will demonstrate zero signs/sx of aspiration with least restrictive diet with 100% accuracy during all meals. LTG: Patient will increase receptive/expressive language skills demonstrated by the ability to communicate basic wants/needs across environments   STG: Patient will follow 1 step commands with 80% accuracy given min cueing  STG: Patient will follow 2 step commands with 80% accuracy given moderate cues. STG: Patient will respond to moderate level yes/no questions with 80% accuracy with moderate cues. STG: Patient will perform automatic speech task with 80% accuracy with moderate cues.        SPEECH LANGUAGE PATHOLOGY: DYSPHAGIA AND SPEECH-LANGUAGE/COGNITION: Initial Assessment    NAME/AGE/GENDER: Sarah Potter is a 67 y.o. male  DATE: 4/7/2021  PRIMARY DIAGNOSIS: [facility] [I61.9]      ICD-10: Treatment Diagnosis: R13.12 Dysphagia, Oropharyngeal Phase    RECOMMENDATIONS   DIET:    NPO with alternative means of nutrition   Anticipate need for long term nutrition- Would benefit from discussion regarding goals of nutrition    MEDICATIONS: Non-oral     ASPIRATION PRECAUTIONS  · Q 4 hour oral care     COMPENSATORY STRATEGIES/MODIFICATIONS  · None     EDUCATION:  · Recommendations discussed with Hospitalist/Intensivist  · Nursing  · Patient     CONTINUATION OF SKILLED SERVICES/MEDICAL NECESSITY:   Patient is expected to demonstrate progress in  swallow strength, swallow timeliness, swallow function, diet tolerance and swallow safety in order to  improve swallow safety, work toward diet advancement and decrease aspiration risk.  Patient is expected to demonstrate progress in expressive communication and receptive ability to decrease assistance required communication, increase independence with activities of daily living and increase communication with family/caregivers.  Patient continues to require skilled intervention due to dysphagia; expressive/receptive language deficits. RECOMMENDATIONS for CONTINUED SPEECH THERAPY: YES: Anticipate need for ongoing speech therapy during this hospitalization and at next level of care. ASSESSMENT   Dysphagia:  Modified barium swallow study completed 4/6/21- Recommendations for NPO due to SILENT aspiration with all liquid trials. Strict NPO with non-oral nutrition/hydration/medication. Q4 hour oral care and aggressive pulmonary toileting. Dysphagia treatment held today as patient was unable to follow commands for participation. Language evaluation: Language evaluation initiated. Limited assessment due poor alertness and impaired command following. He requires multiple repetitions for basic commands following. Essentially non-verbal today. Difficult to determine if functional decline is related to medications over night vs. Acute change. Patient much less alert today than in previous session. RN came to bedside per SLP request- slight improvement in responsiveness when verbal/tactile stim x2 staff members. Per RN pain meds administered over night and may be contributing to lethargy. His current presentation has been consistent all shift. MD also notified of concerns via [employer]. Recommend ongoing speech therapy to address above mentioned deficits. Speech therapy will be needed at next level of care.  May want to consider discussion regarding long term nutrition as dysphagia may be a barrier to discharge. REHABILITATION POTENTIAL FOR STATED GOALS: Good    PLAN    FREQUENCY/DURATION: Continue to follow patient 3 times a week for duration of hospital stay to address above goals. - Recommendations for next treatment session: Next treatment session will address langauge treatment    SUBJECTIVE   Very drowsy. Left eye gaze after moderate verbal cues to rouse. Decline in status since MBS yesterday. RN asked to come to bedside for re-assessment. Per her assessment, his current function has been consistent throughout this shift. Problem List:  (Impairments causing functional limitations):  1. Oropharyngeal dysphagia  2. Expressive/receptive language impairments    Orientation:   Person    Pain: Pain Scale 1: Adult Nonverbal Pain Scale  Pain Intensity 1: 0    OBJECTIVE   Dysphagia:   Modified barium swallow study completed 4/6/21. Recommendations for strict NPO due to severe dysphagia. His ability to participate in dysphagia treatment limited by severity of language impairments. Anticipate need for long term nutrition. Dysphagia treatment held today due to poor level of alertness and inability to consistently follow commands. Cognitive Linguistic Assessment :  Initial language evaluation completed. Results as follows:  - Orientation: no response to questions. He did stated after multiple verbal cues. Speech nearly unintelligible, but he did approximate his name. Inaccurate responses to yes/no questions regarding locations. - 1 step commands: 5/7 accuracy. He required moderate verbal cues, repetition, and extended processing time to respond to questions. Increased to 6/7 with moderate cues. - 2 step commands: only responded to first portion of 2 step commands. - Basic yes/no questions: 5/10 accuracy. Increased to 6/10 with moderate verbal cues and increased processing time. No response to 3/10 trials. - No response to automatic speech tasks.    Expressive language tasks could not be completed due to no response from patient despite cuing and poor level of alertness. INTERDISCIPLINARY COLLABORATION: Physician and registered nurse  PRECAUTIONS/ALLERGIES: Patient has no known allergies. Tool Used: Dysphagia Outcome and Severity Scale (DAYAMI)    Score Comments   Normal Diet  [] 7 With no strategies or extra time needed   Functional Swallow  [] 6 May have mild oral or pharyngeal delay   Mild Dysphagia  [] 5 Which may require one diet consistency restricted    Mild-Moderate Dysphagia  [] 4 With 1-2 diet consistencies restricted   Moderate Dysphagia  [] 3 With 2 or more diet consistencies restricted   Moderate-Severe Dysphagia  [] 2 With partial PO strategies (trials with ST only)   Severe Dysphagia  [] 1 With inability to tolerate any PO safely      Score:  Initial: 1 Most Recent: 1 (Date 04/07/21 )   Interpretation of Tool: The Dysphagia Outcome and Severity Scale (DAYAMI) is a simple, easy-to-use, 7-point scale developed to systematically rate the functional severity of dysphagia based on objective assessment and make recommendations for diet level, independence level, and type of nutrition. Tool Used: MODIFIED YAZMIN SCALE (mRS)   Score   No Symptoms  [] 0   No significant disability despite symptoms; able to carry out all usual duties and activities  [] 1   Slight disability; unable to carry out all previous activities but able to look after own affairs without assistance.    [] 2   Moderate disability; requiring some help but able to walk without assistance  [] 3   Moderately severe disability; unable to walk without assistance and unable to attend to own bodily needs without assistance  [] 4   Severe disability; bedridden, incontinent, and requiring constant nursing care and attention  [] 5      Score:  Initial: 5    Interpretation of Tool: The Modified Sibley Scale is a 7-point scaled used to quantify level of disability as it relates to a patient's functional abilities. Current Medications:   No current facility-administered medications on file prior to encounter. Current Outpatient Medications on File Prior to Encounter   Medication Sig Dispense Refill    cyclobenzaprine (FLEXERIL) 5 mg tablet Take 1 Tab by mouth two (2) times a day. 14 Tab 0    aspirin 81 mg chewable tablet Take 1 Tab by mouth daily.  nitroglycerin (NITROSTAT) 0.4 mg SL tablet 1 Tab by SubLINGual route every five (5) minutes as needed for Chest Pain. 2 Bottle 4    atorvastatin (LIPITOR) 80 mg tablet Take 1 Tab by mouth nightly. 30 Tab 11    metoprolol tartrate (LOPRESSOR) 25 mg tablet Take 1 Tab by mouth two (2) times a day.  60 Tab 6       SAFETY:  After treatment position/precautions:  · Upright in bed  · RN notified  · MD notified via [employer]    Total Treatment Duration:   Time In: 6236  Time Out: Angelika Garay80, [address] [id number] [facility]

## 2021-04-07 NOTE — PROGRESS NOTES
NEUROSURGERY PROGRESS NOTE:   Admit Date: 4/2/2021  Subjective: The patient was doing well earlier today when I evaluated him but has been more lethargic throughout the day prompting a new CT Head WO contrast that demonstrates interval enlargement of the lateral ventricles specifically the temporal horns of the lateral ventricles. Objective:  Visit Vitals  /64   Pulse (!) 101   Temp (!) 102 °F (38.9 °C) Comment: MD aware   Resp 14   Ht 5' 8\" (1.727 m)   Wt 134 lb (60.8 kg)   SpO2 96%   BMI 20.37 kg/m²     General: No acute distress  Lethargic but arouses to voice with some noxious stimuli  Oriented only to name   Eyes open to voice  PERRL, eyes have a fixed downgaze  Hearing grossly intact   Follows commands simple commands in the right upper and right lower extremity  Withdraws in the right lower and localizes in the right upper extremity. Assessment and Plan:   Terra Qian 67 y.o. male with right thalamic intracerebral hemorrhage with interval development of hydrocephalus.  - Plan for urgent/emergent placement of a right frontal external ventricular drain for CSF diversion.   - Consent for the procedure obtained via phone from son Paul Schmidt. Tim Dooley.  Johanna Frye, [address] [place] [address]

## 2021-04-07 NOTE — PROGRESS NOTES
[facility]ist Progress Note    Patient: Griffin Mckeon MRN: [id number]  SSN: [id number]    YOB: 1945  Age: 67 y.o. 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 Neurosurgery evaluated. No surgery indicated. He failed a swallow study and had silent aspiration on MBSS on 4/6.    4/7 - He is drowsy this AM. Says his pain is improved. Cannot move left arm or leg. Denies    Review of systems negative except stated above. Assessment/Plan (MDM):      Active Medical Complaints and Diagnoses:    Principal Problem:    Hemorrhagic cerebrovascular accident (CVA) ([facility] (4/2/2021)  - CT stable  - Continue BP support - goal SBP <140  - NGT in placed  - Restarted Lopressor 25mg BID  - Amlodipine 5mg  - Restart Cardene gtt for better BP control  - IV Lopressor PRN    Active Problems:    Acute left-sided weakness (4/2/2021)  - Due to #1  - PT/OT      Dysphagia following cerebral infarction (4/6/2021)  - Failed swallow study  - NGT in place --> start TFs  - Continue to assess  - May need PEG prior to discharge      Elevated troponin (4/4/2021)  - Supply/demand mismatch  - Cardiology following      Elevated liver enzymes (4/6/2021)  - Likely untreated Hep C  - Improving  - Continue to monitor      Iron deficiency anemia (1/25/2015)  - Stable  - Continue to monitor      Coronary atherosclerosis of native coronary vessel ()  - Will need to resume ASA when ok with neurosurgery  - Never was taking Effient  - Cardiology following      S/P PTCA (percutaneous transluminal coronary angioplasty) ()  - Will need to resume ASA when ok with neurosurgery  - Never was taking Effient  - Cardiology following      Otherwise all chronic medical issues appear stable with no changes. See assessment at bottom of progress note for complete acute, chronic, and resolved hospital medical issues. Diet: DIET NPO Except Meds  VTE ppx: SCDs    Objective:     Visit Vitals  BP (!) 174/92   Pulse 82   Temp (!) 102 °F (38.9 °C)   Resp 14   Ht 5' 8\" (1.727 m)   Wt 60.8 kg (134 lb)   SpO2 94%   BMI 20.37 kg/m²      Oxygen Therapy  O2 Sat (%): 94 % (04/07/21 0703)  Pulse via Oximetry: 82 beats per minute (04/07/21 0703)  O2 Device: Room air (04/07/21 0700)      Intake and Output:     Intake/Output Summary (Last 24 hours) at 4/7/2021 0917  Last data filed at 4/7/2021 0600  Gross per 24 hour   Intake 160 ml   Output 1050 ml   Net -890 ml         Physical Exam:   GENERAL: alert, cooperative, no distress, appears stated age  EYE: conjunctivae/corneas clear. PERRL. THROAT & NECK: normal and no erythema or exudates noted. LUNG: clear to auscultation bilaterally  HEART: regular rate and rhythm, S1S2, no murmur, no JVD  ABDOMEN: soft, non-tender, non-distended. Bowel sounds normal.   EXTREMITIES:  No edema, 2+ pedal/radial pulses bilaterally  SKIN: no rash or abnormalities  NEUROLOGIC: A&Ox3. Cranial nerves 2-12 grossly intact.  Left side 0/5 strength    Lab/Data Review:  Recent Results (from the past 24 hour(s))   METABOLIC PANEL, BASIC    Collection Time: 04/07/21  4:49 AM   Result Value Ref Range    Sodium 137 (L) 138 - 145 mmol/L    Potassium 4.1 3.5 - 5.1 mmol/L    Chloride 107 98 - 107 mmol/L    CO2 25 21 - 32 mmol/L    Anion gap 5 (L) 7 - 16 mmol/L    Glucose 107 (H) 65 - 100 mg/dL    BUN 31 (H) 8 - 23 MG/DL    Creatinine 0.95 0.8 - 1.5 MG/DL    GFR est AA >60 >60 ml/min/1.73m2    GFR est non-AA >60 >60 ml/min/1.73m2    Calcium 9.1 8.3 - 10.4 MG/DL   HGB & HCT    Collection Time: 04/07/21  4:49 AM   Result Value Ref Range    HGB 14.1 13.6 - 17.2 g/dL    HCT 41.7 41.1 - 50.3 %       SARS-CoV-2 Lab Results  \"Novel Coronavirus\" Test: No results found for: COV2NT   \"Emergent Disease\" Test: No results found for: EDPR  \"SARS-COV-2\" Test: No results found for: XGCOVT  \"Precision Labs\" Test: No results found for: RSLT  Rapid Test: No results found for: COVR        Imaging:  Xr Swallow Func Video    Result Date: 4/6/2021  Modified Barium Swallow, 4/6/2021 History: Hemorrhagic CVA. Technique: Real time fluoroscopic imaging was provided for the speech pathologist. Multiple consistencies of barium were given. No spot images were obtained as part of today's study. The total fluoroscopy time is 2.3 minutes. Findings: The patient's ability to swallow was evaluated with thin, and pudding consistencies. The patient had significant difficulty initiating a swallow. There is significant residuals throughout the hypopharynx even with thin consistencies. Multiple episodes of penetration were seen with thin and pudding consistencies. In addition, there was an episode of aspiration at the conclusion of today's study with thin consistency through a straw. 1. Imaging findings as described above. Please see speech pathologist report for further details. Ct Head Wo Cont    Result Date: 4/4/2021  CT HEAD WITHOUT CONTRAST. INDICATION: Intracranial hemorrhage with worsening vision. COMPARISON: Yesterday's exam  TECHNIQUE:   5 mm axial scans from the skull base to the vertex. Our CT scanners use one or more of the following:  Automated exposure control, adjustment of the mA and or kV according to patient size, iterative reconstruction. FINDINGS:  Acute hemorrhage right thalamus and basal ganglia without significant change. Decreased midline shift, 6 mm. There is blood in the posterior horn of the lateral ventricles, right more than left. There is blood in the third ventricle. No new hemorrhage.  Posterior fossa structures unremarkable. Stable acute intracranial hemorrhage with decreased midline shift, 6mm. Ct Head Wo Cont    Result Date: 4/3/2021  NONCONTRAST HEAD CT CLINICAL HISTORY:  Follow-up intracranial hemorrhage. TECHNIQUE:  Axial images were obtained with spiral technique. Radiation dose reduction was achieved using one or all of the following techniques: automated exposure control, weight-based dosing, iterative reconstruction. COMPARISON:  CT yesterday and 0617 hours today. REPORT:   Standard noncontrast head CT demonstrates continued increase in size of the right thalamic hemorrhage with maximum diameter now measured at approximately 3.6 cm compared with 2.9 cm earlier today. Moreover, there is new bilateral lateral intraventricular extension of hemorrhage, primarily localized to the atria. There is associated slight increase in bilateral lateral ventriculomegaly with biatrial diameter now measuring approximately 4.2 cm compared with 3.8 cm this morning. Mass effect is little changed with midline shift from right to left of approximately 5 mm compared with 4 mm this morning. Extensive small vessel ischemic disease and small focal lateral right frontal infarct appear unchanged. INTERVAL INCREASE IN SIZE OF THE RIGHT THALAMIC HEMORRHAGE WITH NEW BILATERAL LATERAL INTRAVENTRICULAR EXTENSION AND SLIGHT WORSENING OF VENTRICULOMEGALY. Ct Head Wo Cont    Result Date: 4/3/2021  EXAM: CT HEAD WO CONT HISTORY: .  TECHNIQUE: Axial images of the brain were performed without the administration of intravenous contrast. Images were obtained axial plane and coronal reformatted images were submitted. CT scan performed using appropriate/available dose optimization/reduction/ALARA techniques. COMPARISON: None. FINDINGS: The known right thalamic hemorrhage is again observed. There is surrounding edema. The body of this hemorrhage has not changed significantly.  Currently it measures 2.4 x 2.9 cm when previously it measured 2.4 x 2.7 cm. There has been interval increase in an area of adjacent or extension of the hemorrhage posteriorly. Previously it measured 4.5 mm in width. Currently it measures 1.5 cm in width and 1.3 cm AP. There is persistent intraventricular hemorrhage in the posterior horn of the right lateral ventricle. Interval development of a small amount of hemorrhage in the left posterior horn. There is mild midline shift of the posterior septum pellucidum of approximately 4.1 mm. The lateral ventricles are mildly dilated, stable. Chronic ischemic white matter changes are again observed. Question small chronic infarction in the right frontal lobe. Interval increase in the size of the right thalamic hemorrhage. Interval development of mild right to left midline shift of the posterior aspect of the septum pellucidum. Stable hemorrhage in the posterior horn of the right lateral ventricle. Interval development of a small amount of hemorrhage in the posterior horn of the left lateral ventricle. Other findings as above. [facility]    Result Date: 4/3/2021  RIGHT UPPER QUADRANT ULTRASOUND. HISTORY: Transaminitis. COMPARISON: No relevant comparison studies available. FINDINGS: Ultrasonographic Ramon's sign: is reported as negative Gallstones: There are intraluminal echogenic foci, probably stones. Gallbladder Wall: not thickened. Common Bile Duct: is not dilated, 4 mm. Intrahepatic Biliary Tree: is not dilated. Liver: Uniform parenchyma Included portion of the pancreas and right kidney: are unremarkable. Vasculature: Aorta: Normal caliber. IVC:  Patent. Portal vein: Hepatopedal flow. Probable gallstones. No biliary tree obstruction. No findings to suggest acute cholecystitis. Xr Chest Port    Result Date: 4/2/2021  CHEST X-RAY, one view. HISTORY:  Chest pain. TECHNIQUE:  AP upright portable view.  COMPARISON: August 2017 FINDINGS: Lungs: Atelectasis or scar right base, similar to prior exam. Costophrenic angles: are sharp. Heart size: is normal. Pulmonary vasculature: is unremarkable. Aorta: Arch calcifications. Included portion of the upper abdomen: is unremarkable. Bones: No gross bony lesions. Other: None. Negative for acute change. Ct Code Neuro Head Wo Contrast    Result Date: 2021  CT HEAD WITHOUT CONTRAST HISTORY:  CVA. COMPARISON: None. TECHNIQUE: Axial imaging was performed without intravenous contrast utilizing 5mm slice thickness. Sagittal and coronal reformats were performed. Radiation dose reduction techniques were used for this study. Our CT scanner uses one or all of the following: Automated exposure control, adjustment of the MAS or KUB according to patient's size and iterative reconstruction. FINDINGS:    *BRAIN:    -  There are no early signs of territorial or lacunar infarction by CT.    -  2.7 x 2.4 cm acute hemorrhage into the right basal ganglia and thalamus. Right lateral ventricle intraventricular hemorrhage.    -  No gross white matter abnormality by CT. *VISUALIZED PARANASAL SINUSES: Well aerated. *MASTOIDS:  Clear. *CALVARIUM AND SCALP: Unremarkable. Acute right basal ganglia hematoma likely on the basis of hypertension. Right intraventricular hemorrhage.  Date of Dictation: 2021 6:17 PM     Results for orders placed or performed during the hospital encounter of 21   2D ECHO COMPLETE ADULT (TTE) W OR [address]  [phone]    Transthoracic Echocardiogram  2D, M-mode, Doppler, and Color Doppler    Patient: Holger Miller  MR #: [id number]  : [id number]  Age: 67 years  Gender: Male  Study date: 2021  Account #: [de-identified]  Height: 68 in  Weight: 133.8 lb  BSA: 1.72 mï¾²  Status:Routine  Location: 334  BP: 137/ 77    Allergies: NO KNOWN ALLERGENS    Sonographer:  Elicia G[facility]  Group:  [facility]  Referring Physician: Lorin Love DO  Reading Physician:  Dr. Gaurang Lindsay    INDICATIONS: TIA with transient neurological disturbance    PROCEDURE: This was a routine study. A transthoracic echocardiogram was  performed. The study included complete 2D imaging, M-mode, complete spectral  Doppler, and color Doppler. Intravenous contrast (Definity) was administered. Intravenous contrast (agitated saline) was administered. Image quality was  adequate. LEFT VENTRICLE: Size was normal. Systolic function was normal. Ejection  fraction was estimated in the range of 55 % to 60 %. There were no regional  wall motion abnormalities. Wall thickness was mildly increased. Left  ventricular diastolic function parameters were normal. Avg E/e': 4.54. RIGHT VENTRICLE: The size was normal. Systolic function was normal. The  tricuspid jet envelope definition was inadequate for estimation of RV   systolic  pressure. LEFT ATRIUM: Size was normal.    ATRIAL SEPTUM: Agitated saline contrast injection (bubble study) was   performed. There was no right-to-left shunt, at rest or induced by the Valsalva   maneuver. RIGHT ATRIUM: Size was normal.    SYSTEMIC VEINS: IVC: The inferior vena cava was normal in size. The  respirophasic change in diameter was more than 50%. AORTIC VALVE: There is mild aortic annular calcification. The valve was  trileaflet. Leaflets exhibited mild sclerosis. MITRAL VALVE: Valve structure was normal.    TRICUSPID VALVE: The valve structure was normal. There was trivial  regurgitation. PULMONIC VALVE: Not well visualized. PERICARDIUM: There was no pericardial effusion. AORTA: The root exhibited normal size. SUMMARY:    -  Left ventricle: Systolic function was normal. Ejection fraction was  estimated in the range of 55 % to 60 %. -  Atrial septum: Agitated saline contrast injection (bubble study) was  performed.  There was no right-to-left shunt, at rest or induced by the Valsalva  maneuver. SYSTEM MEASUREMENT TABLES    2D  Ao Diam: 2.8 cm  LAEDV Index (A-L): 26.9 ml/m2  IVSd: 1.1 cm  LVIDd: 5.2 cm  LVPWd: 0.8 cm    PW  E/E' Av.5    Prepared and signed by    Dr. Hodges Favorite  Signed 2021 22:44:08         Cultures:   All Micro Results     Procedure Component Value Units Date/Time    COVID-19 RAPID TEST [[id number]]     Order Status: Sent           Assessment:     Primary Diagnosis: Hemorrhagic cerebrovascular accident (CVA) [facility]    Hospital Problems as of 2021 Date Reviewed: 2021          Codes Class Noted - Resolved POA    * (Principal) Hemorrhagic cerebrovascular accident (CVA) ([facility] ICD-10-CM: I61.9  ICD-9-CM: 632  2021 - Present Yes        Dysphagia following cerebral infarction ICD-10-CM: I69.391  ICD-9-CM: 438.82  2021 - Present Yes        Acute left-sided weakness ICD-10-CM: R53.1  ICD-9-CM: 728.87  2021 - Present Yes        Elevated liver enzymes ICD-10-CM: R74.8  ICD-9-CM: 790.5  2021 - Present Yes        Elevated troponin ICD-10-CM: R77.8  ICD-9-CM: 790.6  2021 - Present Yes        Coronary atherosclerosis of native coronary vessel ICD-10-CM: I25.10  ICD-9-CM: 414.01  Unknown - Present Yes        Iron deficiency anemia ICD-10-CM: D50.9  ICD-9-CM: 280.9  2015 - Present Yes        S/P PTCA (percutaneous transluminal coronary angioplasty) ICD-10-CM: Z98.61  ICD-9-CM: V45.82  Unknown - Present Yes        Chronic tuberculosis ICD-10-CM: A15.9  ICD-9-CM: 011.90  Unknown - Present Yes        Hyperlipidemia ICD-10-CM: E78.5  ICD-9-CM: 272.4  Unknown - Present Yes        Microcytic anemia ICD-10-CM: D50.9  ICD-9-CM: 280.9  2015 - Present     Overview Signed 2016  9:52 AM by Beverly Colmenares     Iron deficiency                     Discharge Plan:     IRC vs STR    Signed By: Braxton Singleton DO     2021

## 2021-04-07 NOTE — PROGRESS NOTES
ACUTE PHYSICAL THERAPY GOALS:  (Developed with and agreed upon by patient and/or caregiver.)  UPDATED 21  LTG:  (1.)Mr. Joseph Curtis will move from supine to sit and sit to supine , scoot up and down and roll side to side in bed with MINIMAL ASSIST within 7 treatment day(s). (2.)Mr. Joseph Curtis will tolerate sitting up in recliner chair for 2 hours with stable vital signs to increase upright tolerance within 7 treatment day(s). (3.)Mr. Joseph Curtis will maintain static/dynamic sitting x 15 minutes with MINIMAL ASSIST for improved balance within 7 treatment days. (4.)Mr. Joseph Curtis will be able to hold head in cervical rotation left of midline for 2 minutes within 7 treatment days. (5.)Mr. Joseph Curtis will participate in therapeutic activity/exercises x 25 minutes for increased strength within 7 treatment days. PHYSICAL THERAPY: Daily Note and AM Treatment Day # 3    Nicolasa Garland is a 67 y.o. male   PRIMARY DIAGNOSIS: Hemorrhagic cerebrovascular accident (CVA) ([facility]  Hemorrhagic cerebrovascular accident (CVA) ([facility] [I61.9]         ASSESSMENT:     REHAB RECOMMENDATIONS: CURRENT LEVEL OF FUNCTION:  (Most Recently Demonstrated)   Recommendation to date pending progress:  [address] [place] [address] vs. Short-term Rehab  Equipment:    To Be Determined Bed Mobility:   Maximal Assistance x 2  Sit to Stand:   mod-maxA x 2  Transfers:   mod-maxA x 2  Gait/Mobility:   Unable to perform     ASSESSMENT:  Mr. Joseph Curtis continues to present with weakness on L LE. He appeared much more lethargic today with episodes of blank stare on his face. Pt required maxA x 2 today for bed mobility and STS transfers. He demonstrated increased trunk forward flexion in standing and poor standing balance. Pt took one step with maxA x 2 today. No progress today and RN aware of pt lethargy. SUBJECTIVE:   Mr. Joseph Curtis states, [place]. \"    SOCIAL HISTORY/ LIVING ENVIRONMENT: See eval  Home Environment: Private residence  # Steps to Enter: 5  One/Two Story Residence: One story  Living Alone: No  Support Systems: Family member(s)  OBJECTIVE:     PAIN: VITAL SIGNS: LINES/DRAINS:   Pre Treatment: Pain Screen  Pain Scale 1: Numeric (0 - 10)  Pain Intensity 1: 0  Post Treatment: 0   IV and external male catheter  O2 Device: Room air     MOBILITY: I Mod I S SBA CGA Min Mod Max Total  NT x2 Comments:   Bed Mobility    Rolling [] [] [] [] [] [] [] [x] [x] [] [x]    Supine to Sit [] [] [] [] [] [] [] [x] [x] [] [x]    Scooting [] [] [] [] [] [] [] [] [x] [] [x]    Sit to Supine [] [] [] [] [] [] [] [x] [x] [] [x]    Transfers    Sit to Stand [] [] [] [] [] [] [x] [] [] [] [x]    Bed to Chair [] [] [] [] [] [] [] [] [] [x] []    Stand to Sit [] [] [] [] [] [] [] [x] [] [] [x]    I=Independent, Mod I=Modified Independent, S=Supervision, SBA=Standby Assistance, CGA=Contact Guard Assistance,   Min=Minimal Assistance, Mod=Moderate Assistance, Max=Maximal Assistance, Total=Total Assistance, NT=Not Tested    BALANCE: Good Fair+ Fair Fair- Poor NT Comments   Sitting Static [] [] [] [] [x] []    Sitting Dynamic [] [] [] [] [x] []              Standing Static [] [] [] [] [x] []    Standing Dynamic [] [] [] [] [x] []      GAIT: I Mod I S SBA CGA Min Mod Max Total  NT x2 Comments:   Level of Assistance [] [] [] [] [] [] [] [] [] [x] []    Distance NT    DME N/A    Gait Quality NT    Weightbearing  Status N/A     I=Independent, Mod I=Modified Independent, S=Supervision, SBA=Standby Assistance, CGA=Contact Guard Assistance,   Min=Minimal Assistance, Mod=Moderate Assistance, Max=Maximal Assistance, Total=Total Assistance, NT=Not Tested    PLAN:   FREQUENCY/DURATION: PT Plan of Care: 3 times/week for duration of hospital stay or until stated goals are met, whichever comes first.  TREATMENT:     TREATMENT:   ($$ Therapeutic Activity: 23-37 mins    )  Therapeutic Activity (23 Minutes):  Therapeutic activity included Rolling, Supine to Sit, Sit to Supine, Scooting, Transfer Training, Sitting balance  and Standing balance to improve functional Mobility, Strength, ROM and Activity tolerance.     TREATMENT GRID:  N/A    AFTER TREATMENT POSITION/PRECAUTIONS:  Bed, Needs within reach and RN notified    INTERDISCIPLINARY COLLABORATION:  RN/PCT and PT/PTA    TOTAL TREATMENT DURATION:  PT Patient Time In/Time Out  Time In: 1634  Time Out: [address] [facility] Nik Gomez DPT

## 2021-04-07 NOTE — PROGRESS NOTES
MSN, CM:  Patient currently in ICU with CVA. Patient has Left sided weakness. NGT in place r/t results of swallow study. This AM patient had temp of 102 and BP up at 174/92. IRC following patient for possible admission. Case Management will continue to follow.

## 2021-04-07 NOTE — CONSULTS
Comprehensive Nutrition Assessment    Type and Reason for Visit: Initial, Consult  Tube Feeding Management (Hospitalist)    Nutrition Recommendations/Plan:   Enteral Nutrition:  Initiate Jevity 1.5 via NGT at 20 ml/hour, progress by 10ml/4 hours to goal rate of 50ml/hour. Water flush 135 every 4 hours. At goal will provide 1650 kcal (100% estimated calorie needs), 70 grams protein (100% estimated protein needs) and 1646ml free fluid (~1 ml/kcal) calculations based on 22 hours infusion. Vitamin and Mineral Supplement Therapy:  Electrolyte management replacement protocol implemented. Labs:   EN labs: BMP daily, Mg and Phos MWF. Malnutrition Assessment:  Malnutrition Status: At risk for malnutrition (specify)(s/p CVA, NPO, thin at baseline)    Nutrition Assessment:   Nutrition History: Unable to assess, patient with limited responsiveness. Nutrition Background: Patient presented with left side weakness and fall. He was admitted with hemorrhagic cererovascular accident from acute right basal ganglia hematoma with right intraventricular hemorrhage. His PMH is significant for MI, CAD, TB, COPD, GERD, GI bleed, hept C, HLD, HTN, NSTEMI. Daily Update:  Patient seen and discussed with RN. Noted decreased responsiveness 4/3-4/4. Patient with eyes open but does not make eye contact with RD during visit. RN states per report mental status waxes and wanes. S/p MBS 4/9 with NPO. NGT placed 4/6. Abdominal Status (last documented): Intact abdomen with Active  bowel sounds. Last BM 04/02/21.   Pertinent Medications: reviewed and unremarkable  Pertinent Labs:   Lab Results   Component Value Date/Time    Sodium 137 (L) 04/07/2021 04:49 AM    Potassium 4.1 04/07/2021 04:49 AM    Chloride 107 04/07/2021 04:49 AM    CO2 25 04/07/2021 04:49 AM    Anion gap 5 (L) 04/07/2021 04:49 AM    Glucose 107 (H) 04/07/2021 04:49 AM    BUN 31 (H) 04/07/2021 04:49 AM    Creatinine 0.95 04/07/2021 04:49 AM    Calcium 9.1 04/07/2021 04:49 AM    Albumin 3.2 04/06/2021 03:53 AM    Magnesium 2.3 04/06/2021 03:53 AM    Phosphorus 3.6 04/06/2021 03:53 AM       Nutrition Related Findings: Moderate wasting to temples. Potential wasting to clavicles. Appears to have good muscle definition to arms. Current Nutrition Therapies:  DIET NPO Except Meds    Current Intake:   Average Meal Intake: NPO(% prior to NPO)        Anthropometric Measures:  Height: 5' 8\" (172.7 cm)  Current Body Wt: 60.8 kg (134 lb 0.6 oz)(4/2), Weight source: Not specified  BMI: 20.4, Underweight (BMI less than 22) age over 72     Ideal Body Weight (lbs) (Calculated): 154 lbs (70 kg), 87 %  Usual Body Wt: (limited weight history but appears to be chronically thin), Percent weight change:            Edema: Generalized: No Edema (4/6/2021  7:41 PM)     Estimated Daily Nutrient Needs:  Energy (kcal/day): 6616-1510 (Kcal/kg(25-30), Weight Used: Current)  Protein (g/day): 61-76 (1-1.25 g/kg) Weight Used: (Current)  Fluid (ml/day):   (1 ml/kcal)    Nutrition Diagnosis:   · Inadequate oral intake related to swallowing difficulty as evidenced by (MBS, NPO)    Nutrition Interventions:   Food and/or Nutrient Delivery: Continue NPO, Start tube feeding     Coordination of Nutrition Care: Continue to monitor while inpatient  Plan of Care discussed with Onel Parsons RN    Goals:       Active Goal: Tolerate TF at goal within 3 days    Nutrition Monitoring and Evaluation:      Food/Nutrient Intake Outcomes: Enteral nutrition intake/tolerance  Physical Signs/Symptoms Outcomes: Biochemical data, GI status    Discharge Planning:         Ivana Barkley RD, [address], LD on 4/7/2021 at 10:36 AM  Contact: [phone]

## 2021-04-07 NOTE — PROGRESS NOTES
Bedside shift change report given to Shawanda Barrera (oncoming nurse) by Matthew Robert RN (offgoing nurse). Report included the following information SBAR, Kardex, Procedure Summary, Intake/Output, Recent Results and Cardiac Rhythm NSR.    Dual RN NIH completed at bedside

## 2021-04-07 NOTE — PROGRESS NOTES
[facility] CARDIOLOGY PROGRESS NOTE           4/7/2021 8:12 AM    Admit Date: 4/2/2021      Subjective:   Patient less responsive today. Awakens but unable to understand speech. Still with left sided weakness. Denies chest pain. BP elevated and Cardene restarted but weaned off after PO medications. ROS:  Cardiovascular:  As noted above    Objective:      Vitals:    04/07/21 1230 04/07/21 1246 04/07/21 1301 04/07/21 1315   BP: 112/64 110/66 121/69 125/74   Pulse: 94 (!) 102 100 100   Resp:       Temp:       SpO2: 93% 91% 92% 92%   Weight:       Height:           Physical Exam:  General-No Acute Distress  Neck- supple, no JVD  CV- regular rate and rhythm no MRG  Lung- clear bilaterally  Abd- soft, nontender, nondistended  Ext- no edema bilaterally. Neuro:  Left sided weakness. Skin- warm and dry      Data Review:   Recent Labs     04/07/21  0449 04/06/21  0353 04/05/21  0316   * 138 137   K 4.1 3.8 3.7   MG  --  2.3  --    BUN 31* 21 22   CREA 0.95 0.92 1.12   * 105* 129*   WBC  --  5.8 7.0   HGB 14.1 13.7 12.7*   HCT 41.7 40.0* 37.0*   PLT  --  167 166      No results found for: Abby Night, TROPT, TNIPOC     Echo (4/2/21):     -  Left ventricle: Systolic function was normal. Ejection fraction was estimated in the range of 55 % to 60 %.    -  Atrial septum: Agitated saline contrast injection (bubble study) was performed. There was no right-to-left shunt, at rest or induced by the Valsalva maneuver. Assessment/Plan:     Principal Problem:    Hemorrhagic cerebrovascular accident (CVA) ([address] (4/2/2021)    Per neurosurgery. Holding ASA    Active Problems:    Coronary atherosclerosis of native coronary vessel ()    Stent in 2017. Holding ASA with bleed. Await neurosurgery recs in regards to risk and timing of resuming. Hyperlipidemia ()    Continue Lipitor. Acute left-sided weakness (4/2/2021)    Will follow.        Elevated troponin (4/4/2021)    Likely supply/demand imbalance. Echo with normal LV function. No plans for ischemia evaluation. Continue B-blocker and amlodipine. No Aspirin as above.                    Jeffrey Field MD  4/7/2021 8:12 AM

## 2021-04-08 ENCOUNTER — APPOINTMENT (OUTPATIENT)
Dept: CT IMAGING | Age: 76
DRG: 023 | End: 2021-04-08
Attending: NEUROLOGICAL SURGERY
Payer: MEDICARE

## 2021-04-08 LAB
ANION GAP SERPL CALC-SCNC: 9 MMOL/L (ref 7–16)
BUN SERPL-MCNC: 54 MG/DL (ref 8–23)
CALCIUM SERPL-MCNC: 9 MG/DL (ref 8.3–10.4)
CHLORIDE SERPL-SCNC: 112 MMOL/L (ref 98–107)
CO2 SERPL-SCNC: 20 MMOL/L (ref 21–32)
CREAT SERPL-MCNC: 1.78 MG/DL (ref 0.8–1.5)
ERYTHROCYTE [DISTWIDTH] IN BLOOD BY AUTOMATED COUNT: 13.9 % (ref 11.9–14.6)
GLUCOSE SERPL-MCNC: 133 MG/DL (ref 65–100)
HCT VFR BLD AUTO: 42.1 % (ref 41.1–50.3)
HCT VFR BLD AUTO: 42.1 % (ref 41.1–50.3)
HGB BLD-MCNC: 14.5 G/DL (ref 13.6–17.2)
HGB BLD-MCNC: 14.5 G/DL (ref 13.6–17.2)
MCH RBC QN AUTO: 26.6 PG (ref 26.1–32.9)
MCHC RBC AUTO-ENTMCNC: 33.8 G/DL (ref 31.4–35)
MCV RBC AUTO: 78.7 FL (ref 79.6–97.8)
MM INDURATION POC: 0 MM (ref 0–5)
NRBC # BLD: 0 K/UL (ref 0–0.2)
PLATELET # BLD AUTO: 210 K/UL (ref 150–450)
PMV BLD AUTO: 11.4 FL (ref 9.4–12.3)
POTASSIUM SERPL-SCNC: 3.8 MMOL/L (ref 3.5–5.1)
PPD POC: NEGATIVE NEGATIVE
RBC # BLD AUTO: 5.45 M/UL (ref 4.23–5.6)
SODIUM SERPL-SCNC: 141 MMOL/L (ref 136–145)
WBC # BLD AUTO: 14.2 K/UL (ref 4.3–11.1)

## 2021-04-08 PROCEDURE — 36415 COLL VENOUS BLD VENIPUNCTURE: CPT

## 2021-04-08 PROCEDURE — 74011000250 HC RX REV CODE- 250: Performed by: FAMILY MEDICINE

## 2021-04-08 PROCEDURE — 70450 CT HEAD/BRAIN W/O DYE: CPT

## 2021-04-08 PROCEDURE — 85027 COMPLETE CBC AUTOMATED: CPT

## 2021-04-08 PROCEDURE — 74011250637 HC RX REV CODE- 250/637: Performed by: FAMILY MEDICINE

## 2021-04-08 PROCEDURE — 74011250637 HC RX REV CODE- 250/637: Performed by: INTERNAL MEDICINE

## 2021-04-08 PROCEDURE — 99231 SBSQ HOSP IP/OBS SF/LOW 25: CPT | Performed by: INTERNAL MEDICINE

## 2021-04-08 PROCEDURE — 74011000258 HC RX REV CODE- 258: Performed by: NEUROLOGICAL SURGERY

## 2021-04-08 PROCEDURE — 74011000250 HC RX REV CODE- 250: Performed by: NEUROLOGICAL SURGERY

## 2021-04-08 PROCEDURE — 85018 HEMOGLOBIN: CPT

## 2021-04-08 PROCEDURE — 80048 BASIC METABOLIC PNL TOTAL CA: CPT

## 2021-04-08 PROCEDURE — 74011250636 HC RX REV CODE- 250/636: Performed by: NEUROLOGICAL SURGERY

## 2021-04-08 PROCEDURE — 65610000006 HC RM INTENSIVE CARE

## 2021-04-08 RX ORDER — NOREPINEPHRINE BITARTRATE/D5W 4MG/250ML
.5-3 PLASTIC BAG, INJECTION (ML) INTRAVENOUS
Status: DISCONTINUED | OUTPATIENT
Start: 2021-04-08 | End: 2021-04-10

## 2021-04-08 RX ADMIN — ACETAMINOPHEN 650 MG: 325 SUSPENSION ORAL at 19:41

## 2021-04-08 RX ADMIN — METOPROLOL TARTRATE 25 MG: 25 TABLET, FILM COATED ORAL at 09:08

## 2021-04-08 RX ADMIN — MANNITOL 20 G: 20 INJECTION, SOLUTION INTRAVENOUS at 00:14

## 2021-04-08 RX ADMIN — CEFAZOLIN SODIUM 2 G: 100 INJECTION, POWDER, LYOPHILIZED, FOR SOLUTION INTRAVENOUS at 10:09

## 2021-04-08 RX ADMIN — ACETAMINOPHEN 650 MG: 325 SUSPENSION ORAL at 11:21

## 2021-04-08 RX ADMIN — CEFAZOLIN SODIUM 2 G: 100 INJECTION, POWDER, LYOPHILIZED, FOR SOLUTION INTRAVENOUS at 04:30

## 2021-04-08 RX ADMIN — MANNITOL: 20 INJECTION, SOLUTION INTRAVENOUS at 11:11

## 2021-04-08 RX ADMIN — Medication 10 ML: at 14:00

## 2021-04-08 RX ADMIN — MANNITOL 20 G: 20 INJECTION, SOLUTION INTRAVENOUS at 05:49

## 2021-04-08 RX ADMIN — Medication 10 ML: at 22:00

## 2021-04-08 RX ADMIN — MANNITOL: 20 INJECTION, SOLUTION INTRAVENOUS at 17:56

## 2021-04-08 RX ADMIN — ATORVASTATIN CALCIUM 40 MG: 40 TABLET, FILM COATED ORAL at 21:16

## 2021-04-08 RX ADMIN — AMLODIPINE BESYLATE 5 MG: 5 TABLET ORAL at 09:08

## 2021-04-08 RX ADMIN — DEXTROSE MONOHYDRATE 4 MCG/MIN: 50 INJECTION, SOLUTION INTRAVENOUS at 22:55

## 2021-04-08 RX ADMIN — METOPROLOL TARTRATE 25 MG: 25 TABLET, FILM COATED ORAL at 20:15

## 2021-04-08 RX ADMIN — Medication 10 ML: at 05:49

## 2021-04-08 RX ADMIN — MANNITOL: 20 INJECTION, SOLUTION INTRAVENOUS at 23:47

## 2021-04-08 NOTE — PROGRESS NOTES
Occupational Therapy Note:    Chart reviewed for occupational therapy treatment. Per chart, patient had EVD placed overnight secondary to increasing hydrocephalus. Will HOLD today and attempt tomorrow as patient is able.     Joe Carlson OTR/L, CLT

## 2021-04-08 NOTE — PROCEDURES
NEUROSURGERY PROCEDURE NOTE:      Patient: Eneida Wick    Procedure Date: 4/7/2021    Pre-procedure Diagnosis: Acute obstructive hydrocephalus      Post-Procedure Diagnosis: Same     Procedure: Right frontal external ventricular drain placement     Anesthesia: Local sedation       Surgeon: Diomedes Gamboa MD     Procedure in detail:  After a timeout was performed at the bedside.  The right frontal area was shaved with clippers and the patient was prepped and draped in a standard sterile fashion. Sierraville Mode was then identified, a right frontal 2 cm incision was made overlying Kocher's point.  A twist drill craniostomy was performed and an external ventricular drain was placed in the right lateral ventricle at a depth of 7 cm of the skin.  The drain was tunneled subcutaneously 4 cm posterior to the incision.  Drain was then connected to a drainage system.  The 2 cm incision was closed with a running nylon 4-0 suture. The drain was then secured at the exit site with a 4-0 nylon.  Then the drain was secured to the head with multiple stay sutures.  The wound was cleaned and dressed with a clean Tegaderm and Biopatch at the exit site.  Patient tolerated the procedure well and the EVD is patent and she was under pressure upon entering the ventricular system with EVD catheter. The EVD was leveled at 10 cmH2O above the tragus. The patient tolerated the procedure well without difficulty. Seng Black.  Case Brand, [id number] [facility]

## 2021-04-08 NOTE — PROGRESS NOTES
Bedside and Verbal shift change report given to [address] [place] [address] (oncoming nurse) by Gerri Li (offgoing nurse). Report included the following information SBAR, Kardex, ED Summary, Procedure Summary, Intake/Output, MAR and Recent Results.

## 2021-04-08 NOTE — PROGRESS NOTES
NEUROSURGERY PROGRESS NOTE:   Admit Date: 4/2/2021  Subjective:   Patient underwent placement of EVD yesterday evening for hydrocephalus. Objective:  Visit Vitals  /69   Pulse 91   Temp 100.4 °F (38 °C)   Resp 30   Ht 5' 8\" (1.727 m)   Wt 134 lb (60.8 kg)   SpO2 94%   BMI 20.37 kg/m²     General: No acute distress  Oriented only to name and hospital with some perseveration   Eyes open to voice  PERRL, downgaze resolved   Hearing grossly intact   Follows commands simple commands in the right upper and right lower extremity  Withdraws in the right lower and localizes in the right upper extremity. ICP 4 at bedside, EVD 50 cc since placement     Assessment and Plan:   Nicolasa Garland 67 y.o. male with right thalamic intracerebral hemorrhage with interval development of acute obstructive hydrocephalus. - Continue EVD but lower to 5 cm H2O   - Nursing concerned this afternoon that less alert  - CT Head WO contrast ordered   - Continue q1H neuro checks  - Patient with a WBC of 14.2 and a temp of 100.4 which may explain his lethargy today   - Will follow-up CT Head with rectyrone Mcintosh, [address] [place] [address]

## 2021-04-08 NOTE — PROGRESS NOTES
SPEECH PATHOLOGY NOTE:    Patient s/p EVD overnight due to development of hydrocephalus. Will hold speech therapy today with plans to follow up for dysphagia and language treatment tomorrow.      Miriam Santiago, [address] CCC-SLP

## 2021-04-08 NOTE — PROGRESS NOTES
[facility] CARDIOLOGY PROGRESS NOTE           4/8/2021 4:12 PM    Admit Date: 4/2/2021         Subjective: Unfortunately required a drain placed yesterday. BP remains high. ROS:  Cardiovascular:  As noted above    Objective:      Vitals:    04/08/21 1331 04/08/21 1346 04/08/21 1400 04/08/21 1500   BP: 121/80 (!) 144/73 121/60 (!) 139/40   Pulse: 89 90 91 90   Resp: 22 23 20 20   Temp:    98.4 °F (36.9 °C)   SpO2: 94% 97% 95% 97%   Weight:       Height:           On telemetry:      Physical Exam:  General: Well Developed, Well Nourished, No Acute Distress, Alert & Oriented x 3, Appropriate mood  Neck: supple, no JVD  Heart: S1S2 with RRR without murmurs or gallops  Lungs: Clear throughout auscultation bilaterally without adventitious sounds  Abd: soft, nontender, nondistended, with good bowel sounds  Ext: no edema bilaterally  Skin: warm and dry      Data Review:   Recent Labs     04/08/21  0504 04/07/21  0449 04/06/21  0353    137* 138   K 3.8 4.1 3.8   MG  --   --  2.3   BUN 54* 31* 21   CREA 1.78* 0.95 0.92   * 107* 105*   WBC 14.2*  --  5.8   HGB 14.5  14.5 14.1 13.7   HCT 42.1  42.1 41.7 40.0*     --  167       No results for input(s): TNIPOC, TROIQ in the last 72 hours.       Assessment/Plan:     Principal Problem:    Hemorrhagic cerebrovascular accident (CVA) ([facility] [address] (4/2/2021)    Active Problems:    Iron deficiency anemia (1/25/2015)      S/P PTCA (percutaneous transluminal coronary angioplasty) ()      Chronic tuberculosis ()      Coronary atherosclerosis of native coronary vessel ()      Hyperlipidemia ()      Acute left-sided weakness (4/2/2021)      Elevated troponin (4/4/2021)      Dysphagia following cerebral infarction (4/6/2021)      Elevated liver enzymes (4/6/2021)    A/P  1) CAD - asa when cleared by surgery  2) Troponin elevation - likely demand ischemia, no planned intervention  3) HTN - per neuro protocol      Osvaldo Mckeon MD  4/8/2021 4:12 PM

## 2021-04-08 NOTE — PROGRESS NOTES
Physical Therapy Note:    Chart reviewed for physical therapy treatment. Per chart, patient had EVD placed overnight secondary to increasing hydrocephalus. Will HOLD today and attempt tomorrow as patient is able.     Thank you,  Mikael Monroy, PT, DPT

## 2021-04-08 NOTE — PROGRESS NOTES
[facility]ist Progress Note    Patient: Shona Boas MRN: [id number]  SSN: [id number]    YOB: 1945  Age: 67 y.o. 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 Neurosurgery evaluated. No surgery indicated. He failed a swallow study and had silent aspiration on MBSS on 4/6. He was more drowsy on 4/7 and CT showed worse hydrocephalus so he went to the OR for EVD. 4/8 - He is more alert this AM. Seems more confused but trying to talk. Cannot move left arm or leg. Review of systems negative except stated above. Assessment/Plan (MDM):      Active Medical Complaints and Diagnoses:    Principal Problem:    Hemorrhagic cerebrovascular accident (CVA) ([facility] [address] (4/2/2021)  - CT 4/7 with worsening hydrocephalus  - Went for EVD on 4/7  - Continue BP support - goal SBP <140  - NGT in placed  - Continue Lopressor 25mg BID  - Amlodipine 5mg  - On/Off Cardene gtt  - IV Lopressor PRN  - Appreciate neurosurgeries assistance    Active Problems:    Acute left-sided weakness (4/2/2021)  - Due to #1  - PT/OT      Dysphagia following cerebral infarction (4/6/2021)  - Failed swallow study  - NGT in place --> continue TFs  - Continue to assess  - May need PEG prior to discharge      Elevated troponin (4/4/2021)  - Supply/demand mismatch  - Cardiology following      Elevated liver enzymes (4/6/2021)  - Likely untreated Hep C  - Improving  - Continue to monitor      Iron deficiency anemia (1/25/2015)  - Stable  - Continue to monitor Coronary atherosclerosis of native coronary vessel ()  - Will need to resume ASA when ok with neurosurgery  - Never was taking Effient  - Cardiology following      S/P PTCA (percutaneous transluminal coronary angioplasty) ()  - Will need to resume ASA when ok with neurosurgery  - Never was taking Effient  - Cardiology following      Otherwise all chronic medical issues appear stable with no changes. See assessment at bottom of progress note for complete acute, chronic, and resolved hospital medical issues. Diet: DIET NPO Except Meds  DIET TUBE FEEDING Open order for details. Keep HOB elevated >30 degrees. VTE ppx: SCDs    Objective:     Visit Vitals  /74   Pulse 83   Temp 99.9 °F (37.7 °C)   Resp 30   Ht 5' 8\" (1.727 m)   Wt 60.8 kg (134 lb)   SpO2 94%   BMI 20.37 kg/m²      Oxygen Therapy  O2 Sat (%): 94 % (04/08/21 0946)  Pulse via Oximetry: 85 beats per minute (04/08/21 0946)  O2 Device: Nasal cannula (04/08/21 0700)  O2 Flow Rate (L/min): 2 l/min (04/08/21 0700)      Intake and Output:     Intake/Output Summary (Last 24 hours) at 4/8/2021 1006  Last data filed at 4/8/2021 0900  Gross per 24 hour   Intake 425 ml   Output 930 ml   Net -505 ml         Physical Exam:   GENERAL: alert, cooperative, no distress, appears stated age  EYE: conjunctivae/corneas clear. PERRL. THROAT & NECK: normal and no erythema or exudates noted. LUNG: clear to auscultation bilaterally  HEART: regular rate and rhythm, S1S2, no murmur, no JVD  ABDOMEN: soft, non-tender, non-distended. Bowel sounds normal.   EXTREMITIES:  No edema, 2+ pedal/radial pulses bilaterally  SKIN: no rash or abnormalities  NEUROLOGIC: Alert. Cranial nerves 2-12 grossly intact.  Left side 0/5 strength    Lab/Data Review:  Recent Results (from the past 24 hour(s))   PLEASE READ & DOCUMENT PPD TEST IN 24 HRS    Collection Time: 04/07/21 12:15 PM   Result Value Ref Range    PPD      mm Induration     COVID-19 RAPID TEST    Collection Time: 04/07/21 12:21 PM   Result Value Ref Range    Specimen source Nasopharyngeal      COVID-19 rapid test Not detected NOTD     METABOLIC PANEL, BASIC    Collection Time: 04/08/21  5:04 AM   Result Value Ref Range    Sodium 141 136 - 145 mmol/L    Potassium 3.8 3.5 - 5.1 mmol/L    Chloride 112 (H) 98 - 107 mmol/L    CO2 20 (L) 21 - 32 mmol/L    Anion gap 9 7 - 16 mmol/L    Glucose 133 (H) 65 - 100 mg/dL    BUN 54 (H) 8 - 23 MG/DL    Creatinine 1.78 (H) 0.8 - 1.5 MG/DL    GFR est AA 48 (L) >60 ml/min/1.73m2    GFR est non-AA 40 (L) >60 ml/min/1.73m2    Calcium 9.0 8.3 - 10.4 MG/DL   HGB & HCT    Collection Time: 04/08/21  5:04 AM   Result Value Ref Range    HGB 14.5 13.6 - 17.2 g/dL    HCT 42.1 41.1 - 50.3 %   CBC W/O DIFF    Collection Time: 04/08/21  5:04 AM   Result Value Ref Range    WBC 14.2 (H) 4.3 - 11.1 K/uL    RBC 5.45 4.23 - 5.6 M/uL    HGB 14.5 13.6 - 17.2 g/dL    HCT 42.1 41.1 - 50.3 %    MCV 78.7 (L) 79.6 - 97.8 FL    MCH 26.6 26.1 - 32.9 PG    MCHC 33.8 31.4 - 35.0 g/dL    RDW 13.9 11.9 - 14.6 %    PLATELET 448 173 - 403 K/uL    MPV 11.4 9.4 - 12.3 FL    ABSOLUTE NRBC 0.00 0.0 - 0.2 K/uL       SARS-CoV-2 Lab Results  \"Novel Coronavirus\" Test: No results found for: COV2NT   \"Emergent Disease\" Test: No results found for: EDPR  \"SARS-COV-2\" Test: No results found for: XGCOVT  \"Precision Labs\" Test: No results found for: RSLT  Rapid Test:   Lab Results   Component Value Date/Time    COVR Not detected 04/07/2021 12:21 PM           Imaging:  Xr Swallow Func Video    Result Date: 4/6/2021  Modified Barium Swallow, 4/6/2021 History: Hemorrhagic CVA. Technique: Real time fluoroscopic imaging was provided for the speech pathologist. Multiple consistencies of barium were given. No spot images were obtained as part of today's study. The total fluoroscopy time is 2.3 minutes. Findings: The patient's ability to swallow was evaluated with thin, and pudding consistencies. The patient had significant difficulty initiating a swallow.  There is significant residuals throughout the hypopharynx even with thin consistencies. Multiple episodes of penetration were seen with thin and pudding consistencies. In addition, there was an episode of aspiration at the conclusion of today's study with thin consistency through a straw. 1. Imaging findings as described above. Please see speech pathologist report for further details. Ct Head Wo Cont    Result Date: 4/4/2021  CT HEAD WITHOUT CONTRAST. INDICATION: Intracranial hemorrhage with worsening vision. COMPARISON: Yesterday's exam  TECHNIQUE:   5 mm axial scans from the skull base to the vertex. Our CT scanners use one or more of the following:  Automated exposure control, adjustment of the mA and or kV according to patient size, iterative reconstruction. FINDINGS:  Acute hemorrhage right thalamus and basal ganglia without significant change. Decreased midline shift, 6 mm. There is blood in the posterior horn of the lateral ventricles, right more than left. There is blood in the third ventricle. No new hemorrhage. Posterior fossa structures unremarkable. Stable acute intracranial hemorrhage with decreased midline shift, 6mm. Ct Head Wo Cont    Result Date: 4/3/2021  NONCONTRAST HEAD CT CLINICAL HISTORY:  Follow-up intracranial hemorrhage. TECHNIQUE:  Axial images were obtained with spiral technique. Radiation dose reduction was achieved using one or all of the following techniques: automated exposure control, weight-based dosing, iterative reconstruction. COMPARISON:  CT yesterday and 0617 hours today. REPORT:   Standard noncontrast head CT demonstrates continued increase in size of the right thalamic hemorrhage with maximum diameter now measured at approximately 3.6 cm compared with 2.9 cm earlier today. Moreover, there is new bilateral lateral intraventricular extension of hemorrhage, primarily localized to the atria.   There is associated slight increase in bilateral lateral ventriculomegaly with biatrial diameter now measuring approximately 4.2 cm compared with 3.8 cm this morning. Mass effect is little changed with midline shift from right to left of approximately 5 mm compared with 4 mm this morning. Extensive small vessel ischemic disease and small focal lateral right frontal infarct appear unchanged. INTERVAL INCREASE IN SIZE OF THE RIGHT THALAMIC HEMORRHAGE WITH NEW BILATERAL LATERAL INTRAVENTRICULAR EXTENSION AND SLIGHT WORSENING OF VENTRICULOMEGALY. Ct Head Wo Cont    Result Date: 4/3/2021  EXAM: CT HEAD WO CONT HISTORY: .  TECHNIQUE: Axial images of the brain were performed without the administration of intravenous contrast. Images were obtained axial plane and coronal reformatted images were submitted. CT scan performed using appropriate/available dose optimization/reduction/ALARA techniques. COMPARISON: None. FINDINGS: The known right thalamic hemorrhage is again observed. There is surrounding edema. The body of this hemorrhage has not changed significantly. Currently it measures 2.4 x 2.9 cm when previously it measured 2.4 x 2.7 cm. There has been interval increase in an area of adjacent or extension of the hemorrhage posteriorly. Previously it measured 4.5 mm in width. Currently it measures 1.5 cm in width and 1.3 cm AP. There is persistent intraventricular hemorrhage in the posterior horn of the right lateral ventricle. Interval development of a small amount of hemorrhage in the left posterior horn. There is mild midline shift of the posterior septum pellucidum of approximately 4.1 mm. The lateral ventricles are mildly dilated, stable. Chronic ischemic white matter changes are again observed. Question small chronic infarction in the right frontal lobe. Interval increase in the size of the right thalamic hemorrhage. Interval development of mild right to left midline shift of the posterior aspect of the septum pellucidum.  Stable hemorrhage in the posterior horn of the right lateral ventricle. Interval development of a small amount of hemorrhage in the posterior horn of the left lateral ventricle. Other findings as above. [id number] [facility]    Result Date: 4/3/2021  RIGHT UPPER QUADRANT ULTRASOUND. HISTORY: Transaminitis. COMPARISON: No relevant comparison studies available. FINDINGS: Ultrasonographic Ramon's sign: is reported as negative Gallstones: There are intraluminal echogenic foci, probably stones. Gallbladder Wall: not thickened. Common Bile Duct: is not dilated, 4 mm. Intrahepatic Biliary Tree: is not dilated. Liver: Uniform parenchyma Included portion of the pancreas and right kidney: are unremarkable. Vasculature: Aorta: Normal caliber. IVC:  Patent. Portal vein: Hepatopedal flow. Probable gallstones. No biliary tree obstruction. No findings to suggest acute cholecystitis. Xr Chest Port    Result Date: 4/2/2021  CHEST X-RAY, one view. HISTORY:  Chest pain. TECHNIQUE:  AP upright portable view. COMPARISON: August 2017 FINDINGS: Lungs: Atelectasis or scar right base, similar to prior exam. Costophrenic angles: are sharp. Heart size: is normal. Pulmonary vasculature: is unremarkable. Aorta: Arch calcifications. Included portion of the upper abdomen: is unremarkable. Bones: No gross bony lesions. Other: None. Negative for acute change. Ct Code Neuro Head Wo Contrast    Result Date: 4/2/2021  CT HEAD WITHOUT CONTRAST HISTORY:  CVA. COMPARISON: None. TECHNIQUE: Axial imaging was performed without intravenous contrast utilizing 5mm slice thickness. Sagittal and coronal reformats were performed. Radiation dose reduction techniques were used for this study. Our CT scanner uses one or all of the following: Automated exposure control, adjustment of the MAS or KUB according to patient's size and iterative reconstruction.  FINDINGS:    *BRAIN:    -  There are no early signs of territorial or lacunar infarction by CT.    -  2.7 x 2.4 cm acute hemorrhage into the right basal ganglia and thalamus. Right lateral ventricle intraventricular hemorrhage.    -  No gross white matter abnormality by CT. *VISUALIZED PARANASAL SINUSES: Well aerated. *MASTOIDS:  Clear. *CALVARIUM AND SCALP: Unremarkable. Acute right basal ganglia hematoma likely on the basis of hypertension. Right intraventricular hemorrhage. Date of Dictation: 2021 6:17 PM     Results for orders placed or performed during the hospital encounter of 21   2D ECHO COMPLETE ADULT (TTE) W OR [address] [facility]  [address] [facility], [facility] [address] [facility]  [phone]    Transthoracic Echocardiogram  2D, M-mode, Doppler, and Color Doppler    Patient: Rona Villagran  MR #: [id number]  : [id number]  Age: 67 years  Gender: Male  Study date: 2021  Account #: [de-identified]  Height: 68 in  Weight: 133.8 lb  BSA: 1.72 mï¾²  Status:Routine  Location: [facility]  BP: 137/ 77    Allergies: NO KNOWN ALLERGENS    Sonographer:  Krista Whyte RUST  Group:  [facility]  Referring Physician:  Lizbeth Renteria DO  Reading Physician:  Dr. Bassam Puentes    INDICATIONS: TIA with transient neurological disturbance    PROCEDURE: This was a routine study. A transthoracic echocardiogram was  performed. The study included complete 2D imaging, M-mode, complete spectral  Doppler, and color Doppler. Intravenous contrast (Definity) was administered. Intravenous contrast (agitated saline) was administered. Image quality was  adequate. LEFT VENTRICLE: Size was normal. Systolic function was normal. Ejection  fraction was estimated in the range of 55 % to 60 %. There were no regional  wall motion abnormalities. Wall thickness was mildly increased. Left  ventricular diastolic function parameters were normal. Avg E/e': 4.54.     RIGHT VENTRICLE: The size was normal. Systolic function was normal. The  tricuspid jet envelope definition was inadequate for estimation of RV systolic  pressure. LEFT ATRIUM: Size was normal.    ATRIAL SEPTUM: Agitated saline contrast injection (bubble study) was   performed. There was no right-to-left shunt, at rest or induced by the Valsalva   maneuver. RIGHT ATRIUM: Size was normal.    SYSTEMIC VEINS: IVC: The inferior vena cava was normal in size. The  respirophasic change in diameter was more than 50%. AORTIC VALVE: There is mild aortic annular calcification. The valve was  trileaflet. Leaflets exhibited mild sclerosis. MITRAL VALVE: Valve structure was normal.    TRICUSPID VALVE: The valve structure was normal. There was trivial  regurgitation. PULMONIC VALVE: Not well visualized. PERICARDIUM: There was no pericardial effusion. AORTA: The root exhibited normal size. SUMMARY:    -  Left ventricle: Systolic function was normal. Ejection fraction was  estimated in the range of 55 % to 60 %. -  Atrial septum: Agitated saline contrast injection (bubble study) was  performed. There was no right-to-left shunt, at rest or induced by the   Valsalva  maneuver. SYSTEM MEASUREMENT TABLES    2D  Ao Diam: 2.8 cm  LAEDV Index (A-L): 26.9 ml/m2  IVSd: 1.1 cm  LVIDd: 5.2 cm  LVPWd: 0.8 cm    PW  E/E' Av.5    Prepared and signed by    Dr. Angela Yin  Signed 2021 22:44:08         Cultures: All Micro Results     Procedure Component Value Units Date/Time    COVID-19 RAPID TEST [[id number]] Collected: 21 1221    Order Status: Completed Specimen: Nasopharyngeal Updated: 21 1301     Specimen source Nasopharyngeal        COVID-19 rapid test Not detected        Comment:      The specimen is NEGATIVE for SARS-CoV-2, the novel coronavirus associated with COVID-19. A negative result does not rule out COVID-19. This test has been authorized by the FDA under an Emergency Use Authorization (EUA) for use by authorized laboratories.         Fact sheet for Healthcare Providers: ConventionUpdate.co.nz  Fact sheet for Patients: ConventionUpdate.co.nz       Methodology: Isothermal Nucleic Acid Amplification               Assessment:     Primary Diagnosis: Hemorrhagic cerebrovascular accident (CVA) [facility])    Hospital Problems as of 4/8/2021 Date Reviewed: 4/4/2021          Codes Class Noted - Resolved POA    * (Principal) Hemorrhagic cerebrovascular accident (CVA) ([address] ICD-10-CM: I61.9  ICD-9-CM: 615  4/2/2021 - Present Yes        Dysphagia following cerebral infarction ICD-10-CM: I69.391  ICD-9-CM: 438.82  4/6/2021 - Present Yes        Acute left-sided weakness ICD-10-CM: R53.1  ICD-9-CM: 728.87  4/2/2021 - Present Yes        Elevated liver enzymes ICD-10-CM: R74.8  ICD-9-CM: 790.5  4/6/2021 - Present Yes        Elevated troponin ICD-10-CM: R77.8  ICD-9-CM: 790.6  4/4/2021 - Present Yes        Coronary atherosclerosis of native coronary vessel ICD-10-CM: I25.10  ICD-9-CM: 414.01  Unknown - Present Yes        Iron deficiency anemia ICD-10-CM: D50.9  ICD-9-CM: 280.9  1/25/2015 - Present Yes        S/P PTCA (percutaneous transluminal coronary angioplasty) ICD-10-CM: Z98.61  ICD-9-CM: V45.82  Unknown - Present Yes        Chronic tuberculosis ICD-10-CM: A15.9  ICD-9-CM: 011.90  Unknown - Present Yes        Hyperlipidemia ICD-10-CM: E78.5  ICD-9-CM: 272.4  Unknown - Present Yes        Microcytic anemia ICD-10-CM: D50.9  ICD-9-CM: 280.9  1/21/2015 - Present     Overview Signed 4/6/2016  9:52 AM by Brianna Ovalles     Iron deficiency                     Discharge Plan:     IRC vs STR    Signed By: Michel Tyson DO     April 8, 2021

## 2021-04-09 ENCOUNTER — APPOINTMENT (OUTPATIENT)
Dept: GENERAL RADIOLOGY | Age: 76
DRG: 023 | End: 2021-04-09
Attending: INTERNAL MEDICINE
Payer: MEDICARE

## 2021-04-09 PROBLEM — E43 SEVERE PROTEIN-CALORIE MALNUTRITION (HCC): Status: ACTIVE | Noted: 2021-04-09

## 2021-04-09 LAB
ANION GAP SERPL CALC-SCNC: 5 MMOL/L (ref 7–16)
BUN SERPL-MCNC: 65 MG/DL (ref 8–23)
CALCIUM SERPL-MCNC: 9.3 MG/DL (ref 8.3–10.4)
CHLORIDE SERPL-SCNC: 111 MMOL/L (ref 98–107)
CO2 SERPL-SCNC: 27 MMOL/L (ref 21–32)
CREAT SERPL-MCNC: 1.74 MG/DL (ref 0.8–1.5)
GLUCOSE SERPL-MCNC: 136 MG/DL (ref 65–100)
HCT VFR BLD AUTO: 42.4 % (ref 41.1–50.3)
HCV AB S/CO SERPL IA: >11 S/CO RATIO (ref 0–0.9)
HCV RNA SERPL QL NAA+PROBE: NORMAL
HCV RNA SERPL QL NAA+PROBE: POSITIVE
HGB BLD-MCNC: 13.9 G/DL (ref 13.6–17.2)
MAGNESIUM SERPL-MCNC: 3.1 MG/DL (ref 1.8–2.4)
PHOSPHATE SERPL-MCNC: 2.9 MG/DL (ref 2.3–3.7)
POTASSIUM SERPL-SCNC: 3.8 MMOL/L (ref 3.5–5.1)
SODIUM SERPL-SCNC: 143 MMOL/L (ref 138–145)

## 2021-04-09 PROCEDURE — 97535 SELF CARE MNGMENT TRAINING: CPT

## 2021-04-09 PROCEDURE — 74011250636 HC RX REV CODE- 250/636: Performed by: STUDENT IN AN ORGANIZED HEALTH CARE EDUCATION/TRAINING PROGRAM

## 2021-04-09 PROCEDURE — 92526 ORAL FUNCTION THERAPY: CPT

## 2021-04-09 PROCEDURE — 85018 HEMOGLOBIN: CPT

## 2021-04-09 PROCEDURE — 74011000258 HC RX REV CODE- 258: Performed by: INTERNAL MEDICINE

## 2021-04-09 PROCEDURE — 74011250637 HC RX REV CODE- 250/637: Performed by: INTERNAL MEDICINE

## 2021-04-09 PROCEDURE — 83735 ASSAY OF MAGNESIUM: CPT

## 2021-04-09 PROCEDURE — 65610000006 HC RM INTENSIVE CARE

## 2021-04-09 PROCEDURE — 80048 BASIC METABOLIC PNL TOTAL CA: CPT

## 2021-04-09 PROCEDURE — 92507 TX SP LANG VOICE COMM INDIV: CPT

## 2021-04-09 PROCEDURE — 84100 ASSAY OF PHOSPHORUS: CPT

## 2021-04-09 PROCEDURE — 74011000250 HC RX REV CODE- 250: Performed by: STUDENT IN AN ORGANIZED HEALTH CARE EDUCATION/TRAINING PROGRAM

## 2021-04-09 PROCEDURE — 74011000250 HC RX REV CODE- 250: Performed by: NEUROLOGICAL SURGERY

## 2021-04-09 PROCEDURE — 74011250636 HC RX REV CODE- 250/636: Performed by: INTERNAL MEDICINE

## 2021-04-09 PROCEDURE — 36415 COLL VENOUS BLD VENIPUNCTURE: CPT

## 2021-04-09 PROCEDURE — 97112 NEUROMUSCULAR REEDUCATION: CPT

## 2021-04-09 PROCEDURE — 97530 THERAPEUTIC ACTIVITIES: CPT

## 2021-04-09 PROCEDURE — 74011250637 HC RX REV CODE- 250/637: Performed by: FAMILY MEDICINE

## 2021-04-09 PROCEDURE — 99231 SBSQ HOSP IP/OBS SF/LOW 25: CPT | Performed by: INTERNAL MEDICINE

## 2021-04-09 PROCEDURE — 74011000250 HC RX REV CODE- 250: Performed by: FAMILY MEDICINE

## 2021-04-09 PROCEDURE — 71045 X-RAY EXAM CHEST 1 VIEW: CPT

## 2021-04-09 PROCEDURE — 87040 BLOOD CULTURE FOR BACTERIA: CPT

## 2021-04-09 RX ORDER — SODIUM CHLORIDE, SODIUM LACTATE, POTASSIUM CHLORIDE, CALCIUM CHLORIDE 600; 310; 30; 20 MG/100ML; MG/100ML; MG/100ML; MG/100ML
75 INJECTION, SOLUTION INTRAVENOUS CONTINUOUS
Status: DISCONTINUED | OUTPATIENT
Start: 2021-04-09 | End: 2021-04-10

## 2021-04-09 RX ORDER — VANCOMYCIN/0.9 % SOD CHLORIDE 1.5G/250ML
1500 PLASTIC BAG, INJECTION (ML) INTRAVENOUS ONCE
Status: COMPLETED | OUTPATIENT
Start: 2021-04-09 | End: 2021-04-09

## 2021-04-09 RX ADMIN — PIPERACILLIN SODIUM AND TAZOBACTAM SODIUM 3.38 G: 3; .375 INJECTION, POWDER, LYOPHILIZED, FOR SOLUTION INTRAVENOUS at 10:16

## 2021-04-09 RX ADMIN — VANCOMYCIN HYDROCHLORIDE 1500 MG: 10 INJECTION, POWDER, LYOPHILIZED, FOR SOLUTION INTRAVENOUS at 11:10

## 2021-04-09 RX ADMIN — SODIUM CHLORIDE, SODIUM LACTATE, POTASSIUM CHLORIDE, AND CALCIUM CHLORIDE 75 ML/HR: 600; 310; 30; 20 INJECTION, SOLUTION INTRAVENOUS at 10:43

## 2021-04-09 RX ADMIN — MANNITOL: 20 INJECTION, SOLUTION INTRAVENOUS at 18:06

## 2021-04-09 RX ADMIN — MANNITOL: 20 INJECTION, SOLUTION INTRAVENOUS at 23:28

## 2021-04-09 RX ADMIN — MANNITOL: 20 INJECTION, SOLUTION INTRAVENOUS at 11:43

## 2021-04-09 RX ADMIN — SODIUM CHLORIDE 5 MG/HR: 900 INJECTION, SOLUTION INTRAVENOUS at 16:56

## 2021-04-09 RX ADMIN — Medication 10 ML: at 05:03

## 2021-04-09 RX ADMIN — Medication 10 ML: at 20:21

## 2021-04-09 RX ADMIN — Medication 10 ML: at 13:26

## 2021-04-09 RX ADMIN — ACETAMINOPHEN 650 MG: 325 SUSPENSION ORAL at 17:07

## 2021-04-09 RX ADMIN — METOPROLOL TARTRATE 25 MG: 25 TABLET, FILM COATED ORAL at 20:19

## 2021-04-09 RX ADMIN — MANNITOL: 20 INJECTION, SOLUTION INTRAVENOUS at 05:03

## 2021-04-09 RX ADMIN — PIPERACILLIN SODIUM AND TAZOBACTAM SODIUM 3.38 G: 3; .375 INJECTION, POWDER, LYOPHILIZED, FOR SOLUTION INTRAVENOUS at 18:05

## 2021-04-09 RX ADMIN — AMLODIPINE BESYLATE 5 MG: 5 TABLET ORAL at 09:38

## 2021-04-09 RX ADMIN — METOPROLOL TARTRATE 25 MG: 25 TABLET, FILM COATED ORAL at 09:38

## 2021-04-09 RX ADMIN — ATORVASTATIN CALCIUM 40 MG: 40 TABLET, FILM COATED ORAL at 20:21

## 2021-04-09 NOTE — PROGRESS NOTES
Pharmacokinetic Consult to Pharmacist    Jennorlando Enciso is a 67 y.o. male being treated for HAP with vancomycin and zosyn    Height: 5' 8\" (172.7 cm)  Weight: 60.8 kg (134 lb)  Lab Results   Component Value Date/Time    BUN 65 (H) 04/09/2021 05:19 AM    Creatinine 1.74 (H) 04/09/2021 05:19 AM    WBC 14.2 (H) 04/08/2021 05:04 AM      Estimated Creatinine Clearance: 30.6 mL/min (A) (based on SCr of 1.74 mg/dL (H)). CULTURES:  pending    Day 1 of vancomycin. Goal trough is 15-20. Vancomycin dose initiated at 1500 mg X 1, then will dose intermittently due to worsening renal dysfunction. Will continue to follow patient and order levels when clinically indicated.     Thank you,  Kevin Condon, PharmD, [id number]  Clinical Pharmacist  [phone]

## 2021-04-09 NOTE — PROGRESS NOTES
[facility] CARDIOLOGY PROGRESS NOTE           4/9/2021 7:48 AM    Admit Date: 4/2/2021         Subjective: Nodding appropriately when asked questions, denies CP or SOB. ROS:  Cardiovascular:  As noted above    Objective:      Vitals:    04/09/21 0515 04/09/21 0530 04/09/21 0545 04/09/21 0616   BP:   (!) 141/75 130/73   Pulse: (!) 125 94 96 94   Resp: (!) 34 (!) 47 (!) 34 (!) 62   Temp:       SpO2: 96% 96% 97% 97%   Weight:       Height:           On telemetry:      Physical Exam:  General: acutely ill appearing  Neck: supple, no JVD  Heart: S1S2 with RRR without murmurs or gallops  Lungs: Clear throughout auscultation bilaterally without adventitious sounds  Abd: soft, nontender, nondistended, with good bowel sounds  Ext: no edema bilaterally  Skin: warm and dry      Data Review:   Recent Labs     04/09/21  0519 04/08/21  0504    141   K 3.8 3.8   MG 3.1*  --    BUN 65* 54*   CREA 1.74* 1.78*   * 133*   WBC  --  14.2*   HGB 13.9 14.5  14.5   HCT 42.4 42.1  42.1   PLT  --  210       No results for input(s): TNIPOC, TROIQ in the last 72 hours. Assessment/Plan:     Principal Problem:    Hemorrhagic cerebrovascular accident (CVA) ([address] (4/2/2021)    Active Problems:    Iron deficiency anemia (1/25/2015)      S/P PTCA (percutaneous transluminal coronary angioplasty) ()      Chronic tuberculosis ()      Coronary atherosclerosis of native coronary vessel ()      Hyperlipidemia ()      Acute left-sided weakness (4/2/2021)      Elevated troponin (4/4/2021)      Dysphagia following cerebral infarction (4/6/2021)      Elevated liver enzymes (4/6/2021)    A/P  1) CAD - asa when cleared by surgery  2) Troponin elevation - likely demand ischemia, no planned intervention, echo 4/2 normal function and normal diastolic function  3) HTN - per neuro protocol    Cardiology will sign off please call if there are additional questions.       Marcela Garner MD  4/9/2021 7:48 AM

## 2021-04-09 NOTE — PROGRESS NOTES
NEUROSURGERY PROGRESS NOTE:   Admit Date: 4/2/2021  Subjective:   Patient looks brighter this am but has increased rate of breathing with some difficulty speaking. Patient with Leukocytosis yesterday with a Tmax yesterday of 102. He is on Ancef for EVD drain prophylaxis but no other anti-biotics. He was started on levo overnight for hypotension. Objective:  Visit Vitals  /82   Pulse (!) 102   Temp 97.7 °F (36.5 °C)   Resp 27   Ht 5' 8\" (1.727 m)   Wt 134 lb (60.8 kg)   SpO2 94%   BMI 20.37 kg/m²     Oriented only to name with difficulty conversing secondary to respiratory distress, patient with tachypnea this am  Eyes open to spontaneous and tracking observer   PERRL  Hearing grossly intact   Follows commands simple commands in the right upper and right lower extremity briskly   Withdraws in the right lower and localizes in the right upper extremity. ICP less than 5 this am at bedside, EVD 78 cc with xanthrochromic CSF     Assessment and Plan:   Collette Leather 67 y.o. male with right thalamic intracerebral hemorrhage with interval development of acute obstructive hydrocephalus. He appears more alert this am.   - Continue EVD but lower to 5 cm H2O, with plan to attempt a clamp and ICP monitor trial period with 7 days of placement   - Continue ancef for drain prophylaxis, may discontinue and transition to broad spectrum anti-biotics if felt necessary  - Continue q1H neuro checks  - Patient with increased work of breathing this am and leukocytosis yesterday concerning for possible aspiration pneumonia, discussed work-up with Dr. Merlinda Delaine his attending who with evaluate and treat if necessary. Mayuri Shaikh.  Shavonne Waterman, [address] [place] [address]

## 2021-04-09 NOTE — PROGRESS NOTES
Hospitalist Progress Note     Admit Date:  2021  5:50 PM   Name:  Nimisha Kuhn   Age:  67 y.o.  :  1945   MRN:  [id number]   PCP:  Mervat Nye MD  Treatment Team: Attending Provider: Erna Thomas MD; Consulting Provider: Amanda Bryantkeeper, Javier Strickland MD; Utilization Review: Kwadwo Rios; Consulting Provider: Tianna Helm MD; Care Manager: Ayleen Gordon; Care Manager: Sonia Mares RN; Utilization Review:  Jamari Portillo; Primary Nurse: Tyree Melgar RN; Speech Language Pathologist: Joseph Galarza, MUNIR; Physical Therapist: Sole Harris PT, DPT; Occupational Therapist: Maria Isabel Nguyen OT  Presenting Complaint: Extremity Weakness    Initial Admission Diagnosis: Hemorrhagic cerebrovascular accident (CVA) [facility]) [I61.9]     Assessment and Plan:     Hospital Problems as of 2021 Date Reviewed: 2021          Codes Class Noted - Resolved POA    Dysphagia following cerebral infarction ICD-10-CM: I69.391  ICD-9-CM: 438.82  2021 - Present Yes        Elevated liver enzymes ICD-10-CM: R74.8  ICD-9-CM: 790.5  2021 - Present Yes        Elevated troponin ICD-10-CM: R77.8  ICD-9-CM: 790.6  2021 - Present Yes        * (Principal) Hemorrhagic cerebrovascular accident (CVA) ([facility] [address] ICD-10-CM: I61.9  ICD-9-CM: 936  2021 - Present Yes        Acute left-sided weakness ICD-10-CM: R53.1  ICD-9-CM: 728.87  2021 - Present Yes        S/P PTCA (percutaneous transluminal coronary angioplasty) ICD-10-CM: Z98.61  ICD-9-CM: V45.82  Unknown - Present Yes        Chronic tuberculosis ICD-10-CM: A15.9  ICD-9-CM: 011.90  Unknown - Present Yes        Coronary atherosclerosis of native coronary vessel ICD-10-CM: I25.10  ICD-9-CM: 414.01  Unknown - Present Yes        Hyperlipidemia ICD-10-CM: E78.5  ICD-9-CM: 272.4  Unknown - Present Yes        Iron deficiency anemia ICD-10-CM: D50.9  ICD-9-CM: 280.9  2015 - Present Yes        Microcytic anemia ICD-10-CM: D50.9  ICD-9-CM: 280.9 1/21/2015 - Present     Overview Signed 4/6/2016  9:52 AM by Lewis Lima     Iron deficiency                   Plan:  # Sepsis suspected 2/2 aspiration vs HAP   - Tachycardia, fever, leukocytosis, CXR with LLL infiltrate. Zosyn/Vanc for now to cover nosocomial organisms. Check blood cultures. Swallow eval when able, prior notes mention dysphagia so may also need to consider PEG based on swallow eval.     # Right BG/intraventricular hemorrhagic CVA   - S/p EVD on 4/7. Mgmt per Neurosurgery. # DENI on CKD3   - Baseline Cr 1.1-1.3 with GFR 50s. Cr 1.7 today and stable. Add LR, Is/Os, BMP tomorrow. # Elevated troponin   - Cardiology saw, likely demand, normal EF on echo. # CAD   - ASA when ok from surgical standpoint. Con't statin. # HTN   - Norvasc, Lopressor. Off Cardene currently. Other listed chronic conditions stable, continue current management. DC planning: Will need placement. Con't current level of care. Diet:  DIET NPO  DIET TUBE FEEDING  DVT ppx: SCDs only. Hospital Course:   Mr. Renetta Avery is a nice 66 y/o AAM with a h/o HTN, CAD, GERD, HLD, COPD and arthritis who was admitted to ICU on 4/2 with an acute right basal ganglia hemorrhagic CVA. Neurosurgery consulted who recommended conservative management given lack of midline shift or hydrocephalus. Cardiology was consulted for elevated troponin, likely demand ischemia. Head CTs were stable for a few days then he became more drowsy on 4/7 and repeat CT showed increase in right ventricular hemorrhage suspicious for early hydrocephalus. He underwent bedside EVD 4/7 PM.    24hr Events/Subjective:   4/9: Sleeping comfortably but awakens to voice. Weak appearing. Follows commands on the right but still unable to move the left side. Off Cardene. Tachycardia, mild SOB at rest. Tm 102F overnight. CBC pending to check WBCs.      No other complaints  Objective:     Patient Vitals for the past 24 hrs:   Temp Pulse Resp BP SpO2   04/09/21 0938  (!) 124  126/77    04/09/21 0816  (!) 102 27 124/82 94 %   04/09/21 0800 97.7 °F (36.5 °C) 100 20 114/75 95 %   04/09/21 0745  98 20 137/79 93 %   04/09/21 0716  96 23 125/68 94 %   04/09/21 0700  94 22 127/68 98 %   04/09/21 0616  94 (!) 62 130/73 97 %   04/09/21 0545  96 (!) 34 (!) 141/75 97 %   04/09/21 0530  94 (!) 47  96 %   04/09/21 0515  (!) 125 (!) 34  96 %   04/09/21 0500  (!) 129 24 132/77 96 %   04/09/21 0445  (!) 101 28 126/76 97 %   04/09/21 0400  89 25 113/62 99 %   04/09/21 0345  91 28 112/62 98 %   04/09/21 0300  90 23 122/70 98 %   04/09/21 0245  89 (!) 51 123/73 96 %   04/09/21 0231  90 20 117/65 92 %   04/09/21 0216  94 (!) 62 114/66 93 %   04/09/21 0215  96 (!) 52  93 %   04/09/21 0200  87 (!) 62 103/60 96 %   04/09/21 0145  93 (!) 37 136/63 97 %   04/09/21 0130  86 (!) 46  95 %   04/09/21 0115  92 26  96 %   04/09/21 0100  83 20 108/63 98 %   04/09/21 0045  84 20 (!) 104/58 98 %   04/09/21 0000  82 24 (!) 94/52 94 %   04/08/21 2345  80 16 (!) 91/50 95 %   04/08/21 2316  80 27 (!) 102/57 94 %   04/08/21 2313  80 25 (!) 98/56 94 %   04/08/21 2300 98.6 °F (37 °C) 81 (!) 31 (!) 83/53 93 %   04/08/21 2245  81 26 (!) 88/54 94 %   04/08/21 2240  82 26 (!) 89/52 95 %   04/08/21 2220  82 (!) 32 (!) 87/53 94 %   04/08/21 2147  84 (!) 50 (!) 91/55 93 %   04/08/21 2131  86 29 (!) 91/54 93 %   04/08/21 2101  91 (!) 55 121/78 92 %   04/08/21 2058  88 28 119/74 92 %   04/08/21 2031  (!) 109 (!) 44 115/70 92 %   04/08/21 2015  (!) 121 (!) 47 122/75 93 %   04/08/21 1916  (!) 127 (!) 34 (!) 145/92 92 %   04/08/21 1915  (!) 126 (!) 39 (!) 148/92 92 %   04/08/21 1900 (!) 102 °F (38.9 °C) (!) 122 (!) 31 (!) 140/90 91 %   04/08/21 1746    131/82 99 %   04/08/21 1716  (!) 122 30 (!) 130/11 99 %   04/08/21 1646  (!) 119 30 (!) 142/84 100 %   04/08/21 1631  (!) 119 (!) 31 (!) 142/92 100 %   04/08/21 1615    (!) 140/85 100 %   04/08/21 1600  (!) 116 28 139/85 100 % 04/08/21 1500 98.4 °F (36.9 °C) 90 20 (!) 139/40 97 %   04/08/21 1400  91 20 121/60 95 %   04/08/21 1346  90 23 (!) 144/73 97 %   04/08/21 1331  89 22 121/80 94 %   04/08/21 1315  86 29 114/74 94 %   04/08/21 1300    112/72 93 %   04/08/21 1246  85 25 112/73 95 %   04/08/21 1231    111/69 94 %   04/08/21 1215  91 30 116/69 94 %   04/08/21 1201    122/74 95 %   04/08/21 1146  93 29 127/68 95 %   04/08/21 1131  94 (!) 31 (!) 147/83 94 %   04/08/21 1116  92 28 130/81 96 %   04/08/21 1100 100.4 °F (38 °C) 90 28 134/83 96 %   04/08/21 1046  89 28 129/75 94 %   04/08/21 1031  85 24 133/73 93 %   04/08/21 1016    (!) 149/77 93 %   04/08/21 1000    119/75 93 %     Oxygen Therapy  O2 Sat (%): 94 % (04/09/21 0816)  Pulse via Oximetry: 103 beats per minute (04/09/21 0816)  O2 Device: Nasal cannula (04/09/21 0800)  O2 Flow Rate (L/min): 2 l/min (04/09/21 0800)    Estimated body mass index is 20.37 kg/m² as calculated from the following:    Height as of this encounter: 5' 8\" (1.727 m). Weight as of this encounter: 60.8 kg (134 lb). Intake/Output Summary (Last 24 hours) at 4/9/2021 0955  Last data filed at 4/9/2021 0800  Gross per 24 hour   Intake 60 ml   Output 931 ml   Net -871 ml       *Note that automatically entered I/Os may not be accurate; dependent on patient compliance with collection and accurate  by techs. General:    Thin, underweight. No overt distress. Resting comfortably, opens eyes to voice and follows some commands but only moves R side. Head:  EVD present. CV:   Tachycardia. No m/r/g. No edema. No JVD  Lungs:   CTAB. No wheezing, rhonchi, or rales. Unlabored  Abdomen:   Soft, nontender, nondistended. NGT. Extremities: Warm and dry. No cyanosis   Skin:     No rashes. Normal coloration  Neuro:  Left hemiparesis. Follows commands and can move the right side easily with some weakness noted. Tired appearing.      Data Reviewed:  I have reviewed all labs, meds, and studies from the last 24 hours. See all results below.     Last 24hr Labs:  Recent Results (from the past 24 hour(s))   PLEASE READ & DOCUMENT PPD TEST IN 48 HRS    Collection Time: 04/08/21 12:25 PM   Result Value Ref Range    PPD Negative Negative    mm Induration 0 0 - 5 mm   METABOLIC PANEL, BASIC    Collection Time: 04/09/21  5:19 AM   Result Value Ref Range    Sodium 143 138 - 145 mmol/L    Potassium 3.8 3.5 - 5.1 mmol/L    Chloride 111 (H) 98 - 107 mmol/L    CO2 27 21 - 32 mmol/L    Anion gap 5 (L) 7 - 16 mmol/L    Glucose 136 (H) 65 - 100 mg/dL    BUN 65 (H) 8 - 23 MG/DL    Creatinine 1.74 (H) 0.8 - 1.5 MG/DL    GFR est AA 49 (L) >60 ml/min/1.73m2    GFR est non-AA 41 (L) >60 ml/min/1.73m2    Calcium 9.3 8.3 - 10.4 MG/DL   HGB & HCT    Collection Time: 04/09/21  5:19 AM   Result Value Ref Range    HGB 13.9 13.6 - 17.2 g/dL    HCT 42.4 41.1 - 50.3 %   PHOSPHORUS    Collection Time: 04/09/21  5:19 AM   Result Value Ref Range    Phosphorus 2.9 2.3 - 3.7 MG/DL   MAGNESIUM    Collection Time: 04/09/21  5:19 AM   Result Value Ref Range    Magnesium 3.1 (H) 1.8 - 2.4 mg/dL       Current Meds:  Current Facility-Administered Medications   Medication Dose Route Frequency    lactated Ringers infusion  75 mL/hr IntraVENous CONTINUOUS    piperacillin-tazobactam (ZOSYN) 3.375 g in 0.9% sodium chloride (MBP/ADV) 100 mL MBP  3.375 g IntraVENous Q8H    NOREPINephrine (LEVOPHED) 4 mg in 5% dextrose 250 mL infusion  0.5-30 mcg/min IntraVENous TITRATE    NUTRITIONAL SUPPORT ELECTROLYTE PRN ORDERS   Does Not Apply PRN    acetaminophen (TYLENOL) solution 650 mg  650 mg Per NG tube Q4H PRN    mannitol (OSMITROL) 20 g in Empty Bag   IntraVENous Q6H    atorvastatin (LIPITOR) tablet 40 mg  40 mg Per NG tube QHS    metoprolol tartrate (LOPRESSOR) tablet 25 mg  25 mg Per NG tube Q12H    metoprolol (LOPRESSOR) injection 5 mg  5 mg IntraVENous Q6H PRN    [Held by provider] morphine injection 1 mg  1 mg IntraVENous Q4H PRN  [Held by provider] oxyCODONE IR (ROXICODONE) tablet 5 mg  5 mg Oral Q6H PRN    enalaprilat (VASOTEC) injection 1.25 mg  1.25 mg IntraVENous Q6H PRN    amLODIPine (NORVASC) tablet 5 mg  5 mg Oral DAILY    lidocaine 4 % patch 1 Patch  1 Patch TransDERmal Q24H    niCARdipine (CARDENE) 25 mg in 0.9% sodium chloride (MBP/ADV) 250 mL infusion  0-15 mg/hr IntraVENous TITRATE    sodium chloride (NS) flush 5-40 mL  5-40 mL IntraVENous Q8H    sodium chloride (NS) flush 5-40 mL  5-40 mL IntraVENous PRN       Other Studies:  Results for orders placed or performed during the hospital encounter of 21   2D ECHO COMPLETE ADULT (TTE) [address]  [address] [facility], [address] [facility]  [phone]    Transthoracic Echocardiogram  2D, M-mode, Doppler, and Color Doppler    Patient: Edmar Kelley  MR #: [id number]  : [id number]  Age: 67 years  Gender: Male  Study date: 2021  Account #: [de-identified]  Height: 68 in  Weight: 133.8 lb  BSA: 1.72 mï¾²  Status:Routine  Location: 334  BP: 137/ 77    Allergies: NO KNOWN ALLERGENS    Sonographer:  TRINH Bone  Group:  [facility]  Referring Physician:  Cm Telles DO  Reading Physician:  Dr. Sydnie Mccoy    INDICATIONS: TIA with transient neurological disturbance    PROCEDURE: This was a routine study. A transthoracic echocardiogram was  performed. The study included complete 2D imaging, M-mode, complete spectral  Doppler, and color Doppler. Intravenous contrast (Definity) was administered. Intravenous contrast (agitated saline) was administered. Image quality was  adequate. LEFT VENTRICLE: Size was normal. Systolic function was normal. Ejection  fraction was estimated in the range of 55 % to 60 %. There were no regional  wall motion abnormalities. Wall thickness was mildly increased. Left  ventricular diastolic function parameters were normal. Avg E/e': 4.54.     RIGHT VENTRICLE: The size was normal. Systolic function was normal. The  tricuspid jet envelope definition was inadequate for estimation of RV   systolic  pressure. LEFT ATRIUM: Size was normal.    ATRIAL SEPTUM: Agitated saline contrast injection (bubble study) was   performed. There was no right-to-left shunt, at rest or induced by the Valsalva   maneuver. RIGHT ATRIUM: Size was normal.    SYSTEMIC VEINS: IVC: The inferior vena cava was normal in size. The  respirophasic change in diameter was more than 50%. AORTIC VALVE: There is mild aortic annular calcification. The valve was  trileaflet. Leaflets exhibited mild sclerosis. MITRAL VALVE: Valve structure was normal.    TRICUSPID VALVE: The valve structure was normal. There was trivial  regurgitation. PULMONIC VALVE: Not well visualized. PERICARDIUM: There was no pericardial effusion. AORTA: The root exhibited normal size. SUMMARY:    -  Left ventricle: Systolic function was normal. Ejection fraction was  estimated in the range of 55 % to 60 %. -  Atrial septum: Agitated saline contrast injection (bubble study) was  performed. There was no right-to-left shunt, at rest or induced by the   Valsalva  maneuver. SYSTEM MEASUREMENT TABLES    2D  Ao Diam: 2.8 cm  LAEDV Index (A-L): 26.9 ml/m2  IVSd: 1.1 cm  LVIDd: 5.2 cm  LVPWd: 0.8 cm    PW  E/E' Av.5    Prepared and signed by    Dr. Radha Jordan 2021 22:44:08         Xr Chest Sngl V    Result Date: 2021  Exam: XR CHEST SNGL V on 2021 9:09 AM Clinical History: The Male patient is 67years old  presenting for SOB, leukocytosis. Comparison:  Chest x-ray 2021 Findings:  Frontal view of the chest was obtained. Increasing infiltrative changes are seen at the left lung base. The right lung is clear. No pleural effusions are demonstrated. The cardiomediastinal silhouette is within normal limits. There are no acute osseous abnormalities.  An NG tube passes down the esophagus into the stomach. 1. Increasing left basilar pulmonary infiltrate consistent with pneumonia. CPT code(s) 06450     Ct Head Wo Cont    Result Date: 4/8/2021  CT HEAD WITHOUT CONTRAST HISTORY:  Intraparenchymal hemorrhage. . COMPARISON: None. TECHNIQUE: Axial imaging was performed without intravenous contrast utilizing 5mm slice thickness. Sagittal and coronal reformats were performed. Radiation dose reduction techniques were used for this study. Our CT scanner uses one or all of the following: Automated exposure control, adjustment of the MAS or KUB according to patient's size and iterative reconstruction. FINDINGS:    *BRAIN:    -  There are no early signs of territorial or lacunar infarction by CT.    -  Enlarging hematoma in the right basal ganglia which now measures 4.3 x 3.2 cm whereas on the prior study it measured 3.8 x 3.5 cm. with 6 mm of subfalcine shift to the left. Bilateral intraventricular hemorrhage unchanged. -  No gross white matter abnormality by CT. *VISUALIZED PARANASAL SINUSES: Well aerated. *MASTOIDS:  Clear. *CALVARIUM AND SCALP: Unremarkable. Enlarging right basal ganglia hematoma but stable subfalcine shift. Stable intraventricular hemorrhage. Date of Dictation: 4/8/2021 2:28 PM       All Micro Results     Procedure Component Value Units Date/Time    CULTURE, BLOOD [[id number]]     Order Status: Sent Specimen: Blood     CULTURE, BLOOD [[id number]]     Order Status: Sent Specimen: Blood     COVID-19 RAPID TEST [[id number]] Collected: 04/07/21 1221    Order Status: Completed Specimen: Nasopharyngeal Updated: 04/07/21 1301     Specimen source Nasopharyngeal        COVID-19 rapid test Not detected        Comment:      The specimen is NEGATIVE for SARS-CoV-2, the novel coronavirus associated with COVID-19. A negative result does not rule out COVID-19. This test has been authorized by the FDA under an Emergency Use Authorization (EUA) for use by authorized laboratories.         Fact sheet for Healthcare Providers: Lizbeth  Fact sheet for Patients: Jose Manuel.wendi       Methodology: Isothermal Nucleic Acid Amplification               SARS-CoV-2 Lab Results  \"Novel Coronavirus\" Test: No results found for: COV2NT   \"Emergent Disease\" Test: No results found for: EDPR  \"SARS-COV-2\" Test: No results found for: XGCOVT  Rapid Test:   Lab Results   Component Value Date/Time    COVR Not detected 04/07/2021 12:21 PM            Signed:  Carola Farfan MD

## 2021-04-09 NOTE — PROGRESS NOTES
Comprehensive Nutrition Assessment    Type and Reason for Visit: Reassess  Tube Feeding Management (Hospitalist)    Nutrition Recommendations/Plan:   · Enteral Nutrition:  · Increase Jevity 1.5 via NGT to new goal rate of 55 ml/hour now. · Increase water flush to 150 every 4 hours. · At goal will provide 1815 kcal (100% estimated calorie needs), 77 grams protein (100% estimated protein needs) and 1820 ml free fluid (~1 ml/kcal) calculations based on 22 hours infusion. · Vitamin and Mineral Supplement Therapy:  · Electrolyte management replacement protocol implemented. · IVF per MD  · Labs:   · EN labs: BMP daily, Mg and Phos MWF  · Consider bowel regimen if no BM in ~3 days with advance of TF to goal.     Malnutrition Assessment:  Malnutrition Status: Severe malnutrition  Context: Chronic illness  Findings of clinical characteristics of malnutrition:   Energy Intake:  Unable to assess  Weight Loss:  Unable to assess     Body Fat Loss:  7 - Severe body fat loss, Fat overlying ribs   Muscle Mass Loss:  7 - Severe muscle mass loss, Clavicles (pectoralis &deltoids), Scapula (trapezius), Temples (temporalis)  Fluid Accumulation:  No significant fluid accumulation,     Strength:  Normal  strength       Nutrition Assessment:   Nutrition History: Unable to assess, patient with limited responsiveness. Nutrition Background: Patient presented with left side weakness and fall. He was admitted with hemorrhagic cererovascular accident from acute right basal ganglia hematoma with right intraventricular hemorrhage. His PMH is significant for MI, CAD, TB, COPD, GERD, GI bleed, hept C, HLD, HTN, NSTEMI. Daily Update:  Patient seen with RN at bedside. He is pointing to his stomach stating he is hungry. He nods \"yes\" that he is doing well otherwise. Noted TF infusing at 30 ml during RD visit with  Q4. Upon review of chart, it does not appear to have been increased to goal since initiation.  LR started this am per MD for A/CKD. Also noted patient with increased work of breathing overnight with questionable aspiration pneumonia. Abdominal Status (last documented): Semi-soft abdomen with Active  bowel sounds. Last BM 04/02/21. Pertinent Medications: Levo - started for low BP now off, Zosyn, Vanco  IVF: LR @ 75 ml/hr  Pertinent Labs:   Lab Results   Component Value Date/Time    Sodium 143 04/09/2021 05:19 AM    Potassium 3.8 04/09/2021 05:19 AM    Chloride 111 (H) 04/09/2021 05:19 AM    CO2 27 04/09/2021 05:19 AM    Anion gap 5 (L) 04/09/2021 05:19 AM    Glucose 136 (H) 04/09/2021 05:19 AM    BUN 65 (H) 04/09/2021 05:19 AM    Creatinine 1.74 (H) 04/09/2021 05:19 AM    Calcium 9.3 04/09/2021 05:19 AM    Albumin 3.2 04/06/2021 03:53 AM    Magnesium 3.1 (H) 04/09/2021 05:19 AM    Phosphorus 2.9 04/09/2021 05:19 AM   Labs remarkable for Na trending up, K trending down, Mg elevated today    Nutrition Related Findings:   Full NFPE performed with results as above. TF initiated 7/3. Still infusing at 30 ml/hr with 135 ml water flush on reassessment 4/9. Current Nutrition Therapies:  DIET NPO Except Meds  DIET TUBE FEEDING Open order for details. Keep HOB elevated >30 degrees.   Current Tube Feeding (TF) Orders:   · Feeding Route: Nasogastric  · Formula: Jevity 1.5  · Schedule:Continuous    · Regimen: 30 ml/hr  · Additives/Modulars: (none)  · Water Flushes: 135 ml Q4  · Current TF & Flush Orders Provides: 990 kcal (~65% needs), 42 g pro (69% needs), 1311 (~80% needs)    Current Intake:   Average Meal Intake: NPO(% prior to NPO)        Anthropometric Measures:  Height: 5' 8\" (172.7 cm)  Current Body Wt: 55.1 kg (121 lb 7.6 oz)(4/9), Weight source: Bed scale  BMI: 18.5, Underweight (BMI less than 22) age over 72     Ideal Body Weight (lbs) (Calculated): 154 lbs (70 kg), 87 %  Usual Body Wt: 63 kg (138 lb 14.2 oz)(per review of EMR (~63-68 kg)), Percent weight change: -12.5 - unknown time frame          Edema: Generalized: No Edema (4/9/2021  8:00 AM)     Estimated Daily Nutrient Needs:  Energy (kcal/day): 9749-6182 (Kcal/kg(25-30), Weight Used: Current)  Protein (g/day): 61-76 (1-1.25 g/kg) Weight Used: (Current)  Fluid (ml/day):   (1 ml/kcal)    Nutrition Diagnosis:   · Inadequate oral intake related to swallowing difficulty as evidenced by (MBS, NPO)    · Severe malnutrition, In context of chronic illness related to catabolic illness(predicted inadequate oral intake, increased needs) as evidenced by (COPD, malnutrition criteria as above)    Nutrition Interventions:   Food and/or Nutrient Delivery: Modify tube feeding     Coordination of Nutrition Care: Continue to monitor while inpatient  Plan of Care discussed with Rome Mohs, RN and Perfect Serve to Dr. Domingo Mg    Goals:   Previous Goal Met: Progressing toward goal(s)  Active Goal: Tolerate TF at goal within 3 days    Nutrition Monitoring and Evaluation:      Food/Nutrient Intake Outcomes: Enteral nutrition intake/tolerance  Physical Signs/Symptoms Outcomes: Biochemical data, GI status    Discharge Planning:     Too soon to determine    [address], LD on 4/9/2021 at 11:00 AM  Contact: [phone]

## 2021-04-09 NOTE — PROGRESS NOTES
ACUTE PHYSICAL THERAPY GOALS:  (Developed with and agreed upon by patient and/or caregiver.)  UPDATED 21  LTG:  (1.)Mr. Diomedes Gaitan will move from supine to sit and sit to supine , scoot up and down and roll side to side in bed with MINIMAL ASSIST within 7 treatment day(s). (2.)Mr. Diomedes Gaitan will tolerate sitting up in recliner chair for 2 hours with stable vital signs to increase upright tolerance within 7 treatment day(s). (3.)Mr. Diomedes Gaitan will maintain static/dynamic sitting x 15 minutes with MINIMAL ASSIST for improved balance within 7 treatment days. (4.)Mr. Diomedes Gaitan will be able to hold head in cervical rotation left of midline for 2 minutes within 7 treatment days. (5.)Mr. Diomedes Gaitan will participate in therapeutic activity/exercises x 25 minutes for increased strength within 7 treatment days. PHYSICAL THERAPY: Daily Note and AM Treatment Day # 4    Cynthia Guerrero is a 67 y.o. male   PRIMARY DIAGNOSIS: Hemorrhagic cerebrovascular accident (CVA) ([facility]  Hemorrhagic cerebrovascular accident (CVA) ([facility] [I61.9]         ASSESSMENT:     REHAB RECOMMENDATIONS: CURRENT LEVEL OF FUNCTION:  (Most Recently Demonstrated)   Recommendation to date pending progress:  [address]ett[address] [place] [address] vs. Short-term Rehab  Equipment:    To Be Determined Bed Mobility:   Maximal Assistance x 2  Sit to Stand:   mod-maxA x 2  Transfers:   mod-maxA x 2  Gait/Mobility:   Unable to perform     ASSESSMENT:  Mr. Diomedes Gaitan continues to present with weakness on L LE. He now has EVD which was clamped for treatment by RN. Pt slightly more alert today but still no active movement observed in L LE. Pt required maxA x 2 for supine to sit with poor sitting balance. He stood today with maxA x 2 and poor standing balance. Pt assisted back in bed and positioned in upright position given increased secretions while mobilizing. OT present and assisted with ADLs while PT addressed transfers. No significant progress today. SUBJECTIVE:   Mr. Evelin Toney states, \"my daughter. \"    SOCIAL HISTORY/ LIVING ENVIRONMENT: See eval  Home Environment: Private residence  # Steps to Enter: 5  One/Two Story Residence: One story  Living Alone: No  Support Systems: Family member(s)  OBJECTIVE:     PAIN: VITAL SIGNS: LINES/DRAINS:   Pre Treatment: Pain Screen  Pain Scale 1: Numeric (0 - 10)  Pain Intensity 1: 0  Post Treatment: 0   IV and external male catheter, EVD  O2 Device: Nasal cannula     MOBILITY: I Mod I S SBA CGA Min Mod Max Total  NT x2 Comments:   Bed Mobility    Rolling [] [] [] [] [] [] [] [x] [x] [] [x]    Supine to Sit [] [] [] [] [] [] [] [x] [x] [] [x]    Scooting [] [] [] [] [] [] [] [] [x] [] [x]    Sit to Supine [] [] [] [] [] [] [] [x] [x] [] [x]    Transfers    Sit to Stand [] [] [] [] [] [] [] [x] [] [] [x]    Bed to Chair [] [] [] [] [] [] [] [] [] [x] []    Stand to Sit [] [] [] [] [] [] [] [x] [] [] [x]    I=Independent, Mod I=Modified Independent, S=Supervision, SBA=Standby Assistance, CGA=Contact Guard Assistance,   Min=Minimal Assistance, Mod=Moderate Assistance, Max=Maximal Assistance, Total=Total Assistance, NT=Not Tested    BALANCE: Good Fair+ Fair Fair- Poor NT Comments   Sitting Static [] [] [] [] [x] []    Sitting Dynamic [] [] [] [] [x] []              Standing Static [] [] [] [] [x] []    Standing Dynamic [] [] [] [] [x] []      GAIT: I Mod I S SBA CGA Min Mod Max Total  NT x2 Comments:   Level of Assistance [] [] [] [] [] [] [] [] [] [x] []    Distance NT    DME N/A    Gait Quality NT    Weightbearing  Status N/A     I=Independent, Mod I=Modified Independent, S=Supervision, SBA=Standby Assistance, CGA=Contact Guard Assistance,   Min=Minimal Assistance, Mod=Moderate Assistance, Max=Maximal Assistance, Total=Total Assistance, NT=Not Tested    PLAN:   FREQUENCY/DURATION: PT Plan of Care: 3 times/week for duration of hospital stay or until stated goals are met, whichever comes first.  TREATMENT:     TREATMENT:   ($$ Therapeutic Activity: 23-37 mins    )  Co-Treatment PT/OT necessary due to patient's decreased overall endurance/tolerance levels, as well as need for high level skilled assistance to complete functional transfers/mobility and functional tasks  Therapeutic Activity (23 Minutes): Therapeutic activity included Rolling, Supine to Sit, Sit to Supine, Scooting, Transfer Training, Sitting balance  and Standing balance to improve functional Mobility, Strength, ROM and Activity tolerance.     TREATMENT GRID:  N/A    AFTER TREATMENT POSITION/PRECAUTIONS:  Bed, Needs within reach and RN notified    INTERDISCIPLINARY COLLABORATION:  RN/PCT, PT/PTA and OT/LYNCH    TOTAL TREATMENT DURATION:  PT Patient Time In/Time Out  Time In: 0606  Time Out: Yunior Ji, PT, DPT

## 2021-04-09 NOTE — PROGRESS NOTES
ACUTE OT GOALS:  (Developed with and agreed upon by patient and/or caregiver.)  1. Complete functional transfers (I.e. toilet/bedside commode) with c min A. (UPDATED 2021)   2. Complete gentle active assisted L UE ROM focusing more on finger/wrist extensors and shoulder external rotators. CONTINUE TO ADDRESS (2021)  3. Utilize L UE as a support for supine to sitting and sit to standing at least 20% of the time. CONTINUE TO ADDRESS (2021)  4. Complete self-feeding (once cleared by SLP/MD/RN) with standby assistance. CONTINUE TO ADDRESS (2021)  5. Complete grooming/oral care with standby assistance. CONTINUE TO ADDRESS (2021)    NEW GOALS (Added 2021)  1. Patient will complete functional activity while seated EOB with min A and adaptive equipment as needed. 2. Patient will attend to L side of environment for 75% of session with min verbal cues from therapist.    OCCUPATIONAL THERAPY: Daily Note OT Treatment Day # 2    Gladys Lugo is a 67 y.o. male   PRIMARY DIAGNOSIS: Hemorrhagic cerebrovascular accident (CVA) ([facility]  Hemorrhagic cerebrovascular accident (CVA) ([facility] [I61.9]       Payor: [facility] MEDICARE / Plan: [facility] Body / Product Type: Managed Care Medicare /   ASSESSMENT:     REHAB RECOMMENDATIONS: CURRENT LEVEL OF FUNCTION:  (Most Recently Demonstrated)   Recommendation to date pending progress:  Sett[address]  Equipment:    To Be Determined Bathing:   Total Assistance  Dressing:   Total Assistance  Feeding/Grooming:   Total Assistance  Toileting:   Total Assistance  Functional Mobility:   Total Assistance     ASSESSMENT:  Mr. Denisha Cali continues to present with deficits in overall strength, activity tolerance, ADL performance, and functional mobility. Since previous treatment session, EVD drain was placed d/t hydrocephalus (drain needs to be clamped prior to mobility).   Currently not following commands on the L side; does follow commands on R side; more aphasic. Strong L hemalatha-body. Max to total A x 2 for supine to sit transfer to edge of bed. Max verbal and tactile commands to maintain midline orientation however mod A to maintain upright posture. Stood with max A x 2; unable tot take steps. Able to use RUE to wipe face; min A to rachel new gown. Left in semi-reclined chair position d/t thick secretions. No progress towards goals. Will continue OT efforts as indicated. SUBJECTIVE:   Mr. Sandi Carranza states, Lizy Castro you the one that knows my daughter. \"    SOCIAL HISTORY/LIVING ENVIRONMENT:   Home Environment: Private residence  # Steps to Enter: 5  One/Two Story Residence: One story  Living Alone: No  Support Systems: Family member(s)    OBJECTIVE:     PAIN: VITAL SIGNS: LINES/DRAINS:   Pre Treatment: Pain Screen  Pain Scale 1: Numeric (0 - 10)  Pain Intensity 1: 0  Post Treatment: 0   IV and EVD drain  O2 Device: Nasal cannula     ACTIVITIES OF DAILY LIVING: I Mod I S SBA CGA Min Mod Max Total NT Comments   BASIC ADLs:              Bathing/ Showering [] [] [] [] [] [] [] [] [] [x]    Toileting [] [] [] [] [] [] [] [] [] [x]    Dressing [] [] [] [] [] [x] [] [] [] [] Donning new gown   Feeding [] [] [] [] [] [] [] [] [] [x]    Grooming [] [] [] [] [] [x] [] [] [] [] Washing face with RUE   Personal Device Care [] [] [] [] [] [] [] [] [] [x]    Functional Mobility [] [] [] [] [] [] [] [] [x] [] X 2   I=Independent, Mod I=Modified Independent, S=Supervision, SBA=Standby Assistance, CGA=Contact Guard Assistance,   Min=Minimal Assistance, Mod=Moderate Assistance, Max=Maximal Assistance, Total=Total Assistance, NT=Not Tested    MOBILITY: I Mod I S SBA CGA Min Mod Max Total  NT x2 Comments:   Supine to sit [] [] [] [] [] [] [] [x] [x] [] [x]    Sit to supine [] [] [] [] [] [] [] [] [x] [] [x]    Sit to stand [] [] [] [] [] [] [] [] [] [x] []    Bed to chair [] [] [] [] [] [] [] [] [] [x] []    I=Independent, Mod I=Modified Independent, S=Supervision, SBA=Standby Assistance, CGA=Contact Guard Assistance,   Min=Minimal Assistance, Mod=Moderate Assistance, Max=Maximal Assistance, Total=Total Assistance, NT=Not Tested    BALANCE: Good Fair+ Fair Fair- Poor NT Comments   Sitting Static [] [] [] [] [x] []    Sitting Dynamic [] [] [] [] [x] []              Standing Static [] [] [] [] [x] []    Standing Dynamic [] [] [] [] [x] []      PLAN:   FREQUENCY/DURATION: OT Plan of Care: 3 times/week for duration of hospital stay or until stated goals are met, whichever comes first.    TREATMENT:   TREATMENT:   ($$ Self Care/Home Management: 8-22 mins$$ Neuromuscular Re-Education: 8-22 mins   )  Co-Treatment PT/OT necessary due to patient's decreased overall endurance/tolerance levels, as well as need for high level skilled assistance to complete functional transfers/mobility and functional tasks  Self Care (10 Minutes): Self care including Upper Body Dressing and Grooming to decrease level of assistance required. Neuromuscular Re-education (13 Minutes): Neuromuscular Re-education included Balance Training, Coordination training, Hemibody awareness training, Postural training, Sitting balance training and Visual field awareness training to improve Balance, Coordination and Postural Control.     TREATMENT GRID:  N/A    AFTER TREATMENT POSITION/PRECAUTIONS:  Bed, Needs within reach and RN notified    INTERDISCIPLINARY COLLABORATION:  RN/PCT, PT/PTA and OT/LYNCH    TOTAL TREATMENT DURATION:  OT Patient Time In/Time Out  Time In: 9856  Time Out: [address], OT

## 2021-04-09 NOTE — PROGRESS NOTES
Problem: Dysphagia (Adult)  Goal: *Acute Goals and Plan of Care (Insert Text)  Description: STG: Pt will demonstrate zero signs/sx of aspiration with mechanical soft textures with nectar thick liquids with no straws with 90% accuracy during all meals. Discontinued 4/5/21  STG: Pt will complete a full speech, language and cognitive evaluation without assistance with 100% accuracy during session. STG: Pt will complete a Modified barium swallow study with zero signs/sx of aspiration  with 100% accuracy during swallow study MET 4/6/21   LTG : Pt will demonstrate zero signs/sx of aspiration with least restrictive diet with 100% accuracy during all meals. LTG: Patient will increase receptive/expressive language skills demonstrated by the ability to communicate basic wants/needs across environments   STG: Patient will follow 1 step commands with 80% accuracy given min cueing  STG: Patient will follow 2 step commands with 80% accuracy given moderate cues. STG: Patient will respond to moderate level yes/no questions with 80% accuracy with moderate cues. STG: Patient will perform automatic speech task with 80% accuracy with moderate cues. SPEECH LANGUAGE PATHOLOGY: DYSPHAGIA AND SPEECH-LANGUAGE/COGNITION: Re-evaluation    NAME/AGE/GENDER: Jess Moarles is a 67 y.o. male  DATE: 4/9/2021  PRIMARY DIAGNOSIS: [facility] [I61.9]      ICD-10: Treatment Diagnosis: R13.12 Dysphagia, Oropharyngeal Phase    RECOMMENDATIONS   DIET:    NPO with alternative means of nutrition   Anticipate need for long term nutrition- Would benefit from discussion regarding goals of nutrition    MEDICATIONS: Non-oral     ASPIRATION PRECAUTIONS  · Meticulous oral care Q 4 hours. COMPENSATORY STRATEGIES/MODIFICATIONS  · Upright positioning during tube feedings.       EDUCATION:  · Recommendations discussed with Hospitalist/Intensivist  · Nursing  · Patient     CONTINUATION OF SKILLED SERVICES/MEDICAL NECESSITY:   Patient is expected to demonstrate progress in  swallow strength, swallow timeliness, swallow function, diet tolerance and swallow safety in order to  improve swallow safety, work toward diet advancement and decrease aspiration risk.  Patient is expected to demonstrate progress in expressive communication and receptive ability to decrease assistance required communication, increase independence with activities of daily living and increase communication with family/caregivers.  Patient continues to require skilled intervention due to dysphagia; expressive/receptive language deficits. RECOMMENDATIONS for CONTINUED SPEECH THERAPY: YES: Anticipate need for ongoing speech therapy during this hospitalization and at next level of care. ASSESSMENT   Dysphagia:  Severe oropharyngeal with decreased ability to manage secretions. Absent swallow on command. No gag with deep oral suctioning. Not appropriate for po trials and unable to participate in dysphagia exercises at this time. Emphasized importance of diligent oral care given high risk of aspiration pneumonia. Language evaluation: Limited language treatment given poor sustained attention and inconsistent command following. He is accurate with basic yes/no questions and 1 step commands; however, only follows commands on 75% of opportunities. Severe dysarthria that is complicated by poor secretion management. . Able to state name and hospital, but majority of all other communication attempts unintelligible. Recommend continue ongoing speech therapy to address above mentioned deficits. High risk of aspiration due to poor secretion management- Aggressive oral care is critical in reducing aspiration risks. REHABILITATION POTENTIAL FOR STATED GOALS: Good    PLAN    FREQUENCY/DURATION: Continue to follow patient 3 times a week for duration of hospital stay to address above goals.     - Recommendations for next treatment session: Next treatment session will address langauge treatment    SUBJECTIVE   More awake and responsive than over last several days. Occasionally restless. Wet vocal quality with poor secretion management. Problem List:  (Impairments causing functional limitations):  1. Oropharyngeal dysphagia  2. Expressive/receptive language impairments    Orientation:   Person    Pain: Pain Scale 1: Numeric (0 - 10)  Pain Intensity 1: 0    OBJECTIVE   Dysphagia:   Patient requesting water despite poor secretion management. Wet vocal quality with speech. He was able to follow commands for cough; however, he was unable to fully clear secretions. Suction set up at bedside with assistance from RN. Deep oral suctioning removed thick, yellow secretions. No cough or gag reflex. Much improved vocal quality after suctioning. Verbal cues for swallow; however, no pharyngeal movement palpated despite repeated attempts. He is not appropriate for po trials today due to poor secretion management, inability to trigger swallow, silent aspiration noted on recent modified barium swallow study, and reduced command following. Cognitive Linguistic Assessment :  Occasionally restless, trying to grab clinician's shirt.   - Oriented to person (poor intelligibility) and place. Stated \" I don't know why I'm here\". - 1 step commands: responded to 6/8 commands appropriately with repetition and increased time to complete. No response to additional commands despite cues. - Basic yes/no questions completed with 100% accuracy.   - Expressive language tasks deferred due to severity of dysarthria and no response to majority of questions. INTERDISCIPLINARY COLLABORATION: Physician and registered nurse  PRECAUTIONS/ALLERGIES: Patient has no known allergies.      Tool Used: Dysphagia Outcome and Severity Scale (DAYAMI)    Score Comments   Normal Diet  [] 7 With no strategies or extra time needed   Functional Swallow  [] 6 May have mild oral or pharyngeal delay Mild Dysphagia  [] 5 Which may require one diet consistency restricted    Mild-Moderate Dysphagia  [] 4 With 1-2 diet consistencies restricted   Moderate Dysphagia  [] 3 With 2 or more diet consistencies restricted   Moderate-Severe Dysphagia  [] 2 With partial PO strategies (trials with ST only)   Severe Dysphagia  [] 1 With inability to tolerate any PO safely      Score:  Initial: 1 Most Recent: 1 (Date 04/09/21 )   Interpretation of Tool: The Dysphagia Outcome and Severity Scale (DAYAMI) is a simple, easy-to-use, 7-point scale developed to systematically rate the functional severity of dysphagia based on objective assessment and make recommendations for diet level, independence level, and type of nutrition. Tool Used: MODIFIED MARK SCALE (mRS)   Score   No Symptoms  [] 0   No significant disability despite symptoms; able to carry out all usual duties and activities  [] 1   Slight disability; unable to carry out all previous activities but able to look after own affairs without assistance. [] 2   Moderate disability; requiring some help but able to walk without assistance  [] 3   Moderately severe disability; unable to walk without assistance and unable to attend to own bodily needs without assistance  [] 4   Severe disability; bedridden, incontinent, and requiring constant nursing care and attention  [] 5      Score:  Initial: 5    Interpretation of Tool: The Modified Mark Scale is a 7-point scaled used to quantify level of disability as it relates to a patient's functional abilities. Current Medications:   No current facility-administered medications on file prior to encounter. Current Outpatient Medications on File Prior to Encounter   Medication Sig Dispense Refill    cyclobenzaprine (FLEXERIL) 5 mg tablet Take 1 Tab by mouth two (2) times a day. 14 Tab 0    aspirin 81 mg chewable tablet Take 1 Tab by mouth daily.       nitroglycerin (NITROSTAT) 0.4 mg SL tablet 1 Tab by SubLINGual route every five (5) minutes as needed for Chest Pain. 2 Bottle 4    atorvastatin (LIPITOR) 80 mg tablet Take 1 Tab by mouth nightly. 30 Tab 11    metoprolol tartrate (LOPRESSOR) 25 mg tablet Take 1 Tab by mouth two (2) times a day.  60 Tab 6       SAFETY:  After treatment position/precautions:  · Upright in bed  · RN notified    Total Treatment Duration:   Time In: 7885  Time Out: [address] Brooks, [facility] NABOR CHAVEZ, CCC-SLP

## 2021-04-10 LAB
ANION GAP SERPL CALC-SCNC: 1 MMOL/L (ref 7–16)
BASOPHILS # BLD: 0 K/UL (ref 0–0.2)
BASOPHILS NFR BLD: 0 % (ref 0–2)
BUN SERPL-MCNC: 54 MG/DL (ref 8–23)
CALCIUM SERPL-MCNC: 8.8 MG/DL (ref 8.3–10.4)
CHLORIDE SERPL-SCNC: 120 MMOL/L (ref 98–107)
CO2 SERPL-SCNC: 29 MMOL/L (ref 21–32)
CREAT SERPL-MCNC: 1.57 MG/DL (ref 0.8–1.5)
DIFFERENTIAL METHOD BLD: ABNORMAL
EOSINOPHIL # BLD: 0 K/UL (ref 0–0.8)
EOSINOPHIL NFR BLD: 0 % (ref 0.5–7.8)
ERYTHROCYTE [DISTWIDTH] IN BLOOD BY AUTOMATED COUNT: 14 % (ref 11.9–14.6)
GLUCOSE BLD STRIP.AUTO-MCNC: 101 MG/DL (ref 65–100)
GLUCOSE BLD STRIP.AUTO-MCNC: 112 MG/DL (ref 65–100)
GLUCOSE BLD STRIP.AUTO-MCNC: 167 MG/DL (ref 65–100)
GLUCOSE SERPL-MCNC: 124 MG/DL (ref 65–100)
HCT VFR BLD AUTO: 38.7 % (ref 41.1–50.3)
HGB BLD-MCNC: 12.8 G/DL (ref 13.6–17.2)
IMM GRANULOCYTES # BLD AUTO: 0.1 K/UL (ref 0–0.5)
IMM GRANULOCYTES NFR BLD AUTO: 1 % (ref 0–5)
LYMPHOCYTES # BLD: 0.9 K/UL (ref 0.5–4.6)
LYMPHOCYTES NFR BLD: 11 % (ref 13–44)
MCH RBC QN AUTO: 26.7 PG (ref 26.1–32.9)
MCHC RBC AUTO-ENTMCNC: 33.1 G/DL (ref 31.4–35)
MCV RBC AUTO: 80.8 FL (ref 79.6–97.8)
MONOCYTES # BLD: 0.8 K/UL (ref 0.1–1.3)
MONOCYTES NFR BLD: 9 % (ref 4–12)
NEUTS SEG # BLD: 6.7 K/UL (ref 1.7–8.2)
NEUTS SEG NFR BLD: 79 % (ref 43–78)
NRBC # BLD: 0 K/UL (ref 0–0.2)
PLATELET # BLD AUTO: 222 K/UL (ref 150–450)
PMV BLD AUTO: 11.7 FL (ref 9.4–12.3)
POTASSIUM SERPL-SCNC: 3.8 MMOL/L (ref 3.5–5.1)
RBC # BLD AUTO: 4.79 M/UL (ref 4.23–5.6)
SERVICE CMNT-IMP: ABNORMAL
SODIUM SERPL-SCNC: 150 MMOL/L (ref 138–145)
SODIUM SERPL-SCNC: 155 MMOL/L (ref 136–145)
VANCOMYCIN SERPL-MCNC: 14 UG/ML
WBC # BLD AUTO: 8.5 K/UL (ref 4.3–11.1)

## 2021-04-10 PROCEDURE — 74011250637 HC RX REV CODE- 250/637: Performed by: INTERNAL MEDICINE

## 2021-04-10 PROCEDURE — 74011000250 HC RX REV CODE- 250: Performed by: INTERNAL MEDICINE

## 2021-04-10 PROCEDURE — 84295 ASSAY OF SERUM SODIUM: CPT

## 2021-04-10 PROCEDURE — 74011636637 HC RX REV CODE- 636/637: Performed by: INTERNAL MEDICINE

## 2021-04-10 PROCEDURE — 74011250636 HC RX REV CODE- 250/636: Performed by: INTERNAL MEDICINE

## 2021-04-10 PROCEDURE — 65610000006 HC RM INTENSIVE CARE

## 2021-04-10 PROCEDURE — 80048 BASIC METABOLIC PNL TOTAL CA: CPT

## 2021-04-10 PROCEDURE — 36415 COLL VENOUS BLD VENIPUNCTURE: CPT

## 2021-04-10 PROCEDURE — 80202 ASSAY OF VANCOMYCIN: CPT

## 2021-04-10 PROCEDURE — 74011000258 HC RX REV CODE- 258: Performed by: INTERNAL MEDICINE

## 2021-04-10 PROCEDURE — 2709999900 HC NON-CHARGEABLE SUPPLY

## 2021-04-10 PROCEDURE — 74011250637 HC RX REV CODE- 250/637: Performed by: FAMILY MEDICINE

## 2021-04-10 PROCEDURE — 74011000250 HC RX REV CODE- 250: Performed by: FAMILY MEDICINE

## 2021-04-10 PROCEDURE — 82962 GLUCOSE BLOOD TEST: CPT

## 2021-04-10 PROCEDURE — 85025 COMPLETE CBC W/AUTO DIFF WBC: CPT

## 2021-04-10 PROCEDURE — 74011000250 HC RX REV CODE- 250: Performed by: NEUROLOGICAL SURGERY

## 2021-04-10 RX ORDER — DEXTROSE MONOHYDRATE 50 MG/ML
75 INJECTION, SOLUTION INTRAVENOUS ONCE
Status: COMPLETED | OUTPATIENT
Start: 2021-04-10 | End: 2021-04-10

## 2021-04-10 RX ORDER — SODIUM CHLORIDE 9 MG/ML
75 INJECTION, SOLUTION INTRAVENOUS CONTINUOUS
Status: DISCONTINUED | OUTPATIENT
Start: 2021-04-10 | End: 2021-04-20

## 2021-04-10 RX ORDER — INSULIN LISPRO 100 [IU]/ML
INJECTION, SOLUTION INTRAVENOUS; SUBCUTANEOUS
Status: DISCONTINUED | OUTPATIENT
Start: 2021-04-10 | End: 2021-04-16

## 2021-04-10 RX ADMIN — Medication 10 ML: at 21:21

## 2021-04-10 RX ADMIN — DEXTROSE MONOHYDRATE 75 ML/HR: 50 INJECTION, SOLUTION INTRAVENOUS at 09:26

## 2021-04-10 RX ADMIN — Medication 10 ML: at 05:01

## 2021-04-10 RX ADMIN — PIPERACILLIN SODIUM AND TAZOBACTAM SODIUM 3.38 G: 3; .375 INJECTION, POWDER, LYOPHILIZED, FOR SOLUTION INTRAVENOUS at 09:40

## 2021-04-10 RX ADMIN — AMLODIPINE BESYLATE 5 MG: 5 TABLET ORAL at 09:40

## 2021-04-10 RX ADMIN — PIPERACILLIN SODIUM AND TAZOBACTAM SODIUM 3.38 G: 3; .375 INJECTION, POWDER, LYOPHILIZED, FOR SOLUTION INTRAVENOUS at 01:00

## 2021-04-10 RX ADMIN — ATORVASTATIN CALCIUM 40 MG: 40 TABLET, FILM COATED ORAL at 21:20

## 2021-04-10 RX ADMIN — MANNITOL: 20 INJECTION, SOLUTION INTRAVENOUS at 05:01

## 2021-04-10 RX ADMIN — ENALAPRILAT 1.25 MG: 1.25 INJECTION INTRAVENOUS at 00:27

## 2021-04-10 RX ADMIN — Medication 10 ML: at 13:14

## 2021-04-10 RX ADMIN — METOPROLOL TARTRATE 25 MG: 25 TABLET, FILM COATED ORAL at 21:20

## 2021-04-10 RX ADMIN — INSULIN LISPRO 2 UNITS: 100 INJECTION, SOLUTION INTRAVENOUS; SUBCUTANEOUS at 11:05

## 2021-04-10 RX ADMIN — VANCOMYCIN HYDROCHLORIDE 750 MG: 750 INJECTION, POWDER, LYOPHILIZED, FOR SOLUTION INTRAVENOUS at 10:55

## 2021-04-10 RX ADMIN — SODIUM CHLORIDE 75 ML/HR: 900 INJECTION, SOLUTION INTRAVENOUS at 11:01

## 2021-04-10 RX ADMIN — ACETAMINOPHEN 650 MG: 325 SUSPENSION ORAL at 09:40

## 2021-04-10 RX ADMIN — PIPERACILLIN SODIUM AND TAZOBACTAM SODIUM 3.38 G: 3; .375 INJECTION, POWDER, LYOPHILIZED, FOR SOLUTION INTRAVENOUS at 17:06

## 2021-04-10 RX ADMIN — METOPROLOL TARTRATE 25 MG: 25 TABLET, FILM COATED ORAL at 09:40

## 2021-04-10 RX ADMIN — SODIUM CHLORIDE, SODIUM LACTATE, POTASSIUM CHLORIDE, AND CALCIUM CHLORIDE 75 ML/HR: 600; 310; 30; 20 INJECTION, SOLUTION INTRAVENOUS at 01:03

## 2021-04-10 NOTE — PROGRESS NOTES
Pt had incontinent soft brown stool. Daniela care done. Condom cath changed. Adult brief changed. Gown changed. IV site to left AC leaking. Site redressed. No further leaking.

## 2021-04-10 NOTE — PROGRESS NOTES
Bedside report received from [facility]. Dual bedside neuro check completed. No changes noted. Pt's condom cath pulled off, bed wet. Pt given CHG bath with linen change. New condom cath applied with adult brief. No skin breakdown noted. Pt tolerating tube feeding.

## 2021-04-10 NOTE — PROGRESS NOTES
[facility]side [facility]. Dual neuro check and [facility] complete.

## 2021-04-10 NOTE — PROGRESS NOTES
Neurosurgery here to see pt. Do not need manitol anymore and request to stop D5W and return to NS as sodium levels 150-155 are ok. Dr Domingo Mg updated.

## 2021-04-10 NOTE — PROGRESS NOTES
Hospitalist Progress Note     Admit Date:  2021  5:50 PM   Name:  Cynthia Guerrero   Age:  67 y.o.  :  1945   MRN:  [id number]   PCP:  Mj Hope MD  Treatment Team: Attending Provider: Severa Bury, MD; Consulting Provider: Chay Dunne, Sanam De La Paz MD; Utilization Review: Slime Domingo; Consulting Provider: Ortega Key MD; Care Manager: Katharina Muñoz; Care Manager: Isidro Sheldon RN; Utilization Review:  Yumiko Sanchez; Primary Nurse: Carlton Galeazzi, RN; Primary Nurse: Erika Todd RN  Presenting Complaint: Extremity Weakness    Initial Admission Diagnosis: Hemorrhagic cerebrovascular accident (CVA) [facility]) [I61.9]     Assessment and Plan:     Hospital Problems as of 4/10/2021 Date Reviewed: 2021          Codes Class Noted - Resolved POA    Severe protein-calorie malnutrition ([facility] [email or web address] 75.) ICD-10-CM: E43  ICD-9-CM: 262  2021 - Present Yes        Dysphagia following cerebral infarction ICD-10-CM: I69.391  ICD-9-CM: 438.82  2021 - Present Yes        Elevated liver enzymes ICD-10-CM: R74.8  ICD-9-CM: 790.5  2021 - Present Yes        Elevated troponin ICD-10-CM: R77.8  ICD-9-CM: 790.6  2021 - Present Yes        * (Principal) Hemorrh[facility]) [facility] ICD-10-CM: I61.9  ICD-9-CM: 090  2021 - Present Yes        Acute left-sided weakness ICD-10-CM: R53.1  ICD-9-CM: 728.87  2021 - Present Yes        S/P PTCA (percutaneous transluminal coronary angioplasty) ICD-10-CM: Z98.61  ICD-9-CM: V45.82  Unknown - Present Yes        Chronic tuberculosis ICD-10-CM: A15.9  ICD-9-CM: 011.90  Unknown - Present Yes        Coronary atherosclerosis of native coronary vessel ICD-10-CM: I25.10  ICD-9-CM: 414.01  Unknown - Present Yes        Hyperlipidemia ICD-10-CM: E78.5  ICD-9-CM: 272.4  Unknown - Present Yes        Iron deficiency anemia ICD-10-CM: D50.9  ICD-9-CM: 280.9  2015 - Present Yes        Microcytic anemia ICD-10-CM: D50.9  ICD-9-CM: 280.9  2015 - Present     Overview Signed 4/6/2016  9:52 AM by Terri Ruano     Iron deficiency                   Plan:  # Sepsis suspected 2/2 aspiration vs HAP              - Sepsis resolved. CXR with LLL infiltrate. Con't broad spectrum abx, cxs neg, stop vanc tomorrow if still negative. Swallow eval when able, prior notes mention dysphagia so may also need to consider PEG based on swallow eval.     # HyperNa   - Change to D5W, repeat sodium this afternoon. Mannitol per Neurosurgery, con't vs hold? # Right BG/intraventricular hemorrhagic CVA              - S/p EVD on 4/7. Mgmt per Neurosurgery.     # DENI on CKD3              - Baseline Cr 1.1-1.3 with GFR 50s. Better today, con't IVFs. BMPs daily. # Elevated troponin              - Cardiology saw, likely demand, normal EF on echo.     # CAD              - ASA when ok from surgical standpoint. Con't statin.     # HTN              - Norvasc, Lopressor. Off Cardene currently. DC planning: Pending. Diet:  DIET NPO  DIET TUBE FEEDING  DVT ppx: SCDs only. Hospital Course:   Mr. Davey Plaza is a nice 66 y/o AAM with a h/o HTN, CAD, GERD, HLD, COPD and arthritis who was admitted to ICU on 4/2 with an acute right basal ganglia hemorrhagic CVA. Neurosurgery consulted who recommended conservative management given lack of midline shift or hydrocephalus. Cardiology was consulted for elevated troponin, likely demand ischemia. Head CTs were stable for a few days then he became more drowsy on 4/7 and repeat CT showed increase in right ventricular hemorrhage suspicious for early hydrocephalus. He underwent bedside EVD 4/7 PM. Repeat CXR with LLL infiltrate, started on antibiotics to cover HAP vs aspiration. 24hr Events/Subjective:   4/10: Resting comfortably, will wake up and follow commands. Says, \"I'm doing alright\". EVD in place. Sodium up to 150 today, fluids changed. Cultures negative.     No other complaints  Objective:     Patient Vitals for the past 24 hrs:   Temp Pulse Resp BP SpO2   04/10/21 0831  94 21 (!) 145/74 97 %   04/10/21 0800  99 24 131/75 97 %   04/10/21 0730  99 30 123/69 100 %   04/10/21 0700 99.2 °F (37.3 °C) (!) 108 (!) 31 104/63 93 %   04/10/21 0630  96 23 134/77 95 %   04/10/21 0600  100 26 125/70 97 %   04/10/21 0530  (!) 101 30 130/72 98 %   04/10/21 0500  100 29 125/68 97 %   04/10/21 0430  92 25 114/62 97 %   04/10/21 0400  (!) 101 26 118/68 96 %   04/10/21 0330  100 27 128/67 93 %   04/10/21 0300 99.5 °F (37.5 °C) 99 (!) 31 125/68 95 %   04/10/21 0230  (!) 101 25 126/71 94 %   04/10/21 0200  91 24 120/69 94 %   04/10/21 0130  93 22 132/72 94 %   04/10/21 0100  94 28 123/69 95 %   04/10/21 0030  93 25 133/77 98 %   04/10/21 0000  95 28 (!) 146/77 97 %   04/09/21 2330  94 (!) 32 (!) 143/73 97 %   04/09/21 2300 97.6 °F (36.4 °C) 86 21 132/73 96 %   04/09/21 2230  91 26 121/72 97 %   04/09/21 2200  (!) 109 24 130/74 96 %   04/09/21 2148     95 %   04/09/21 2140  97 28 139/72 92 %   04/09/21 2115  83 (!) 32 125/73 94 %   04/09/21 2100  84  126/77 95 %   04/09/21 2046  (!) 101 30 121/75 95 %   04/09/21 2030  (!) 102 27 125/72 91 %   04/09/21 2015  (!) 102 (!) 35 136/80 92 %   04/09/21 2001  100 (!) 37 130/78 94 %   04/09/21 2000  99 (!) 35 130/78 94 %   04/09/21 1946  98  131/75 92 %   04/09/21 1930  99 30 126/70 93 %   04/09/21 1900 98.7 °F (37.1 °C) 97 (!) 33 120/65 95 %   04/09/21 1831  98 (!) 40 120/70 97 %   04/09/21 1816  92 24 114/67 96 %   04/09/21 1800  96 (!) 44 130/68 95 %   04/09/21 1745  100 29 120/70 96 %   04/09/21 1731  (!) 108 24 128/75 95 %   04/09/21 1716  100 22 125/67 96 %   04/09/21 1700 100 °F (37.8 °C) 92 27 (!) 152/77 97 %   04/09/21 1656  95  (!) 152/75    04/09/21 1645  100 25 (!) 160/88 95 %   04/09/21 1631  99 27 128/78 95 %   04/09/21 1616  96 30 133/78 97 %   04/09/21 1600  92 22 134/75 100 %   04/09/21 1545  95 (!) 125 (!) 148/82 98 %   04/09/21 1531  94 30 (!) 143/83 97 %   04/09/21 1516  91 21 138/75 100 %   04/09/21 1500  94 24 109/65 100 %   04/09/21 1445  93 22 132/69 100 %   04/09/21 1431  88 23 133/69 99 %   04/09/21 1416  91 23 (!) 142/78 99 %   04/09/21 1400  97 30 (!) 144/81 99 %   04/09/21 1345  86 (!) 31 (!) 140/79 99 %   04/09/21 1331  92 26 130/73 99 %   04/09/21 1316  83 21 (!) 143/82 100 %   04/09/21 1300  82 22 (!) 148/79 100 %   04/09/21 1245  78 21 (!) 142/79 99 %   04/09/21 1231  87 (!) 46 132/71 99 %   04/09/21 1216  85 26 127/73 99 %   04/09/21 1200 (!) 96.4 °F (35.8 °C) 86 27 139/82 100 %   04/09/21 1145  87 24 131/81 100 %   04/09/21 1131  84 30 136/76 98 %   04/09/21 1116  83 24 135/77 99 %   04/09/21 1103  80 (!) 32 134/81 99 %   04/09/21 1045  86 26 134/81 98 %   04/09/21 1032  84 (!) 38 134/86 97 %   04/09/21 1017  83 25 113/74 95 %   04/09/21 1000  83 (!) 34 122/83 95 %   04/09/21 0945  95 23 125/75 96 %   04/09/21 0940  100 23 126/77 98 %   04/09/21 0938  (!) 124  126/77      Oxygen Therapy  O2 Sat (%): 97 % (04/10/21 0831)  Pulse via Oximetry: 92 beats per minute (04/10/21 0831)  O2 Device: Nasal cannula (04/10/21 0700)  Skin Assessment: Clean, dry, & intact (04/10/21 0700)  O2 Flow Rate (L/min): 3 l/min (04/10/21 0700)    Estimated body mass index is 18.57 kg/m² as calculated from the following:    Height as of this encounter: 5' 8\" (1.727 m). Weight as of this encounter: 55.4 kg (122 lb 1.6 oz). Intake/Output Summary (Last 24 hours) at 4/10/2021 0930  Last data filed at 4/10/2021 0800  Gross per 24 hour   Intake 5087.16 ml   Output 1958 ml   Net 3129.16 ml       *Note that automatically entered I/Os may not be accurate; dependent on patient compliance with collection and accurate  by techs. General:    Thin, underweight. No overt distress. Resting comfortably. Head:   EVD in place, CSF draining. CV:   RRR. No m/r/g. No edema. No JVD  Lungs:   CTAB. No wheezing, rhonchi, or rales.   Unlabored  Abdomen:   Soft, nontender, nondistended. NGT left nare. Extremities: Warm and dry. No cyanosis. Skin:     No rashes. Normal coloration  Neuro:  No gross focal deficits. Left sided weakness. Weak appearing. Able to move right side. Data Reviewed:  I have reviewed all labs, meds, and studies from the last 24 hours. See all results below.     Last 24hr Labs:  Recent Results (from the past 24 hour(s))   CULTURE, BLOOD    Collection Time: 04/09/21 10:53 AM    Specimen: Blood   Result Value Ref Range    Special Requests: RIGHT  HAND        Culture result: NO GROWTH AFTER 20 HOURS     CULTURE, BLOOD    Collection Time: 04/09/21 10:58 AM    Specimen: Blood   Result Value Ref Range    Special Requests: RIGHT  Antecubital        Culture result: NO GROWTH AFTER 20 HOURS     METABOLIC PANEL, BASIC    Collection Time: 04/10/21  3:49 AM   Result Value Ref Range    Sodium 150 (H) 138 - 145 mmol/L    Potassium 3.8 3.5 - 5.1 mmol/L    Chloride 120 (H) 98 - 107 mmol/L    CO2 29 21 - 32 mmol/L    Anion gap 1 (L) 7 - 16 mmol/L    Glucose 124 (H) 65 - 100 mg/dL    BUN 54 (H) 8 - 23 MG/DL    Creatinine 1.57 (H) 0.8 - 1.5 MG/DL    GFR est AA 55 (L) >60 ml/min/1.73m2    GFR est non-AA 46 (L) >60 ml/min/1.73m2    Calcium 8.8 8.3 - 10.4 MG/DL   VANCOMYCIN, RANDOM    Collection Time: 04/10/21  3:49 AM   Result Value Ref Range    Vancomycin, random 14.0 UG/ML   CBC WITH AUTOMATED DIFF    Collection Time: 04/10/21  3:50 AM   Result Value Ref Range    WBC 8.5 4.3 - 11.1 K/uL    RBC 4.79 4.23 - 5.6 M/uL    HGB 12.8 (L) 13.6 - 17.2 g/dL    HCT 38.7 (L) 41.1 - 50.3 %    MCV 80.8 79.6 - 97.8 FL    MCH 26.7 26.1 - 32.9 PG    MCHC 33.1 31.4 - 35.0 g/dL    RDW 14.0 11.9 - 14.6 %    PLATELET 117 664 - 316 K/uL    MPV 11.7 9.4 - 12.3 FL    ABSOLUTE NRBC 0.00 0.0 - 0.2 K/uL    DF AUTOMATED      NEUTROPHILS 79 (H) 43 - 78 %    LYMPHOCYTES 11 (L) 13 - 44 %    MONOCYTES 9 4.0 - 12.0 %    EOSINOPHILS 0 (L) 0.5 - 7.8 %    BASOPHILS 0 0.0 - 2.0 %    IMMATURE GRANULOCYTES 1 0.0 - 5.0 %    ABS. NEUTROPHILS 6.7 1.7 - 8.2 K/UL    ABS. LYMPHOCYTES 0.9 0.5 - 4.6 K/UL    ABS. MONOCYTES 0.8 0.1 - 1.3 K/UL    ABS. EOSINOPHILS 0.0 0.0 - 0.8 K/UL    ABS. BASOPHILS 0.0 0.0 - 0.2 K/UL    ABS. IMM.  GRANS. 0.1 0.0 - 0.5 K/UL       Current Meds:  Current Facility-Administered Medications   Medication Dose Route Frequency    insulin lispro (HUMALOG) injection   SubCUTAneous AC&HS    piperacillin-tazobactam (ZOSYN) 3.375 g in 0.9% sodium chloride (MBP/ADV) 100 mL MBP  3.375 g IntraVENous Q8H    Vancomycin Pharmacy Intermittent Dosing   Other Rx Dosing/Monitoring    NUTRITIONAL SUPPORT ELECTROLYTE PRN ORDERS   Does Not Apply PRN    acetaminophen (TYLENOL) solution 650 mg  650 mg Per NG tube Q4H PRN    mannitol (OSMITROL) 20 g in Empty Bag   IntraVENous Q6H    atorvastatin (LIPITOR) tablet 40 mg  40 mg Per NG tube QHS    metoprolol tartrate (LOPRESSOR) tablet 25 mg  25 mg Per NG tube Q12H    metoprolol (LOPRESSOR) injection 5 mg  5 mg IntraVENous Q6H PRN    [Held by provider] morphine injection 1 mg  1 mg IntraVENous Q4H PRN    [Held by provider] oxyCODONE IR (ROXICODONE) tablet 5 mg  5 mg Oral Q6H PRN    enalaprilat (VASOTEC) injection 1.25 mg  1.25 mg IntraVENous Q6H PRN    amLODIPine (NORVASC) tablet 5 mg  5 mg Oral DAILY    lidocaine 4 % patch 1 Patch  1 Patch TransDERmal Q24H    niCARdipine (CARDENE) 25 mg in 0.9% sodium chloride (MBP/ADV) 250 mL infusion  0-15 mg/hr IntraVENous TITRATE    sodium chloride (NS) flush 5-40 mL  5-40 mL IntraVENous Q8H    sodium chloride (NS) flush 5-40 mL  5-40 mL IntraVENous PRN       Other Studies:  Results for orders placed or performed during the hospital encounter of 04/02/21   2D ECHO COMPLETE ADULT (TTE) W OR WO CONTR    Narrative    Katelyn  [address] [facility], [address] [facility]  [phone]    Transthoracic Echocardiogram  2D, M-mode, Doppler, and Color Doppler    Patient: Ruba Cubalander CLARIBEL  MR #: [id number]  : 1945  Age: 67 years  Gender: Male  Study date: 2021  Account #: [de-identified]  Height: 68 in  Weight: 133.8 lb  BSA: 1.72 mï¾²  Status:Routine  Location: 334  BP: 137/ 77    Allergies: NO KNOWN ALLERGENS    Sonographer:  TRINH Sierra  Group:  [id number] Card[facility]  Referring Physician:  Rosemary Boogie DO  Reading Physician:  Dr. Gabi Thomas    INDICATIONS: TIA with transient neurological disturbance    PROCEDURE: This was a routine study. A transthoracic echocardiogram was  performed. The study included complete 2D imaging, M-mode, complete spectral  Doppler, and color Doppler. Intravenous contrast (Definity) was administered. Intravenous contrast (agitated saline) was administered. Image quality was  adequate. LEFT VENTRICLE: Size was normal. Systolic function was normal. Ejection  fraction was estimated in the range of 55 % to 60 %. There were no regional  wall motion abnormalities. Wall thickness was mildly increased. Left  ventricular diastolic function parameters were normal. Avg E/e': 4.54. RIGHT VENTRICLE: The size was normal. Systolic function was normal. The  tricuspid jet envelope definition was inadequate for estimation of RV   systolic  pressure. LEFT ATRIUM: Size was normal.    ATRIAL SEPTUM: Agitated saline contrast injection (bubble study) was   performed. There was no right-to-left shunt, at rest or induced by the Valsalva   maneuver. RIGHT ATRIUM: Size was normal.    SYSTEMIC VEINS: IVC: The inferior vena cava was normal in size. The  respirophasic change in diameter was more than 50%. AORTIC VALVE: There is mild aortic annular calcification. The valve was  trileaflet. Leaflets exhibited mild sclerosis. MITRAL VALVE: Valve structure was normal.    TRICUSPID VALVE: The valve structure was normal. There was trivial  regurgitation. PULMONIC VALVE: Not well visualized.     PERICARDIUM: There was no pericardial effusion. AORTA: The root exhibited normal size. SUMMARY:    -  Left ventricle: Systolic function was normal. Ejection fraction was  estimated in the range of 55 % to 60 %. -  Atrial septum: Agitated saline contrast injection (bubble study) was  performed. There was no right-to-left shunt, at rest or induced by the   Valsalva  maneuver. SYSTEM MEASUREMENT TABLES    2D  Ao Diam: 2.8 cm  LAEDV Index (A-L): 26.9 ml/m2  IVSd: 1.1 cm  LVIDd: 5.2 cm  LVPWd: 0.8 cm    PW  E/E' Av.5    Prepared and signed by    Dr. Froy Manzo 2021 22:44:08         No results found. All Micro Results     Procedure Component Value Units Date/Time    CULTURE, BLOOD [[id number]] Collected: 21 1053    Order Status: Completed Specimen: Blood Updated: 04/10/21 0801     Special Requests: --        RIGHT  HAND       Culture result: NO GROWTH AFTER 20 HOURS       CULTURE, BLOOD [[id number]] Collected: 21 1058    Order Status: Completed Specimen: Blood Updated: 04/10/21 0801     Special Requests: --        RIGHT  Antecubital       Culture result: NO GROWTH AFTER 20 HOURS       COVID-19 RAPID TEST [[id number]] Collected: 21 1221    Order Status: Completed Specimen: Nasopharyngeal Updated: 21 1301     Specimen source Nasopharyngeal        COVID-19 rapid test Not detected        Comment:      The specimen is NEGATIVE for SARS-CoV-2, the novel coronavirus associated with COVID-19. A negative result does not rule out COVID-19. This test has been authorized by the FDA under an Emergency Use Authorization (EUA) for use by authorized laboratories.         Fact sheet for Healthcare Providers: ConventionUpdate.co.nz  Fact sheet for Patients: ConventionUpdate.co.nz       Methodology: Isothermal Nucleic Acid Amplification               SARS-CoV-2 Lab Results  \"Novel Coronavirus\" Test: No results found for: COV2NT   \"Emergent Disease\" Test: No results found for: EDPR  \"SARS-COV-2\" Test: No results found for: XGCOVT  Rapid Test:   Lab Results   Component Value Date/Time    COVR Not detected 04/07/2021 12:21 PM            Signed:  Vanessa Zacarias MD

## 2021-04-11 LAB
ANION GAP SERPL CALC-SCNC: 5 MMOL/L (ref 7–16)
BASOPHILS # BLD: 0 K/UL (ref 0–0.2)
BASOPHILS NFR BLD: 0 % (ref 0–2)
BUN SERPL-MCNC: 38 MG/DL (ref 8–23)
CALCIUM SERPL-MCNC: 8.9 MG/DL (ref 8.3–10.4)
CHLORIDE SERPL-SCNC: 124 MMOL/L (ref 98–107)
CO2 SERPL-SCNC: 26 MMOL/L (ref 21–32)
CREAT SERPL-MCNC: 1.16 MG/DL (ref 0.8–1.5)
DIFFERENTIAL METHOD BLD: ABNORMAL
EOSINOPHIL # BLD: 0.1 K/UL (ref 0–0.8)
EOSINOPHIL NFR BLD: 2 % (ref 0.5–7.8)
ERYTHROCYTE [DISTWIDTH] IN BLOOD BY AUTOMATED COUNT: 14.4 % (ref 11.9–14.6)
GLUCOSE BLD STRIP.AUTO-MCNC: 114 MG/DL (ref 65–100)
GLUCOSE BLD STRIP.AUTO-MCNC: 115 MG/DL (ref 65–100)
GLUCOSE BLD STRIP.AUTO-MCNC: 128 MG/DL (ref 65–100)
GLUCOSE BLD STRIP.AUTO-MCNC: 93 MG/DL (ref 65–100)
GLUCOSE SERPL-MCNC: 100 MG/DL (ref 65–100)
HCT VFR BLD AUTO: 40.7 % (ref 41.1–50.3)
HGB BLD-MCNC: 12.9 G/DL (ref 13.6–17.2)
IMM GRANULOCYTES # BLD AUTO: 0 K/UL (ref 0–0.5)
IMM GRANULOCYTES NFR BLD AUTO: 1 % (ref 0–5)
LYMPHOCYTES # BLD: 1 K/UL (ref 0.5–4.6)
LYMPHOCYTES NFR BLD: 19 % (ref 13–44)
MCH RBC QN AUTO: 26.9 PG (ref 26.1–32.9)
MCHC RBC AUTO-ENTMCNC: 31.7 G/DL (ref 31.4–35)
MCV RBC AUTO: 84.8 FL (ref 79.6–97.8)
MONOCYTES # BLD: 0.6 K/UL (ref 0.1–1.3)
MONOCYTES NFR BLD: 11 % (ref 4–12)
NEUTS SEG # BLD: 3.6 K/UL (ref 1.7–8.2)
NEUTS SEG NFR BLD: 68 % (ref 43–78)
NRBC # BLD: 0 K/UL (ref 0–0.2)
PLATELET # BLD AUTO: 192 K/UL (ref 150–450)
PMV BLD AUTO: 11.9 FL (ref 9.4–12.3)
POTASSIUM SERPL-SCNC: 4.2 MMOL/L (ref 3.5–5.1)
RBC # BLD AUTO: 4.8 M/UL (ref 4.23–5.6)
SERVICE CMNT-IMP: ABNORMAL
SERVICE CMNT-IMP: NORMAL
SODIUM SERPL-SCNC: 155 MMOL/L (ref 136–145)
VANCOMYCIN SERPL-MCNC: 9.1 UG/ML
WBC # BLD AUTO: 5.4 K/UL (ref 4.3–11.1)

## 2021-04-11 PROCEDURE — 74011250636 HC RX REV CODE- 250/636: Performed by: INTERNAL MEDICINE

## 2021-04-11 PROCEDURE — 74011000250 HC RX REV CODE- 250: Performed by: FAMILY MEDICINE

## 2021-04-11 PROCEDURE — 82962 GLUCOSE BLOOD TEST: CPT

## 2021-04-11 PROCEDURE — 80048 BASIC METABOLIC PNL TOTAL CA: CPT

## 2021-04-11 PROCEDURE — 65610000006 HC RM INTENSIVE CARE

## 2021-04-11 PROCEDURE — 74011250637 HC RX REV CODE- 250/637: Performed by: INTERNAL MEDICINE

## 2021-04-11 PROCEDURE — 80202 ASSAY OF VANCOMYCIN: CPT

## 2021-04-11 PROCEDURE — 74011000258 HC RX REV CODE- 258: Performed by: INTERNAL MEDICINE

## 2021-04-11 PROCEDURE — 85025 COMPLETE CBC W/AUTO DIFF WBC: CPT

## 2021-04-11 PROCEDURE — 74011000250 HC RX REV CODE- 250: Performed by: INTERNAL MEDICINE

## 2021-04-11 PROCEDURE — 74011250637 HC RX REV CODE- 250/637: Performed by: FAMILY MEDICINE

## 2021-04-11 RX ORDER — AMLODIPINE BESYLATE 10 MG/1
10 TABLET ORAL DAILY
Status: DISCONTINUED | OUTPATIENT
Start: 2021-04-11 | End: 2021-04-13

## 2021-04-11 RX ADMIN — ENALAPRILAT 1.25 MG: 1.25 INJECTION INTRAVENOUS at 17:54

## 2021-04-11 RX ADMIN — PIPERACILLIN SODIUM AND TAZOBACTAM SODIUM 3.38 G: 3; .375 INJECTION, POWDER, LYOPHILIZED, FOR SOLUTION INTRAVENOUS at 02:38

## 2021-04-11 RX ADMIN — Medication 10 ML: at 07:31

## 2021-04-11 RX ADMIN — PIPERACILLIN SODIUM AND TAZOBACTAM SODIUM 3.38 G: 3; .375 INJECTION, POWDER, LYOPHILIZED, FOR SOLUTION INTRAVENOUS at 09:08

## 2021-04-11 RX ADMIN — Medication 10 ML: at 13:16

## 2021-04-11 RX ADMIN — METOPROLOL TARTRATE 25 MG: 25 TABLET, FILM COATED ORAL at 20:21

## 2021-04-11 RX ADMIN — AMLODIPINE BESYLATE 10 MG: 10 TABLET ORAL at 09:08

## 2021-04-11 RX ADMIN — METOPROLOL TARTRATE 25 MG: 25 TABLET, FILM COATED ORAL at 09:08

## 2021-04-11 RX ADMIN — ACETAMINOPHEN 650 MG: 325 SUSPENSION ORAL at 21:10

## 2021-04-11 RX ADMIN — Medication 10 ML: at 22:36

## 2021-04-11 RX ADMIN — PIPERACILLIN SODIUM AND TAZOBACTAM SODIUM 3.38 G: 3; .375 INJECTION, POWDER, LYOPHILIZED, FOR SOLUTION INTRAVENOUS at 17:02

## 2021-04-11 RX ADMIN — VANCOMYCIN HYDROCHLORIDE 1000 MG: 1 INJECTION, POWDER, LYOPHILIZED, FOR SOLUTION INTRAVENOUS at 06:33

## 2021-04-11 RX ADMIN — ENALAPRILAT 1.25 MG: 1.25 INJECTION INTRAVENOUS at 06:38

## 2021-04-11 RX ADMIN — SODIUM CHLORIDE 75 ML/HR: 900 INJECTION, SOLUTION INTRAVENOUS at 14:12

## 2021-04-11 RX ADMIN — ATORVASTATIN CALCIUM 40 MG: 40 TABLET, FILM COATED ORAL at 21:10

## 2021-04-11 NOTE — PROGRESS NOTES
[facility] Progress Note     S: No acute events overnight, on Zosyn for aspiration PNA, EVD in place. Pt appears to indicate has burning with urination  O:  Visit Vitals  /73   Pulse 83   Temp 97.7 °F (36.5 °C)   Resp 29   Ht 5' 8\" (1.727 m)   Wt 55.4 kg (122 lb 1.6 oz)   SpO2 100%   BMI 18.57 kg/m²       VSS  Awake, EO spont, pt making requests/suggestions with R hand  FC's on RU/RLE briskly, localizing on L  SILT  EVD @ 5cmH20, functioning, 143cc xanthochromic out over 24hrs     A/P: R thalamic IPH with obstructive HCP s/p EVD with aspiration PNA  - Neuro stable, expected deficits for location of hemorrhage  - Abx per primary team for PNA  - Na elevated to 155 - ideal for pt's edema surrounding hemorrhage, ok to allow to drift down slowly. No changes or decline since mannitol d/c'd  - Cont to follow exam, monitor EVD and likely clamp trial next week, though notable increase in output yesterday. - Cont supportive care.       Current Facility-Administered Medications:     amLODIPine (NORVASC) tablet 10 mg, 10 mg, Oral, DAILY, Kailey Cassidy MD    insulin lispro (HUMALOG) injection, , SubCUTAneous, AC&HS, Kailey Cassidy MD, Stopped at 04/10/21 1630    0.9% sodium chloride infusion, 75 mL/hr, IntraVENous, CONTINUOUS, Kailey Cassidy MD, Last Rate: 75 mL/hr at 04/11/21 0731, 75 mL/hr at 04/11/21 0731    piperacillin-tazobactam (ZOSYN) 3.375 g in 0.9% sodium chloride (MBP/ADV) 100 mL MBP, 3.375 g, IntraVENous, Q8H, Kailey Cassidy MD, Last Rate: 25 mL/hr at 04/11/21 0238, 3.375 g at 04/11/21 0238    NUTRITIONAL SUPPORT ELECTROLYTE PRN ORDERS, , Does Not Apply, PRN, Jarod Rangel DO    acetaminophen (TYLENOL) solution 650 mg, 650 mg, Per NG tube, Q4H PRN, Jarod Rangel DO, 650 mg at 04/10/21 0940    atorvastatin (LIPITOR) tablet 40 mg, 40 mg, Per NG tube, QHS, Shira AL MD, 40 mg at 04/10/21 2120    metoprolol tartrate (LOPRESSOR) tablet 25 mg, 25 mg, Per NG tube, Q12H, Roxane Romero MD, 25 mg at 04/10/21 2120    metoprolol (LOPRESSOR) injection 5 mg, 5 mg, IntraVENous, Q6H PRN, Jarod Rangel E, DO, 5 mg at 04/07/21 1427    [Held by provider] morphine injection 1 mg, 1 mg, IntraVENous, Q4H PRN, EmmalenaGilbert prestonson E, DO    [Held by provider] oxyCODONE IR (ROXICODONE) tablet 5 mg, 5 mg, Oral, Q6H PRN, Gilbert Rangelson E, DO, 5 mg at 04/07/21 0155    enalaprilat (VASOTEC) injection 1.25 mg, 1.25 mg, IntraVENous, Q6H PRN, Jarod Rangel E, DO, 1.25 mg at 04/11/21 4699    lidocaine 4 % patch 1 Patch, 1 Patch, TransDERmal, Q24H, Kurt Jesus MD, 1 Patch at 04/10/21 1054    niCARdipine (CARDENE) 25 mg in 0.9% sodium chloride (MBP/ADV) 250 mL infusion, 0-15 mg/hr, IntraVENous, TITRATE, Elijah Parker, DO, Stopped at 04/09/21 2050    sodium chloride (NS) flush 5-40 mL, 5-40 mL, IntraVENous, Q8H, Usman Montes MD, 10 mL at 04/11/21 0731    sodium chloride (NS) flush 5-40 mL, 5-40 mL, IntraVENous, PRN, Deborah Cervantes MD

## 2021-04-11 NOTE — PROGRESS NOTES
Pt's condom cath leaking. Replaced catheter. Pt had smear of stool. Pt checked for impaction. Pt has soft stool in rectum. No impaction. Daniela care done. agult brief placed on pt.

## 2021-04-11 NOTE — PROGRESS NOTES
Hospitalist Progress Note     Admit Date:  2021  5:50 PM   Name:  Sarah Potter   Age:  67 y.o.  :  1945   MRN:  [id number]   PCP:  Joan Aguilar MD  Treatment Team: Attending Provider: Phill Dunn MD; Consulting Provider: Gabriela Hansen MD; Utilization Review: Rosalio Samson; Consulting Provider: Flo Mcwilliams MD; Care Manager: Payal Wagner; Care Manager: Marely Ann, RIK; Utilization Review:  Yumiko Up; Primary Nurse: Jak Manriquez RN; Primary Nurse: Efra Gomez RN  Presenting Complaint: Extremity Weakness    Initial Admission Diagnosis: Hemorrhagic cerebrovascular accident (CVA) [facility]) [I61.9]     Assessment and Plan:     Hospital Problems as of 2021 Date Reviewed: 2021          Codes Class Noted - Resolved POA    Severe protein-calorie malnutrition ([facility] ICD-10-CM: E43  ICD-9-CM: 262  2021 - Present Yes        Dysphagia following cerebral infarction ICD-10-CM: I69.391  ICD-9-CM: 438.82  2021 - Present Yes        Elevated liver enzymes ICD-10-CM: R74.8  ICD-9-CM: 790.5  2021 - Present Yes        Elevated troponin ICD-10-CM: R77.8  ICD-9-CM: 790.6  2021 - Present Yes        * (Principal) Hemorrh[facility] ([facility] ICD-10-CM: I61.9  ICD-9-CM: 998  2021 - Present Yes        Acute left-sided weakness ICD-10-CM: R53.1  ICD-9-CM: 728.87  2021 - Present Yes        S/P PTCA (percutaneous transluminal coronary angioplasty) ICD-10-CM: Z98.61  ICD-9-CM: V45.82  Unknown - Present Yes        Chronic tuberculosis ICD-10-CM: A15.9  ICD-9-CM: 011.90  Unknown - Present Yes        Coronary atherosclerosis of native coronary vessel ICD-10-CM: I25.10  ICD-9-CM: 414.01  Unknown - Present Yes        Hyperlipidemia ICD-10-CM: E78.5  ICD-9-CM: 272.4  Unknown - Present Yes        Iron deficiency anemia ICD-10-CM: D50.9  ICD-9-CM: 280.9  2015 - Present Yes        Microcytic anemia ICD-10-CM: D50.9  ICD-9-CM: 280.9  2015 - Present     Overview Signed 4/6/2016  9:52 AM by Desiree Jimenez     Iron deficiency                   Plan:  # Sepsis suspected 2/2 aspiration vs HAP              - Sepsis resolved. CXR with LLL infiltrate. - Stop vanc today, con't Zosyn   - Swallow eval when able, prior notes mention dysphagia so may also need to consider PEG based on swallow eval.      # HyperNa              - Ok per NSG given edema. F/u.     # Right BG/intraventricular hemorrhagic CVA              - S/p EVD on 4/7. Mgmt per Neurosurgery.     # DENI on CKD3              - Baseline Cr 1.1-1.3 with GFR 50s. Improving.     # Elevated troponin              - Cardiology saw, likely demand, normal EF on echo.     # CAD              - ASA when ok from surgical standpoint. Con't statin.     # HTN              - Norvasc, Lopressor. Off Cardene currently.     Other listed chronic conditions stable, continue current management. DC planning: Placement. Diet:  DIET NPO  DIET TUBE FEEDING  DVT ppx: SCDs only. Hospital Course:   Mr. Franco Kunz is a nice 66 y/o AAM with a h/o HTN, CAD, GERD, HLD, COPD and arthritis who was admitted to ICU on 4/2 with an acute right basal ganglia hemorrhagic CVA. Neurosurgery consulted who recommended conservative management given lack of midline shift or hydrocephalus. Cardiology was consulted for elevated troponin, likely demand ischemia. Head CTs were stable for a few days then he became more drowsy on 4/7 and repeat CT showed increase in right ventricular hemorrhage suspicious for early hydrocephalus. He underwent bedside EVD 4/7 PM. Repeat CXR with LLL infiltrate, started on antibiotics to cover HAP vs aspiration. 24hr Events/Subjective:   4/11: Doing well, still very weak appearing, EVD draining, Na 155, afebrile, cultures negative. Moves right side well, still nothing on the left.      No other complaints  Objective:     Patient Vitals for the past 24 hrs:   Temp Pulse Resp BP SpO2   04/11/21 0902  76 17 (!) 144/77 100 %   04/11/21 0830  73 24 136/75 100 %   04/11/21 0801  74 22 (!) 144/82 100 %   04/11/21 0730  83 29 125/73 100 %   04/11/21 0701 97.7 °F (36.5 °C) 85 24 125/75 100 %   04/11/21 0634  80 18 (!) 170/88 100 %   04/11/21 0631  78 25 (!) 161/87 100 %   04/11/21 0630  79 19 (!) 171/89 100 %   04/11/21 0601  84 18 (!) 145/80 100 %   04/11/21 0530  81 (!) 45 (!) 141/75 100 %   04/11/21 0502  81 29 127/76 100 %   04/11/21 0501  75 30 (!) 151/81 100 %   04/11/21 0430  74 28 (!) 145/72 100 %   04/11/21 0400 98.3 °F (36.8 °C) 75 17 135/75 100 %   04/11/21 0330  82 24 131/84 100 %   04/11/21 0300  82 29 (!) 141/75 100 %   04/11/21 0230  84 23 132/79 100 %   04/11/21 0203  75 (!) 34 (!) 154/83 100 %   04/11/21 0201  79 (!) 40 (!) 169/80 100 %   04/11/21 0131  81 28 (!) 144/71 100 %   04/11/21 0030  79 (!) 32 126/71 100 %   04/11/21 0000 97.8 °F (36.6 °C) 69 24 137/74 100 %   04/10/21 2330  73 (!) 46 128/74 100 %   04/10/21 2301  73 28 134/84 100 %   04/10/21 2230  61 (!) 47 (!) 117/90 100 %   04/10/21 2200  77 22 119/75 100 %   04/10/21 2130  88 30 (!) 142/84 100 %   04/10/21 2100  81 24 131/74 100 %   04/10/21 2030  87 27 114/71 100 %   04/10/21 2001  85 19 (!) 164/78 100 %   04/10/21 1930  98 (!) 31 (!) 147/82 100 %   04/10/21 1900 98.1 °F (36.7 °C) 79 (!) 32 (!) 133/99 100 %   04/10/21 1800  86 22 135/79 100 %   04/10/21 1731  88 26 133/81 99 %   04/10/21 1700  77 20 114/63 100 %   04/10/21 1631  75 27 (!) 148/82 100 %   04/10/21 1600  77 20 131/72 100 %   04/10/21 1532  85 30 137/78 100 %   04/10/21 1500 98.9 °F (37.2 °C) 79 20 133/75 99 %   04/10/21 1430  84 (!) 32 120/68 100 %   04/10/21 1428  74 26 122/68 100 %   04/10/21 1400  73 18 137/72 100 %   04/10/21 1330  79 22 129/69 100 %   04/10/21 1300  76 (!) 32 122/62 100 %   04/10/21 1230  79 25 120/71 100 %   04/10/21 1200  74 28 123/66 100 %   04/10/21 1145  67 20  100 %   04/10/21 1130  72 26 116/64 100 %   04/10/21 1100 98.1 °F (36.7 °C) 73 26 101/64 100 %   04/10/21 1030  72 19 (!) 101/59 100 %   04/10/21 1002  96 (!) 34 122/74 100 %   04/10/21 1000  (!) 102 (!) 34 137/81 100 %   04/10/21 0930  (!) 102 (!) 43 137/81 100 %     Oxygen Therapy  O2 Sat (%): 100 % (04/11/21 0902)  Pulse via Oximetry: 76 beats per minute (04/11/21 0902)  O2 Device: Nasal cannula (04/11/21 0701)  Skin Assessment: Clean, dry, & intact (04/10/21 1930)  O2 Flow Rate (L/min): 3 l/min (04/11/21 0701)    Estimated body mass index is 18.57 kg/m² as calculated from the following:    Height as of this encounter: 5' 8\" (1.727 m). Weight as of this encounter: 55.4 kg (122 lb 1.6 oz). Intake/Output Summary (Last 24 hours) at 4/11/2021 0929  Last data filed at 4/11/2021 0800  Gross per 24 hour   Intake 3541.75 ml   Output 899 ml   Net 2642. 75 ml       *Note that automatically entered I/Os may not be accurate; dependent on patient compliance with collection and accurate  by techs. General:    Cachectic, no acute distress. Head:  EVD draining. CV:   RRR. No m/r/g. No edema. No JVD  Lungs:   CTAB. No wheezing, rhonchi, or rales. Unlabored. Abdomen:   Soft, nontender, nondistended. Extremities: Warm and dry. No cyanosis   Skin:     No rashes. Normal coloration  Neuro:  L hemiparesis. Moves right side spontaneously and on command. Weak appearing but communicates, attempts to stick tongue out. Data Reviewed:  I have reviewed all labs, meds, and studies from the last 24 hours. See all results below.     Last 24hr Labs:  Recent Results (from the past 24 hour(s))   GLUCOSE, POC    Collection Time: 04/10/21 11:04 AM   Result Value Ref Range    Glucose (POC) 167 (H) 65 - 100 mg/dL    Performed by [facility]    SODIUM    Collection Time: 04/10/21  2:34 PM   Result Value Ref Range    Sodium 155 (H) 136 - 145 mmol/L   GLUCOSE, POC    Collection Time: 04/10/21  4:42 PM   Result Value Ref Range    Glucose (POC) 101 (H) 65 - 100 mg/dL    Performed by Ashutosh    GLUCOSE, POC    Collection Time: 04/10/21  9:17 PM   Result Value Ref Range    Glucose (POC) 112 (H) 65 - 100 mg/dL    Performed by [facility]    METABOLIC PANEL, BASIC    Collection Time: 04/11/21  4:10 AM   Result Value Ref Range    Sodium 155 (H) 136 - 145 mmol/L    Potassium 4.2 3.5 - 5.1 mmol/L    Chloride 124 (H) 98 - 107 mmol/L    CO2 26 21 - 32 mmol/L    Anion gap 5 (L) 7 - 16 mmol/L    Glucose 100 65 - 100 mg/dL    BUN 38 (H) 8 - 23 MG/DL    Creatinine 1.16 0.8 - 1.5 MG/DL    GFR est AA >60 >60 ml/min/1.73m2    GFR est non-AA >60 >60 ml/min/1.73m2    Calcium 8.9 8.3 - 10.4 MG/DL   VANCOMYCIN, RANDOM    Collection Time: 04/11/21  4:10 AM   Result Value Ref Range    Vancomycin, random 9.1 UG/ML   CBC WITH AUTOMATED DIFF    Collection Time: 04/11/21  4:10 AM   Result Value Ref Range    WBC 5.4 4.3 - 11.1 K/uL    RBC 4.80 4.23 - 5.6 M/uL    HGB 12.9 (L) 13.6 - 17.2 g/dL    HCT 40.7 (L) 41.1 - 50.3 %    MCV 84.8 79.6 - 97.8 FL    MCH 26.9 26.1 - 32.9 PG    MCHC 31.7 31.4 - 35.0 g/dL    RDW 14.4 11.9 - 14.6 %    PLATELET 314 740 - 191 K/uL    MPV 11.9 9.4 - 12.3 FL    ABSOLUTE NRBC 0.00 0.0 - 0.2 K/uL    DF AUTOMATED      NEUTROPHILS 68 43 - 78 %    LYMPHOCYTES 19 13 - 44 %    MONOCYTES 11 4.0 - 12.0 %    EOSINOPHILS 2 0.5 - 7.8 %    BASOPHILS 0 0.0 - 2.0 %    IMMATURE GRANULOCYTES 1 0.0 - 5.0 %    ABS. NEUTROPHILS 3.6 1.7 - 8.2 K/UL    ABS. LYMPHOCYTES 1.0 0.5 - 4.6 K/UL    ABS. MONOCYTES 0.6 0.1 - 1.3 K/UL    ABS. EOSINOPHILS 0.1 0.0 - 0.8 K/UL    ABS. BASOPHILS 0.0 0.0 - 0.2 K/UL    ABS. IMM.  GRANS. 0.0 0.0 - 0.5 K/UL   GLUCOSE, POC    Collection Time: 04/11/21  7:54 AM   Result Value Ref Range    Glucose (POC) 93 65 - 100 mg/dL    Performed by Ashutosh        Current Meds:  Current Facility-Administered Medications   Medication Dose Route Frequency    amLODIPine (NORVASC) tablet 10 mg  10 mg Oral DAILY    insulin lispro (HUMALOG) injection   SubCUTAneous AC&HS    0.9% sodium chloride infusion  75 mL/hr IntraVENous CONTINUOUS    piperacillin-tazobactam (ZOSYN) 3.375 g in 0.9% sodium chloride (MBP/ADV) 100 mL MBP  3.375 g IntraVENous Q8H    NUTRITIONAL SUPPORT ELECTROLYTE PRN ORDERS   Does Not Apply PRN    acetaminophen (TYLENOL) solution 650 mg  650 mg Per NG tube Q4H PRN    atorvastatin (LIPITOR) tablet 40 mg  40 mg Per NG tube QHS    metoprolol tartrate (LOPRESSOR) tablet 25 mg  25 mg Per NG tube Q12H    metoprolol (LOPRESSOR) injection 5 mg  5 mg IntraVENous Q6H PRN    [Held by provider] morphine injection 1 mg  1 mg IntraVENous Q4H PRN    [Held by provider] oxyCODONE IR (ROXICODONE) tablet 5 mg  5 mg Oral Q6H PRN    enalaprilat (VASOTEC) injection 1.25 mg  1.25 mg IntraVENous Q6H PRN    lidocaine 4 % patch 1 Patch  1 Patch TransDERmal Q24H    niCARdipine (CARDENE) 25 mg in 0.9% sodium chloride (MBP/ADV) 250 mL infusion  0-15 mg/hr IntraVENous TITRATE    sodium chloride (NS) flush 5-40 mL  5-40 mL IntraVENous Q8H    sodium chloride (NS) flush 5-40 mL  5-40 mL IntraVENous PRN       Other Studies:  Results for orders placed or performed during the hospital encounter of 21   2D ECHO COMPLETE ADULT (TTE) W OR 1400 [address], [address] [place] [address]  [phone]    Transthoracic Echocardiogram  2D, M-mode, Doppler, and Color Doppler    Patient: Bianca Edwards  MR #: [id number]  : [id number]  Age: 67 years  Gender: Male  Study date: 2021  Account #: [de-identified]  Height: 68 in  Weight: 133.8 lb  BSA: 1.72 mï¾²  Status:Routine  Location: 334  BP: 137/ 77    Allergies: NO KNOWN ALLERGENS    Sonographer:  TRINH Iraheta  Group:  [facility]  Referring Physician:  Clare Barker DO  Reading Physician:  Dr. Claudia Muir    INDICATIONS: TIA with transient neurological disturbance    PROCEDURE: This was a routine study.  A transthoracic echocardiogram was  performed. The study included complete 2D imaging, M-mode, complete spectral  Doppler, and color Doppler. Intravenous contrast (Definity) was administered. Intravenous contrast (agitated saline) was administered. Image quality was  adequate. LEFT VENTRICLE: Size was normal. Systolic function was normal. Ejection  fraction was estimated in the range of 55 % to 60 %. There were no regional  wall motion abnormalities. Wall thickness was mildly increased. Left  ventricular diastolic function parameters were normal. Avg E/e': 4.54. RIGHT VENTRICLE: The size was normal. Systolic function was normal. The  tricuspid jet envelope definition was inadequate for estimation of RV   systolic  pressure. LEFT ATRIUM: Size was normal.    ATRIAL SEPTUM: Agitated saline contrast injection (bubble study) was   performed. There was no right-to-left shunt, at rest or induced by the Valsalva   maneuver. RIGHT ATRIUM: Size was normal.    SYSTEMIC VEINS: IVC: The inferior vena cava was normal in size. The  respirophasic change in diameter was more than 50%. AORTIC VALVE: There is mild aortic annular calcification. The valve was  trileaflet. Leaflets exhibited mild sclerosis. MITRAL VALVE: Valve structure was normal.    TRICUSPID VALVE: The valve structure was normal. There was trivial  regurgitation. PULMONIC VALVE: Not well visualized. PERICARDIUM: There was no pericardial effusion. AORTA: The root exhibited normal size. SUMMARY:    -  Left ventricle: Systolic function was normal. Ejection fraction was  estimated in the range of 55 % to 60 %. -  Atrial septum: Agitated saline contrast injection (bubble study) was  performed. There was no right-to-left shunt, at rest or induced by the   Valsalva  maneuver.     SYSTEM MEASUREMENT TABLES    2D  Ao Diam: 2.8 cm  LAEDV Index (A-L): 26.9 ml/m2  IVSd: 1.1 cm  LVIDd: 5.2 cm  LVPWd: 0.8 cm    PW  E/E' Av.5    Prepared and signed by    Dr. Vernon Diaz 03-Apr-2021 22:44:08         No results found. All Micro Results     Procedure Component Value Units Date/Time    CULTURE, BLOOD [[id number]] Collected: 04/09/21 1053    Order Status: Completed Specimen: Blood Updated: 04/11/21 0636     Special Requests: --        RIGHT  HAND       Culture result: NO GROWTH 2 DAYS       CULTURE, BLOOD [[id number]] Collected: 04/09/21 1058    Order Status: Completed Specimen: Blood Updated: 04/11/21 0636     Special Requests: --        RIGHT  Antecubital       Culture result: NO GROWTH 2 DAYS       COVID-19 RAPID TEST [[id number]] Collected: 04/07/21 1221    Order Status: Completed Specimen: Nasopharyngeal Updated: 04/07/21 1301     Specimen source Nasopharyngeal        COVID-19 rapid test Not detected        Comment:      The specimen is NEGATIVE for SARS-CoV-2, the novel coronavirus associated with COVID-19. A negative result does not rule out COVID-19. This test has been authorized by the FDA under an Emergency Use Authorization (EUA) for use by authorized laboratories.         Fact sheet for Healthcare Providers: ConventionUpdate.co.nz  Fact sheet for Patients: ConventionUpdate.co.nz       Methodology: Isothermal Nucleic Acid Amplification               SARS-CoV-2 Lab Results  \"Novel Coronavirus\" Test: No results found for: COV2NT   \"Emergent Disease\" Test: No results found for: EDPR  \"SARS-COV-2\" Test: No results found for: XGCOVT  Rapid Test:   Lab Results   Component Value Date/Time    COVR Not detected 04/07/2021 12:21 PM            Signed:  Vanessa Zacarias MD

## 2021-04-12 LAB
ANION GAP SERPL CALC-SCNC: 5 MMOL/L (ref 7–16)
BUN SERPL-MCNC: 32 MG/DL (ref 8–23)
CALCIUM SERPL-MCNC: 8.8 MG/DL (ref 8.3–10.4)
CHLORIDE SERPL-SCNC: 122 MMOL/L (ref 98–107)
CO2 SERPL-SCNC: 24 MMOL/L (ref 21–32)
CREAT SERPL-MCNC: 1.14 MG/DL (ref 0.8–1.5)
GLUCOSE BLD STRIP.AUTO-MCNC: 107 MG/DL (ref 65–100)
GLUCOSE BLD STRIP.AUTO-MCNC: 120 MG/DL (ref 65–100)
GLUCOSE BLD STRIP.AUTO-MCNC: 129 MG/DL (ref 65–100)
GLUCOSE BLD STRIP.AUTO-MCNC: 91 MG/DL (ref 65–100)
GLUCOSE SERPL-MCNC: 113 MG/DL (ref 65–100)
MAGNESIUM SERPL-MCNC: 2.5 MG/DL (ref 1.8–2.4)
PHOSPHATE SERPL-MCNC: 4.5 MG/DL (ref 2.3–3.7)
POTASSIUM SERPL-SCNC: 4.3 MMOL/L (ref 3.5–5.1)
SERVICE CMNT-IMP: ABNORMAL
SERVICE CMNT-IMP: NORMAL
SODIUM SERPL-SCNC: 151 MMOL/L (ref 136–145)

## 2021-04-12 PROCEDURE — 82962 GLUCOSE BLOOD TEST: CPT

## 2021-04-12 PROCEDURE — 83735 ASSAY OF MAGNESIUM: CPT

## 2021-04-12 PROCEDURE — 74011000258 HC RX REV CODE- 258: Performed by: INTERNAL MEDICINE

## 2021-04-12 PROCEDURE — 99231 SBSQ HOSP IP/OBS SF/LOW 25: CPT | Performed by: PHYSICAL MEDICINE & REHABILITATION

## 2021-04-12 PROCEDURE — 36415 COLL VENOUS BLD VENIPUNCTURE: CPT

## 2021-04-12 PROCEDURE — 84100 ASSAY OF PHOSPHORUS: CPT

## 2021-04-12 PROCEDURE — 74011250637 HC RX REV CODE- 250/637: Performed by: INTERNAL MEDICINE

## 2021-04-12 PROCEDURE — 80048 BASIC METABOLIC PNL TOTAL CA: CPT

## 2021-04-12 PROCEDURE — 74011250637 HC RX REV CODE- 250/637: Performed by: FAMILY MEDICINE

## 2021-04-12 PROCEDURE — 97530 THERAPEUTIC ACTIVITIES: CPT

## 2021-04-12 PROCEDURE — 92526 ORAL FUNCTION THERAPY: CPT

## 2021-04-12 PROCEDURE — 65610000006 HC RM INTENSIVE CARE

## 2021-04-12 PROCEDURE — 97112 NEUROMUSCULAR REEDUCATION: CPT

## 2021-04-12 PROCEDURE — 74011000250 HC RX REV CODE- 250: Performed by: FAMILY MEDICINE

## 2021-04-12 PROCEDURE — 74011000250 HC RX REV CODE- 250: Performed by: INTERNAL MEDICINE

## 2021-04-12 PROCEDURE — 74011250636 HC RX REV CODE- 250/636: Performed by: INTERNAL MEDICINE

## 2021-04-12 PROCEDURE — 92507 TX SP LANG VOICE COMM INDIV: CPT

## 2021-04-12 RX ADMIN — ENALAPRILAT 1.25 MG: 1.25 INJECTION INTRAVENOUS at 09:09

## 2021-04-12 RX ADMIN — ACETAMINOPHEN 650 MG: 325 SUSPENSION ORAL at 14:23

## 2021-04-12 RX ADMIN — Medication 10 ML: at 05:59

## 2021-04-12 RX ADMIN — ATORVASTATIN CALCIUM 40 MG: 40 TABLET, FILM COATED ORAL at 21:19

## 2021-04-12 RX ADMIN — PIPERACILLIN SODIUM AND TAZOBACTAM SODIUM 3.38 G: 3; .375 INJECTION, POWDER, LYOPHILIZED, FOR SOLUTION INTRAVENOUS at 17:15

## 2021-04-12 RX ADMIN — METOPROLOL TARTRATE 25 MG: 25 TABLET, FILM COATED ORAL at 21:19

## 2021-04-12 RX ADMIN — PIPERACILLIN SODIUM AND TAZOBACTAM SODIUM 3.38 G: 3; .375 INJECTION, POWDER, LYOPHILIZED, FOR SOLUTION INTRAVENOUS at 09:09

## 2021-04-12 RX ADMIN — PIPERACILLIN SODIUM AND TAZOBACTAM SODIUM 3.38 G: 3; .375 INJECTION, POWDER, LYOPHILIZED, FOR SOLUTION INTRAVENOUS at 02:30

## 2021-04-12 RX ADMIN — METOPROLOL TARTRATE 25 MG: 25 TABLET, FILM COATED ORAL at 08:02

## 2021-04-12 RX ADMIN — Medication 10 ML: at 13:01

## 2021-04-12 RX ADMIN — AMLODIPINE BESYLATE 10 MG: 10 TABLET ORAL at 08:02

## 2021-04-12 RX ADMIN — Medication 10 ML: at 21:19

## 2021-04-12 RX ADMIN — SODIUM CHLORIDE 75 ML/HR: 900 INJECTION, SOLUTION INTRAVENOUS at 17:20

## 2021-04-12 RX ADMIN — SODIUM CHLORIDE 75 ML/HR: 900 INJECTION, SOLUTION INTRAVENOUS at 03:50

## 2021-04-12 RX ADMIN — METOPROLOL TARTRATE 5 MG: 5 INJECTION INTRAVENOUS at 05:54

## 2021-04-12 NOTE — PROGRESS NOTES
Care Management Interventions  Mode of Transport at Discharge: Other (see comment)([facility] vs family)  Transition of Care Consult (CM Consult): Discharge Planning(Pt is insured by medicare with pharmacy benefits.  )  Discharge Durable Medical Equipment: No  Physical Therapy Consult: Yes  Occupational Therapy Consult: Yes  Speech Therapy Consult: Yes  Current Support Network: Relative's Home, Family Lives Nearby(Pt lives with his brother and is normally independent with all ADL's. Daughter Orquidea Ivan [phone] is also supportive.  )  Confirm Follow Up Transport: Family  The Plan for Transition of Care is Related to the Following Treatment Goals : Pt will need rehab services to return to his functional baseline. The Patient and/or Patient Representative was Provided with a Choice of Provider and Agrees with the Discharge Plan?: Yes  Name of the Patient Representative Who was Provided with a Choice of Provider and Agrees with the Discharge Plan: pt/daughter  Freedom of Choice List was Provided with Basic Dialogue that Supports the Patient's Individualized Plan of Care/Goals, Treatment Preferences and Shares the Quality Data Associated with the Providers?: Yes   Resource Information Provided?: No  Discharge Location  [facility]    CM in to visit pt. Pt alert and vocally requesting for CM to turn TV off. Pt moving only right side. Pt on room air. EVD remains in place with plan to clamp soon. Pt on no drips. TF infusing via NGT. OT recommendation to [facility] or [facility]. CM confirmed with Eirc Tobin in [facility] that pt remains on their list. SLP recommending continued therapy throughout stay. CM to continue to follow for DCP.

## 2021-04-12 NOTE — PROGRESS NOTES
ACUTE OT GOALS:  (Developed with and agreed upon by patient and/or caregiver.)  1. Complete functional transfers (I.e. toilet/bedside commode) with c min A. (UPDATED 2021)   2. Complete gentle active assisted L UE ROM focusing more on finger/wrist extensors and shoulder external rotators. CONTINUE TO ADDRESS (2021)  3. Utilize L UE as a support for supine to sitting and sit to standing at least 20% of the time. CONTINUE TO ADDRESS (2021)  4. Complete self-feeding (once cleared by SLP/MD/RN) with standby assistance. CONTINUE TO ADDRESS (2021)  5. Complete grooming/oral care with standby assistance. CONTINUE TO ADDRESS (2021)    NEW GOALS (Added 2021)  1. Patient will complete functional activity while seated EOB with min A and adaptive equipment as needed. 2. Patient will attend to L side of environment for 75% of session with min verbal cues from therapist.    OCCUPATIONAL THERAPY: Daily Note OT Treatment Day # 3    Gladys Lugo is a 67 y.o. male   PRIMARY DIAGNOSIS: Hemorrhagic cerebrovascular accident (CVA) ([facility]  Hemorrhagic cerebrovascular accident (CVA) ([facility] [I61.9]       Payor: Ricardo Navarro [phone] / [place] / Product Type: Managed Care Medicare /   ASSESSMENT:     REHAB RECOMMENDATIONS: CURRENT LEVEL OF FUNCTION:  (Most Recently Demonstrated)   Recommendation to date pending progress:  [address] [place] [address] vs. Short-term Rehab  Equipment:    To Be Determined Bathing:   Total Assistance  Dressing:   Total Assistance  Feeding/Grooming:   Set Up  Toileting:   Total Assistance  Functional Mobility:   Total Assistance     ASSESSMENT:  Mr. Denisha Cali continues to present with deficits in overall strength, activity tolerance, ADL performance, and functional mobility. Since previous treatment session, EVD drain was placed d/t hydrocephalus (drain needs to be clamped prior to mobility).   Currently not following commands on the L side; does follow commands on R side; remains aphasic. Strong L hemalatha-body. Max to total A x 2 for supine to sit transfer to edge of bed. Max verbal and tactile commands to maintain midline orientation. Attempted L orientation including reaching for items placed on L side of body; pt able to grasp mouth swab with R hand. Pt also able to completed functional hand to mouth pattern with R hand when bringing swab to mouth. Attempted dynamic weight shifts however poor static sitting balance today. Stood with max A x 2 for better positioning in bed. Returned to supine in bed with pt rolled to L side. No progress towards goals. Will continue OT efforts as indicated. SUBJECTIVE:   Mr. Hadry Chen states, \"I want some water. \"    SOCIAL HISTORY/LIVING ENVIRONMENT:   Home Environment: Private residence  # Steps to Enter: 5  One/Two Story Residence: One story  Living Alone: No  Support Systems: Family member(s)    OBJECTIVE:     PAIN: VITAL SIGNS: LINES/DRAINS:   Pre Treatment: Pain Screen  Pain Scale 1: Numeric (0 - 10)  Pain Intensity 1: 0  Post Treatment: 0   IV and EVD drain  O2 Device: Nasal cannula     ACTIVITIES OF DAILY LIVING: I Mod I S SBA CGA Min Mod Max Total NT Comments   BASIC ADLs:              Bathing/ Showering [] [] [] [] [] [] [] [] [] [x]    Toileting [] [] [] [] [] [] [] [] [] [x]    Dressing [] [] [] [] [] [] [] [] [] [x]    Feeding [] [] [] [] [] [] [] [] [] [x]    Grooming [] [] [] [] [] [] [] [] [] [x]    Personal Device Care [] [] [] [] [] [] [] [] [] [x]    Functional Mobility [] [] [] [] [] [] [] [x] [x] [] X 2   I=Independent, Mod I=Modified Independent, S=Supervision, SBA=Standby Assistance, CGA=Contact Guard Assistance,   Min=Minimal Assistance, Mod=Moderate Assistance, Max=Maximal Assistance, Total=Total Assistance, NT=Not Tested    MOBILITY: I Mod I S SBA CGA Min Mod Max Total  NT x2 Comments:   Supine to sit [] [] [] [] [] [] [] [x] [x] [] [x]    Sit to supine [] [] [] [] [] [] [] [] [x] [] [x] Sit to stand [] [] [] [] [] [] [] [] [] [x] []    Bed to chair [] [] [] [] [] [] [] [] [] [x] []    I=Independent, Mod I=Modified Independent, S=Supervision, SBA=Standby Assistance, CGA=Contact Guard Assistance,   Min=Minimal Assistance, Mod=Moderate Assistance, Max=Maximal Assistance, Total=Total Assistance, NT=Not Tested    BALANCE: Good Fair+ Fair Fair- Poor NT Comments   Sitting Static [] [] [] [] [x] []    Sitting Dynamic [] [] [] [] [x] []              Standing Static [] [] [] [] [x] []    Standing Dynamic [] [] [] [] [x] []      PLAN:   FREQUENCY/DURATION: OT Plan of Care: 3 times/week for duration of hospital stay or until stated goals are met, whichever comes first.    TREATMENT:   TREATMENT:   ( $$ Neuromuscular Re-Education: 23-37 mins   )  Co-Treatment PT/OT necessary due to patient's decreased overall endurance/tolerance levels, as well as need for high level skilled assistance to complete functional transfers/mobility and functional tasks  Neuromuscular Re-education (23 Minutes): Neuromuscular Re-education included Balance Training, Coordination training, Hemibody awareness training, Postural training, Sitting balance training and Visual field awareness training to improve Balance, Coordination and Postural Control.     TREATMENT GRID:  N/A    AFTER TREATMENT POSITION/PRECAUTIONS:  Bed, Needs within reach and RN notified    INTERDISCIPLINARY COLLABORATION:  RN/PCT, PT/PTA and OT/LYNCH    TOTAL TREATMENT DURATION:  OT Patient Time In/Time Out  Time In: 1120  Time Out: Yunior aNva OT

## 2021-04-12 NOTE — PROGRESS NOTES
Problem: Dysphagia (Adult)  Goal: *Acute Goals and Plan of Care (Insert Text)  Description: STG: Pt will demonstrate zero signs/sx of aspiration with mechanical soft textures with nectar thick liquids with no straws with 90% accuracy during all meals. Discontinued 4/5/21  STG: Pt will complete a full speech, language and cognitive evaluation without assistance with 100% accuracy during session. STG: Pt will complete a Modified barium swallow study with zero signs/sx of aspiration  with 100% accuracy during swallow study MET 4/6/21   LTG : Pt will demonstrate zero signs/sx of aspiration with least restrictive diet with 100% accuracy during all meals. LTG: Patient will increase receptive/expressive language skills demonstrated by the ability to communicate basic wants/needs across environments   STG: Patient will follow 1 step commands with 80% accuracy given min cueing  STG: Patient will follow 2 step commands with 80% accuracy given moderate cues. STG: Patient will respond to moderate level yes/no questions with 80% accuracy with moderate cues. STG: Patient will perform automatic speech task with 80% accuracy with moderate cues. SPEECH LANGUAGE PATHOLOGY: DYSPHAGIA AND SPEECH-LANGUAGE/COGNITION: Daily Note 1    NAME/AGE/GENDER: Newton Luke is a 67 y.o. male  DATE: 4/12/2021  PRIMARY DIAGNOSIS: [facility] [I61.9]      ICD-10: Treatment Diagnosis: R13.12 Dysphagia, Oropharyngeal Phase    RECOMMENDATIONS   DIET:    NPO with alternative means of nutrition   Anticipate need for long term nutrition- Would benefit from discussion regarding goals of nutrition    MEDICATIONS: Non-oral     ASPIRATION PRECAUTIONS  · Meticulous oral care Q 4 hours. COMPENSATORY STRATEGIES/MODIFICATIONS  · Upright positioning during tube feedings.       EDUCATION:  · Recommendations discussed with Hospitalist/Intensivist  · Nursing  · Patient     CONTINUATION OF SKILLED SERVICES/MEDICAL NECESSITY:   Patient is expected to demonstrate progress in  swallow strength, swallow timeliness, swallow function, diet tolerance and swallow safety in order to  improve swallow safety, work toward diet advancement and decrease aspiration risk.  Patient is expected to demonstrate progress in expressive communication and receptive ability to decrease assistance required communication, increase independence with activities of daily living and increase communication with family/caregivers.  Patient continues to require skilled intervention due to dysphagia; expressive/receptive language deficits. RECOMMENDATIONS for CONTINUED SPEECH THERAPY: YES: Anticipate need for ongoing speech therapy during this hospitalization and at next level of care. ASSESSMENT   Dysphagia:  Patient continues to present with severe dysphagia. Poor secretion management with wet baseline vocal quality. Weak cued cough ineffective in improving vocal quality. Patient provided with small ice chip across 2 trials, which resulted in immediate coughing and absent swallow upon palpation. Language evaluation: Patient with improved alertness and cognitive linguistic abilities from prior session. Speech remains dysarthric reducing overall intelligibility especially when out of context. Following basic 1 step commands and answering yes/no and some open ended questions appropriately. Improved accuracy with automatic speech tasks. Attention remains impaired, but improved as patient was able to complete basic expressive/receptive language tasks requiring only minimal cues to attend. Poor insight into deficits as patient continued to request water after significant coughing with ice chips and after therapist explained aspiration risk. Recommend continue ongoing speech therapy to address dysphagia and cognitive linguistic impairment.      REHABILITATION POTENTIAL FOR STATED GOALS: Good    PLAN    FREQUENCY/DURATION: Continue to follow patient 3 times a week for duration of hospital stay to address above goals. - Recommendations for next treatment session: Next treatment session will address langauge treatment    SUBJECTIVE   Patient alert upright in bed upon arrival. Wet vocal quality. Asking for water and coffee. Problem List:  (Impairments causing functional limitations):  1. Oropharyngeal dysphagia  2. Expressive/receptive language impairments    Orientation:   Person  Place  Time    Pain: Pain Scale 1: Numeric (0 - 10)  Pain Intensity 1: 0    OBJECTIVE   Dysphagia:   Patient with wet vocal quality upon arrival. Follows command to cough; however, cough was weak and ineffective in clearing vocal quality. Minimal secretions removed with oral suctioning indicating pharyngeal secretions. Provided patient with small ice chips across 2 trials to determine if this would assist with eliciting swallow to clear secretions; however, significant coughing occurred with ice chips and swallow was absent upon palpation. Cognitive Linguistic Assessment :  -Orientation: 3/5  -1 step commands:   - Yes/no questions:   -Automatic speech: counted 1-10- 100%; DELVIS-100%; OMKAR:  -Basic reasonin/3    INTERDISCIPLINARY COLLABORATION: RN  PRECAUTIONS/ALLERGIES: Patient has no known allergies.      Tool Used: Dysphagia Outcome and Severity Scale (DAYAMI)    Score Comments   Normal Diet  [] 7 With no strategies or extra time needed   Functional Swallow  [] 6 May have mild oral or pharyngeal delay   Mild Dysphagia  [] 5 Which may require one diet consistency restricted    Mild-Moderate Dysphagia  [] 4 With 1-2 diet consistencies restricted   Moderate Dysphagia  [] 3 With 2 or more diet consistencies restricted   Moderate-Severe Dysphagia  [] 2 With partial PO strategies (trials with ST only)   Severe Dysphagia  [] 1 With inability to tolerate any PO safely      Score:  Initial: 1 Most Recent: 1 (Date 21 )   Interpretation of Tool: The Dysphagia Outcome and Severity Scale (DAYAMI) is a simple, easy-to-use, 7-point scale developed to systematically rate the functional severity of dysphagia based on objective assessment and make recommendations for diet level, independence level, and type of nutrition. Tool Used: MODIFIED YAZMIN SCALE (mRS)   Score   No Symptoms  [] 0   No significant disability despite symptoms; able to carry out all usual duties and activities  [] 1   Slight disability; unable to carry out all previous activities but able to look after own affairs without assistance. [] 2   Moderate disability; requiring some help but able to walk without assistance  [] 3   Moderately severe disability; unable to walk without assistance and unable to attend to own bodily needs without assistance  [] 4   Severe disability; bedridden, incontinent, and requiring constant nursing care and attention  [] 5      Score:  Initial: 5 Most recent: 1   Interpretation of Tool: The Modified Slope Scale is a 7-point scaled used to quantify level of disability as it relates to a patient's functional abilities. Current Medications:   No current facility-administered medications on file prior to encounter. Current Outpatient Medications on File Prior to Encounter   Medication Sig Dispense Refill    cyclobenzaprine (FLEXERIL) 5 mg tablet Take 1 Tab by mouth two (2) times a day. 14 Tab 0    aspirin 81 mg chewable tablet Take 1 Tab by mouth daily.  nitroglycerin (NITROSTAT) 0.4 mg SL tablet 1 Tab by SubLINGual route every five (5) minutes as needed for Chest Pain. 2 Bottle 4    atorvastatin (LIPITOR) 80 mg tablet Take 1 Tab by mouth nightly. 30 Tab 11    metoprolol tartrate (LOPRESSOR) 25 mg tablet Take 1 Tab by mouth two (2) times a day.  60 Tab 6       SAFETY:  After treatment position/precautions:  · Upright in bed  · Dr. Garry Jorge notified    Total Treatment Duration:   Time In: 1320  Time Out: 88 [facility] [address], [address] [facility]

## 2021-04-12 NOTE — PROGRESS NOTES
Fadi Briggs MD  Medical Director  [facility], [address]  Tel: [phone]       Physical Medicine & Rehab Consult Progress Note      Patient: Carter Potter  Admit Date: 4/2/2021  Admit Diagnosis: Hemorrhagic cerebrovascular accident (CVA) [facility]) [I61.9]    Recommendations:   Continue acute rehabilitation program, Coordination of rehab / medical care, Counseling of PM&R care issues management  -failed bedside swallow. Has NGT but will likely require PEG if oropharyngeal mechanism not improved. Pt requires mod/max assist with mobility. Has poor sitting balance with strong posterior right lean despite maximal cuing; this tends to be a strong indicator of a prolonged and incomplete recovery. Pt will require 24/7 care. Will contin to follow progress  -still has in EVD. History / Subjective / Complaint:     Patient seen and examined, interim EMR reviewed. Perseverating on wanting water to drink. Speech wet sounding.  Difficult to understand at times    No Known Allergies  Current Facility-Administered Medications   Medication Dose Route Frequency    amLODIPine (NORVASC) tablet 10 mg  10 mg Oral DAILY    insulin lispro (HUMALOG) injection   SubCUTAneous AC&HS    0.9% sodium chloride infusion  75 mL/hr IntraVENous CONTINUOUS    piperacillin-tazobactam (ZOSYN) 3.375 g in 0.9% sodium chloride (MBP/ADV) 100 mL MBP  3.375 g IntraVENous Q8H    NUTRITIONAL SUPPORT ELECTROLYTE PRN ORDERS   Does Not Apply PRN    acetaminophen (TYLENOL) solution 650 mg  650 mg Per NG tube Q4H PRN    atorvastatin (LIPITOR) tablet 40 mg  40 mg Per NG tube QHS    metoprolol tartrate (LOPRESSOR) tablet 25 mg  25 mg Per NG tube Q12H    metoprolol (LOPRESSOR) injection 5 mg  5 mg IntraVENous Q6H PRN    [Held by provider] morphine injection 1 mg  1 mg IntraVENous Q4H PRN    [Held by provider] oxyCODONE IR (ROXICODONE) tablet 5 mg  5 mg Oral Q6H PRN    enalaprilat (VASOTEC) injection 1.25 mg  1.25 mg IntraVENous Q6H PRN    lidocaine 4 % patch 1 Patch  1 Patch TransDERmal Q24H    niCARdipine (CARDENE) 25 mg in 0.9% sodium chloride (MBP/ADV) 250 mL infusion  0-15 mg/hr IntraVENous TITRATE    sodium chloride (NS) flush 5-40 mL  5-40 mL IntraVENous Q8H    sodium chloride (NS) flush 5-40 mL  5-40 mL IntraVENous PRN       Objective:     Vitals:  Patient Vitals for the past 8 hrs:   BP Temp Pulse Resp SpO2 Weight   04/12/21 1400 135/75  83 25 96 %    04/12/21 1345 139/74  80 (!) 46 99 %    04/12/21 1330 (!) 162/85  (!) 133 (!) 47 100 %    04/12/21 1316 122/70  76 18 99 %    04/12/21 1300 132/73  76 29 100 %    04/12/21 1245 127/65  81 27 100 %    04/12/21 1230 (!) 145/76  71 19 100 %    04/12/21 1216 (!) 143/76  68 16 100 %    04/12/21 1200 (!) 141/74  69 17 100 %    04/12/21 1154 (!) 144/73  70 21 99 %    04/12/21 1122     100 %    04/12/21 1100 135/75  72 19 100 %    04/12/21 1059 135/75 98.2 °F (36.8 °C) 84 21 100 % 125 lb (56.7 kg)   04/12/21 1045 137/73  69 20 100 %    04/12/21 1030 138/79  72 20 100 %    04/12/21 1016 131/73  71 26 100 %    04/12/21 1000 133/75  71 30 100 %    04/12/21 0945 133/78  71 25 99 %    04/12/21 0930 117/67  71 30 99 %    04/12/21 0916 120/71  86 (!) 46 100 %    04/12/21 0900 (!) 150/81  62 16 100 %    04/12/21 0845 (!) 162/83  60 16 100 %    04/12/21 0839 (!) 148/80  (!) 57 16 100 %    04/12/21 0800 (!) 158/80  66 15 100 %    04/12/21 0730   64 20 100 %    04/12/21 0705 (!) 163/85  (!) 237  100 %       Intake and Output:  04/10 1901 - 04/12 0700  In: 5154.8 [I.V.:3283.8]  Out: 1894 [Urine:1700; Drains:194]    Physical Exam:  HEENT; EVD right scalp, draining  Awake, alert  Oriented x 2  PERRLA, left inattn, left visual field deficit on confrontation  Speech wet, dysarthric  Perseverative and tangential  Follows one step commands with cuing and redirection  Dense left hemiplegia and dec sensation    Pain 1  Pain Scale 1: Numeric (0 - 10) (04/12/21 1122)  Pain Intensity 1: 0 (04/12/21 1122)  Patient Stated Pain Goal: 0 (04/12/21 1059)  Pain Reassessment 1: Yes (04/11/21 2200)  Pain Onset 1: ongoing (04/07/21 0150)  Pain Location 1: Head (04/11/21 2110)  Pain Orientation 1: Mid;Right (04/11/21 2110)  Pain Description 1: Throbbing (04/11/21 2110)  Pain Intervention(s) 1: Medication (see MAR) (04/11/21 2110)     Functional Assessment:  Gross Assessment  AROM: Grossly decreased, non-functional(L UE impaired ) (04/03/21 1036)  PROM: Generally decreased, functional(L UE; Tightness with external rotation) (04/03/21 1036)  Strength: Grossly decreased, non-functional(L UE grossly 3-/5 weak extensors shoulder) (04/03/21 1036)                                               Labs/Studies:  Recent Results (from the past 72 hour(s))   METABOLIC PANEL, BASIC    Collection Time: 04/10/21  3:49 AM   Result Value Ref Range    Sodium 150 (H) 138 - 145 mmol/L    Potassium 3.8 3.5 - 5.1 mmol/L    Chloride 120 (H) 98 - 107 mmol/L    CO2 29 21 - 32 mmol/L    Anion gap 1 (L) 7 - 16 mmol/L    Glucose 124 (H) 65 - 100 mg/dL    BUN 54 (H) 8 - 23 MG/DL    Creatinine 1.57 (H) 0.8 - 1.5 MG/DL    GFR est AA 55 (L) >60 ml/min/1.73m2    GFR est non-AA 46 (L) >60 ml/min/1.73m2    Calcium 8.8 8.3 - 10.4 MG/DL   VANCOMYCIN, RANDOM    Collection Time: 04/10/21  3:49 AM   Result Value Ref Range    Vancomycin, random 14.0 UG/ML   CBC WITH AUTOMATED DIFF    Collection Time: 04/10/21  3:50 AM   Result Value Ref Range    WBC 8.5 4.3 - 11.1 K/uL    RBC 4.79 4.23 - 5.6 M/uL    HGB 12.8 (L) 13.6 - 17.2 g/dL    HCT 38.7 (L) 41.1 - 50.3 %    MCV 80.8 79.6 - 97.8 FL    MCH 26.7 26.1 - 32.9 PG    MCHC 33.1 31.4 - 35.0 g/dL    RDW 14.0 11.9 - 14.6 %    PLATELET 041 804 - 071 K/uL    MPV 11.7 9.4 - 12.3 FL    ABSOLUTE NRBC 0.00 0.0 - 0.2 K/uL    DF AUTOMATED      NEUTROPHILS 79 (H) 43 - 78 %    LYMPHOCYTES 11 (L) 13 - 44 %    MONOCYTES 9 4.0 - 12.0 % EOSINOPHILS 0 (L) 0.5 - 7.8 %    BASOPHILS 0 0.0 - 2.0 %    IMMATURE GRANULOCYTES 1 0.0 - 5.0 %    ABS. NEUTROPHILS 6.7 1.7 - 8.2 K/UL    ABS. LYMPHOCYTES 0.9 0.5 - 4.6 K/UL    ABS. MONOCYTES 0.8 0.1 - 1.3 K/UL    ABS. EOSINOPHILS 0.0 0.0 - 0.8 K/UL    ABS. BASOPHILS 0.0 0.0 - 0.2 K/UL    ABS. IMM.  GRANS. 0.1 0.0 - 0.5 K/UL   GLUCOSE, POC    Collection Time: 04/10/21 11:04 AM   Result Value Ref Range    Glucose (POC) 167 (H) 65 - 100 mg/dL    Performed by Ashutosh    SODIUM    Collection Time: 04/10/21  2:34 PM   Result Value Ref Range    Sodium 155 (H) 136 - 145 mmol/L   GLUCOSE, POC    Collection Time: 04/10/21  4:42 PM   Result Value Ref Range    Glucose (POC) 101 (H) 65 - 100 mg/dL    Performed by Ashutosh    GLUCOSE, POC    Collection Time: 04/10/21  9:17 PM   Result Value Ref Range    Glucose (POC) 112 (H) 65 - 100 mg/dL    Performed by [facility]    METABOLIC PANEL, BASIC    Collection Time: 04/11/21  4:10 AM   Result Value Ref Range    Sodium 155 (H) 136 - 145 mmol/L    Potassium 4.2 3.5 - 5.1 mmol/L    Chloride 124 (H) 98 - 107 mmol/L    CO2 26 21 - 32 mmol/L    Anion gap 5 (L) 7 - 16 mmol/L    Glucose 100 65 - 100 mg/dL    BUN 38 (H) 8 - 23 MG/DL    Creatinine 1.16 0.8 - 1.5 MG/DL    GFR est AA >60 >60 ml/min/1.73m2    GFR est non-AA >60 >60 ml/min/1.73m2    Calcium 8.9 8.3 - 10.4 MG/DL   VANCOMYCIN, RANDOM    Collection Time: 04/11/21  4:10 AM   Result Value Ref Range    Vancomycin, random 9.1 UG/ML   CBC WITH AUTOMATED DIFF    Collection Time: 04/11/21  4:10 AM   Result Value Ref Range    WBC 5.4 4.3 - 11.1 K/uL    RBC 4.80 4.23 - 5.6 M/uL    HGB 12.9 (L) 13.6 - 17.2 g/dL    HCT 40.7 (L) 41.1 - 50.3 %    MCV 84.8 79.6 - 97.8 FL    MCH 26.9 26.1 - 32.9 PG    MCHC 31.7 31.4 - 35.0 g/dL    RDW 14.4 11.9 - 14.6 %    PLATELET 166 965 - 785 K/uL    MPV 11.9 9.4 - 12.3 FL    ABSOLUTE NRBC 0.00 0.0 - 0.2 K/uL    DF AUTOMATED      NEUTROPHILS 68 43 - 78 %    LYMPHOCYTES 19 13 - 44 % MONOCYTES 11 4.0 - 12.0 %    EOSINOPHILS 2 0.5 - 7.8 %    BASOPHILS 0 0.0 - 2.0 %    IMMATURE GRANULOCYTES 1 0.0 - 5.0 %    ABS. NEUTROPHILS 3.6 1.7 - 8.2 K/UL    ABS. LYMPHOCYTES 1.0 0.5 - 4.6 K/UL    ABS. MONOCYTES 0.6 0.1 - 1.3 K/UL    ABS. EOSINOPHILS 0.1 0.0 - 0.8 K/UL    ABS. BASOPHILS 0.0 0.0 - 0.2 K/UL    ABS. IMM.  GRANS. 0.0 0.0 - 0.5 K/UL   GLUCOSE, POC    Collection Time: 04/11/21  7:54 AM   Result Value Ref Range    Glucose (POC) 93 65 - 100 mg/dL    Performed by EtsarabjitkoYoshi    GLUCOSE, POC    Collection Time: 04/11/21 11:49 AM   Result Value Ref Range    Glucose (POC) 114 (H) 65 - 100 mg/dL    Performed by EtsarabjitkoYoshi    GLUCOSE, POC    Collection Time: 04/11/21  3:59 PM   Result Value Ref Range    Glucose (POC) 115 (H) 65 - 100 mg/dL    Performed by RosekoYoshi    GLUCOSE, POC    Collection Time: 04/11/21 10:34 PM   Result Value Ref Range    Glucose (POC) 128 (H) 65 - 100 mg/dL    Performed by [place]    METABOLIC PANEL, BASIC    Collection Time: 04/12/21  4:08 AM   Result Value Ref Range    Sodium 151 (H) 136 - 145 mmol/L    Potassium 4.3 3.5 - 5.1 mmol/L    Chloride 122 (H) 98 - 107 mmol/L    CO2 24 21 - 32 mmol/L    Anion gap 5 (L) 7 - 16 mmol/L    Glucose 113 (H) 65 - 100 mg/dL    BUN 32 (H) 8 - 23 MG/DL    Creatinine 1.14 0.8 - 1.5 MG/DL    GFR est AA >60 >60 ml/min/1.73m2    GFR est non-AA >60 >60 ml/min/1.73m2    Calcium 8.8 8.3 - 10.4 MG/DL   PHOSPHORUS    Collection Time: 04/12/21  4:08 AM   Result Value Ref Range    Phosphorus 4.5 (H) 2.3 - 3.7 MG/DL   MAGNESIUM    Collection Time: 04/12/21  4:08 AM   Result Value Ref Range    Magnesium 2.5 (H) 1.8 - 2.4 mg/dL   GLUCOSE, POC    Collection Time: 04/12/21  7:19 AM   Result Value Ref Range    Glucose (POC) 91 65 - 100 mg/dL    Performed by Torres    GLUCOSE, POC    Collection Time: 04/12/21 11:11 AM   Result Value Ref Range    Glucose (POC) 120 (H) 65 - 100 mg/dL    Performed by BarberDarcie Assessment:     Principal Problem:    Hemorrhagic cerebrovascular accident (CVA) ([facility] (4/2/2021)    Active Problems:    Iron deficiency anemia (1/25/2015)      S/P PTCA (percutaneous transluminal coronary angioplasty) ()      Chronic tuberculosis ()      Coronary atherosclerosis of native coronary vessel ()      Hyperlipidemia ()      Acute left-sided weakness (4/2/2021)      Elevated troponin (4/4/2021)      Dysphagia following cerebral infarction (4/6/2021)      Elevated liver enzymes (4/6/2021)      Severe protein-calorie malnutrition ([facility] (4/9/2021)        Plan / Recommendations / Medical Decision Making:     Recommendations:   Continue acute rehabilitation program  Coordination of rehab / medical care  Counseling of PM&R care issues management  Monitoring and management of medical conditions per plan of care / orders    Discussion with Family / Caregiver / Staff    Reviewed Therapies / Eura Karsten / Medications / Records  Tyra Ya MD, Medical Director  [address] [place] [address]

## 2021-04-12 NOTE — PROGRESS NOTES
Hospitalist Progress Note     Admit Date:  2021  5:50 PM   Name:  Sarah Potter   Age:  67 y.o.  :  1945   MRN:  [id number]   PCP:  Joan Aguilar MD  Treatment Team: Attending Provider: Phill Dunn MD; Consulting Provider: Gabriela Hansen MD; Utilization Review: Rosalio Samson; Consulting Provider: Flo Mcwilliams MD; Care Manager: Payal Wagner; Care Manager: Marely Ann RN; Utilization Review:  Yumiko Up; Speech Language Pathologist: Valerie Mcdonald, MUNIR; Physical Therapist: Brant Moreno PT, DPT; Occupational Therapist: Cj Kelley OT  Presenting Complaint: Extremity Weakness    Initial Admission Diagnosis: Hemorrhagic cerebrovascular accident (CVA) [facility]) [I61.9]     Assessment and Plan:     Hospital Problems as of 2021 Date Reviewed: 2021          Codes Class Noted - Resolved POA    Severe protein-calorie malnutrition ([facility] ICD-10-CM: E43  ICD-9-CM: 262  2021 - Present Yes        Dysphagia following cerebral infarction ICD-10-CM: I69.391  ICD-9-CM: 438.82  2021 - Present Yes        Elevated liver enzymes ICD-10-CM: R74.8  ICD-9-CM: 790.5  2021 - Present Yes        Elevated troponin ICD-10-CM: R77.8  ICD-9-CM: 790.6  2021 - Present Yes        * (Principal) Hemorrhagic cerebrovascular accident (CVA) ([facility] ICD-10-CM: I61.9  ICD-9-CM: 207  2021 - Present Yes        Acute left-sided weakness ICD-10-CM: R53.1  ICD-9-CM: 728.87  2021 - Present Yes        S/P PTCA (percutaneous transluminal coronary angioplasty) ICD-10-CM: Z98.61  ICD-9-CM: V45.82  Unknown - Present Yes        Chronic tuberculosis ICD-10-CM: A15.9  ICD-9-CM: 011.90  Unknown - Present Yes        Coronary atherosclerosis of native coronary vessel ICD-10-CM: I25.10  ICD-9-CM: 414.01  Unknown - Present Yes        Hyperlipidemia ICD-10-CM: E78.5  ICD-9-CM: 272.4  Unknown - Present Yes        Iron deficiency anemia ICD-10-CM: D50.9  ICD-9-CM: 280.9  2015 - Present Yes        Microcytic anemia ICD-10-CM: D50.9  ICD-9-CM: 280.9  1/21/2015 - Present     Overview Signed 4/6/2016  9:52 AM by Wendy Jimenez     Iron deficiency                   Plan:  # Sepsis suspected 2/2 aspiration vs HAP              - Sepsis resolved.  CXR with LLL infiltrate.               - Vanc d/c, on Zosyn              - Swallow eval when able, prior notes mention dysphagia so may also need to consider PEG based on swallow eval.     # Right BG/intraventricular hemorrhagic CVA              - S/p EVD on 4/7. Mgmt per Neurosurgery. Clamp trial soon. # HyperNa              - Better today, 151.     # DENI on CKD3              - Baseline Cr 1.1-1.3 with GFR 50s. Resolved.     # Elevated troponin              - Cardiology saw, likely demand, normal EF on echo.     # CAD              - ASA when ok from surgical standpoint. Con't statin.     # HTN              - Norvasc, Lopressor, IV PRNs. Off Cardene currently. Other listed chronic conditions stable, continue current management. DC planning: Placement. Diet:  DIET NPO  DIET TUBE FEEDING  DVT ppx: SCDs only. Hospital Course:   Mr. Brodie Donnelly is a nice 68 y/o AAM with a h/o HTN, CAD, GERD, HLD, COPD and arthritis who was admitted to ICU on 4/2 with an acute right basal ganglia hemorrhagic CVA. Neurosurgery consulted who recommended conservative management given lack of midline shift or hydrocephalus. Cardiology was consulted for elevated troponin, likely demand ischemia. Head CTs were stable for a few days then he became more drowsy on 4/7 and repeat CT showed increase in right ventricular hemorrhage suspicious for early hydrocephalus. He underwent bedside EVD 4/7 PM. Repeat CXR with LLL infiltrate, started on antibiotics to cover HAP vs aspiration. 24hr Events/Subjective:   4/12: Still left sided hemiparesis. EVD draining, likely clamp trial soon. Tolerating TFs, good uop, denies pain currently, follows commands on the right side.      No other complaints  Objective:     Patient Vitals for the past 24 hrs:   Temp Pulse Resp BP SpO2   04/12/21 1100  72 19 135/75 100 %   04/12/21 1059 98.2 °F (36.8 °C) 84 21 135/75 100 %   04/12/21 1045  69 20 137/73 100 %   04/12/21 1030  72 20 138/79 100 %   04/12/21 1016  71 26 131/73 100 %   04/12/21 1000  71 30 133/75 100 %   04/12/21 0945  71 25 133/78 99 %   04/12/21 0930  71 30 117/67 99 %   04/12/21 0916  86 (!) 46 120/71 100 %   04/12/21 0900  62 16 (!) 150/81 100 %   04/12/21 0845  60 16 (!) 162/83 100 %   04/12/21 0839  (!) 57 16 (!) 148/80 100 %   04/12/21 0800  66 15 (!) 158/80 100 %   04/12/21 0730  64 20  100 %   04/12/21 0705  (!) 237  (!) 163/85 100 %   04/12/21 0700 98.2 °F (36.8 °C) 72 (!) 34 (!) 142/75 95 %   04/12/21 0645  68 24 (!) 142/75 100 %   04/12/21 0630  71 (!) 32 (!) 148/76 100 %   04/12/21 0616  60 21 139/73 100 %   04/12/21 0534  70 (!) 38 (!) 145/81 100 %   04/12/21 0500  96 28 138/80 95 %   04/12/21 0402  75 17 137/70 99 %   04/12/21 0349  67 16 127/68 98 %   04/12/21 0300  69 18 137/67 95 %   04/12/21 0230  63 17 129/68 97 %   04/12/21 0200  61 21 125/75 99 %   04/12/21 0144  68 20 136/65 98 %   04/12/21 0100  65 21 127/74 100 %   04/12/21 0040  69 (!) 60 (!) 141/75 100 %   04/12/21 0001 99.2 °F (37.3 °C) 63 17 117/61 100 %   04/11/21 2340  63 16 108/61 100 %   04/11/21 2300  63 26 133/63 100 %   04/11/21 2232  69 19 127/74 100 %   04/11/21 2200  69 27 (!) 96/59 99 %   04/11/21 2131  66 23 122/72 100 %   04/11/21 2100  70 25 129/73 100 %   04/11/21 2021  82  (!) 144/74    04/11/21 2000  85 20 (!) 144/74 100 %   04/11/21 1931 99.7 °F (37.6 °C) 82 28 135/80 100 %   04/11/21 1801  84 (!) 32 (!) 140/80 100 %   04/11/21 1730  83 21 (!) 148/76 100 %   04/11/21 1701  83 (!) 31 (!) 144/77 100 %   04/11/21 1630  81 25 (!) 140/79 100 %   04/11/21 1601  83 24 135/79 98 %   04/11/21 1530  81 23 128/74 99 %   04/11/21 1501 97.9 °F (36.6 °C) 82 (!) 34 136/75 100 %   04/11/21 1430  84 (!) 39 (!) 133/92 100 %   04/11/21 1401  75 24 120/70 100 %   04/11/21 1330  77 22 128/69 100 %   04/11/21 1301  77 (!) 31 122/72 100 %   04/11/21 1230  74 23 120/68 100 %   04/11/21 1207  75 29 123/70 100 %   04/11/21 1200  74 29 123/70 100 %   04/11/21 1130  74 29 (!) 149/77 100 %     Oxygen Therapy  O2 Sat (%): 100 % (04/12/21 1100)  Pulse via Oximetry: 73 beats per minute (04/12/21 1100)  O2 Device: Nasal cannula (04/12/21 1059)  Skin Assessment: Clean, dry, & intact (04/10/21 1930)  O2 Flow Rate (L/min): 3 l/min (04/12/21 1059)    Estimated body mass index is 18.85 kg/m² as calculated from the following:    Height as of this encounter: 5' 8\" (1.727 m). Weight as of this encounter: 56.2 kg (124 lb). Intake/Output Summary (Last 24 hours) at 4/12/2021 1109  Last data filed at 4/12/2021 1059  Gross per 24 hour   Intake 3889.75 ml   Output 1788 ml   Net 2101.75 ml       *Note that automatically entered I/Os may not be accurate; dependent on patient compliance with collection and accurate  by techs. General:    Underweight. No overt distress  Head:  EVD draining. CV:   RRR. No m/r/g. No edema. No JVD  Lungs:   CTAB. No wheezing, rhonchi, or rales. Unlabored  Abdomen:   Soft, nontender, nondistended. NGT. Extremities: Warm and dry. No cyanosis   Skin:     No rashes. Normal coloration  Neuro:  Persistent left sided hemiparesis but still moving the right side easily spontaneously and on command. Data Reviewed:  I have reviewed all labs, meds, and studies from the last 24 hours. See all results below.     Last 24hr Labs:  Recent Results (from the past 24 hour(s))   GLUCOSE, POC    Collection Time: 04/11/21 11:49 AM   Result Value Ref Range    Glucose (POC) 114 (H) 65 - 100 mg/dL    Performed by Ashtuosh    GLUCOSE, POC    Collection Time: 04/11/21  3:59 PM   Result Value Ref Range    Glucose (POC) 115 (H) 65 - 100 mg/dL    Performed by Ashutosh    GLUCOSE, POC    Collection Time: 04/11/21 10:34 PM   Result Value Ref Range    Glucose (POC) 128 (H) 65 - 100 mg/dL    Performed by Andrea    METABOLIC PANEL, BASIC    Collection Time: 04/12/21  4:08 AM   Result Value Ref Range    Sodium 151 (H) 136 - 145 mmol/L    Potassium 4.3 3.5 - 5.1 mmol/L    Chloride 122 (H) 98 - 107 mmol/L    CO2 24 21 - 32 mmol/L    Anion gap 5 (L) 7 - 16 mmol/L    Glucose 113 (H) 65 - 100 mg/dL    BUN 32 (H) 8 - 23 MG/DL    Creatinine 1.14 0.8 - 1.5 MG/DL    GFR est AA >60 >60 ml/min/1.73m2    GFR est non-AA >60 >60 ml/min/1.73m2    Calcium 8.8 8.3 - 10.4 MG/DL   PHOSPHORUS    Collection Time: 04/12/21  4:08 AM   Result Value Ref Range    Phosphorus 4.5 (H) 2.3 - 3.7 MG/DL   MAGNESIUM    Collection Time: 04/12/21  4:08 AM   Result Value Ref Range    Magnesium 2.5 (H) 1.8 - 2.4 mg/dL   GLUCOSE, POC    Collection Time: 04/12/21  7:19 AM   Result Value Ref Range    Glucose (POC) 91 65 - 100 mg/dL    Performed by Torres        Current Meds:  Current Facility-Administered Medications   Medication Dose Route Frequency    amLODIPine (NORVASC) tablet 10 mg  10 mg Oral DAILY    insulin lispro (HUMALOG) injection   SubCUTAneous AC&HS    0.9% sodium chloride infusion  75 mL/hr IntraVENous CONTINUOUS    piperacillin-tazobactam (ZOSYN) 3.375 g in 0.9% sodium chloride (MBP/ADV) 100 mL MBP  3.375 g IntraVENous Q8H    NUTRITIONAL SUPPORT ELECTROLYTE PRN ORDERS   Does Not Apply PRN    acetaminophen (TYLENOL) solution 650 mg  650 mg Per NG tube Q4H PRN    atorvastatin (LIPITOR) tablet 40 mg  40 mg Per NG tube QHS    metoprolol tartrate (LOPRESSOR) tablet 25 mg  25 mg Per NG tube Q12H    metoprolol (LOPRESSOR) injection 5 mg  5 mg IntraVENous Q6H PRN    [Held by provider] morphine injection 1 mg  1 mg IntraVENous Q4H PRN    [Held by provider] oxyCODONE IR (ROXICODONE) tablet 5 mg  5 mg Oral Q6H PRN    enalaprilat (VASOTEC) injection 1.25 mg  1.25 mg IntraVENous Q6H PRN    lidocaine 4 % patch 1 Patch  1 Patch TransDERmal Q24H    niCARdipine (CARDENE) 25 mg in 0.9% sodium chloride (MBP/ADV) 250 mL infusion  0-15 mg/hr IntraVENous TITRATE    sodium chloride (NS) flush 5-40 mL  5-40 mL IntraVENous Q8H    sodium chloride (NS) flush 5-40 mL  5-40 mL IntraVENous PRN       Other Studies:  Results for orders placed or performed during the hospital encounter of 21   2D ECHO COMPLETE ADULT (TTE) [address]  [address], [address] [facility]  [phone]    Transthoracic Echocardiogram  2D, M-mode, Doppler, and Color Doppler    Patient: Emilio Barker  MR #: [id number]  : [id number]  Age: 67 years  Gender: Male  Study date: 2021  Account #: [de-identified]  Height: 68 in  Weight: 133.8 lb  BSA: 1.72 mï¾²  Status:Routine  Location: [facility]  BP: 137/ 77    Allergies: NO KNOWN ALLERGENS    Sonographer:  TRINH Preston  Group:  [facility]  Referring Physician:  Susana Romero DO  Reading Physician:  Dr. Gala Bradley    INDICATIONS: TIA with transient neurological disturbance    PROCEDURE: This was a routine study. A transthoracic echocardiogram was  performed. The study included complete 2D imaging, M-mode, complete spectral  Doppler, and color Doppler. Intravenous contrast (Definity) was administered. Intravenous contrast (agitated saline) was administered. Image quality was  adequate. LEFT VENTRICLE: Size was normal. Systolic function was normal. Ejection  fraction was estimated in the range of 55 % to 60 %. There were no regional  wall motion abnormalities. Wall thickness was mildly increased. Left  ventricular diastolic function parameters were normal. Avg E/e': 4.54. RIGHT VENTRICLE: The size was normal. Systolic function was normal. The  tricuspid jet envelope definition was inadequate for estimation of RV   systolic  pressure.     LEFT ATRIUM: Size was normal.    ATRIAL SEPTUM: Agitated saline contrast injection (bubble study) was   performed. There was no right-to-left shunt, at rest or induced by the Valsalva   maneuver. RIGHT ATRIUM: Size was normal.    SYSTEMIC VEINS: IVC: The inferior vena cava was normal in size. The  respirophasic change in diameter was more than 50%. AORTIC VALVE: There is mild aortic annular calcification. The valve was  trileaflet. Leaflets exhibited mild sclerosis. MITRAL VALVE: Valve structure was normal.    TRICUSPID VALVE: The valve structure was normal. There was trivial  regurgitation. PULMONIC VALVE: Not well visualized. PERICARDIUM: There was no pericardial effusion. AORTA: The root exhibited normal size. SUMMARY:    -  Left ventricle: Systolic function was normal. Ejection fraction was  estimated in the range of 55 % to 60 %. -  Atrial septum: Agitated saline contrast injection (bubble study) was  performed. There was no right-to-left shunt, at rest or induced by the   Valsalva  maneuver. SYSTEM MEASUREMENT TABLES    2D  Ao Diam: 2.8 cm  LAEDV Index (A-L): 26.9 ml/m2  IVSd: 1.1 cm  LVIDd: 5.2 cm  LVPWd: 0.8 cm    PW  E/E' Av.5    Prepared and signed by    Dr. Narda Kramer 2021 22:44:08         No results found.     All Micro Results     Procedure Component Value Units Date/Time    CULTURE, BLOOD [[id number]] Collected: 21 1053    Order Status: Completed Specimen: Blood Updated: 21     Special Requests: --        RIGHT  HAND       Culture result: NO GROWTH 2 DAYS       CULTURE, BLOOD [[id number]] Collected: 21 1058    Order Status: Completed Specimen: Blood Updated: 2136     Special Requests: --        RIGHT  Antecubital       Culture result: NO GROWTH 2 DAYS       COVID-19 RAPID TEST [[id number]] Collected: 21 1221    Order Status: Completed Specimen: Nasopharyngeal Updated: 21 1301     Specimen source Nasopharyngeal        COVID-19 rapid test Not detected        Comment:      The specimen is NEGATIVE for SARS-CoV-2, the novel coronavirus associated with COVID-19. A negative result does not rule out COVID-19. This test has been authorized by the FDA under an Emergency Use Authorization (EUA) for use by authorized laboratories.         Fact sheet for Healthcare Providers: [employer]  Fact sheet for Patients: [employer]       Methodology: Isothermal Nucleic Acid Amplification               SARS-CoV-2 Lab Results  \"Novel Coronavirus\" Test: No results found for: COV2NT   \"Emergent Disease\" Test: No results found for: EDPR  \"SARS-COV-2\" Test: No results found for: XGCOVT  Rapid Test:   Lab Results   Component Value Date/Time    COVR Not detected 04/07/2021 12:21 PM            Signed:  Jonel Giang MD

## 2021-04-12 NOTE — PROGRESS NOTES
Bedside report received from [facility]. Dual neuro check completed. Pt incontinent of loose stool. Daniela care done. Linens changed. Pt repositioned in bed.

## 2021-04-12 NOTE — PROGRESS NOTES
NEUROSURGERY PROGRESS NOTE:   Admit Date: 4/2/2021  Subjective:   Patient on Zosyn now for aspiration PNA     Objective:  Visit Vitals  BP (!) 163/85   Pulse (!) 237   Temp 98.2 °F (36.8 °C)   Resp (!) 34   Ht 5' 8\" (1.727 m)   Wt 124 lb (56.2 kg)   SpO2 100%   BMI 18.85 kg/m²     Oriented only to name, hospital and year   Eyes open to spontaneous and tracking observer   PERRL  Hearing grossly intact   Follows commands simple commands in the right upper and right lower extremity briskly   Withdraws/purposeful in the right lower and localizes in the right upper extremity. ICP less than 5 this am at bedside, EVD 98 cc with xanthrochromic CSF     Assessment and Plan:   Birtha Passy 67 y.o. male with right thalamic intracerebral hemorrhage with interval development of acute obstructive hydrocephalus. He appears more alert this am.   - Continue EVD but lower to 5 cm H2O, will raise tomorrow and monitor with plan to attempt a clamp and ICP monitor trial period within 7 days of plac ement   - Continue q1H neuro checks  - Will continue to monitor closely, Na 151, Na 145 to 155 an ideal range given hemorrhage and edema surrounding hemorrhage. Meño Gibbons.  [facility], [address] [place] [address]

## 2021-04-12 NOTE — PROGRESS NOTES
End of shift report:     Patient with 4 BMs overnight. First soft, last 2 loose/watery. 2 urine occurrences due to condom cath malfunction, 350cc uop in canister. PrimoFit catheter now in place. Small skin tear noted to base of penis near top of scrotum. No other skin issues noted. Patient given lopressor 5mg IV once this AM for SBP >140. Patient has been alert, oriented x3-4, purposeful mvmt R side, left sided weakness remains. Tolerating tube feeding. Remains with difficulty clearing secretions: frequent oral tracheal suctioning and mouth care required. EVD insertion site c/d/i. Draining serosanguinous CSF. ICP 1-6 overnight.

## 2021-04-12 NOTE — PROGRESS NOTES
ACUTE PHYSICAL THERAPY GOALS:  (Developed with and agreed upon by patient and/or caregiver.)  UPDATED 21  LTG:  (1.)Mr. Esteban Wong will move from supine to sit and sit to supine , scoot up and down and roll side to side in bed with MINIMAL ASSIST within 7 treatment day(s). (2.)Mr. Esteban Wong will tolerate sitting up in recliner chair for 2 hours with stable vital signs to increase upright tolerance within 7 treatment day(s). (3.)Mr. Esteban Wong will maintain static/dynamic sitting x 15 minutes with MINIMAL ASSIST for improved balance within 7 treatment days. (4.)Mr. Esteban Wong will be able to hold head in cervical rotation left of midline for 2 minutes within 7 treatment days. (5.)Mr. Esteban Wong will participate in therapeutic activity/exercises x 25 minutes for increased strength within 7 treatment days. PHYSICAL THERAPY: Daily Note and AM Treatment Day # 5    Terra Laughlin is a 67 y.o. male   PRIMARY DIAGNOSIS: Hemorrhagic cerebrovascular accident (CVA) ([facility]  Hemorrhagic cerebrovascular accident (CVA) ([facility] [I61.9]         ASSESSMENT:     REHAB RECOMMENDATIONS: CURRENT LEVEL OF FUNCTION:  (Most Recently Demonstrated)   Recommendation to date pending progress:  [address] [place] [address] vs. Short-term Rehab  Equipment:    To Be Determined Bed Mobility:   Maximal Assistance x 2  Sit to Stand:   mod-maxA x 2  Transfers:   mod-maxA x 2  Gait/Mobility:   Unable to perform     ASSESSMENT:  Mr. Esteban Wong continues to present with weakness on L LE tone noted with attempts at PROM of LLE. He now has EVD which was clamped for treatment by RN. Pt more alert today but still no active movement observed in L LE. Perseverating on water this AM.  Pt required maxA x 2 for supine to sit with poor sitting balance. Strong posterior R lean despite maximal cues.   He stood today with moderate A x 2 and poor standing balance (increased tone of LLE appears to be helpful with sit to stand transfer) and noted improved postural control as well in standing. Pt assisted back in bed and positioned in upright position given increased secretions while mobilizing and offloaded onto L side with pillows. OT present and assisted with neuro-reeducation while PT addressed transfers. No significant progress today. SUBJECTIVE:   Mr. Esteban Wong states, \"my daughter. \"    SOCIAL HISTORY/ LIVING ENVIRONMENT: See eval  Home Environment: Private residence  # Steps to Enter: 5  One/Two Story Residence: One story  Living Alone: No  Support Systems: Family member(s)  OBJECTIVE:     PAIN: VITAL SIGNS: LINES/DRAINS:   Pre Treatment: Pain Screen  Pain Scale 1: Numeric (0 - 10)  Pain Intensity 1: 0  Post Treatment: 0 Vital Signs  O2 Sat (%): 100 %  O2 Device: Nasal cannula  O2 Flow Rate (L/min): 3 l/min IV and external male catheter, EVD  O2 Device: Nasal cannula     MOBILITY: I Mod I S SBA CGA Min Mod Max Total  NT x2 Comments:   Bed Mobility    Rolling [] [] [] [] [] [] [] [x] [x] [] [x]    Supine to Sit [] [] [] [] [] [] [] [x] [x] [] [x]    Scooting [] [] [] [] [] [] [] [] [x] [] [x]    Sit to Supine [] [] [] [] [] [] [] [x] [x] [] [x]    Transfers    Sit to Stand [] [] [] [] [] [] [] [x] [] [] [x]    Bed to Chair [] [] [] [] [] [] [] [] [] [x] []    Stand to Sit [] [] [] [] [] [] [] [x] [] [] [x]    I=Independent, Mod I=Modified Independent, S=Supervision, SBA=Standby Assistance, CGA=Contact Guard Assistance,   Min=Minimal Assistance, Mod=Moderate Assistance, Max=Maximal Assistance, Total=Total Assistance, NT=Not Tested    BALANCE: Good Fair+ Fair Fair- Poor NT Comments   Sitting Static [] [] [] [] [x] []    Sitting Dynamic [] [] [] [] [x] []              Standing Static [] [] [] [] [x] []    Standing Dynamic [] [] [] [] [x] []      GAIT: I Mod I S SBA CGA Min Mod Max Total  NT x2 Comments:   Level of Assistance [] [] [] [] [] [] [] [] [] [x] []    Distance NT    DME N/A    Gait Quality NT    Weightbearing  Status N/A     I=Independent, Mod I=Modified Independent, S=Supervision, SBA=Standby Assistance, CGA=Contact Guard Assistance,   Min=Minimal Assistance, Mod=Moderate Assistance, Max=Maximal Assistance, Total=Total Assistance, NT=Not Tested    PLAN:   FREQUENCY/DURATION: PT Plan of Care: 3 times/week for duration of hospital stay or until stated goals are met, whichever comes first.  TREATMENT:     TREATMENT:   ($$ Therapeutic Activity: 23-37 mins    )  Co-Treatment PT/OT necessary due to patient's decreased overall endurance/tolerance levels, as well as need for high level skilled assistance to complete functional transfers/mobility and functional tasks  Therapeutic Activity (23 Minutes): Therapeutic activity included Rolling, Supine to Sit, Sit to Supine, Scooting, Transfer Training, Sitting balance  and Standing balance to improve functional Mobility, Strength, ROM and Activity tolerance.     TREATMENT GRID:  N/A    AFTER TREATMENT POSITION/PRECAUTIONS:  Bed, Needs within reach, RN notified and RUE restrained    INTERDISCIPLINARY COLLABORATION:  RN/PCT, PT/PTA and OT/LYNCH    TOTAL TREATMENT DURATION:  PT Patient Time In/Time Out  Time In: 1122  Time Out: 1973 [facility], PT, DPT

## 2021-04-12 NOTE — PROGRESS NOTES
Comprehensive Nutrition Assessment    Type and Reason for Visit: Reassess  Tube Feeding Management (Hospitalist)    Nutrition Recommendations/Plan:   · Enteral Nutrition:  · Continue Jevity 1.5 via NGT at 55 ml/hour. · Continue water flush to 150 every 4 hours.    · Vitamin and Mineral Supplement Therapy:  · Electrolyte management replacement protocol implemented. · IVF per MD  · Labs:   · EN labs: BMP daily, Mg and Phos MWF. Repeat Mg and Phos in am.     Malnutrition Assessment:  Malnutrition Status: Severe malnutrition  Context: Chronic illness  Findings of clinical characteristics of malnutrition:   Energy Intake:  Unable to assess  Weight Loss:  Unable to assess     Body Fat Loss:  7 - Severe body fat loss, Fat overlying ribs   Muscle Mass Loss:  7 - Severe muscle mass loss, Clavicles (pectoralis &deltoids), Scapula (trapezius), Temples (temporalis)  Fluid Accumulation:  No significant fluid accumulation,     Strength:  Normal  strength       Nutrition Assessment:   Nutrition History: Still unable to assess. Patient more alert and oriented. He is answering questions appropriately but very garbled speech and difficult to understand. Nutrition Background: Patient presented with left side weakness and fall. He was admitted with hemorrhagic cererovascular accident from acute right basal ganglia hematoma with right intraventricular hemorrhage. His PMH is significant for MI, CAD, TB, COPD, GERD, GI bleed, hept C, HLD, HTN, NSTEMI. Daily Update:  Patient seen with RN at bedside. He is talking away, but only able to understand him partially. RN reports that patient is now having frequent bowel movements. States they are not watery, just frequent. Question likely due to antibiotics. Trial of EVC clamp tomorrow. Na goal 145-151 per neurology. Abdominal Status (last documented): Intact abdomen with Active  bowel sounds. Last BM 04/12/21.   Pertinent Medications: SSI, Zosyn, Vanco discontinued  IVF: NS @75 ml/hr  Pertinent Labs:   Lab Results   Component Value Date/Time    Sodium 151 (H) 04/12/2021 04:08 AM    Potassium 4.3 04/12/2021 04:08 AM    Chloride 122 (H) 04/12/2021 04:08 AM    CO2 24 04/12/2021 04:08 AM    Anion gap 5 (L) 04/12/2021 04:08 AM    Glucose 113 (H) 04/12/2021 04:08 AM    BUN 32 (H) 04/12/2021 04:08 AM    Creatinine 1.14 04/12/2021 04:08 AM    Calcium 8.8 04/12/2021 04:08 AM    Albumin 3.2 04/06/2021 03:53 AM    Magnesium 2.5 (H) 04/12/2021 04:08 AM    Phosphorus 4.5 (H) 04/12/2021 04:08 AM   Labs remarkable for elevated Mg but improved, elevated Phos - ?etiology     Nutrition Related Findings:   Full NFPE performed with results as above. TF initiated 7/3. Still infusing at 30 ml/hr with 135 ml water flush on reassessment 4/9. TF advanced to goal 4/9. Current Nutrition Therapies:  DIET NPO Except Meds  DIET TUBE FEEDING Open order for details. Keep HOB elevated >30 degrees.   Current Tube Feeding (TF) Orders:   · Feeding Route: Nasogastric  · Formula: Jevity 1.5  · Schedule:Continuous    · Regimen: 55 ml/hr  · Additives/Modulars: (none)  · Water Flushes: 150 ml Q4  · Current TF & Flush Orders Provides: at goal  · Goal TF & Flush Orders Provides: 1815 kcal (100% estimated calorie needs), 77 grams protein (100% estimated protein needs) and 1820 ml free fluid (~1 ml/kcal)    Current Intake:   Average Meal Intake: NPO(% prior to NPO)        Anthropometric Measures:  Height: 5' 8\" (172.7 cm)  Current Body Wt: 56.7 kg (125 lb)(4/12), Weight source: Bed scale  BMI: 19, Underweight (BMI less than 22) age over 72     Ideal Body Weight (lbs) (Calculated): 154 lbs (70 kg), 87 %  Usual Body Wt: 63 kg (138 lb 14.2 oz)(per review of EMR (~63-68 kg)), Percent weight change: -12.5          Edema: Generalized: No Edema (4/12/2021  7:00 AM)     Estimated Daily Nutrient Needs:  Energy (kcal/day): 5683-4296 (Kcal/kg(25-30), Weight Used: Current)  Protein (g/day): 61-76 (1-1.25 g/kg) Weight Used: (Current)  Fluid (ml/day):   (1 ml/kcal)    Nutrition Diagnosis:   · Inadequate oral intake related to swallowing difficulty as evidenced by (MBS, NPO)    Nutrition Interventions:   Food and/or Nutrient Delivery: Continue tube feeding     Coordination of Nutrition Care: Continue to monitor while inpatient  Plan of Care discussed with Aman Sprague RN     Goals:   Previous Goal Met: Goal(s) achieved  Active Goal: Continue to tolerate TF at goal    Nutrition Monitoring and Evaluation:      Food/Nutrient Intake Outcomes: Enteral nutrition intake/tolerance  Physical Signs/Symptoms Outcomes: Biochemical data, GI status    Discharge Planning:     Too soon to determine    [address] [place], LD on 4/12/2021 at 2:07 PM  Contact: [phone]

## 2021-04-13 LAB
ANION GAP SERPL CALC-SCNC: 5 MMOL/L (ref 7–16)
BUN SERPL-MCNC: 28 MG/DL (ref 8–23)
CALCIUM SERPL-MCNC: 8.7 MG/DL (ref 8.3–10.4)
CHLORIDE SERPL-SCNC: 117 MMOL/L (ref 98–107)
CO2 SERPL-SCNC: 25 MMOL/L (ref 21–32)
CREAT SERPL-MCNC: 1.09 MG/DL (ref 0.8–1.5)
GLUCOSE BLD STRIP.AUTO-MCNC: 119 MG/DL (ref 65–100)
GLUCOSE BLD STRIP.AUTO-MCNC: 121 MG/DL (ref 65–100)
GLUCOSE SERPL-MCNC: 127 MG/DL (ref 65–100)
MAGNESIUM SERPL-MCNC: 2.2 MG/DL (ref 1.8–2.4)
PHOSPHATE SERPL-MCNC: 3.3 MG/DL (ref 2.3–3.7)
POTASSIUM SERPL-SCNC: 4.2 MMOL/L (ref 3.5–5.1)
SERVICE CMNT-IMP: ABNORMAL
SERVICE CMNT-IMP: ABNORMAL
SODIUM SERPL-SCNC: 147 MMOL/L (ref 138–145)

## 2021-04-13 PROCEDURE — 92507 TX SP LANG VOICE COMM INDIV: CPT

## 2021-04-13 PROCEDURE — 74011000250 HC RX REV CODE- 250: Performed by: STUDENT IN AN ORGANIZED HEALTH CARE EDUCATION/TRAINING PROGRAM

## 2021-04-13 PROCEDURE — 83735 ASSAY OF MAGNESIUM: CPT

## 2021-04-13 PROCEDURE — 74011250637 HC RX REV CODE- 250/637: Performed by: INTERNAL MEDICINE

## 2021-04-13 PROCEDURE — 77030018798 HC PMP KT ENTRL FED COVD -A

## 2021-04-13 PROCEDURE — 74011250636 HC RX REV CODE- 250/636: Performed by: STUDENT IN AN ORGANIZED HEALTH CARE EDUCATION/TRAINING PROGRAM

## 2021-04-13 PROCEDURE — 74011250637 HC RX REV CODE- 250/637: Performed by: FAMILY MEDICINE

## 2021-04-13 PROCEDURE — 82962 GLUCOSE BLOOD TEST: CPT

## 2021-04-13 PROCEDURE — 65610000006 HC RM INTENSIVE CARE

## 2021-04-13 PROCEDURE — 36415 COLL VENOUS BLD VENIPUNCTURE: CPT

## 2021-04-13 PROCEDURE — 84100 ASSAY OF PHOSPHORUS: CPT

## 2021-04-13 PROCEDURE — 74011000250 HC RX REV CODE- 250: Performed by: FAMILY MEDICINE

## 2021-04-13 PROCEDURE — 74011000258 HC RX REV CODE- 258: Performed by: INTERNAL MEDICINE

## 2021-04-13 PROCEDURE — 80048 BASIC METABOLIC PNL TOTAL CA: CPT

## 2021-04-13 PROCEDURE — 74011000250 HC RX REV CODE- 250: Performed by: INTERNAL MEDICINE

## 2021-04-13 PROCEDURE — 74011250636 HC RX REV CODE- 250/636: Performed by: INTERNAL MEDICINE

## 2021-04-13 RX ORDER — AMLODIPINE BESYLATE 10 MG/1
10 TABLET ORAL DAILY
Status: DISCONTINUED | OUTPATIENT
Start: 2021-04-13 | End: 2021-04-22

## 2021-04-13 RX ADMIN — ENALAPRILAT 1.25 MG: 1.25 INJECTION INTRAVENOUS at 00:35

## 2021-04-13 RX ADMIN — METOPROLOL TARTRATE 25 MG: 25 TABLET, FILM COATED ORAL at 09:27

## 2021-04-13 RX ADMIN — SODIUM CHLORIDE 75 ML/HR: 900 INJECTION, SOLUTION INTRAVENOUS at 12:17

## 2021-04-13 RX ADMIN — Medication 10 ML: at 05:35

## 2021-04-13 RX ADMIN — SODIUM CHLORIDE 5 MG/HR: 900 INJECTION, SOLUTION INTRAVENOUS at 07:19

## 2021-04-13 RX ADMIN — PIPERACILLIN SODIUM AND TAZOBACTAM SODIUM 3.38 G: 3; .375 INJECTION, POWDER, LYOPHILIZED, FOR SOLUTION INTRAVENOUS at 09:27

## 2021-04-13 RX ADMIN — Medication 10 ML: at 15:07

## 2021-04-13 RX ADMIN — Medication 1 EACH: at 12:04

## 2021-04-13 RX ADMIN — METOPROLOL TARTRATE 5 MG: 5 INJECTION INTRAVENOUS at 01:33

## 2021-04-13 RX ADMIN — SODIUM CHLORIDE 5 MG/HR: 900 INJECTION, SOLUTION INTRAVENOUS at 02:43

## 2021-04-13 RX ADMIN — ATORVASTATIN CALCIUM 40 MG: 40 TABLET, FILM COATED ORAL at 21:15

## 2021-04-13 RX ADMIN — AMLODIPINE BESYLATE 10 MG: 10 TABLET ORAL at 09:27

## 2021-04-13 RX ADMIN — SODIUM CHLORIDE 2.5 MG/HR: 900 INJECTION, SOLUTION INTRAVENOUS at 18:36

## 2021-04-13 RX ADMIN — PIPERACILLIN SODIUM AND TAZOBACTAM SODIUM 3.38 G: 3; .375 INJECTION, POWDER, LYOPHILIZED, FOR SOLUTION INTRAVENOUS at 01:33

## 2021-04-13 RX ADMIN — METOPROLOL TARTRATE 25 MG: 25 TABLET, FILM COATED ORAL at 20:56

## 2021-04-13 RX ADMIN — ACETAMINOPHEN 650 MG: 325 SUSPENSION ORAL at 20:56

## 2021-04-13 RX ADMIN — PIPERACILLIN SODIUM AND TAZOBACTAM SODIUM 3.38 G: 3; .375 INJECTION, POWDER, LYOPHILIZED, FOR SOLUTION INTRAVENOUS at 19:32

## 2021-04-13 RX ADMIN — Medication 10 ML: at 21:15

## 2021-04-13 NOTE — PROGRESS NOTES
NEUROSURGERY PROGRESS NOTE:   Admit Date: 4/2/2021  Subjective:   No acute overnight events     Objective:  Visit Vitals  /74   Pulse 77   Temp 98.3 °F (36.8 °C)   Resp 18   Ht 5' 8\" (1.727 m)   Wt 125 lb (56.7 kg)   SpO2 100%   BMI 19.01 kg/m²     Oriented only to name, hospital and year   Eyes open to spontaneous and tracking observer   PERRL  Hearing grossly intact   Follows commands simple commands in the right upper and right lower extremity briskly   Withdraws/purposeful in the right lower and localizes in the right upper extremity. ICP less than 3 cmH2O this am at bedside, EVD 65 cc with xanthrochromic CSF     Assessment and Plan:   August Forth 67 y.o. male with right thalamic intracerebral hemorrhage with interval development of acute obstructive hydrocephalus. - Continue EVD but lower to 5 cm H2O, will raise to 10 cmH2O tomorrow and monitor with plan to attempt a clamp and ICP monitor trial period within 7 days of plac ement   - Continue q1H neuro checks  - Will continue to monitor closely, Na 147, Na 145 to 155 an ideal range given hemorrhage and edema surrounding hemorrhage. Kendra Olivia.  Dong Jackman, [address] [place] [address]

## 2021-04-13 NOTE — PROGRESS NOTES
Nutrition brief note:     Comprehensive Nutrition Assessment    Type and Reason for Visit: Reassess  Tube Feeding Management (Hospitalist)    Nutrition Recommendations/Plan:   · Enteral Nutrition:  · Continue Jevity 1.5 via NGT at 55 ml/hour.   · Continue water flush to 150 every 4 hours.    · Vitamin and Mineral Supplement Therapy:  · Electrolyte management replacement protocol implemented. · IVF per MD  · Labs:   · EN labs: BMP daily, Mg and Phos MWF. Follow up for repeat labs. Discussed with RN. Still tolerating TF without issue. Lab Results   Component Value Date/Time    Sodium 147 (H) 04/13/2021 04:47 AM    Potassium 4.2 04/13/2021 04:47 AM    Chloride 117 (H) 04/13/2021 04:47 AM    CO2 25 04/13/2021 04:47 AM    Anion gap 5 (L) 04/13/2021 04:47 AM    Glucose 127 (H) 04/13/2021 04:47 AM    BUN 28 (H) 04/13/2021 04:47 AM    Creatinine 1.09 04/13/2021 04:47 AM    Calcium 8.7 04/13/2021 04:47 AM    Albumin 3.2 04/06/2021 03:53 AM    Magnesium 2.2 04/13/2021 04:47 AM    Phosphorus 3.3 04/13/2021 04:47 AM   Labs within limits    Nutrition to continue to follow per protocol.     [address] [place], LD on 4/13/2021 at 9:58 AM  Contact: [phone]

## 2021-04-13 NOTE — PROGRESS NOTES
Hospitalist Progress Note     Admit Date:  2021  5:50 PM   Name:  Jenn Enciso   Age:  67 y.o.  :  1945   MRN:  [id number]   PCP:  Dea Bess MD  Treatment Team: Attending Provider: Rip Jarvis MD; Consulting Provider: Alex Rowland MD; Utilization Review: Santa Guajardo; Consulting Provider: Zayra Stoner MD; Care Manager: Saloni Jaimes; Care Manager: Belkis Nevarez RN; Utilization Review:  Willie He; Speech Language Pathologist: Vesta Batres, MUNIR; Occupational Therapist: Eloise Franco OT  Presenting Complaint: Extremity Weakness    Initial Admission Diagnosis: Hemorrhagic cerebrovascular accident (CVA) [facility]) [I61.9]     Assessment and Plan:     Hospital Problems as of 2021 Date Reviewed: 2021          Codes Class Noted - Resolved POA    Severe protein-calorie malnutrition ([facility] ICD-10-CM: E43  ICD-9-CM: 262  2021 - Present Yes        Dysphagia following cerebral infarction ICD-10-CM: I69.391  ICD-9-CM: 438.82  2021 - Present Yes        Elevated liver enzymes ICD-10-CM: R74.8  ICD-9-CM: 790.5  2021 - Present Yes        Elevated troponin ICD-10-CM: R77.8  ICD-9-CM: 790.6  2021 - Present Yes        * (Principal) Hemorrhagic cerebrovascular accident (CVA) ([facility] ICD-10-CM: I61.9  ICD-9-CM: 536  2021 - Present Yes        Acute left-sided weakness ICD-10-CM: R53.1  ICD-9-CM: 728.87  2021 - Present Yes        S/P PTCA (percutaneous transluminal coronary angioplasty) ICD-10-CM: Z98.61  ICD-9-CM: V45.82  Unknown - Present Yes        Chronic tuberculosis ICD-10-CM: A15.9  ICD-9-CM: 011.90  Unknown - Present Yes        Coronary atherosclerosis of native coronary vessel ICD-10-CM: I25.10  ICD-9-CM: 414.01  Unknown - Present Yes        Hyperlipidemia ICD-10-CM: E78.5  ICD-9-CM: 272.4  Unknown - Present Yes        Iron deficiency anemia ICD-10-CM: D50.9  ICD-9-CM: 280.9  2015 - Present Yes        Microcytic anemia ICD-10-CM: D50.9  ICD-9-CM: 280.9  1/21/2015 - Present     Overview Signed 4/6/2016  9:52 AM by Desiree Avila     Iron deficiency                   Plan:  # Sepsis suspected 2/2 aspiration vs HAP              - Sepsis resolved.  CXR with LLL infiltrate.               - Vanc d/c, on Zosyn for aspiration              - ST eval today, rec NPO. Spoke to daughter, Garry Sanders, regarding PEG tube and she will discuss with family members.      # Right BG/intraventricular hemorrhagic CVA              - S/p EVD on 4/7. Mgmt per Neurosurgery. Clamp trial soon.     # HyperNa              - VEKQKI today, 147     # DENI on CKD3              - Baseline Cr 1.1-1.3 with GFR 50s. Resolved.     # Elevated troponin              - Cardiology saw, likely demand, normal EF on echo.     # CAD              - ASA when ok from surgical standpoint. Con't statin.     # HTN              - Norvasc, Lopressor, IV PRNs. Off Cardene currently. Other listed chronic conditions stable, continue current management. DC planning: Placement. Diet:  DIET NPO  DIET TUBE FEEDING  DVT ppx: SCDs only. Hospital Course:   Mr. Lashaun Mendoza is a nice 66 y/o AAM with a h/o HTN, CAD, GERD, HLD, COPD and arthritis who was admitted to ICU on 4/2 with an acute right basal ganglia hemorrhagic CVA. Neurosurgery consulted who recommended conservative management given lack of midline shift or hydrocephalus. Cardiology was consulted for elevated troponin, likely demand ischemia. Head CTs were stable for a few days then he became more drowsy on 4/7 and repeat CT showed increase in right ventricular hemorrhage suspicious for early hydrocephalus. He underwent bedside EVD 4/7 PM. Repeat CXR with LLL infiltrate, started on antibiotics to cover HAP vs aspiration. EVD clamp trial soon. 24hr Events/Subjective:   4/13: Remains hemiparetic on the left side. Strength and ROM unchanged on the right. Tolerating TFs, had BMs.  When asked if he has pain he points to his abdomen but I then asked if he's just hungry and he nodded yes. BPs are better. I called and spoke to his daughter, Edouard Fowler, today and gave her an update, all question answered to the best of my ability.      No other complaints  Objective:     Patient Vitals for the past 24 hrs:   Temp Pulse Resp BP SpO2   04/13/21 1100  74 29 119/69 100 %   04/13/21 1045  68 20 127/67 100 %   04/13/21 1030  69 18 138/74 99 %   04/13/21 1016  70 17 (!) 142/79 99 %   04/13/21 1000  66 20 137/75 99 %   04/13/21 0945  87 20 (!) 143/76 100 %   04/13/21 0930  83 27 137/74 100 %   04/13/21 0917  81 25 130/71 100 %   04/13/21 0900  77 17 (!) 166/76 100 %   04/13/21 0845  87 24 137/75 98 %   04/13/21 0830  75 19 137/74 100 %   04/13/21 0816  77 18 130/74 100 %   04/13/21 0800  80 21 125/67 100 %   04/13/21 0745  80 19 128/64 99 %   04/13/21 0730  84 21 133/68 100 %   04/13/21 0719  83 23 125/65    04/13/21 0716  85 22 125/65 100 %   04/13/21 0700  87 19 122/65 97 %   04/13/21 0645  80 19 120/64 98 %   04/13/21 0630  82 19 (!) 121/57 99 %   04/13/21 0616  81 18 (!) 148/71 99 %   04/13/21 0600  79 27 125/66 99 %   04/13/21 0545  81 20 118/62 99 %   04/13/21 0530  84 (!) 34 123/66 99 %   04/13/21 0516  83 (!) 33 128/70 96 %   04/13/21 0500  82 30 122/62 97 %   04/13/21 0445  81 23 120/60 99 %   04/13/21 0430 98.3 °F (36.8 °C) 80 18 131/64 100 %   04/13/21 0416  75 18 132/66 99 %   04/13/21 0400  76 19 127/63 100 %   04/13/21 0345  77 18 118/61 99 %   04/13/21 0330  81 28 138/66 97 %   04/13/21 0316  84 24 134/75 100 %   04/13/21 0301  87 (!) 31 (!) 146/84 99 %   04/13/21 0243    (!) 159/95    04/13/21 0239  79 25 (!) 159/95 99 %   04/13/21 0217  78 20 (!) 151/75 100 %   04/13/21 0200  70 16 (!) 140/74 100 %   04/13/21 0133  81 19 (!) 170/86 99 %   04/13/21 0117  79 15 133/79 100 %   04/13/21 0054  77 22 (!) 145/81 100 %   04/13/21 0035    (!) 160/70    04/13/21 0030  79  (!) 160/70    04/13/21 0000 98.4 °F (36.9 °C) 71 22 139/70 100 %   04/12/21 2331  67 (!) 62 133/70 100 %   04/12/21 2300  61 30 128/65 100 %   04/12/21 2231  65 (!) 59 132/71 99 %   04/12/21 2200  63 17 135/77 100 %   04/12/21 2145  75 (!) 34 134/78 99 %   04/12/21 2119  80  (!) 148/80    04/12/21 2116  79 (!) 31 (!) 148/83 100 %   04/12/21 2046  81 24 (!) 147/78 100 %   04/12/21 2031  84 28 132/79 100 %   04/12/21 2016  82 25 137/74 99 %   04/12/21 2000  77 19 (!) 140/68 99 %   04/12/21 1945  80 18 135/77 100 %   04/12/21 1930  80 19 134/78 100 %   04/12/21 1916  79 21 130/75 100 %   04/12/21 1901 97.9 °F (36.6 °C) 77 25 130/71 100 %   04/12/21 1801  74 17 123/69 100 %   04/12/21 1745  71 16 133/72 100 %   04/12/21 1730  72 18 127/69 100 %   04/12/21 1716  71 16 128/68 100 %   04/12/21 1700  71 16 (!) 155/86 100 %   04/12/21 1645  78 18 136/79 99 %   04/12/21 1630  75 (!) 43 137/80 100 %   04/12/21 1616  78 23 125/69 98 %   04/12/21 1600  77 20 129/73 100 %   04/12/21 1545  78 26 120/70 99 %   04/12/21 1530  79 25 114/60 99 %   04/12/21 1516  71 18 (!) 111/57 100 %   04/12/21 1500 98 °F (36.7 °C) 72 19 124/65 100 %   04/12/21 1445  80 21 107/62 100 %   04/12/21 1430  75 18 137/68 99 %   04/12/21 1416  76 20 (!) 147/76 100 %   04/12/21 1400  83 25 135/75 96 %   04/12/21 1345  80 (!) 46 139/74 99 %   04/12/21 1330  (!) 133 (!) 47 (!) 162/85 100 %   04/12/21 1316  76 18 122/70 99 %   04/12/21 1300  76 29 132/73 100 %   04/12/21 1245  81 27 127/65 100 %   04/12/21 1230  71 19 (!) 145/76 100 %   04/12/21 1216  68 16 (!) 143/76 100 %     Oxygen Therapy  O2 Sat (%): 100 % (04/13/21 1100)  Pulse via Oximetry: 75 beats per minute (04/13/21 1100)  O2 Device: Nasal cannula (04/13/21 0700)  Skin Assessment: Clean, dry, & intact (04/13/21 0700)  Skin Protection for O2 Device: No (04/13/21 0700)  O2 Flow Rate (L/min): 2 l/min (04/13/21 0700)    Estimated body mass index is 19.01 kg/m² as calculated from the following:    Height as of this encounter: 5' 8\" (1.727 m). Weight as of this encounter: 56.7 kg (125 lb). Intake/Output Summary (Last 24 hours) at 4/13/2021 1212  Last data filed at 4/13/2021 1100  Gross per 24 hour   Intake 3124.5 ml   Output 769 ml   Net 2355.5 ml       *Note that automatically entered I/Os may not be accurate; dependent on patient compliance with collection and accurate  by techs. General:    Cachectic. No overt distress. Head:  EVD on the right. CV:   RRR. No m/r/g. No edema. No JVD  Lungs:   CTAB. No wheezing, rhonchi, or rales. Unlabored  Abdomen:   Soft, nontender, nondistended. NGT. Extremities: Warm and dry. No cyanosis   Skin:     No rashes. Normal coloration  Neuro:  No gross focal deficits. Left hemiparesis. Good strength and ROM on the right. Follows commands. Data Reviewed:  I have reviewed all labs, meds, and studies from the last 24 hours. See all results below.     Last 24hr Labs:  Recent Results (from the past 24 hour(s))   GLUCOSE, POC    Collection Time: 04/12/21  3:45 PM   Result Value Ref Range    Glucose (POC) 129 (H) 65 - 100 mg/dL    Performed by Torres    GLUCOSE, POC    Collection Time: 04/12/21  9:59 PM   Result Value Ref Range    Glucose (POC) 107 (H) 65 - 100 mg/dL    Performed by Karolyn    METABOLIC PANEL, BASIC    Collection Time: 04/13/21  4:47 AM   Result Value Ref Range    Sodium 147 (H) 138 - 145 mmol/L    Potassium 4.2 3.5 - 5.1 mmol/L    Chloride 117 (H) 98 - 107 mmol/L    CO2 25 21 - 32 mmol/L    Anion gap 5 (L) 7 - 16 mmol/L    Glucose 127 (H) 65 - 100 mg/dL    BUN 28 (H) 8 - 23 MG/DL    Creatinine 1.09 0.8 - 1.5 MG/DL    GFR est AA >60 >60 ml/min/1.73m2    GFR est non-AA >60 >60 ml/min/1.73m2    Calcium 8.7 8.3 - 10.4 MG/DL   MAGNESIUM    Collection Time: 04/13/21  4:47 AM   Result Value Ref Range    Magnesium 2.2 1.8 - 2.4 mg/dL   PHOSPHORUS    Collection Time: 04/13/21  4:47 AM   Result Value Ref Range    Phosphorus 3.3 2.3 - 3.7 MG/DL   GLUCOSE, POC    Collection Time: 21  8:04 AM   Result Value Ref Range    Glucose (POC) 121 (H) 65 - 100 mg/dL    Performed by Laughlin (Jones)        Current Meds:  Current Facility-Administered Medications   Medication Dose Route Frequency    amLODIPine (NORVASC) tablet 10 mg  10 mg Per NG tube DAILY    lip protectant (BLISTEX) ointment 1 Each  1 Each Topical PRN    insulin lispro (HUMALOG) injection   SubCUTAneous AC&HS    0.9% sodium chloride infusion  75 mL/hr IntraVENous CONTINUOUS    piperacillin-tazobactam (ZOSYN) 3.375 g in 0.9% sodium chloride (MBP/ADV) 100 mL MBP  3.375 g IntraVENous Q8H    NUTRITIONAL SUPPORT ELECTROLYTE PRN ORDERS   Does Not Apply PRN    acetaminophen (TYLENOL) solution 650 mg  650 mg Per NG tube Q4H PRN    atorvastatin (LIPITOR) tablet 40 mg  40 mg Per NG tube QHS    metoprolol tartrate (LOPRESSOR) tablet 25 mg  25 mg Per NG tube Q12H    metoprolol (LOPRESSOR) injection 5 mg  5 mg IntraVENous Q6H PRN    [Held by provider] morphine injection 1 mg  1 mg IntraVENous Q4H PRN    [Held by provider] oxyCODONE IR (ROXICODONE) tablet 5 mg  5 mg Oral Q6H PRN    enalaprilat (VASOTEC) injection 1.25 mg  1.25 mg IntraVENous Q6H PRN    lidocaine 4 % patch 1 Patch  1 Patch TransDERmal Q24H    niCARdipine (CARDENE) 25 mg in 0.9% sodium chloride (MBP/ADV) 250 mL infusion  0-15 mg/hr IntraVENous TITRATE    sodium chloride (NS) flush 5-40 mL  5-40 mL IntraVENous Q8H    sodium chloride (NS) flush 5-40 mL  5-40 mL IntraVENous PRN       Other Studies:  Results for orders placed or performed during the hospital encounter of 21   2D ECHO COMPLETE ADULT (TTE) W OR [address]  [phone]    Transthoracic Echocardiogram  2D, M-mode, Doppler, and Color Doppler    Patient: Lexis Jean  MR #: [id number]  : [id number]  Age: 67 years  Gender: Male  Study date: 03-Apr-2021  Account #: [de-identified]  Height: 68 in  Weight: 133.8 lb  BSA: 1.72 mï¾²  Status:Routine  Location: 334  BP: 137/ 77    Allergies: NO KNOWN ALLERGENS    Sonographer:  TRINH Munson  Group:  [facility]  Referring Physician:  Vibha Ocasio DO  Reading Physician:  Dr. Aziza Ramires    INDICATIONS: TIA with transient neurological disturbance    PROCEDURE: This was a routine study. A transthoracic echocardiogram was  performed. The study included complete 2D imaging, M-mode, complete spectral  Doppler, and color Doppler. Intravenous contrast (Definity) was administered. Intravenous contrast (agitated saline) was administered. Image quality was  adequate. LEFT VENTRICLE: Size was normal. Systolic function was normal. Ejection  fraction was estimated in the range of 55 % to 60 %. There were no regional  wall motion abnormalities. Wall thickness was mildly increased. Left  ventricular diastolic function parameters were normal. Avg E/e': 4.54. RIGHT VENTRICLE: The size was normal. Systolic function was normal. The  tricuspid jet envelope definition was inadequate for estimation of RV   systolic  pressure. LEFT ATRIUM: Size was normal.    ATRIAL SEPTUM: Agitated saline contrast injection (bubble study) was   performed. There was no right-to-left shunt, at rest or induced by the Valsalva   maneuver. RIGHT ATRIUM: Size was normal.    SYSTEMIC VEINS: IVC: The inferior vena cava was normal in size. The  respirophasic change in diameter was more than 50%. AORTIC VALVE: There is mild aortic annular calcification. The valve was  trileaflet. Leaflets exhibited mild sclerosis. MITRAL VALVE: Valve structure was normal.    TRICUSPID VALVE: The valve structure was normal. There was trivial  regurgitation. PULMONIC VALVE: Not well visualized. PERICARDIUM: There was no pericardial effusion. AORTA: The root exhibited normal size.     SUMMARY:    -  Left ventricle: Systolic function was normal. Ejection fraction was  estimated in the range of 55 % to 60 %. -  Atrial septum: Agitated saline contrast injection (bubble study) was  performed. There was no right-to-left shunt, at rest or induced by the   Valsalva  maneuver. SYSTEM MEASUREMENT TABLES    2D  Ao Diam: 2.8 cm  LAEDV Index (A-L): 26.9 ml/m2  IVSd: 1.1 cm  LVIDd: 5.2 cm  LVPWd: 0.8 cm    PW  E/E' Av.5    Prepared and signed by    Dr. Ryne Pinedo 2021 22:44:08         No results found. All Micro Results     Procedure Component Value Units Date/Time    CULTURE, BLOOD [[id number]] Collected: 21 1053    Order Status: Completed Specimen: Blood Updated: 21 1038     Special Requests: --        RIGHT  HAND       Culture result: NO GROWTH 4 DAYS       CULTURE, BLOOD [[id number]] Collected: 21 105    Order Status: Completed Specimen: Blood Updated: 21 1038     Special Requests: --        RIGHT  Antecubital       Culture result: NO GROWTH 4 DAYS       COVID-19 RAPID TEST [[id number]] Collected: 21 1221    Order Status: Completed Specimen: Nasopharyngeal Updated: 21 1301     Specimen source Nasopharyngeal        COVID-19 rapid test Not detected        Comment:      The specimen is NEGATIVE for SARS-CoV-2, the novel coronavirus associated with COVID-19. A negative result does not rule out COVID-19. This test has been authorized by the FDA under an Emergency Use Authorization (EUA) for use by authorized laboratories.         Fact sheet for Healthcare Providers: ConventionUpdate.co.nz  Fact sheet for Patients: ConventionUpdate.co.nz       Methodology: Isothermal Nucleic Acid Amplification               SARS-CoV-2 Lab Results  \"Novel Coronavirus\" Test: No results found for: COV2NT   \"Emergent Disease\" Test: No results found for: EDPR  \"SARS-COV-2\" Test: No results found for: XGCOVT  Rapid Test:   Lab Results   Component Value Date/Time    COVR Not detected 04/07/2021 12:21 PM            Signed:  Mt Jensen MD

## 2021-04-13 NOTE — PROGRESS NOTES
Bedside shift change report given to RIK Sanchez (oncoming nurse) by RIK Gamble (offgoing nurse). Report included the following information SBAR, Kardex, ED Summary, OR Summary, Procedure Summary, Intake/Output, MAR, Recent Results, Med Rec Status, Cardiac Rhythm NSR, Alarm Parameters , Pre Procedure Checklist, Quality Measures and Dual Neuro Assessment.       [email or web address]  [id number]

## 2021-04-13 NOTE — PROGRESS NOTES
Problem: Dysphagia (Adult)  Goal: *Acute Goals and Plan of Care (Insert Text)  Description: STG: Pt will demonstrate zero signs/sx of aspiration with mechanical soft textures with nectar thick liquids with no straws with 90% accuracy during all meals. Discontinued 4/5/21  STG: Pt will complete a full speech, language and cognitive evaluation without assistance with 100% accuracy during session. STG: Pt will complete a Modified barium swallow study with zero signs/sx of aspiration  with 100% accuracy during swallow study MET 4/6/21   LTG : Pt will demonstrate zero signs/sx of aspiration with least restrictive diet with 100% accuracy during all meals. LTG: Patient will increase receptive/expressive language skills demonstrated by the ability to communicate basic wants/needs across environments   STG: Patient will follow 1 step commands with 80% accuracy given min cueing  STG: Patient will follow 2 step commands with 80% accuracy given moderate cues. STG: Patient will respond to moderate level yes/no questions with 80% accuracy with moderate cues. STG: Patient will perform automatic speech task with 80% accuracy with moderate cues. SPEECH LANGUAGE PATHOLOGY: DYSPHAGIA AND SPEECH-LANGUAGE/COGNITION: Daily Note 2    NAME/AGE/GENDER: Nimisha Kuhn is a 67 y.o. male  DATE: 4/13/2021  PRIMARY DIAGNOSIS: [facility] [I61.9]      ICD-10: Treatment Diagnosis: R13.12 Dysphagia, Oropharyngeal Phase    RECOMMENDATIONS   DIET:    NPO with alternative means of nutrition   NEEDS LONG TERM NUTRITION IF IN LINE WITH GOALS OF CARE    MEDICATIONS: Non-oral     ASPIRATION PRECAUTIONS  · Meticulous oral care Q 4 hours. COMPENSATORY STRATEGIES/MODIFICATIONS  · Upright positioning during tube feedings.       EDUCATION:  · Recommendations discussed with Hospitalist/Intensivist  · Nursing  · Patient     CONTINUATION OF SKILLED SERVICES/MEDICAL NECESSITY:   Patient is expected to demonstrate progress in  swallow strength, swallow timeliness, swallow function, diet tolerance and swallow safety in order to  improve swallow safety, work toward diet advancement and decrease aspiration risk.  Patient is expected to demonstrate progress in expressive communication and receptive ability to decrease assistance required communication, increase independence with activities of daily living and increase communication with family/caregivers.  Patient continues to require skilled intervention due to dysphagia; expressive/receptive language deficits. RECOMMENDATIONS for CONTINUED SPEECH THERAPY: YES: Anticipate need for ongoing speech therapy during this hospitalization and at next level of care. ASSESSMENT   Dysphagia:  Treatment held today due to patients inability to perform throat clear or cough on commands. RN reports need for frequent NT suctioning due to poor secretion management. Continue NPO with non-oral nutrition/hydration/medication. Continue Q 4 hour oral care. Patient will require LONG TERM NUTRITION via PEG due to severity of deficits and inability to participate in dysphagia treatment (if in line with goals of care). Also may want to consider Palliative Care consult to assist with decision making regarding nutrition. Language evaluation: Limited ability to participate in language treatment today. Only verbal responses were to state his name and location after repeated encouragement and respond \"uh huh\" x1. Very slow responses with basic yes/no questions. No command following. He did gesture to indicate pain location when asked. No progress towards goals today. Recommend continue ongoing speech therapy to address dysphagia and cognitive linguistic impairment. REHABILITATION POTENTIAL FOR STATED GOALS: Good    PLAN    FREQUENCY/DURATION: Continue to follow patient 3 times a week for duration of hospital stay to address above goals.     - Recommendations for next treatment session: Next treatment session will address langauge treatment    SUBJECTIVE   Eyes open, but poorly responsive. Repeatedly rubbing leg to indicate pain. Very minimal verbalizations. Problem List:  (Impairments causing functional limitations):  1. Oropharyngeal dysphagia  2. Expressive/receptive language impairments    Orientation:   Person  Place  Time    Pain: Pain Scale 1: Adult Nonverbal Pain Scale  Pain Intensity 1: 0  Pain Location 1: Leg    OBJECTIVE   Dysphagia:   Not addressed today due to decreased participation/inability to produce cough or throat clear. Cognitive Linguistic Assessment :  -Orientation: 4/5  -1 step commands: 0/5  - Yes/no questions: 7/7, however, he required greatly extended time and repetition to respond. INTERDISCIPLINARY COLLABORATION: RN  PRECAUTIONS/ALLERGIES: Patient has no known allergies. Tool Used: Dysphagia Outcome and Severity Scale (DAYAMI)    Score Comments   Normal Diet  [] 7 With no strategies or extra time needed   Functional Swallow  [] 6 May have mild oral or pharyngeal delay   Mild Dysphagia  [] 5 Which may require one diet consistency restricted    Mild-Moderate Dysphagia  [] 4 With 1-2 diet consistencies restricted   Moderate Dysphagia  [] 3 With 2 or more diet consistencies restricted   Moderate-Severe Dysphagia  [] 2 With partial PO strategies (trials with ST only)   Severe Dysphagia  [] 1 With inability to tolerate any PO safely      Score:  Initial: 1 Most Recent: 1 (Date 04/13/21 )   Interpretation of Tool: The Dysphagia Outcome and Severity Scale (DAYAMI) is a simple, easy-to-use, 7-point scale developed to systematically rate the functional severity of dysphagia based on objective assessment and make recommendations for diet level, independence level, and type of nutrition.      Tool Used: MODIFIED YAZMIN SCALE (mRS)   Score   No Symptoms  [] 0   No significant disability despite symptoms; able to carry out all usual duties and activities [] 1   Slight disability; unable to carry out all previous activities but able to look after own affairs without assistance. [] 2   Moderate disability; requiring some help but able to walk without assistance  [] 3   Moderately severe disability; unable to walk without assistance and unable to attend to own bodily needs without assistance  [] 4   Severe disability; bedridden, incontinent, and requiring constant nursing care and attention  [] 5      Score:  Initial: 5 Most recent: 1   Interpretation of Tool: The Modified [place] Scale is a 7-point scaled used to quantify level of disability as it relates to a patient's functional abilities. Current Medications:   No current facility-administered medications on file prior to encounter. Current Outpatient Medications on File Prior to Encounter   Medication Sig Dispense Refill    cyclobenzaprine (FLEXERIL) 5 mg tablet Take 1 Tab by mouth two (2) times a day. 14 Tab 0    aspirin 81 mg chewable tablet Take 1 Tab by mouth daily.  nitroglycerin (NITROSTAT) 0.4 mg SL tablet 1 Tab by SubLINGual route every five (5) minutes as needed for Chest Pain. 2 Bottle 4    atorvastatin (LIPITOR) 80 mg tablet Take 1 Tab by mouth nightly. 30 Tab 11    metoprolol tartrate (LOPRESSOR) 25 mg tablet Take 1 Tab by mouth two (2) times a day.  60 Tab 6       SAFETY:  After treatment position/precautions:  · Upright in bed  · Dr. Shelby Donovan notified  · RN at bedside    Total Treatment Duration:   Time In: 1920  Time Out: [address], [facility] MEDICO DEL [facility], [address] [place] [address] [FreeTextEntry1] : 65-year-old female with complaints of prolapsing hemorrhoids. Symptoms of been present for many years and worsening. Had previous rubber band ligation procedure with improvement of symptoms wishes to proceed again today

## 2021-04-13 NOTE — PROGRESS NOTES
EVD drain raised to 10 cm H2O at 12:00 with hourly average drain output ~1 mL and ICP ~5. No change in neuro status this afternoon. Pt remains alert and oriented, answering questions appropriately and moving R side spontaneously.

## 2021-04-14 ENCOUNTER — APPOINTMENT (OUTPATIENT)
Dept: CT IMAGING | Age: 76
DRG: 023 | End: 2021-04-14
Attending: NEUROLOGICAL SURGERY
Payer: MEDICARE

## 2021-04-14 LAB
ANION GAP SERPL CALC-SCNC: 5 MMOL/L (ref 7–16)
ARTERIAL PATENCY WRIST A: POSITIVE
BACTERIA SPEC CULT: NORMAL
BACTERIA SPEC CULT: NORMAL
BASE EXCESS BLD CALC-SCNC: 0.9 MMOL/L
BDY SITE: ABNORMAL
BUN SERPL-MCNC: 26 MG/DL (ref 8–23)
CALCIUM SERPL-MCNC: 8.9 MG/DL (ref 8.3–10.4)
CHLORIDE SERPL-SCNC: 114 MMOL/L (ref 98–107)
CO2 SERPL-SCNC: 26 MMOL/L (ref 21–32)
CREAT SERPL-MCNC: 1.01 MG/DL (ref 0.8–1.5)
GAS FLOW.O2 O2 DELIVERY SYS: ABNORMAL L/MIN
GLUCOSE BLD STRIP.AUTO-MCNC: 108 MG/DL (ref 65–100)
GLUCOSE BLD STRIP.AUTO-MCNC: 113 MG/DL (ref 65–100)
GLUCOSE BLD STRIP.AUTO-MCNC: 140 MG/DL (ref 65–100)
GLUCOSE BLD STRIP.AUTO-MCNC: 99 MG/DL (ref 65–100)
GLUCOSE SERPL-MCNC: 120 MG/DL (ref 65–100)
HCO3 BLD-SCNC: 22.7 MMOL/L (ref 22–26)
MAGNESIUM SERPL-MCNC: 2.3 MG/DL (ref 1.8–2.4)
PCO2 BLD: 27.4 MMHG (ref 35–45)
PH BLD: 7.53 [PH] (ref 7.35–7.45)
PHOSPHATE SERPL-MCNC: 3.8 MG/DL (ref 2.3–3.7)
PO2 BLD: 78 MMHG (ref 75–100)
POTASSIUM SERPL-SCNC: 3.9 MMOL/L (ref 3.5–5.1)
SAO2 % BLD: 97 % (ref 95–98)
SERVICE CMNT-IMP: ABNORMAL
SERVICE CMNT-IMP: NORMAL
SODIUM SERPL-SCNC: 145 MMOL/L (ref 136–145)
SPECIMEN TYPE: ABNORMAL

## 2021-04-14 PROCEDURE — 70450 CT HEAD/BRAIN W/O DYE: CPT

## 2021-04-14 PROCEDURE — 74011250636 HC RX REV CODE- 250/636: Performed by: INTERNAL MEDICINE

## 2021-04-14 PROCEDURE — 74011000250 HC RX REV CODE- 250: Performed by: INTERNAL MEDICINE

## 2021-04-14 PROCEDURE — 74011250637 HC RX REV CODE- 250/637: Performed by: INTERNAL MEDICINE

## 2021-04-14 PROCEDURE — 92507 TX SP LANG VOICE COMM INDIV: CPT

## 2021-04-14 PROCEDURE — 83735 ASSAY OF MAGNESIUM: CPT

## 2021-04-14 PROCEDURE — 80048 BASIC METABOLIC PNL TOTAL CA: CPT

## 2021-04-14 PROCEDURE — 36415 COLL VENOUS BLD VENIPUNCTURE: CPT

## 2021-04-14 PROCEDURE — 82803 BLOOD GASES ANY COMBINATION: CPT

## 2021-04-14 PROCEDURE — 36600 WITHDRAWAL OF ARTERIAL BLOOD: CPT

## 2021-04-14 PROCEDURE — 74011000258 HC RX REV CODE- 258: Performed by: INTERNAL MEDICINE

## 2021-04-14 PROCEDURE — 97530 THERAPEUTIC ACTIVITIES: CPT

## 2021-04-14 PROCEDURE — 92526 ORAL FUNCTION THERAPY: CPT

## 2021-04-14 PROCEDURE — 65610000006 HC RM INTENSIVE CARE

## 2021-04-14 PROCEDURE — 74011250637 HC RX REV CODE- 250/637: Performed by: FAMILY MEDICINE

## 2021-04-14 PROCEDURE — 82962 GLUCOSE BLOOD TEST: CPT

## 2021-04-14 PROCEDURE — 97112 NEUROMUSCULAR REEDUCATION: CPT

## 2021-04-14 PROCEDURE — 84100 ASSAY OF PHOSPHORUS: CPT

## 2021-04-14 PROCEDURE — 74011000250 HC RX REV CODE- 250: Performed by: FAMILY MEDICINE

## 2021-04-14 PROCEDURE — 97535 SELF CARE MNGMENT TRAINING: CPT

## 2021-04-14 RX ADMIN — METOPROLOL TARTRATE 5 MG: 5 INJECTION INTRAVENOUS at 04:31

## 2021-04-14 RX ADMIN — PIPERACILLIN SODIUM AND TAZOBACTAM SODIUM 3.38 G: 3; .375 INJECTION, POWDER, LYOPHILIZED, FOR SOLUTION INTRAVENOUS at 10:21

## 2021-04-14 RX ADMIN — AMLODIPINE BESYLATE 10 MG: 10 TABLET ORAL at 09:00

## 2021-04-14 RX ADMIN — ATORVASTATIN CALCIUM 40 MG: 40 TABLET, FILM COATED ORAL at 22:09

## 2021-04-14 RX ADMIN — ENALAPRILAT 1.25 MG: 1.25 INJECTION INTRAVENOUS at 16:17

## 2021-04-14 RX ADMIN — ACETAMINOPHEN 650 MG: 325 SUSPENSION ORAL at 20:26

## 2021-04-14 RX ADMIN — SODIUM CHLORIDE 75 ML/HR: 900 INJECTION, SOLUTION INTRAVENOUS at 08:59

## 2021-04-14 RX ADMIN — METOPROLOL TARTRATE 25 MG: 25 TABLET, FILM COATED ORAL at 20:26

## 2021-04-14 RX ADMIN — Medication 10 ML: at 06:52

## 2021-04-14 RX ADMIN — Medication 10 ML: at 14:20

## 2021-04-14 RX ADMIN — Medication 10 ML: at 22:10

## 2021-04-14 RX ADMIN — PIPERACILLIN SODIUM AND TAZOBACTAM SODIUM 3.38 G: 3; .375 INJECTION, POWDER, LYOPHILIZED, FOR SOLUTION INTRAVENOUS at 01:36

## 2021-04-14 RX ADMIN — METOPROLOL TARTRATE 25 MG: 25 TABLET, FILM COATED ORAL at 09:00

## 2021-04-14 RX ADMIN — PIPERACILLIN SODIUM AND TAZOBACTAM SODIUM 3.38 G: 3; .375 INJECTION, POWDER, LYOPHILIZED, FOR SOLUTION INTRAVENOUS at 18:07

## 2021-04-14 RX ADMIN — ACETAMINOPHEN 650 MG: 325 SUSPENSION ORAL at 06:52

## 2021-04-14 NOTE — PROGRESS NOTES
Care Management Interventions  Mode of Transport at Discharge: Other (see comment)(Anton vs family)  Transition of Care Consult (CM Consult): Discharge Planning(Pt is insured by medicare with pharmacy benefits.  )  Discharge Durable Medical Equipment: No  Physical Therapy Consult: Yes  Occupational Therapy Consult: Yes  Speech Therapy Consult: Yes  Current Support Network: Relative's Home, Family Lives Nearby(Pt lives with his brother and is normally independent with all ADL's. Daughter Massimo Warren [phone] is also supportive.  )  Confirm Follow Up Transport: Family  The Plan for Transition of Care is Related to the Following Treatment Goals : Pt will need rehab services to return to his functional baseline. The Patient and/or Patient Representative was Provided with a Choice of Provider and Agrees with the Discharge Plan?: Yes  Name of the Patient Representative Who was Provided with a Choice of Provider and Agrees with the Discharge Plan: pt/daughter  Freedom of Choice List was Provided with Basic Dialogue that Supports the Patient's Individualized Plan of Care/Goals, Treatment Preferences and Shares the Quality Data Associated with the Providers?: Yes  [place] Resource Information Provided?: No  Discharge Location  Discharge Placement: [facility] vs SNF rehab)     CM following SLP note- pt will need alternative feeding- PEG probable for nutrition. Pt remains in critical condition with EVD in place. Requiring IV Cardene drip for blood pressure control. Pt with [facility] Medicare. CM considering options for pt as DCP; [facility] continues to follow. With placement of PEG tube, pt may qualify for LTAC with fall back to a SNF facility.

## 2021-04-14 NOTE — PROGRESS NOTES
Dr. Munoz Likes aware of decreased LOC and worsening mentation. Pt is no longer answering orientation questions and is very difficult to arouse. Pt had complained of 8/10 headache while working with Speech Therapy. ICP 8 and drain output 3.5 mL at 10:00. Orders for stat head CT.

## 2021-04-14 NOTE — PROGRESS NOTES
ACUTE OT GOALS:  (Developed with and agreed upon by patient and/or caregiver.)  1. Complete functional transfers (I.e. toilet/bedside commode) with c min A. (UPDATED 2021)   2. Complete gentle active assisted L UE ROM focusing more on finger/wrist extensors and shoulder external rotators. CONTINUE TO ADDRESS (2021)  3. Utilize L UE as a support for supine to sitting and sit to standing at least 20% of the time. CONTINUE TO ADDRESS (2021)  4. Complete self-feeding (once cleared by SLP/MD/RN) with standby assistance. CONTINUE TO ADDRESS (2021)  5. Complete grooming/oral care with standby assistance. CONTINUE TO ADDRESS (2021)    NEW GOALS (Added 2021)  1. Patient will complete functional activity while seated EOB with min A and adaptive equipment as needed. 2. Patient will attend to L side of environment for 75% of session with min verbal cues from therapist.    OCCUPATIONAL THERAPY: Daily Note OT Treatment Day # 4    Lay De La Fuente is a 67 y.o. male   PRIMARY DIAGNOSIS: Hemorrhagic cerebrovascular accident (CVA) ([facility]  Hemorrhagic cerebrovascular accident (CVA) ([facility] [I61.9]       Payor: Ricardo Navarro [id number] / [place] / Product Type: Managed Care Medicare /   ASSESSMENT:     REHAB RECOMMENDATIONS: CURRENT LEVEL OF FUNCTION:  (Most Recently Demonstrated)   Recommendation to date pending progress:  [address] [place] [address] vs. Short-term Rehab  Equipment:    To Be Determined Bathing:   Total Assistance  Dressing:   Total Assistance  Feeding/Grooming:   Set Up  Toileting:   Total Assistance  Functional Mobility:   Total Assistance     ASSESSMENT:  Mr. Ashli Young continues to present with deficits in overall strength, activity tolerance, ADL performance, and functional mobility. EVD drain was present d/t hydrocephalus (drain needs to be clamped prior to mobility).   Currently not following commands on the L side; does follow commands on R side. Improving aphasia. Rolling bed mobility completed with max A to facilitate bowel hygiene; total A for bowel hygiene. Max to total A x 2 for supine to sit transfer to edge of bed. Max verbal and tactile commands to maintain midline orientation. Strong L hemibody. Attempted L orientation including reaching for items placed on L side of body; pt able to grasp washcloth with R hand. Weight shifts onto L elbow. Stood with max A x 2 for better positioning in bed. Returned to supine in bed with pt rolled to L side. No progress towards goals. Will continue OT efforts as indicated. SUBJECTIVE:   Mr. Davon Staton states, Reggie Hides it easy on me. \"    SOCIAL HISTORY/LIVING ENVIRONMENT:   Home Environment: Private residence  # Steps to Enter: 5  One/Two Story Residence: One story  Living Alone: No  Support Systems: Family member(s)    OBJECTIVE:     PAIN: VITAL SIGNS: LINES/DRAINS:   Pre Treatment: Pain Screen  Pain Scale 1: Numeric (0 - 10)  Pain Intensity 1: 0  Post Treatment: 0   IV and EVD drain  O2 Device: None (Room air)     ACTIVITIES OF DAILY LIVING: I Mod I S SBA CGA Min Mod Max Total NT Comments   BASIC ADLs:              Bathing/ Showering [] [] [] [] [] [] [] [] [] [x]    Toileting [] [] [] [] [] [] [] [] [x] []    Dressing [] [] [] [] [] [] [] [] [] [x]    Feeding [] [] [] [] [] [] [] [] [] [x]    Grooming [] [] [x] [] [] [] [] [] [] [] Able to wash face with RUE   Personal Device Care [] [] [] [] [] [] [] [] [] [x]    Functional Mobility [] [] [] [] [] [] [] [x] [x] [] X 2   I=Independent, Mod I=Modified Independent, S=Supervision, SBA=Standby Assistance, CGA=Contact Guard Assistance,   Min=Minimal Assistance, Mod=Moderate Assistance, Max=Maximal Assistance, Total=Total Assistance, NT=Not Tested    MOBILITY: I Mod I S SBA CGA Min Mod Max Total  NT x2 Comments:   Supine to sit [] [] [] [] [] [] [] [x] [x] [] [x]    Sit to supine [] [] [] [] [] [] [] [] [x] [] [x]    Sit to stand [] [] [] [] [] [] [] [] [] [x] [] Bed to chair [] [] [] [] [] [] [] [] [] [x] []    I=Independent, Mod I=Modified Independent, S=Supervision, SBA=Standby Assistance, CGA=Contact Guard Assistance,   Min=Minimal Assistance, Mod=Moderate Assistance, Max=Maximal Assistance, Total=Total Assistance, NT=Not Tested    BALANCE: Good Fair+ Fair Fair- Poor NT Comments   Sitting Static [] [] [] [] [x] []    Sitting Dynamic [] [] [] [] [x] []              Standing Static [] [] [] [] [x] []    Standing Dynamic [] [] [] [] [x] []      PLAN:   FREQUENCY/DURATION: OT Plan of Care: 3 times/week for duration of hospital stay or until stated goals are met, whichever comes first.    TREATMENT:   TREATMENT:   ($$ Self Care/Home Management: 8-22 mins$$ Neuromuscular Re-Education: 8-22 mins   )  Co-Treatment PT/OT necessary due to patient's decreased overall endurance/tolerance levels, as well as need for high level skilled assistance to complete functional transfers/mobility and functional tasks  Self Care (10 Minutes): Self care including Toileting and Grooming to increase independence and decrease level of assistance required. Neuromuscular Re-education (13 Minutes): Neuromuscular Re-education included Balance Training, Coordination training, Hemibody awareness training, Postural training, Sitting balance training and Visual field awareness training to improve Balance, Coordination and Postural Control.     TREATMENT GRID:  N/A    AFTER TREATMENT POSITION/PRECAUTIONS:  Bed, Needs within reach and RN notified    INTERDISCIPLINARY COLLABORATION:  RN/PCT, PT/PTA and OT/LYNCH    TOTAL TREATMENT DURATION:  OT Patient Time In/Time Out  Time In: 1336  Time Out: [address], OT

## 2021-04-14 NOTE — PROGRESS NOTES
Hospitalist Progress Note     Admit Date:  2021  5:50 PM   Name:  Minh Cameron   Age:  67 y.o.  :  1945   MRN:  [id number]   PCP:  Eric Lombardi MD  Treatment Team: Attending Provider: Francis Epperson MD; Consulting Provider: Per Olson MD; Utilization Review: Oneal You; Consulting Provider: Sariah Kaba MD; Care Manager: Lillian Vazquez; Care Manager: Meseret Mcdermott RN; Utilization Review:  Vinny Ovalle; Physical Therapy Assistant: Lavonne Davalos PTA; Occupational Therapist: Ever Aiken OT  Presenting Complaint: Extremity Weakness    Initial Admission Diagnosis: Hemorrhagic cerebrovascular accident (CVA) [facility]) [I61.9]     Assessment and Plan:     Hospital Problems as of 2021 Date Reviewed: 2021          Codes Class Noted - Resolved POA    Severe protein-calorie malnutrition ([facility] [address] ICD-10-CM: E43  ICD-9-CM: 262  2021 - Present Yes        Dysphagia following cerebral infarction ICD-10-CM: I69.391  ICD-9-CM: 438.82  2021 - Present Yes        Elevated liver enzymes ICD-10-CM: R74.8  ICD-9-CM: 790.5  2021 - Present Yes        Elevated troponin ICD-10-CM: R77.8  ICD-9-CM: 790.6  2021 - Present Yes        * (Principal) Hemorrhagic cerebrovascular accident (CVA) ([facility] ICD-10-CM: I61.9  ICD-9-CM: 393  2021 - Present Yes        Acute left-sided weakness ICD-10-CM: R53.1  ICD-9-CM: 728.87  2021 - Present Yes        S/P PTCA (percutaneous transluminal coronary angioplasty) ICD-10-CM: Z98.61  ICD-9-CM: V45.82  Unknown - Present Yes        Chronic tuberculosis ICD-10-CM: A15.9  ICD-9-CM: 011.90  Unknown - Present Yes        Coronary atherosclerosis of native coronary vessel ICD-10-CM: I25.10  ICD-9-CM: 414.01  Unknown - Present Yes        Hyperlipidemia ICD-10-CM: E78.5  ICD-9-CM: 272.4  Unknown - Present Yes        Iron deficiency anemia ICD-10-CM: D50.9  ICD-9-CM: 280.9  2015 - Present Yes        Microcytic anemia ICD-10-CM: D50.9  ICD-9-CM: 280.9  1/21/2015 - Present     Overview Signed 4/6/2016  9:52 AM by Brennen Coleman     Iron deficiency                   Plan:     # Right BG/intraventricular hemorrhagic CVA              - S/p EVD on 4/7. Mgmt per Neurosurgery. Repeat head CT today for drowsiness, looks unchanged. Clamp trial per surgery. More alert on reassessment this afternoon. # Sepsis suspected 2/2 aspiration vs HAP              - Sepsis resolved.  CXR with LLL infiltrate.               - MOQJ d/c, on Zosyn for aspiration              - ST eval, rec NPO. PEG when appropriate, patient seems to be agreeable based on today's discussion.      # HyperNa              - [id number]      # DENI on CKD3              - Baseline Cr 1.1-1.3 with GFR 50s. Resolved.     # Elevated troponin              - Cardiology saw, likely demand, normal EF on echo.     # CAD              - ASA when ok from surgical standpoint. Con't statin.     # HTN              - Norvasc, Lopressor, IV PRNs. Off Cardene currently. Other listed chronic conditions stable, continue current management. DC planning: Placement. Diet:  DIET NPO  DIET TUBE FEEDING  DVT ppx: SCDs only. Hospital Course:   Mr. Quincy Lee is a nice 68 y/o AAM with a h/o HTN, CAD, GERD, HLD, COPD and arthritis who was admitted to ICU on 4/2 with an acute right basal ganglia hemorrhagic CVA. Neurosurgery consulted who recommended conservative management given lack of midline shift or hydrocephalus. Cardiology was consulted for elevated troponin, likely demand ischemia. Head CTs were stable for a few days then he became more drowsy on 4/7 and repeat CT showed increase in right ventricular hemorrhage suspicious for early hydrocephalus. He underwent bedside EVD 4/7 PM. Repeat CXR with LLL infiltrate, started on antibiotics to cover HAP vs aspiration. EVD clamp trial soon. Repeat head CT on 4/14 for drowsiness, unchanged.     24hr Events/Subjective:   4/14: Drowsy this morning so head CT was repeated, is unchanged. Re-evaluated later in the day and he is much more alert. Daughter and sister at bedside. Patient seems to be agreeable to a PEG tube and wishes to remain full code. EVD at 10cm.      No other complaints  Objective:     Patient Vitals for the past 24 hrs:   Temp Pulse Resp BP SpO2   04/14/21 1201  76  (!) 128/95 98 %   04/14/21 1146  75 24 136/80 99 %   04/14/21 1130  75 19 135/77 97 %   04/14/21 1115  73 21 (!) 141/78 94 %   04/14/21 1110  69 25 127/71 98 %   04/14/21 1030  67 29 113/69 98 %   04/14/21 1015  65 23 128/75 99 %   04/14/21 1000  62 18 123/73 99 %   04/14/21 0946  68 24 123/74 99 %   04/14/21 0930  63 27 120/69 97 %   04/14/21 0915  69 20 (!) 149/73 100 %   04/14/21 0900  72 17 137/79 100 %   04/14/21 0846  75 16 137/74 95 %   04/14/21 0830  (!) 128 25 115/73 99 %   04/14/21 0815  73 16 139/76 100 %   04/14/21 0800  81  126/70 100 %   04/14/21 0746  76 19 127/66 98 %   04/14/21 0730  78 17 (!) 147/81 98 %   04/14/21 0715  79 17 (!) 150/81 99 %   04/14/21 0700 98.3 °F (36.8 °C) 83 17 (!) 145/84 99 %   04/14/21 0646  84  (!) 141/82 99 %   04/14/21 0630  82 (!) 35  94 %   04/14/21 0600  79 19     04/14/21 0530  (!) 128 25 (!) 152/75 (!) 83 %   04/14/21 0500  72 30 (!) 144/80 99 %   04/14/21 0430  75 (!) 31 (!) 141/74 100 %   04/14/21 0400  82 20 (!) 149/82 99 %   04/14/21 0330  76 25 (!) 154/88 100 %   04/14/21 0300  77 26 139/78 99 %   04/14/21 0230  97 19 138/75 98 %   04/14/21 0200  69 16 135/80 99 %   04/14/21 0130  70 16 (!) 147/78 98 %   04/14/21 0100  70 15 128/73 96 %   04/14/21 0030  69 (!) 36 124/71 97 %   04/14/21 0000  72 24 128/68 98 %   04/13/21 2300 98.9 °F (37.2 °C) 62 16 135/66 98 %   04/13/21 2209  65  99/62    04/13/21 2200  68 25 99/62 100 %   04/13/21 2130  74 20 (!) 116/58 100 %   04/13/21 2100  97 27 (!) 144/75 97 %   04/13/21 2056  97  (!) 152/78    04/13/21 2030  98 21 (!) 150/75 98 %   04/13/21 2000  92 19 Edel Saenz ) 146/76 96 %   04/13/21 1930  96 20 (!) 144/75 97 %   04/13/21 1900 99.5 °F (37.5 °C) 97 23 (!) 147/74 96 %   04/13/21 1845  (!) 103 22 (!) 143/81 97 %   04/13/21 1830  (!) 137  (!) 144/82 95 %   04/13/21 1816  98  (!) 146/79 97 %   04/13/21 1800  95 24 139/76 96 %   04/13/21 1745  96 25 133/75 97 %   04/13/21 1730  99 22 (!) 140/77 98 %   04/13/21 1717  96 19 (!) 157/65 97 %   04/13/21 1700  94 18 129/75 97 %   04/13/21 1645  92 26 136/76 97 %   04/13/21 1630  90 19 (!) 141/76 99 %   04/13/21 1616  92 25 (!) 148/65 97 %   04/13/21 1600  95 (!) 44 139/77 98 %   04/13/21 1545  92 24 138/75 98 %   04/13/21 1530  90 23 135/70 98 %   04/13/21 1516  88 19 (!) 143/73 97 %   04/13/21 1501 98.9 °F (37.2 °C) 88 10 138/75 98 %   04/13/21 1500  90 14 (!) 148/82 97 %   04/13/21 1445  87 20 (!) 142/78 98 %   04/13/21 1430  86 22 138/74 98 %   04/13/21 1416  81 26 127/67 99 %   04/13/21 1400  81 23 130/66 100 %     Oxygen Therapy  O2 Sat (%): 98 % (04/14/21 1201)  Pulse via Oximetry: 76 beats per minute (04/14/21 1201)  O2 Device: None (Room air) (04/14/21 0800)  Skin Assessment: Clean, dry, & intact (04/14/21 0800)  Skin Protection for O2 Device: N/A (04/13/21 1900)  O2 Flow Rate (L/min): 2 l/min (04/13/21 0700)    Estimated body mass index is 19.01 kg/m² as calculated from the following:    Height as of this encounter: 5' 8\" (1.727 m). Weight as of this encounter: 56.7 kg (125 lb). Intake/Output Summary (Last 24 hours) at 4/14/2021 1357  Last data filed at 4/14/2021 0039  Gross per 24 hour   Intake 3700 ml   Output 2327.5 ml   Net 1372.5 ml       *Note that automatically entered I/Os may not be accurate; dependent on patient compliance with collection and accurate  by techs. General:    Cachectic, temporal wassting. No overt distress. Head:  EVD present. CV:   RRR. No m/r/g. No edema. No JVD  Lungs:   CTAB. No wheezing, rhonchi, or rales.   Unlabored  Abdomen:   Soft, nontender, nondistended. NGT left nare. Extremities: Warm and dry. No cyanosis   Skin:     No rashes. Normal coloration  Neuro:  Good strength on the right and follows commands on that side, remains unable to move the left side. Data Reviewed:  I have reviewed all labs, meds, and studies from the last 24 hours. See all results below.     Last 24hr Labs:  Recent Results (from the past 24 hour(s))   GLUCOSE, POC    Collection Time: 04/13/21  9:18 PM   Result Value Ref Range    Glucose (POC) 119 (H) 65 - 100 mg/dL    Performed by [facility]    METABOLIC PANEL, BASIC    Collection Time: 04/14/21  5:10 AM   Result Value Ref Range    Sodium 145 136 - 145 mmol/L    Potassium 3.9 3.5 - 5.1 mmol/L    Chloride 114 (H) 98 - 107 mmol/L    CO2 26 21 - 32 mmol/L    Anion gap 5 (L) 7 - 16 mmol/L    Glucose 120 (H) 65 - 100 mg/dL    BUN 26 (H) 8 - 23 MG/DL    Creatinine 1.01 0.8 - 1.5 MG/DL    GFR est AA >60 >60 ml/min/1.73m2    GFR est non-AA >60 >60 ml/min/1.73m2    Calcium 8.9 8.3 - 10.4 MG/DL   PHOSPHORUS    Collection Time: 04/14/21  5:10 AM   Result Value Ref Range    Phosphorus 3.8 (H) 2.3 - 3.7 MG/DL   MAGNESIUM    Collection Time: 04/14/21  5:10 AM   Result Value Ref Range    Magnesium 2.3 1.8 - 2.4 mg/dL   GLUCOSE, POC    Collection Time: 04/14/21  6:39 AM   Result Value Ref Range    Glucose (POC) 113 (H) 65 - 100 mg/dL    Performed by [facility]    GLUCOSE, POC    Collection Time: 04/14/21 12:11 PM   Result Value Ref Range    Glucose (POC) 99 65 - 100 mg/dL    Performed by Damion Holliday        Current Meds:  Current Facility-Administered Medications   Medication Dose Route Frequency    amLODIPine (NORVASC) tablet 10 mg  10 mg Per NG tube DAILY    lip protectant (BLISTEX) ointment 1 Each  1 Each Topical PRN    insulin lispro (HUMALOG) injection   SubCUTAneous AC&HS    0.9% sodium chloride infusion  75 mL/hr IntraVENous CONTINUOUS    piperacillin-tazobactam (ZOSYN) 3.375 g in 0.9% sodium chloride (MBP/ADV) 100 mL MBP  3.375 g IntraVENous Q8H    NUTRITIONAL SUPPORT ELECTROLYTE PRN ORDERS   Does Not Apply PRN    acetaminophen (TYLENOL) solution 650 mg  650 mg Per NG tube Q4H PRN    atorvastatin (LIPITOR) tablet 40 mg  40 mg Per NG tube QHS    metoprolol tartrate (LOPRESSOR) tablet 25 mg  25 mg Per NG tube Q12H    metoprolol (LOPRESSOR) injection 5 mg  5 mg IntraVENous Q6H PRN    [Held by provider] morphine injection 1 mg  1 mg IntraVENous Q4H PRN    [Held by provider] oxyCODONE IR (ROXICODONE) tablet 5 mg  5 mg Oral Q6H PRN    enalaprilat (VASOTEC) injection 1.25 mg  1.25 mg IntraVENous Q6H PRN    lidocaine 4 % patch 1 Patch  1 Patch TransDERmal Q24H    niCARdipine (CARDENE) 25 mg in 0.9% sodium chloride (MBP/ADV) 250 mL infusion  0-15 mg/hr IntraVENous TITRATE    sodium chloride (NS) flush 5-40 mL  5-40 mL IntraVENous Q8H    sodium chloride (NS) flush 5-40 mL  5-40 mL IntraVENous PRN       Other Studies:  Results for orders placed or performed during the hospital encounter of 21   2D ECHO COMPLETE ADULT (TTE) W OR [address] [facility] [address] [place] [address], [facility]  [phone]    Transthoracic Echocardiogram  2D, M-mode, Doppler, and Color Doppler    Patient: Holger Miller  MR #: [id number]  : [id number]  Age: 67 years  Gender: Male  Study date: 2021  Account #: [de-identified]  Height: 68 in  Weight: 133.8 lb  BSA: 1.72 mï¾²  Status:Routine  Location: [facility]  BP: 137/ 77    Allergies: NO KNOWN ALLERGENS    Sonographer:  TRINH Daily  Group:  [facility]  Referring Physician:  Kelly Skelton DO  Reading Physician:  Dr. Saleem Shelley    INDICATIONS: TIA with transient neurological disturbance    PROCEDURE: This was a routine study. A transthoracic echocardiogram was  performed. The study included complete 2D imaging, M-mode, complete spectral  Doppler, and color Doppler.  Intravenous contrast (Definity) was administered. Intravenous contrast (agitated saline) was administered. Image quality was  adequate. LEFT VENTRICLE: Size was normal. Systolic function was normal. Ejection  fraction was estimated in the range of 55 % to 60 %. There were no regional  wall motion abnormalities. Wall thickness was mildly increased. Left  ventricular diastolic function parameters were normal. Avg E/e': 4.54. RIGHT VENTRICLE: The size was normal. Systolic function was normal. The  tricuspid jet envelope definition was inadequate for estimation of RV   systolic  pressure. LEFT ATRIUM: Size was normal.    ATRIAL SEPTUM: Agitated saline contrast injection (bubble study) was   performed. There was no right-to-left shunt, at rest or induced by the Valsalva   maneuver. RIGHT ATRIUM: Size was normal.    SYSTEMIC VEINS: IVC: The inferior vena cava was normal in size. The  respirophasic change in diameter was more than 50%. AORTIC VALVE: There is mild aortic annular calcification. The valve was  trileaflet. Leaflets exhibited mild sclerosis. MITRAL VALVE: Valve structure was normal.    TRICUSPID VALVE: The valve structure was normal. There was trivial  regurgitation. PULMONIC VALVE: Not well visualized. PERICARDIUM: There was no pericardial effusion. AORTA: The root exhibited normal size. SUMMARY:    -  Left ventricle: Systolic function was normal. Ejection fraction was  estimated in the range of 55 % to 60 %. -  Atrial septum: Agitated saline contrast injection (bubble study) was  performed. There was no right-to-left shunt, at rest or induced by the   Valsalva  maneuver.     SYSTEM MEASUREMENT TABLES    2D  Ao Diam: 2.8 cm  LAEDV Index (A-L): 26.9 ml/m2  IVSd: 1.1 cm  LVIDd: 5.2 cm  LVPWd: 0.8 cm    PW  E/E' Av.5    Prepared and signed by    Dr. Sydnie Mccoy  Signed 2021 22:44:08         Ct Head Wo Cont    Result Date: 2021  EXAMINATION: HEAD CT WITHOUT CONTRAST 2021 11:02 AM ACCESSION NUMBER: [id number] INDICATION: decreased LOC, pt has EVD COMPARISON: Head CT 4/8/2021 TECHNIQUE: Multiple-row detector helical CT examination of the head without intravenous contrast. Radiation dose reduction techniques were used for this study:  Our CT scanners use one or all of the following: Automated exposure control, adjustment of the mA and/or kVp according to patient's size, iterative reconstruction. FINDINGS: No significant change in size of the right thalamic hematoma, measuring 4.3 x 3.2 cm. There is persistent mass effect upon the lateral and third ventricles. Unchanged 0.5 cm of right to left midline shift. Right uncal herniation is unchanged. The quadrigeminal plate, pontine, and medullary cisterns are patent. No cerebellar tonsillar ectopia or herniation. Unchanged ventriculostomy catheter positioning with tip terminating in the inferior aspect of the body of the left lateral ventricle. There are persistent blood products within the ventricles consequent to intraventricular extension of the right thalamic hematoma. The ventricles are overall stable in caliber in comparison to the 4/8/2021 exam. There is right-sided vasogenic edema surrounding the thalamic hematoma. No definite CT evidence of acute ischemic infarction. Partially visualized nasopharyngeal support tubing. The paranasal sinuses and mastoid air cells are well aerated and clear. 1. No significant change to the right thalamic hematoma, surrounding vasogenic edema, and associated mass effect, as detailed above and including right to left midline shift and right-sided uncal herniation. 2. Stable caliber of the ventricular system with unchanged ventriculostomy catheter positioning. 3. Persistent dependent intraventricular hemorrhage consequent to intraventricular extension of the right thalamic hematoma.  VOICE DICTATED BY: Dr. Venkatesh Murillo Results     Procedure Component Value Units Date/Time    CULTURE, BLOOD [[id number]] Collected: 04/09/21 1053    Order Status: Completed Specimen: Blood Updated: 04/14/21 0721     Special Requests: --        RIGHT  HAND       Culture result: NO GROWTH 5 DAYS       CULTURE, BLOOD [[id number]] Collected: 04/09/21 1058    Order Status: Completed Specimen: Blood Updated: 04/14/21 0721     Special Requests: --        RIGHT  Antecubital       Culture result: NO GROWTH 5 DAYS       COVID-19 RAPID TEST [[id number]] Collected: 04/07/21 1221    Order Status: Completed Specimen: Nasopharyngeal Updated: 04/07/21 1301     Specimen source Nasopharyngeal        COVID-19 rapid test Not detected        Comment:      The specimen is NEGATIVE for SARS-CoV-2, the novel coronavirus associated with COVID-19. A negative result does not rule out COVID-19. This test has been authorized by the FDA under an Emergency Use Authorization (EUA) for use by authorized laboratories.         Fact sheet for Healthcare Providers: ConventionUpdate.co.nz  Fact sheet for Patients: ConventionUpdate.co.nz       Methodology: Isothermal Nucleic Acid Amplification               SARS-CoV-2 Lab Results  \"Novel Coronavirus\" Test: No results found for: COV2NT   \"Emergent Disease\" Test: No results found for: EDPR  \"SARS-COV-2\" Test: No results found for: XGCOVT  Rapid Test:   Lab Results   Component Value Date/Time    COVR Not detected 04/07/2021 12:21 PM            Signed:  Ambreen Rodriguez MD

## 2021-04-14 NOTE — PROGRESS NOTES
Problem: Dysphagia (Adult)  Goal: *Acute Goals and Plan of Care (Insert Text)  Description: STG: Pt will demonstrate zero signs/sx of aspiration with mechanical soft textures with nectar thick liquids with no straws with 90% accuracy during all meals. Discontinued 4/5/21  STG: Pt will complete a full speech, language and cognitive evaluation without assistance with 100% accuracy during session. STG: Pt will complete a Modified barium swallow study with zero signs/sx of aspiration  with 100% accuracy during swallow study MET 4/6/21   LTG : Pt will demonstrate zero signs/sx of aspiration with least restrictive diet with 100% accuracy during all meals. LTG: Patient will increase receptive/expressive language skills demonstrated by the ability to communicate basic wants/needs across environments   STG: Patient will follow 1 step commands with 80% accuracy given min cueing  STG: Patient will follow 2 step commands with 80% accuracy given moderate cues. STG: Patient will respond to moderate level yes/no questions with 80% accuracy with moderate cues. STG: Patient will perform automatic speech task with 80% accuracy with moderate cues. SPEECH LANGUAGE PATHOLOGY: DYSPHAGIA AND SPEECH-LANGUAGE/COGNITION: Daily Note 3    NAME/AGE/GENDER: Minh Cameron is a 67 y.o. male  DATE: 4/14/2021  PRIMARY DIAGNOSIS: [facility]vascular [facility] [I61.9]      ICD-10: Treatment Diagnosis: R13.12 Dysphagia, Oropharyngeal Phase    RECOMMENDATIONS   DIET:    NPO with alternative means of nutrition   NEEDS LONG TERM NUTRITION IF IN LINE WITH GOALS OF CARE    MEDICATIONS: Non-oral     ASPIRATION PRECAUTIONS  · Meticulous oral care Q 4 hours. COMPENSATORY STRATEGIES/MODIFICATIONS  · Upright positioning during tube feedings.       EDUCATION:  · Recommendations discussed with Hospitalist/Intensivist  · Nursing  · Patient     CONTINUATION OF SKILLED SERVICES/MEDICAL NECESSITY:   Patient is expected to demonstrate progress in  swallow strength, swallow timeliness, swallow function, diet tolerance and swallow safety in order to  improve swallow safety, work toward diet advancement and decrease aspiration risk.  Patient is expected to demonstrate progress in expressive communication and receptive ability to decrease assistance required communication, increase independence with activities of daily living and increase communication with family/caregivers.  Patient continues to require skilled intervention due to dysphagia; expressive/receptive language deficits. RECOMMENDATIONS for CONTINUED SPEECH THERAPY: YES: Anticipate need for ongoing speech therapy during this hospitalization and at next level of care. ASSESSMENT   Dysphagia: Dysphagia exercises attempted. Limited ability to participate due to difficulty with swallow initiation. Some improvement in hyolaryngeal movement; however, unable to trigger dry swallow or with use of wet swab. Poor secretion management persists. Given severity of dysphagia, LONG TERM NUTRITION IS RECOMMENDED IF IN LINE WITH PATIENT\"S WISHES/GOALS of care. Language evaluation: Much improved language abilities today. Able to follow 1 and 2 step commands. Improved verbal responses to open ended questions. Speech mildly dysarthria and low volume; however, intelligibility at 75% at the phrase level. Safety awareness remains impaired as he is unable to verbalized understanding of NPO recommendations. Recommend continue ongoing speech therapy to address dysphagia and cognitive linguistic impairment. REHABILITATION POTENTIAL FOR STATED GOALS: Good    PLAN    FREQUENCY/DURATION: Continue to follow patient 3 times a week for duration of hospital stay to address above goals. - Recommendations for next treatment session: Next treatment session will address langauge treatment    SUBJECTIVE   More alert today. \"I want to walk. When are we getting up\".      Problem List: (Impairments causing functional limitations):  1. Oropharyngeal dysphagia  2. Expressive/receptive language impairments    Orientation:   Person  Place  Time    Pain: Pain Scale 1: Numeric (0 - 10)  Pain Intensity 1: 8  Pain Location 1: Head  Pain Intervention(s) 1: Nurse notified    OBJECTIVE   Dysphagia:   Dysphagia exercises attempted. Deep oral suctioning prior to exercises produced thick, cream colored secretions. Dry effortful swallow attempted; however, he was able to achieve some hyolaryngeal elevation with use of wet swab. Increased audible secretions in pharynx after 2 wet swabs were presented. No complete swallow was palpated across trials. Falsetto also completed with 30% accuracy across 10 trials. Cognitive Linguistic Assessment :  -Orientation: 4/5- Disoriented to time.   -1 step commands: 5/5; 2 step commands 4/5  - Yes/no questions: 8/8\  - Opened ended questions about personal history/information: communicated at sentence level with 75% intelligibility. Intelligibility limited by low volume, reduced labial movement for articulation, and wet vocal quality. He was able to improve intelligibility to 85% with cues to utilize strategies of increased volume and more pronounced labial movements. INTERDISCIPLINARY COLLABORATION: RN  PRECAUTIONS/ALLERGIES: Patient has no known allergies.      Tool Used: Dysphagia Outcome and Severity Scale (DAYAMI)    Score Comments   Normal Diet  [] 7 With no strategies or extra time needed   Functional Swallow  [] 6 May have mild oral or pharyngeal delay   Mild Dysphagia  [] 5 Which may require one diet consistency restricted    Mild-Moderate Dysphagia  [] 4 With 1-2 diet consistencies restricted   Moderate Dysphagia  [] 3 With 2 or more diet consistencies restricted   Moderate-Severe Dysphagia  [] 2 With partial PO strategies (trials with ST only)   Severe Dysphagia  [] 1 With inability to tolerate any PO safely      Score:  Initial: 1 Most Recent: 1 (Date 04/14/21 ) Interpretation of Tool: The Dysphagia Outcome and Severity Scale (DAYAMI) is a simple, easy-to-use, 7-point scale developed to systematically rate the functional severity of dysphagia based on objective assessment and make recommendations for diet level, independence level, and type of nutrition. Tool Used: MODIFIED YAZMIN SCALE (mRS)   Score   No Symptoms  [] 0   No significant disability despite symptoms; able to carry out all usual duties and activities  [] 1   Slight disability; unable to carry out all previous activities but able to look after own affairs without assistance. [] 2   Moderate disability; requiring some help but able to walk without assistance  [] 3   Moderately severe disability; unable to walk without assistance and unable to attend to own bodily needs without assistance  [] 4   Severe disability; bedridden, incontinent, and requiring constant nursing care and attention  [] 5      Score:  Initial: 5 Most recent: 1   Interpretation of Tool: The Modified [place] Scale is a 7-point scaled used to quantify level of disability as it relates to a patient's functional abilities. Current Medications:   No current facility-administered medications on file prior to encounter. Current Outpatient Medications on File Prior to Encounter   Medication Sig Dispense Refill    cyclobenzaprine (FLEXERIL) 5 mg tablet Take 1 Tab by mouth two (2) times a day. 14 Tab 0    aspirin 81 mg chewable tablet Take 1 Tab by mouth daily.  nitroglycerin (NITROSTAT) 0.4 mg SL tablet 1 Tab by SubLINGual route every five (5) minutes as needed for Chest Pain. 2 Bottle 4    atorvastatin (LIPITOR) 80 mg tablet Take 1 Tab by mouth nightly. 30 Tab 11    metoprolol tartrate (LOPRESSOR) 25 mg tablet Take 1 Tab by mouth two (2) times a day.  60 Tab 6       SAFETY:  After treatment position/precautions:  · Upright in bed  · RN notified    Total Treatment Duration:   Time In: 4379  Time Out: 2002 Orion [place], MSP, CCC-SLP

## 2021-04-14 NOTE — PROGRESS NOTES
Bedside shift change report given to Charles Galindo RN (oncoming nurse) by Olivia Bernard RN (offgoing nurse). Report included the following information SBAR, Kardex, ED Summary, OR Summary, Procedure Summary, Intake/Output, MAR, Recent Results, Med Rec Status, Cardiac Rhythm NSR, Alarm Parameters , Pre Procedure Checklist, Quality Measures and Dual Neuro Assessment.

## 2021-04-14 NOTE — PROGRESS NOTES
Patient with increased lethargy and no verbal response during 0100 neuro exam, patient still following commands. Pupils brisk and reactive. No other significant changes. Dr. Zaira Toro on call and paged. MD states to continue to watch as long as all other neuro findings are the same.

## 2021-04-14 NOTE — PROGRESS NOTES
NEUROSURGERY PROGRESS NOTE:   Admit Date: 4/2/2021  Subjective:   No acute overnight events     Objective:  Visit Vitals  BP (!) 152/75   Pulse 82   Temp 98.9 °F (37.2 °C)   Resp (!) 35   Ht 5' 8\" (1.727 m)   Wt 125 lb (56.7 kg)   SpO2 94%   BMI 19.01 kg/m²     Oriented only to name, hospital and year   Eyes open to spontaneous and tracking observer   PERRL  Hearing grossly intact   Follows commands simple commands in the right upper and right lower extremity briskly   Withdraws/purposeful in the right lower and localizes in the right upper extremity. ICP less than 4 cmH2O this am at bedside, EVD 49 cc with xanthrochromic CSF with EVD at 10 cmH2O at the tragus. Assessment and Plan:   Charles Seat 67 y.o. male with right thalamic intracerebral hemorrhage with interval development of acute obstructive hydrocephalus who underwent placement of an EVD on 04/07/2021  - Continue EVD raise to 12 cmH2O today monitor will begin EVD weaning and ICP monitoring process. Call if concern for acute neurological decline.   - Once ready to clamp EVD and monitor ICP will obtain a new baseline CT Head WO contrast.  - Continue q1H neuro checks  - Will continue to monitor closely, Na 145, Na 145 to 155 an ideal range given hemorrhage and edema surrounding hemorrhage. Anju Ovalle.  [facility], [address] [place] [address]

## 2021-04-15 LAB
ANION GAP SERPL CALC-SCNC: 5 MMOL/L (ref 7–16)
BUN SERPL-MCNC: 27 MG/DL (ref 8–23)
CALCIUM SERPL-MCNC: 8.6 MG/DL (ref 8.3–10.4)
CHLORIDE SERPL-SCNC: 113 MMOL/L (ref 98–107)
CO2 SERPL-SCNC: 25 MMOL/L (ref 21–32)
CREAT SERPL-MCNC: 0.99 MG/DL (ref 0.8–1.5)
GLUCOSE BLD STRIP.AUTO-MCNC: 100 MG/DL (ref 65–100)
GLUCOSE BLD STRIP.AUTO-MCNC: 105 MG/DL (ref 65–100)
GLUCOSE BLD STRIP.AUTO-MCNC: 113 MG/DL (ref 65–100)
GLUCOSE BLD STRIP.AUTO-MCNC: 87 MG/DL (ref 65–100)
GLUCOSE SERPL-MCNC: 122 MG/DL (ref 65–100)
POTASSIUM SERPL-SCNC: 3.8 MMOL/L (ref 3.5–5.1)
SERVICE CMNT-IMP: ABNORMAL
SERVICE CMNT-IMP: ABNORMAL
SERVICE CMNT-IMP: NORMAL
SERVICE CMNT-IMP: NORMAL
SODIUM SERPL-SCNC: 143 MMOL/L (ref 138–145)

## 2021-04-15 PROCEDURE — 80048 BASIC METABOLIC PNL TOTAL CA: CPT

## 2021-04-15 PROCEDURE — 65610000006 HC RM INTENSIVE CARE

## 2021-04-15 PROCEDURE — 74011000250 HC RX REV CODE- 250: Performed by: FAMILY MEDICINE

## 2021-04-15 PROCEDURE — 74011250637 HC RX REV CODE- 250/637: Performed by: INTERNAL MEDICINE

## 2021-04-15 PROCEDURE — 74011000250 HC RX REV CODE- 250: Performed by: STUDENT IN AN ORGANIZED HEALTH CARE EDUCATION/TRAINING PROGRAM

## 2021-04-15 PROCEDURE — 36415 COLL VENOUS BLD VENIPUNCTURE: CPT

## 2021-04-15 PROCEDURE — 74011250637 HC RX REV CODE- 250/637: Performed by: FAMILY MEDICINE

## 2021-04-15 PROCEDURE — 74011250636 HC RX REV CODE- 250/636: Performed by: INTERNAL MEDICINE

## 2021-04-15 PROCEDURE — 82962 GLUCOSE BLOOD TEST: CPT

## 2021-04-15 PROCEDURE — 74011250636 HC RX REV CODE- 250/636: Performed by: STUDENT IN AN ORGANIZED HEALTH CARE EDUCATION/TRAINING PROGRAM

## 2021-04-15 PROCEDURE — 74011000258 HC RX REV CODE- 258: Performed by: INTERNAL MEDICINE

## 2021-04-15 RX ADMIN — Medication 10 ML: at 14:00

## 2021-04-15 RX ADMIN — METOPROLOL TARTRATE 25 MG: 25 TABLET, FILM COATED ORAL at 09:14

## 2021-04-15 RX ADMIN — AMLODIPINE BESYLATE 10 MG: 10 TABLET ORAL at 09:14

## 2021-04-15 RX ADMIN — Medication 10 ML: at 06:37

## 2021-04-15 RX ADMIN — PIPERACILLIN SODIUM AND TAZOBACTAM SODIUM 3.38 G: 3; .375 INJECTION, POWDER, LYOPHILIZED, FOR SOLUTION INTRAVENOUS at 09:14

## 2021-04-15 RX ADMIN — PIPERACILLIN SODIUM AND TAZOBACTAM SODIUM 3.38 G: 3; .375 INJECTION, POWDER, LYOPHILIZED, FOR SOLUTION INTRAVENOUS at 18:01

## 2021-04-15 RX ADMIN — ACETAMINOPHEN 650 MG: 325 SUSPENSION ORAL at 13:01

## 2021-04-15 RX ADMIN — PIPERACILLIN SODIUM AND TAZOBACTAM SODIUM 3.38 G: 3; .375 INJECTION, POWDER, LYOPHILIZED, FOR SOLUTION INTRAVENOUS at 01:13

## 2021-04-15 RX ADMIN — Medication 10 ML: at 22:55

## 2021-04-15 RX ADMIN — METOPROLOL TARTRATE 25 MG: 25 TABLET, FILM COATED ORAL at 21:00

## 2021-04-15 RX ADMIN — ATORVASTATIN CALCIUM 40 MG: 40 TABLET, FILM COATED ORAL at 21:45

## 2021-04-15 RX ADMIN — SODIUM CHLORIDE 75 ML/HR: 900 INJECTION, SOLUTION INTRAVENOUS at 04:22

## 2021-04-15 RX ADMIN — ACETAMINOPHEN 650 MG: 325 SUSPENSION ORAL at 06:36

## 2021-04-15 RX ADMIN — SODIUM CHLORIDE 2.5 MG/HR: 900 INJECTION, SOLUTION INTRAVENOUS at 01:13

## 2021-04-15 NOTE — PROGRESS NOTES
SPEECH PATHOLOGY NOTE:    Attempted to see patient for therapy this PM. Patient reporting 8/10 headache. RN notified. Will follow up at later time/date.         Liza Keene MS, CCC-SLP

## 2021-04-15 NOTE — PROGRESS NOTES
Bedside shift change report given to [facility], RN (oncoming nurse) by Stanley Matos RN (offgoing nurse). Report included the following information SBAR, Kardex, ED Summary, OR Summary, Procedure Summary, Intake/Output, MAR, Recent Results, Med Rec Status, Cardiac Rhythm NSR, Alarm Parameters , Pre Procedure Checklist, Quality Measures and Dual Neuro Assessment.

## 2021-04-15 NOTE — PROGRESS NOTES
Care Management Interventions  Mode of Transport at Discharge: Other (see comment)(Anton vs family)  Transition of Care Consult (CM Consult): Discharge Planning(Pt is insured by medicare with pharmacy benefits.  )  Discharge Durable Medical Equipment: No  Physical Therapy Consult: Yes  Occupational Therapy Consult: Yes  Speech Therapy Consult: Yes  Current Support Network: Relative's Home, Family Lives Nearby(Pt lives with his brother and is normally independent with all ADL's. Daughter Porfirio Cosme [phone] is also supportive.  )  Confirm Follow Up Transport: Family  The Plan for Transition of Care is Related to the Following Treatment Goals : Pt will need rehab services to return to his functional baseline. The Patient and/or Patient Representative was Provided with a Choice of Provider and Agrees with the Discharge Plan?: Yes  Name of the Patient Representative Who was Provided with a Choice of Provider and Agrees with the Discharge Plan: pt/daughter  Freedom of Choice List was Provided with Basic Dialogue that Supports the Patient's Individualized Plan of Care/Goals, Treatment Preferences and Shares the Quality Data Associated with the Providers?: Yes   Resource Information Provided?: No  Discharge Location  Discharge Placement: [facility] vs SNF rehab)    CM placed a phone call to pt's daughter, Lisa Arcos ([phone]) but learned from Lisa Arcos that pt's son, Sally Ayala, was visiting in pt's room. Both inquired of this CM of disposition at discharge. CM informed them of [facility] consult, LTAC referral (if declined by [facility] but needs a PEG), or SNF. CM offered a list of SNF to pt's son. Ami confirms that PTA pt lived with his brother. Lisa Arcos states pt's brother has since had coronary stents placed and doesn't feel that pt will be able to return to her brother's to live after this admission. CM informed Ami of the application process for [facility]. Pt is more somnolent today. Adjustments to EVD per Neuro.    CM to continue to follow and update disposition with pt's progress with PT/OT.

## 2021-04-15 NOTE — PROGRESS NOTES
[facility]ist Progress Note     Name:  Adilene Sherwood  Age:76 y.o. Sex:male   :  1945    MRN:  [id number]     Admit Date:  2021    Reason for Admission:  Hemorrhagic cerebrovascular accident (CVA) [facility]) [I61.9]    Assessment & Plan       Hemorrhagic CVA s/p EVD 21: Followed by neurosurgery   NPO  NGT feeds  SLP following  Likely to need PEG   Goal sodium 140 to 150, currently on NS IVF      Sepsis due to Pneumonia:   zosyn EOT 21  On room air  followup fever curve    Elevated troponin:  Followed by cariology for demand ischemia       DENI on CKD:  followup BMP,stable  On NS IVF    CAD:  Continued lipitor    HTN:  Continued norvasc, metoprolol  Currently on IV cardene to wean as tolerant  Has prn IV antihypertensives       Diet:  DIET NPO  DIET TUBE FEEDING  DVT PPx: anticoagulation contra indicated due to hemorrhagic CVA  GI Ppx:  none  Code: Full Code    Dispo/Discharge Planning: pending    Hospital Course/Subjective:     Mr. Franco Kunz is a 66 yo male PMH of HTN, CAD, HLP, COPD, admitted 21 with acute right basal ganglia hemorrhagic CVA. Neurosurgery did not recommend acute interventions. On 21 he was more drowsy and repeat CT head showed increased IVH and concern for early hydrocephalus. He is s/p EVD 21. Repeat CT head  stable. Goal sodium is 140 to 150. He was covered with antibiotics for pneumonia, possible aspiration. Cardiology followed for elevated troponin, feeling this was due to demand ischemia. He is receiving NGT feeds and likely will require PEG. He has resumed antihypertensives via NGT and also required IV cardene. Discharge plans pending. Subjective/24 hr Events (04/15/21):     Somnolent, ROS not possible, RIK Santiago states he will move right side when most alert     .8      Objective:     Patient Vitals for the past 24 hrs:   Temp Pulse Resp BP SpO2   04/15/21 0845  77 19 (!) 149/77 96 %   04/15/21 0831  77 18 (!) 141/74 96 %   04/15/21 0816  81 18 134/70 97 %   04/15/21 0800  80 22 131/72 96 %   04/15/21 0716  81 21 136/76 98 %   04/15/21 0700  84 20 (!) 144/87 98 %   04/15/21 0630  83 24 (!) 141/78 97 %   04/15/21 0600  82 19 (!) 144/73 97 %   04/15/21 0530  88 (!) 36 (!) 140/74 98 %   04/15/21 0500  84 24 (!) 147/77 98 %   04/15/21 0430  80 20 135/71 98 %   04/15/21 0400  81 25 136/69 99 %   04/15/21 0330  75 19 (!) 142/68 96 %   04/15/21 0300 98.9 °F (37.2 °C) 76 19 (!) 146/75 97 %   04/15/21 0230  82 21 137/67 99 %   04/15/21 0200  78 23 139/71 98 %   04/15/21 0130  72 20 137/65 97 %   04/15/21 0100  71 18 (!) 131/59    04/15/21 0030  80 21 (!) 118/57    04/15/21 0000  71 22 113/60 96 %   04/14/21 2330  69 18 124/64 97 %   04/14/21 2300 98.9 °F (37.2 °C) 69 26 126/69 98 %   04/14/21 2230  72 22 127/66 97 %   04/14/21 2200  71 (!) 34 122/65 98 %   04/14/21 2130  65 19 126/70 97 %   04/14/21 2100  73 26 125/61 97 %   04/14/21 2030  96 27 139/83 97 %   04/14/21 2000  97 25 (!) 151/80 97 %   04/14/21 1930  91 26 (!) 148/79 97 %   04/14/21 1900 (!) 100.8 °F (38.2 °C) 92 26 (!) 151/84 97 %   04/14/21 1846  (!) 126 (!) 34 (!) 150/84 97 %   04/14/21 1831  93  (!) 150/79 97 %   04/14/21 1815  91  (!) 144/74 97 %   04/14/21 1800  93 (!) 34 136/70 97 %   04/14/21 1746  92  (!) 154/85 97 %   04/14/21 1730  89  (!) 142/76 97 %   04/14/21 1718  91 25 (!) 149/71 96 %   04/14/21 1700  90  136/74 96 %   04/14/21 1646  93  (!) 149/78    04/14/21 1630  95 28 (!) 153/78 98 %   04/14/21 1615  95 27 (!) 146/74 92 %   04/14/21 1600  92 24 (!) 153/84 98 %   04/14/21 1546  92 23 (!) 155/84 96 %   04/14/21 1530  89 22 (!) 144/79 99 %   04/14/21 1515  90 21 (!) 149/83 99 %   04/14/21 1501 99 °F (37.2 °C) 84 20 134/80 99 %   04/14/21 1330  85 25 130/66 99 %   04/14/21 1201  76  (!) 128/95 98 %   04/14/21 1146  75 24 136/80 99 %   04/14/21 1130 99.1 °F (37.3 °C) 75 19 135/77 97 %   04/14/21 1115  73 21 (!) 141/78 94 % 04/14/21 1110  69 25 127/71 98 %   04/14/21 1030  67 29 113/69 98 %   04/14/21 1015  65 23 128/75 99 %   04/14/21 1000  62 18 123/73 99 %   04/14/21 0946  68 24 123/74 99 %   04/14/21 0930  63 27 120/69 97 %   04/14/21 0915  69 20 (!) 149/73 100 %     Oxygen Therapy  O2 Sat (%): 96 % (04/15/21 0845)  Pulse via Oximetry: 78 beats per minute (04/15/21 0845)  O2 Device: None (Room air) (04/15/21 0300)  Skin Assessment: Clean, dry, & intact (04/15/21 0300)  Skin Protection for O2 Device: N/A (04/13/21 1900)  O2 Flow Rate (L/min): 2 l/min (04/13/21 0700)    Body mass index is 19.01 kg/m².     Physical Exam:   General: No acute distress, somnolent  Lungs: Clear to auscultation bilaterally anterior   Cardiovascular: Regular rate and rhythm with normal S1 and S2, no edema    Abdomen: Soft, nontender, nondistended, normoactive bowel sounds   Extremities: No cyanosis    Neuro: not interactive   Psych: unable to assess     Data Review:  I have reviewed all labs, meds, and studies from the last 24 hours:    Labs:    Recent Results (from the past 24 hour(s))   GLUCOSE, POC    Collection Time: 04/14/21 12:11 PM   Result Value Ref Range    Glucose (POC) 99 65 - 100 mg/dL    Performed by Te CONNELLY G3    Collection Time: 04/14/21  2:46 PM   Result Value Ref Range    Device: ROOM AIR      pH (POC) 7.53 (H) 7.35 - 7.45      pCO2 (POC) 27.4 (L) 35 - 45 MMHG    pO2 (POC) 78 75 - 100 MMHG    HCO3 (POC) 22.7 22 - 26 MMOL/L    sO2 (POC) 97.0 95 - 98 %    Base excess (POC) 0.9 mmol/L    Allens test (POC) Positive      Site RIGHT RADIAL      Specimen type (POC) ARTERIAL      Performed by Kimberley Cleary    GLUCOSE, POC    Collection Time: 04/14/21  4:36 PM   Result Value Ref Range    Glucose (POC) 108 (H) 65 - 100 mg/dL    Performed by Peter Coyle    GLUCOSE, POC    Collection Time: 04/14/21 10:05 PM   Result Value Ref Range    Glucose (POC) 140 (H) 65 - 100 mg/dL    Performed by [facility]    METABOLIC PANEL, BASIC    Collection Time: 04/15/21  4:24 AM   Result Value Ref Range    Sodium 143 138 - 145 mmol/L    Potassium 3.8 3.5 - 5.1 mmol/L    Chloride 113 (H) 98 - 107 mmol/L    CO2 25 21 - 32 mmol/L    Anion gap 5 (L) 7 - 16 mmol/L    Glucose 122 (H) 65 - 100 mg/dL    BUN 27 (H) 8 - 23 MG/DL    Creatinine 0.99 0.8 - 1.5 MG/DL    GFR est AA >60 >60 ml/min/1.73m2    GFR est non-AA >60 >60 ml/min/1.73m2    Calcium 8.6 8.3 - 10.4 MG/DL   GLUCOSE, POC    Collection Time: 04/15/21  6:00 AM   Result Value Ref Range    Glucose (POC) 113 (H) 65 - 100 mg/dL    Performed by Sameera        All Micro Results     Procedure Component Value Units Date/Time    CULTURE, BLOOD [[id number]] Collected: 04/09/21 1053    Order Status: Completed Specimen: Blood Updated: 04/14/21 0721     Special Requests: --        RIGHT  HAND       Culture result: NO GROWTH 5 DAYS       CULTURE, BLOOD [[id number]] Collected: 04/09/21 1058    Order Status: Completed Specimen: Blood Updated: 04/14/21 0721     Special Requests: --        RIGHT  Antecubital       Culture result: NO GROWTH 5 DAYS       COVID-19 RAPID TEST [[id number]] Collected: 04/07/21 1221    Order Status: Completed Specimen: Nasopharyngeal Updated: 04/07/21 1301     Specimen source Nasopharyngeal        COVID-19 rapid test Not detected        Comment:      The specimen is NEGATIVE for SARS-CoV-2, the novel coronavirus associated with COVID-19. A negative result does not rule out COVID-19. This test has been authorized by the FDA under an Emergency Use Authorization (EUA) for use by authorized laboratories.         Fact sheet for Healthcare Providers: ConventionUpdate.co.nz  Fact sheet for Patients: ConventionUpdate.co.nz       Methodology: Isothermal Nucleic Acid Amplification               EKG Results     Procedure 720 Value Units Date/Time    EKG, 12 LEAD, SUBSEQUENT [[id number]] Collected: 04/03/21 9052    Order Status: Completed Updated: 04/03/21 1227     Ventricular Rate 74 BPM      Atrial Rate 74 BPM      P-R Interval 160 ms      QRS Duration 94 ms      Q-T Interval 388 ms      QTC Calculation (Bezet) 430 ms      Calculated P Axis 71 degrees      Calculated R Axis -61 degrees      Calculated T Axis 88 degrees      Diagnosis --     Sinus rhythm with occasional Premature ventricular complexes  Left anterior fascicular block  ST & T wave abnormality, consider lateral ischemia  Abnormal ECG  When compared with ECG of 02-APR-2021 18:26,  Premature ventricular complexes are now Present  Left anterior fascicular block is now Present  Confirmed by Clinton Henry ([id number]) on 4/3/2021 12:27:42 PM      Initial ECG [[id number]] Collected: 04/02/21 1826    Order Status: Completed Updated: 04/03/21 0732     Ventricular Rate 77 BPM      Atrial Rate 78 BPM      P-R Interval 156 ms      QRS Duration 96 ms      Q-T Interval 356 ms      QTC Calculation (Bezet) 402 ms      Calculated P Axis 76 degrees      Calculated R Axis 69 degrees      Calculated T Axis 79 degrees      Diagnosis --     Normal sinus rhythm  Normal ECG  When compared with ECG of 28-AUG-2017 21:42,  Questionable change in QRS axis  Nonspecific T wave abnormality, improved in Lateral leads  Confirmed by Milind Huynh ([id number]) on 4/3/2021 7:32:01 AM      EKG 12 LEAD INITIAL [[id number]]     Order Status: Canceled           Other Studies:  Ct Head Wo Cont    Result Date: 4/3/2021  NONCONTRAST HEAD CT CLINICAL HISTORY:  Follow-up intracranial hemorrhage. TECHNIQUE:  Axial images were obtained with spiral technique. Radiation dose reduction was achieved using one or all of the following techniques: automated exposure control, weight-based dosing, iterative reconstruction. COMPARISON:  CT yesterday and 0617 hours today.  REPORT:   Standard noncontrast head CT demonstrates continued increase in size of the right thalamic hemorrhage with maximum diameter now measured at approximately 3.6 cm compared with 2.9 cm earlier today. Moreover, there is new bilateral lateral intraventricular extension of hemorrhage, primarily localized to the atria. There is associated slight increase in bilateral lateral ventriculomegaly with biatrial diameter now measuring approximately 4.2 cm compared with 3.8 cm this morning. Mass effect is little changed with midline shift from right to left of approximately 5 mm compared with 4 mm this morning. Extensive small vessel ischemic disease and small focal lateral right frontal infarct appear unchanged. INTERVAL INCREASE IN SIZE OF THE RIGHT THALAMIC HEMORRHAGE WITH NEW BILATERAL LATERAL INTRAVENTRICULAR EXTENSION AND SLIGHT WORSENING OF VENTRICULOMEGALY. Ct Head Wo Cont    Result Date: 4/3/2021  EXAM: CT HEAD WO CONT HISTORY: .  TECHNIQUE: Axial images of the brain were performed without the administration of intravenous contrast. Images were obtained axial plane and coronal reformatted images were submitted. CT scan performed using appropriate/available dose optimization/reduction/ALARA techniques. COMPARISON: None. FINDINGS: The known right thalamic hemorrhage is again observed. There is surrounding edema. The body of this hemorrhage has not changed significantly. Currently it measures 2.4 x 2.9 cm when previously it measured 2.4 x 2.7 cm. There has been interval increase in an area of adjacent or extension of the hemorrhage posteriorly. Previously it measured 4.5 mm in width. Currently it measures 1.5 cm in width and 1.3 cm AP. There is persistent intraventricular hemorrhage in the posterior horn of the right lateral ventricle. Interval development of a small amount of hemorrhage in the left posterior horn. There is mild midline shift of the posterior septum pellucidum of approximately 4.1 mm. The lateral ventricles are mildly dilated, stable. Chronic ischemic white matter changes are again observed. Question small chronic infarction in the right frontal lobe.      Interval increase in the size of the right thalamic hemorrhage. Interval development of mild right to left midline shift of the posterior aspect of the septum pellucidum. Stable hemorrhage in the posterior horn of the right lateral ventricle. Interval development of a small amount of hemorrhage in the posterior horn of the left lateral ventricle. Other findings as above. [id number]    Result Date: 4/3/2021  RIGHT UPPER QUADRANT ULTRASOUND. HISTORY: Transaminitis. COMPARISON: No relevant comparison studies available. FINDINGS: Ultrasonographic Ramon's sign: is reported as negative Gallstones: There are intraluminal echogenic foci, probably stones. Gallbladder Wall: not thickened. Common Bile Duct: is not dilated, 4 mm. Intrahepatic Biliary Tree: is not dilated. Liver: Uniform parenchyma Included portion of the pancreas and right kidney: are unremarkable. Vasculature: Aorta: Normal caliber. IVC:  Patent. Portal vein: Hepatopedal flow. Probable gallstones. No biliary tree obstruction. No findings to suggest acute cholecystitis. Xr Chest Port    Result Date: 4/2/2021  CHEST X-RAY, one view. HISTORY:  Chest pain. TECHNIQUE:  AP upright portable view. COMPARISON: August 2017 FINDINGS: Lungs: Atelectasis or scar right base, similar to prior exam. Costophrenic angles: are sharp. Heart size: is normal. Pulmonary vasculature: is unremarkable. Aorta: Arch calcifications. Included portion of the upper abdomen: is unremarkable. Bones: No gross bony lesions. Other: None. Negative for acute change. Ct Code Neuro Head Wo Contrast    Result Date: 4/2/2021  CT HEAD WITHOUT CONTRAST HISTORY:  CVA. COMPARISON: None. TECHNIQUE: Axial imaging was performed without intravenous contrast utilizing 5mm slice thickness. Sagittal and coronal reformats were performed. Radiation dose reduction techniques were used for this study.  Our CT scanner uses one or all of the following: Automated exposure control, adjustment of the MAS or KUB according to patient's size and iterative reconstruction. FINDINGS:    *BRAIN:    -  There are no early signs of territorial or lacunar infarction by CT.    -  2.7 x 2.4 cm acute hemorrhage into the right basal ganglia and thalamus. Right lateral ventricle intraventricular hemorrhage.    -  No gross white matter abnormality by CT. *VISUALIZED PARANASAL SINUSES: Well aerated. *MASTOIDS:  Clear. *CALVARIUM AND SCALP: Unremarkable. Acute right basal ganglia hematoma likely on the basis of hypertension. Right intraventricular hemorrhage.  Date of Dictation: 4/2/2021 6:17 PM          Current Meds:   Current Facility-Administered Medications   Medication Dose Route Frequency    amLODIPine (NORVASC) tablet 10 mg  10 mg Per NG tube DAILY    lip protectant (BLISTEX) ointment 1 Each  1 Each Topical PRN    insulin lispro (HUMALOG) injection   SubCUTAneous AC&HS    0.9% sodium chloride infusion  75 mL/hr IntraVENous CONTINUOUS    piperacillin-tazobactam (ZOSYN) 3.375 g in 0.9% sodium chloride (MBP/ADV) 100 mL MBP  3.375 g IntraVENous Q8H    NUTRITIONAL SUPPORT ELECTROLYTE PRN ORDERS   Does Not Apply PRN    acetaminophen (TYLENOL) solution 650 mg  650 mg Per NG tube Q4H PRN    atorvastatin (LIPITOR) tablet 40 mg  40 mg Per NG tube QHS    metoprolol tartrate (LOPRESSOR) tablet 25 mg  25 mg Per NG tube Q12H    metoprolol (LOPRESSOR) injection 5 mg  5 mg IntraVENous Q6H PRN    [Held by provider] morphine injection 1 mg  1 mg IntraVENous Q4H PRN    [Held by provider] oxyCODONE IR (ROXICODONE) tablet 5 mg  5 mg Oral Q6H PRN    enalaprilat (VASOTEC) injection 1.25 mg  1.25 mg IntraVENous Q6H PRN    lidocaine 4 % patch 1 Patch  1 Patch TransDERmal Q24H    niCARdipine (CARDENE) 25 mg in 0.9% sodium chloride (MBP/ADV) 250 mL infusion  0-15 mg/hr IntraVENous TITRATE    sodium chloride (NS) flush 5-40 mL  5-40 mL IntraVENous Q8H    sodium chloride (NS) flush 5-40 mL  5-40 mL IntraVENous PRN       Problem List:  Hospital Problems as of 4/15/2021 Date Reviewed: 4/4/2021          Codes Class Noted - Resolved POA    Severe protein-calorie malnutrition ([facility] ICD-10-CM: E43  ICD-9-CM: 262  4/9/2021 - Present Yes        Dysphagia following cerebral infarction ICD-10-CM: I69.391  ICD-9-CM: 438.82  4/6/2021 - Present Yes        Elevated liver enzymes ICD-10-CM: R74.8  ICD-9-CM: 790.5  4/6/2021 - Present Yes        Elevated troponin ICD-10-CM: R77.8  ICD-9-CM: 790.6  4/4/2021 - Present Yes        * (Principal) Hemorrhagic cerebrovascular accident (CVA) ([facility].) ICD-10-CM: I61.9  ICD-9-CM: 415  4/2/2021 - Present Yes        Acute left-sided weakness ICD-10-CM: R53.1  ICD-9-CM: 728.87  4/2/2021 - Present Yes        S/P PTCA (percutaneous transluminal coronary angioplasty) ICD-10-CM: Z98.61  ICD-9-CM: V45.82  Unknown - Present Yes        Chronic tuberculosis ICD-10-CM: A15.9  ICD-9-CM: 011.90  Unknown - Present Yes        Coronary atherosclerosis of native coronary vessel ICD-10-CM: I25.10  ICD-9-CM: 414.01  Unknown - Present Yes        Hyperlipidemia ICD-10-CM: E78.5  ICD-9-CM: 272.4  Unknown - Present Yes        Iron deficiency anemia ICD-10-CM: D50.9  ICD-9-CM: 280.9  1/25/2015 - Present Yes        Microcytic anemia ICD-10-CM: D50.9  ICD-9-CM: 280.9  1/21/2015 - Present     Overview Signed 4/6/2016  9:52 AM by Zac Espinal     Iron deficiency                    Part of this note was written by using a voice dictation software and the note has been proof read but may still contain some grammatical/other typographical errors.     Signed By: Africa James MD   [employer] [facility]ist [facility]    April 15, 2021  9:08 AM

## 2021-04-15 NOTE — PROGRESS NOTES
NEUROSURGERY PROGRESS NOTE:   Admit Date: 4/2/2021  Subjective:   No acute overnight events. Patient underwent CT Head WO contrast yesterday that demonstrated no interval enlargement of the ventricular system. Objective:  Visit Vitals  BP (!) 141/78   Pulse 83   Temp 98.9 °F (37.2 °C)   Resp 24   Ht 5' 8\" (1.727 m)   Wt 125 lb (56.7 kg)   SpO2 97%   BMI 19.01 kg/m²     Oriented only to name, hospital and year   Eyes open to spontaneous and tracking observer   PERRL  Hearing grossly intact   Follows commands simple commands in the right upper and right lower extremity briskly   Withdraws/purposeful in the right lower and localizes in the right upper extremity. ICP less than 3 cmH2O this am at bedside, EVD 38.5 cc with xanthrochromic CSF with EVD at 12 cmH2O at the tragus. Assessment and Plan:   Radha Cano 67 y.o. male with right thalamic intracerebral hemorrhage with interval development of acute obstructive hydrocephalus who underwent placement of an EVD on 04/07/2021  - Continue EVD raise to 15 cmH2O today monitor will begin EVD weaning and ICP monitoring process. Call if concern for acute neurological decline.   - Once ready to clamp EVD and monitor ICP will obtain a new baseline CT Head WO contrast.  - Continue q1H neuro checks  - Will continue to monitor closely, Na 143, Na 140 to 150 an ideal range given hemorrhage and edema surrounding hemorrhage. Luke Moeller.  Shelby Barrera, [address] [place] [address]

## 2021-04-15 NOTE — PROGRESS NOTES
Bedside shift change report given to RIK Sanchez (oncoming nurse) by Nicole Christine RN (offgoing nurse). Report included the following information SBAR, Kardex, ED Summary, OR Summary, Procedure Summary, Intake/Output, MAR, Recent Results, Med Rec Status, Cardiac Rhythm NSSR, Alarm Parameters , Pre Procedure Checklist, Quality Measures and Dual Neuro Assessment.

## 2021-04-16 LAB
ANION GAP SERPL CALC-SCNC: 4 MMOL/L (ref 7–16)
BASOPHILS # BLD: 0 K/UL (ref 0–0.2)
BASOPHILS NFR BLD: 0 % (ref 0–2)
BUN SERPL-MCNC: 27 MG/DL (ref 8–23)
CALCIUM SERPL-MCNC: 8.8 MG/DL (ref 8.3–10.4)
CHLORIDE SERPL-SCNC: 111 MMOL/L (ref 98–107)
CO2 SERPL-SCNC: 27 MMOL/L (ref 21–32)
CREAT SERPL-MCNC: 1.01 MG/DL (ref 0.8–1.5)
DIFFERENTIAL METHOD BLD: ABNORMAL
EOSINOPHIL # BLD: 0.2 K/UL (ref 0–0.8)
EOSINOPHIL NFR BLD: 2 % (ref 0.5–7.8)
ERYTHROCYTE [DISTWIDTH] IN BLOOD BY AUTOMATED COUNT: 13.7 % (ref 11.9–14.6)
GLUCOSE BLD STRIP.AUTO-MCNC: 103 MG/DL (ref 65–100)
GLUCOSE BLD STRIP.AUTO-MCNC: 106 MG/DL (ref 65–100)
GLUCOSE BLD STRIP.AUTO-MCNC: 111 MG/DL (ref 65–100)
GLUCOSE BLD STRIP.AUTO-MCNC: 58 MG/DL (ref 65–100)
GLUCOSE SERPL-MCNC: 97 MG/DL (ref 65–100)
HCT VFR BLD AUTO: 37.9 % (ref 41.1–50.3)
HGB BLD-MCNC: 12.7 G/DL (ref 13.6–17.2)
IMM GRANULOCYTES # BLD AUTO: 0.1 K/UL (ref 0–0.5)
IMM GRANULOCYTES NFR BLD AUTO: 1 % (ref 0–5)
LYMPHOCYTES # BLD: 1.3 K/UL (ref 0.5–4.6)
LYMPHOCYTES NFR BLD: 15 % (ref 13–44)
MAGNESIUM SERPL-MCNC: 2.1 MG/DL (ref 1.8–2.4)
MCH RBC QN AUTO: 26.7 PG (ref 26.1–32.9)
MCHC RBC AUTO-ENTMCNC: 33.5 G/DL (ref 31.4–35)
MCV RBC AUTO: 79.6 FL (ref 79.6–97.8)
MONOCYTES # BLD: 0.6 K/UL (ref 0.1–1.3)
MONOCYTES NFR BLD: 7 % (ref 4–12)
NEUTS SEG # BLD: 6.2 K/UL (ref 1.7–8.2)
NEUTS SEG NFR BLD: 75 % (ref 43–78)
NRBC # BLD: 0 K/UL (ref 0–0.2)
PHOSPHATE SERPL-MCNC: 3.1 MG/DL (ref 2.3–3.7)
PLATELET # BLD AUTO: 239 K/UL (ref 150–450)
PMV BLD AUTO: 12.4 FL (ref 9.4–12.3)
POTASSIUM SERPL-SCNC: 3.9 MMOL/L (ref 3.5–5.1)
RBC # BLD AUTO: 4.76 M/UL (ref 4.23–5.6)
SERVICE CMNT-IMP: ABNORMAL
SODIUM SERPL-SCNC: 142 MMOL/L (ref 138–145)
WBC # BLD AUTO: 8.4 K/UL (ref 4.3–11.1)

## 2021-04-16 PROCEDURE — 74011000258 HC RX REV CODE- 258: Performed by: INTERNAL MEDICINE

## 2021-04-16 PROCEDURE — 74011000250 HC RX REV CODE- 250: Performed by: FAMILY MEDICINE

## 2021-04-16 PROCEDURE — 80048 BASIC METABOLIC PNL TOTAL CA: CPT

## 2021-04-16 PROCEDURE — 74011250636 HC RX REV CODE- 250/636: Performed by: INTERNAL MEDICINE

## 2021-04-16 PROCEDURE — 97112 NEUROMUSCULAR REEDUCATION: CPT

## 2021-04-16 PROCEDURE — 36415 COLL VENOUS BLD VENIPUNCTURE: CPT

## 2021-04-16 PROCEDURE — 65610000006 HC RM INTENSIVE CARE

## 2021-04-16 PROCEDURE — 83735 ASSAY OF MAGNESIUM: CPT

## 2021-04-16 PROCEDURE — 97530 THERAPEUTIC ACTIVITIES: CPT

## 2021-04-16 PROCEDURE — 74011250637 HC RX REV CODE- 250/637: Performed by: FAMILY MEDICINE

## 2021-04-16 PROCEDURE — 82962 GLUCOSE BLOOD TEST: CPT

## 2021-04-16 PROCEDURE — 74011250637 HC RX REV CODE- 250/637: Performed by: INTERNAL MEDICINE

## 2021-04-16 PROCEDURE — 84100 ASSAY OF PHOSPHORUS: CPT

## 2021-04-16 PROCEDURE — 85025 COMPLETE CBC W/AUTO DIFF WBC: CPT

## 2021-04-16 PROCEDURE — 97535 SELF CARE MNGMENT TRAINING: CPT

## 2021-04-16 RX ORDER — CEFAZOLIN SODIUM/WATER 2 G/20 ML
2 SYRINGE (ML) INTRAVENOUS
Status: COMPLETED | OUTPATIENT
Start: 2021-04-19 | End: 2021-04-19

## 2021-04-16 RX ORDER — CEFAZOLIN SODIUM/WATER 2 G/20 ML
2 SYRINGE (ML) INTRAVENOUS
Status: DISCONTINUED | OUTPATIENT
Start: 2021-04-16 | End: 2021-04-16

## 2021-04-16 RX ORDER — INSULIN LISPRO 100 [IU]/ML
INJECTION, SOLUTION INTRAVENOUS; SUBCUTANEOUS EVERY 6 HOURS
Status: DISCONTINUED | OUTPATIENT
Start: 2021-04-16 | End: 2021-04-27 | Stop reason: HOSPADM

## 2021-04-16 RX ORDER — METOPROLOL TARTRATE 50 MG/1
50 TABLET ORAL EVERY 12 HOURS
Status: DISCONTINUED | OUTPATIENT
Start: 2021-04-16 | End: 2021-04-22

## 2021-04-16 RX ADMIN — ATORVASTATIN CALCIUM 40 MG: 40 TABLET, FILM COATED ORAL at 21:31

## 2021-04-16 RX ADMIN — PIPERACILLIN SODIUM AND TAZOBACTAM SODIUM 3.38 G: 3; .375 INJECTION, POWDER, LYOPHILIZED, FOR SOLUTION INTRAVENOUS at 01:57

## 2021-04-16 RX ADMIN — Medication 10 ML: at 05:01

## 2021-04-16 RX ADMIN — SODIUM CHLORIDE 75 ML/HR: 900 INJECTION, SOLUTION INTRAVENOUS at 01:58

## 2021-04-16 RX ADMIN — Medication 10 ML: at 21:33

## 2021-04-16 RX ADMIN — SODIUM CHLORIDE 75 ML/HR: 900 INJECTION, SOLUTION INTRAVENOUS at 20:01

## 2021-04-16 RX ADMIN — AMLODIPINE BESYLATE 10 MG: 10 TABLET ORAL at 09:08

## 2021-04-16 RX ADMIN — Medication 30 ML: at 13:41

## 2021-04-16 RX ADMIN — METOPROLOL TARTRATE 50 MG: 50 TABLET, FILM COATED ORAL at 09:08

## 2021-04-16 RX ADMIN — METOPROLOL TARTRATE 50 MG: 50 TABLET, FILM COATED ORAL at 20:58

## 2021-04-16 NOTE — PROGRESS NOTES
[facility]ist Progress Note     Name:  Baylee Dey  Age:76 y.o. Sex:male   :  1945    MRN:  [id number]     Admit Date:  2021    Reason for Admission:  Hemorrhagic cerebrovascular accident (CVA) [facility]) [I61.9]    Assessment & Plan       Hemorrhagic CVA s/p EVD 21: Followed by neurosurgery   NPO  NGT feeds  SLP following  Likely to need PEG, attempting to reach family   Goal sodium 140 to 150, currently on NS IVF      Sepsis due to Pneumonia:   zosyn EOT 21  On room air  followup fever curve        Elevated troponin:  Followed by cariology, due to demand ischemia       DENI on CKD:  followup BMP,stable  On NS IVF    CAD:  Continued lipitor    HTN:  Continued norvasc, increase  Metoprolol    IV cardene as needed for goal < 140/90  Also Has prn IV antihypertensives       Diet:  DIET NPO  DIET TUBE FEEDING  DVT PPx: anticoagulation contra indicated due to hemorrhagic CVA  GI Ppx:  none  Code: Full Code    Dispo/Discharge Planning: pending, will need PEG    Hospital Course/Subjective:     Mr. Hardy Chen is a 66 yo male PMH of HTN, CAD, HLP, COPD, admitted 21 with acute right basal ganglia hemorrhagic CVA. Neurosurgery did not recommend acute interventions on admit. On 21 he was more drowsy and repeat CT head showed increased IVH and concern for early hydrocephalus. He is s/p EVD 21. Repeat CT head  stable. Goal sodium is 140 to 150. He was covered with antibiotics for pneumonia, possible aspiration. Cardiology followed for elevated troponin, feeling this was due to demand ischemia. He is receiving NGT feeds and likely will require PEG. SLP following. He has resumed antihypertensives via NGT and also required IV cardene. Discharge plans pending. Subjective/24 hr Events (21):     More alert, able to mouth some words, wants to eat,       Objective:     Patient Vitals for the past 24 hrs:   Temp Pulse Resp BP SpO2   21 0900  93 23 136/70    21 0800  92 26 (!) 148/77    04/16/21 0700 98.2 °F (36.8 °C) 97 26 (!) 163/80 96 %   04/16/21 0600  85 18 (!) 150/81 96 %   04/16/21 0500  89 18 (!) 156/87 96 %   04/16/21 0400  89 24 (!) 142/75 98 %   04/16/21 0300 98.5 °F (36.9 °C) 91 23 (!) 141/78 95 %   04/16/21 0200  94 (!) 31 (!) 149/86    04/16/21 0100  87 24 (!) 140/80 98 %   04/16/21 0000  86 24 137/81 98 %   04/15/21 2300 98.5 °F (36.9 °C) 79 24 (!) 142/79 98 %   04/15/21 2200  91 21 (!) 141/78 97 %   04/15/21 2100  (!) 101 23 (!) 159/85 98 %   04/15/21 2000  97 (!) 35 (!) 146/79 91 %   04/15/21 1900 98.4 °F (36.9 °C) 91 30 (!) 148/78    04/15/21 1816  87 23 136/74 98 %   04/15/21 1730  89 29 132/67 98 %   04/15/21 1700  80 25 138/76 99 %   04/15/21 1630  86 22 133/71 97 %   04/15/21 1545  (!) 126 22 138/78 98 %   04/15/21 1530  83 12 138/74 98 %   04/15/21 1516  81 28 134/75 98 %   04/15/21 1500 98.8 °F (37.1 °C)       04/15/21 1430  75 15 (!) 143/73 98 %   04/15/21 1400  (!) 120 24 124/62 99 %   04/15/21 1316  75 21 138/73 98 %   04/15/21 1230  79 20 136/72 99 %   04/15/21 1200  77 24 137/63 98 %   04/15/21 1145  73 17 133/74 99 %   04/15/21 1131  77 24 132/70 99 %   04/15/21 1116    128/68 98 %     Oxygen Therapy  O2 Sat (%): 96 % (04/16/21 0700)  Pulse via Oximetry: 86 beats per minute (04/16/21 0600)  O2 Device: (P) None (Room air) (04/16/21 0700)  Skin Assessment: (P) Clean, dry, & intact (04/16/21 0700)  Skin Protection for O2 Device: N/A (04/13/21 1900)  O2 Flow Rate (L/min): 2 l/min (04/13/21 0700)    Body mass index is 19.69 kg/m².     Physical Exam:   General: No acute distress, alert   Lungs: Clear to auscultation bilaterally anterior   Cardiovascular: Regular rate and rhythm with normal S1 and S2, no edema    Abdomen: Soft, nontender, nondistended, normoactive bowel sounds   Extremities: No cyanosis    Neuro: /5 right UE and LE strength, 0/5 left UE and LE  Psych: appropriate affect    Data Review:  I have reviewed all labs, meds, and studies from the last 24 hours:    Labs:    Recent Results (from the past 24 hour(s))   GLUCOSE, POC    Collection Time: 04/15/21 12:08 PM   Result Value Ref Range    Glucose (POC) 87 65 - 100 mg/dL    Performed by Megha    GLUCOSE, POC    Collection Time: 04/15/21  3:57 PM   Result Value Ref Range    Glucose (POC) 105 (H) 65 - 100 mg/dL    Performed by Megha    GLUCOSE, POC    Collection Time: 04/15/21  9:49 PM   Result Value Ref Range    Glucose (POC) 100 65 - 100 mg/dL    Performed by [facility]    METABOLIC PANEL, BASIC    Collection Time: 04/16/21  5:29 AM   Result Value Ref Range    Sodium 142 138 - 145 mmol/L    Potassium 3.9 3.5 - 5.1 mmol/L    Chloride 111 (H) 98 - 107 mmol/L    CO2 27 21 - 32 mmol/L    Anion gap 4 (L) 7 - 16 mmol/L    Glucose 97 65 - 100 mg/dL    BUN 27 (H) 8 - 23 MG/DL    Creatinine 1.01 0.8 - 1.5 MG/DL    GFR est AA >60 >60 ml/min/1.73m2    GFR est non-AA >60 >60 ml/min/1.73m2    Calcium 8.8 8.3 - 10.4 MG/DL   PHOSPHORUS    Collection Time: 04/16/21  5:29 AM   Result Value Ref Range    Phosphorus 3.1 2.3 - 3.7 MG/DL   MAGNESIUM    Collection Time: 04/16/21  5:29 AM   Result Value Ref Range    Magnesium 2.1 1.8 - 2.4 mg/dL   CBC WITH AUTOMATED DIFF    Collection Time: 04/16/21  5:29 AM   Result Value Ref Range    WBC 8.4 4.3 - 11.1 K/uL    RBC 4.76 4.23 - 5.6 M/uL    HGB 12.7 (L) 13.6 - 17.2 g/dL    HCT 37.9 (L) 41.1 - 50.3 %    MCV 79.6 79.6 - 97.8 FL    MCH 26.7 26.1 - 32.9 PG    MCHC 33.5 31.4 - 35.0 g/dL    RDW 13.7 11.9 - 14.6 %    PLATELET 065 289 - 921 K/uL    MPV 12.4 (H) 9.4 - 12.3 FL    ABSOLUTE NRBC 0.00 0.0 - 0.2 K/uL    NEUTROPHILS 75 43 - 78 %    LYMPHOCYTES 15 13 - 44 %    MONOCYTES 7 4.0 - 12.0 %    EOSINOPHILS 2 0.5 - 7.8 %    BASOPHILS 0 0.0 - 2.0 %    IMMATURE GRANULOCYTES 1 0.0 - 5.0 %    ABS. NEUTROPHILS 6.2 1.7 - 8.2 K/UL    ABS. LYMPHOCYTES 1.3 0.5 - 4.6 K/UL    ABS. MONOCYTES 0.6 0.1 - 1.3 K/UL    ABS. EOSINOPHILS 0.2 0.0 - 0.8 K/UL    ABS. BASOPHILS 0.0 0.0 - 0.2 K/UL    ABS. IMM. GRANS. 0.1 0.0 - 0.5 K/UL    DF AUTOMATED     GLUCOSE, POC    Collection Time: 04/16/21  6:21 AM   Result Value Ref Range    Glucose (POC) 103 (H) 65 - 100 mg/dL    Performed by Harry        All Micro Results     Procedure Component Value Units Date/Time    CULTURE, BLOOD [[id number]] Collected: 04/09/21 1053    Order Status: Completed Specimen: Blood Updated: 04/14/21 0721     Special Requests: --        RIGHT  HAND       Culture result: NO GROWTH 5 DAYS       CULTURE, BLOOD [[id number]] Collected: 04/09/21 1058    Order Status: Completed Specimen: Blood Updated: 04/14/21 0721     Special Requests: --        RIGHT  Antecubital       Culture result: NO GROWTH 5 DAYS       COVID-19 RAPID TEST [[id number]] Collected: 04/07/21 1221    Order Status: Completed Specimen: Nasopharyngeal Updated: 04/07/21 1301     Specimen source Nasopharyngeal        COVID-19 rapid test Not detected        Comment:      The specimen is NEGATIVE for SARS-CoV-2, the novel coronavirus associated with COVID-19. A negative result does not rule out COVID-19. This test has been authorized by the FDA under an Emergency Use Authorization (EUA) for use by authorized laboratories.         Fact sheet for Healthcare Providers: ConventionUpdate.co.nz  Fact sheet for Patients: ConventionUpdate.co.nz       Methodology: Isothermal Nucleic Acid Amplification               EKG Results     Procedure 720 Value Units Date/Time    EKG, 12 LEAD, SUBSEQUENT [[id number]] Collected: 04/03/21 0821    Order Status: Completed Updated: 04/03/21 1227     Ventricular Rate 74 BPM      Atrial Rate 74 BPM      P-R Interval 160 ms      QRS Duration 94 ms      Q-T Interval 388 ms      QTC Calculation (Bezet) 430 ms      Calculated P Axis 71 degrees      Calculated R Axis -61 degrees      Calculated T Axis 88 degrees      Diagnosis --     Sinus rhythm with occasional Premature ventricular complexes  Left anterior fascicular block  ST & T wave abnormality, consider lateral ischemia  Abnormal ECG  When compared with ECG of 02-APR-2021 18:26,  Premature ventricular complexes are now Present  Left anterior fascicular block is now Present  Confirmed by Charu Spencer ([id number]) on 4/3/2021 12:27:42 PM      Initial ECG [[id number]] Collected: 04/02/21 1826    Order Status: Completed Updated: 04/03/21 0732     Ventricular Rate 77 BPM      Atrial Rate 78 BPM      P-R Interval 156 ms      QRS Duration 96 ms      Q-T Interval 356 ms      QTC Calculation (Bezet) 402 ms      Calculated P Axis 76 degrees      Calculated R Axis 69 degrees      Calculated T Axis 79 degrees      Diagnosis --     Normal sinus rhythm  Normal ECG  When compared with ECG of 28-AUG-2017 21:42,  Questionable change in QRS axis  Nonspecific T wave abnormality, improved in Lateral leads  Confirmed by Noe Damian ([id number]) on 4/3/2021 7:32:01 AM      EKG 12 LEAD INITIAL [[id number]]     Order Status: Canceled           Other Studies:  Ct Head Wo Cont    Result Date: 4/3/2021  NONCONTRAST HEAD CT CLINICAL HISTORY:  Follow-up intracranial hemorrhage. TECHNIQUE:  Axial images were obtained with spiral technique. Radiation dose reduction was achieved using one or all of the following techniques: automated exposure control, weight-based dosing, iterative reconstruction. COMPARISON:  CT yesterday and 0617 hours today. REPORT:   Standard noncontrast head CT demonstrates continued increase in size of the right thalamic hemorrhage with maximum diameter now measured at approximately 3.6 cm compared with 2.9 cm earlier today. Moreover, there is new bilateral lateral intraventricular extension of hemorrhage, primarily localized to the atria. There is associated slight increase in bilateral lateral ventriculomegaly with biatrial diameter now measuring approximately 4.2 cm compared with 3.8 cm this morning. Mass effect is little changed with midline shift from right to left of approximately 5 mm compared with 4 mm this morning. Extensive small vessel ischemic disease and small focal lateral right frontal infarct appear unchanged. INTERVAL INCREASE IN SIZE OF THE RIGHT THALAMIC HEMORRHAGE WITH NEW BILATERAL LATERAL INTRAVENTRICULAR EXTENSION AND SLIGHT WORSENING OF VENTRICULOMEGALY. Ct Head Wo Cont    Result Date: 4/3/2021  EXAM: CT HEAD WO CONT HISTORY: .  TECHNIQUE: Axial images of the brain were performed without the administration of intravenous contrast. Images were obtained axial plane and coronal reformatted images were submitted. CT scan performed using appropriate/available dose optimization/reduction/ALARA techniques. COMPARISON: None. FINDINGS: The known right thalamic hemorrhage is again observed. There is surrounding edema. The body of this hemorrhage has not changed significantly. Currently it measures 2.4 x 2.9 cm when previously it measured 2.4 x 2.7 cm. There has been interval increase in an area of adjacent or extension of the hemorrhage posteriorly. Previously it measured 4.5 mm in width. Currently it measures 1.5 cm in width and 1.3 cm AP. There is persistent intraventricular hemorrhage in the posterior horn of the right lateral ventricle. Interval development of a small amount of hemorrhage in the left posterior horn. There is mild midline shift of the posterior septum pellucidum of approximately 4.1 mm. The lateral ventricles are mildly dilated, stable. Chronic ischemic white matter changes are again observed. Question small chronic infarction in the right frontal lobe. Interval increase in the size of the right thalamic hemorrhage. Interval development of mild right to left midline shift of the posterior aspect of the septum pellucidum. Stable hemorrhage in the posterior horn of the right lateral ventricle.  Interval development of a small amount of hemorrhage in the posterior horn of the left lateral ventricle. Other findings as above. [id number]    Result Date: 4/3/2021  RIGHT UPPER QUADRANT ULTRASOUND. HISTORY: Transaminitis. COMPARISON: No relevant comparison studies available. FINDINGS: Ultrasonographic Ramon's sign: is reported as negative Gallstones: There are intraluminal echogenic foci, probably stones. Gallbladder Wall: not thickened. Common Bile Duct: is not dilated, 4 mm. Intrahepatic Biliary Tree: is not dilated. Liver: Uniform parenchyma Included portion of the pancreas and right kidney: are unremarkable. Vasculature: Aorta: Normal caliber. IVC:  Patent. Portal vein: Hepatopedal flow. Probable gallstones. No biliary tree obstruction. No findings to suggest acute cholecystitis. Xr Chest Port    Result Date: 4/2/2021  CHEST X-RAY, one view. HISTORY:  Chest pain. TECHNIQUE:  AP upright portable view. COMPARISON: August 2017 FINDINGS: Lungs: Atelectasis or scar right base, similar to prior exam. Costophrenic angles: are sharp. Heart size: is normal. Pulmonary vasculature: is unremarkable. Aorta: Arch calcifications. Included portion of the upper abdomen: is unremarkable. Bones: No gross bony lesions. Other: None. Negative for acute change. Ct Code Neuro Head Wo Contrast    Result Date: 4/2/2021  CT HEAD WITHOUT CONTRAST HISTORY:  CVA. COMPARISON: None. TECHNIQUE: Axial imaging was performed without intravenous contrast utilizing 5mm slice thickness. Sagittal and coronal reformats were performed. Radiation dose reduction techniques were used for this study. Our CT scanner uses one or all of the following: Automated exposure control, adjustment of the MAS or KUB according to patient's size and iterative reconstruction. FINDINGS:    *BRAIN:    -  There are no early signs of territorial or lacunar infarction by CT.    -  2.7 x 2.4 cm acute hemorrhage into the right basal ganglia and thalamus.  Right lateral ventricle intraventricular hemorrhage.    -  No gross white matter abnormality by CT. *VISUALIZED PARANASAL SINUSES: Well aerated. *MASTOIDS:  Clear. *CALVARIUM AND SCALP: Unremarkable. Acute right basal ganglia hematoma likely on the basis of hypertension. Right intraventricular hemorrhage.  Date of Dictation: 4/2/2021 6:17 PM          Current Meds:   Current Facility-Administered Medications   Medication Dose Route Frequency    insulin lispro (HUMALOG) injection   SubCUTAneous Q6H    metoprolol tartrate (LOPRESSOR) tablet 50 mg  50 mg Per NG tube Q12H    amLODIPine (NORVASC) tablet 10 mg  10 mg Per NG tube DAILY    lip protectant (BLISTEX) ointment 1 Each  1 Each Topical PRN    0.9% sodium chloride infusion  75 mL/hr IntraVENous CONTINUOUS    NUTRITIONAL SUPPORT ELECTROLYTE PRN ORDERS   Does Not Apply PRN    acetaminophen (TYLENOL) solution 650 mg  650 mg Per NG tube Q4H PRN    atorvastatin (LIPITOR) tablet 40 mg  40 mg Per NG tube QHS    metoprolol (LOPRESSOR) injection 5 mg  5 mg IntraVENous Q6H PRN    [Held by provider] morphine injection 1 mg  1 mg IntraVENous Q4H PRN    [Held by provider] oxyCODONE IR (ROXICODONE) tablet 5 mg  5 mg Oral Q6H PRN    enalaprilat (VASOTEC) injection 1.25 mg  1.25 mg IntraVENous Q6H PRN    lidocaine 4 % patch 1 Patch  1 Patch TransDERmal Q24H    niCARdipine (CARDENE) 25 mg in 0.9% sodium chloride (MBP/ADV) 250 mL infusion  0-15 mg/hr IntraVENous TITRATE    sodium chloride (NS) flush 5-40 mL  5-40 mL IntraVENous Q8H    sodium chloride (NS) flush 5-40 mL  5-40 mL IntraVENous PRN       Problem List:  Hospital Problems as of 4/16/2021 Date Reviewed: 4/4/2021          Codes Class Noted - Resolved POA    Severe protein-calorie malnutrition ([facility] [address] ICD-10-CM: E43  ICD-9-CM: 262  4/9/2021 - Present Yes        Dysphagia following cerebral infarction ICD-10-CM: I69.391  ICD-9-CM: 438.82  4/6/2021 - Present Yes        Elevated liver enzymes ICD-10-CM: R74.8  ICD-9-CM: 790.5  4/6/2021 - Present Yes        Elevated troponin ICD-10-CM: R77.8  ICD-9-CM: 790.6  4/4/2021 - Present Yes        * (Principal) Hemorrhagic cerebrovascular accident (CVA) ([facility] [address] ICD-10-CM: I61.9  ICD-9-CM: 432  4/2/2021 - Present Yes        Acute left-sided weakness ICD-10-CM: R53.1  ICD-9-CM: 728.87  4/2/2021 - Present Yes        S/P PTCA (percutaneous transluminal coronary angioplasty) ICD-10-CM: Z98.61  ICD-9-CM: V45.82  Unknown - Present Yes        Chronic tuberculosis ICD-10-CM: A15.9  ICD-9-CM: 011.90  Unknown - Present Yes        Coronary atherosclerosis of native coronary vessel ICD-10-CM: I25.10  ICD-9-CM: 414.01  Unknown - Present Yes        Hyperlipidemia ICD-10-CM: E78.5  ICD-9-CM: 272.4  Unknown - Present Yes        Iron deficiency anemia ICD-10-CM: D50.9  ICD-9-CM: 280.9  1/25/2015 - Present Yes        Microcytic anemia ICD-10-CM: D50.9  ICD-9-CM: 280.9  1/21/2015 - Present     Overview Signed 4/6/2016  9:52 AM by Elizabeth Mitchell     Iron deficiency                    Part of this note was written by using a voice dictation software and the note has been proof read but may still contain some grammatical/other typographical errors.     Signed By: Rylan Longo MD   [facility]    April 16, 2021  9:08 AM

## 2021-04-16 NOTE — PROGRESS NOTES
Bridle removed from NG tube et site to nasal septum cleansed. Open abraded area noted to Rt side of nasal septum. Cleansed with normal saline and CHG wipe.  Tape applied to NG tube for securement

## 2021-04-16 NOTE — PROGRESS NOTES
[facility] Report included the following information SBAR, Intake/Output, MAR, Recent Results, Cardiac Rhythm, Alarm Parameters, Quality Measures. Dual Neuro and NIH completed at bedside.

## 2021-04-16 NOTE — PROGRESS NOTES
Spiritual Care Visit, initial visit. Patient responded only minimal.    Prayed for patient's healing and health. Left a card for patient. Visit by Irina aDn, Staff .  Harley., .B., B.A.

## 2021-04-16 NOTE — PROGRESS NOTES
ACUTE OT GOALS:  (Developed with and agreed upon by patient and/or caregiver.)  1. Complete functional transfers (I.e. toilet/bedside commode) with c min A. (UPDATED 2021)   2. Complete gentle active assisted L UE ROM focusing more on finger/wrist extensors and shoulder external rotators. CONTINUE TO ADDRESS (2021)  3. Utilize L UE as a support for supine to sitting and sit to standing at least 20% of the time. CONTINUE TO ADDRESS (2021)  4. Complete self-feeding (once cleared by SLP/MD/RN) with standby assistance. CONTINUE TO ADDRESS (2021)  5. Complete grooming/oral care with standby assistance. CONTINUE TO ADDRESS (2021)    NEW GOALS (Added 2021)  1. Patient will complete functional activity while seated EOB with min A and adaptive equipment as needed. 2. Patient will attend to L side of environment for 75% of session with min verbal cues from therapist.    OCCUPATIONAL THERAPY: Daily Note OT Treatment Day # 5    Eneida Wick is a 67 y.o. male   PRIMARY DIAGNOSIS: Hemorrhagic cerebrovascular accident (CVA) ([facility] [address]  Hemorrhagic cerebrovascular accident (CVA) ([facility] [address] [I61.9]       Payor: Ricardo Navarro [id number] / Plan: [place] / Product Type: Managed Care Medicare /   ASSESSMENT:     REHAB RECOMMENDATIONS: CURRENT LEVEL OF FUNCTION:  (Most Recently Demonstrated)   Recommendation to date pending progress:  [address]  Equipment:    To Be Determined Bathing:   Total Assistance  Dressing:   Total Assistance  Feeding/Grooming:   Set Up  Toileting:   Total Assistance  Functional Mobility:   Total Assistance     ASSESSMENT:  Mr. Rock Parham continues to present with deficits in overall strength, activity tolerance, ADL performance, and functional mobility. EVD drain was present d/t hydrocephalus (drain needs to be clamped prior to mobility). Currently not following commands on the L side; does follow commands on R side.  Improving aphasia. Rolling bed mobility completed with max A to facilitate bowel hygiene; total A for bowel hygiene. Max to total A x 2 for supine to sit transfer to edge of bed. Max verbal and tactile commands to maintain midline orientation. Strong L hemibody. Completed dynamic weight shifts onto LUE for proprioceptive input however limited d/t poor sitting balance. Able to wash face with RUE with set-up. Stood with max A x 2 for improved positioning in bed. No progress towards goals. Will continue OT efforts as indicated. SUBJECTIVE:   Mr. Ashli Young states, \" My nose hurts. \" (referring to NG tube)    SOCIAL HISTORY/LIVING ENVIRONMENT:   Home Environment: Private residence  # Steps to Enter: 5  One/Two Story Residence: One story  Living Alone: No  Support Systems: Family member(s)    OBJECTIVE:     PAIN: VITAL SIGNS: LINES/DRAINS:   Pre Treatment: Pain Screen  Pain Scale 1: Numeric (0 - 10)  Pain Intensity 1: 0  Post Treatment: 0   IV and EVD drain  O2 Device: None (Room air)     ACTIVITIES OF DAILY LIVING: I Mod I S SBA CGA Min Mod Max Total NT Comments   BASIC ADLs:              Bathing/ Showering [] [] [] [] [] [] [] [] [] [x]    Toileting [] [] [] [] [] [] [] [] [x] []    Dressing [] [] [] [] [] [] [] [] [] [x]    Feeding [] [] [] [] [] [] [] [] [] [x]    Grooming [] [] [x] [] [] [] [] [] [] [] Able to wash face with RUE   Personal Device Care [] [] [] [] [] [] [] [] [] [x]    Functional Mobility [] [] [] [] [] [] [] [x] [x] [] X 2   I=Independent, Mod I=Modified Independent, S=Supervision, SBA=Standby Assistance, CGA=Contact Guard Assistance,   Min=Minimal Assistance, Mod=Moderate Assistance, Max=Maximal Assistance, Total=Total Assistance, NT=Not Tested    MOBILITY: I Mod I S SBA CGA Min Mod Max Total  NT x2 Comments:   Supine to sit [] [] [] [] [] [] [] [x] [x] [] [x]    Sit to supine [] [] [] [] [] [] [] [] [x] [] [x]    Sit to stand [] [] [] [] [] [] [] [] [] [x] []    Bed to chair [] [] [] [] [] [] [] [] [] [x] []    I=Independent, Mod I=Modified Independent, S=Supervision, SBA=Standby Assistance, CGA=Contact Guard Assistance,   Min=Minimal Assistance, Mod=Moderate Assistance, Max=Maximal Assistance, Total=Total Assistance, NT=Not Tested    BALANCE: Good Fair+ Fair Fair- Poor NT Comments   Sitting Static [] [] [] [] [x] []    Sitting Dynamic [] [] [] [] [x] []              Standing Static [] [] [] [] [x] []    Standing Dynamic [] [] [] [] [x] []      PLAN:   FREQUENCY/DURATION: OT Plan of Care: 3 times/week for duration of hospital stay or until stated goals are met, whichever comes first.    TREATMENT:   TREATMENT:   ($$ Self Care/Home Management: 8-22 mins$$ Neuromuscular Re-Education: 8-22 mins   )  Co-Treatment PT/OT necessary due to patient's decreased overall endurance/tolerance levels, as well as need for high level skilled assistance to complete functional transfers/mobility and functional tasks  Self Care (10 Minutes): Self care including Toileting and Grooming to increase independence and decrease level of assistance required. Neuromuscular Re-education (13 Minutes): Neuromuscular Re-education included Balance Training, Coordination training, Hemibody awareness training, Postural training, Sitting balance training and Visual field awareness training to improve Balance, Coordination, Postural Control and Proprioception.     TREATMENT GRID:  N/A    AFTER TREATMENT POSITION/PRECAUTIONS:  Bed, Needs within reach and RN notified    INTERDISCIPLINARY COLLABORATION:  RN/PCT, PT/PTA and OT/LYNCH    TOTAL TREATMENT DURATION:  OT Patient Time In/Time Out  Time In: 0162  Time Out: [address], OT

## 2021-04-16 NOTE — H&P (VIEW-ONLY)
[facility] Consult Note Referring Provider:  Dr Yesica Dey Consult Date:  4/16/2021 Admit Date:  4/2/2021 Chief Complaint:  PEG eval 
 
Subjective:  
 
History of Present Illness:  Patient is a 67 y.o. male with PMH of including but not limited to HTN, CAD, HLP, COPD, CVA, HCV untreated, hx of TB treated in 2014, NSTEMI with LENA in 11/2017, who is seen in consultation at the request of Dr. Yesica Dey for evaluation for PEG. Pt admitted 4/2/2021 with acute right basal ganglia hemorrhagic CVA. Neurosurgery did not recommend acute interventions. Repeat head CT 4/7/2021 with increased IVH and concern for early hydrocephalus. He underwent EVD 4/7/2021. Cardiology following for elevated troponin, felt to be related to demand ischemia. He is currently receiving TF via NGT. Speech pathology with attempted evaluation 4/16/2021, but pt unable to follow commands. Was on Zosyn from 4/9/21 to 4/16/2021 for PNA and possible aspiration. GI consulted for PEG placement. No family at bedside. Pt opens eyes. He shakes head no to any pain. Does not respond or interact otherwise. Nurse at bedside reports that primary team has already discussed PEG placement with POA (pt's daughter Trevor James) who has already given consent. EGD 1/22/2015 with Dr Jian Smith for anemia revealed small HH and esophagitis. Colonoscopy 1/23/2015 with Dr Drew Moran for anemia revealed single diverticulum in R colon. Recall recommended for 5 years. PMH: 
Past Medical History:  
Diagnosis Date  Abdominal cramping 1/21/2015  Acute blood loss anemia 1/22/2015  Acute MI, lateral wall, subsequent episode of care ([facility] [address]  Anemia 1/21/2015  Arthritis  CAD (coronary artery disease) 12/14 WITH MI, STENTS  
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   
 no meds  Coronary atherosclerosis of native coronary vessel  Dyspnea  GERD (gastroesophageal reflux disease)  GI bleed 1/21/2015  Hepatitis C >10 yrs ago  Hyperlipidemia  Hypertension   
 pt denies this  Iron deficiency anemia 1/25/2015  Lung nodule RELATED TO TB  
 Lung nodule, multiple 10/4/2013 Seen on CT 10/2/13. GHULAM. PET positive. Navigational bronch 10/16/13--non-diagnositic  Near syncope 1/21/2015  
 NSTEMI (non-ST elevated myocardial infarction) ([facility] [address] 12/31/2014  Pulmonary tuberculosis  S/P PTCA (percutaneous transluminal coronary angioplasty)  Shortness of breath 1/21/2015  TB peritonitis 5/2/2014 PSH: 
Past Surgical History:  
Procedure Laterality Date  HX OTHER SURGICAL    
 LUNG NODULE BIOPSY, TB  
 HX PTCA  [place] ABDOMEN SURGERY PROC UNLISTED    
 hernia  [place] CHEST SURGERY PROCEDURE UNLISTED  11/2013  
 cervical mediastinal bx  [place] LEFT HEART CATH,PERCUTANEOUS  12/31/2014 Xience stents to 2nd OM and pCirc Allergies: 
No Known Allergies Home Medications: 
Prior to Admission medications Medication Sig Start Date End Date Taking? Authorizing Provider  
cyclobenzaprine (FLEXERIL) 5 mg tablet Take 1 Tab by mouth two (2) times a day. 5/7/18   CONNOR Martinez  
aspirin 81 mg chewable tablet Take 1 Tab by mouth daily. 8/30/17   Popeye Romo PA  
nitroglycerin (NITROSTAT) 0.4 mg SL tablet 1 Tab by SubLINGual route every five (5) minutes as needed for Chest Pain. 8/30/17   Popeye Romo PA  
atorvastatin (LIPITOR) 80 mg tablet Take 1 Tab by mouth nightly. 8/30/17   Popeye Romo PA  
metoprolol tartrate (LOPRESSOR) 25 mg tablet Take 1 Tab by mouth two (2) times a day. 8/30/17   CON[facility] Medications: 
Current Facility-Administered Medications Medication Dose Route Frequency  insulin lispro (HUMALOG) injection   SubCUTAneous Q6H  
 metoprolol tartrate (LOPRESSOR) tablet 50 mg  50 mg Per NG tube Q12H  
 amLODIPine (NORVASC) tablet 10 mg  10 mg Per NG tube DAILY  lip protectant (BLISTEX) ointment 1 Each  1 Each Topical PRN  
 0.9% sodium chloride infusion  75 mL/hr IntraVENous CONTINUOUS  
 NUTRITIONAL SUPPORT ELECTROLYTE PRN ORDERS   Does Not Apply PRN  
 acetaminophen (TYLENOL) solution 650 mg  650 mg Per NG tube Q4H PRN  
 atorvastatin (LIPITOR) tablet 40 mg  40 mg Per NG tube QHS  metoprolol (LOPRESSOR) injection 5 mg  5 mg IntraVENous Q6H PRN  
 [Held by provider] morphine injection 1 mg  1 mg IntraVENous Q4H PRN  
 [Held by provider] oxyCODONE IR (ROXICODONE) tablet 5 mg  5 mg Oral Q6H PRN  
 enalaprilat (VASOTEC) injection 1.25 mg  1.25 mg IntraVENous Q6H PRN  
 lidocaine 4 % patch 1 Patch  1 Patch TransDERmal Q24H  
 niCARdipine (CARDENE) 25 mg in 0.9% sodium chloride (MBP/ADV) 250 mL infusion  0-15 mg/hr IntraVENous TITRATE  sodium chloride (NS) flush 5-40 mL  5-40 mL IntraVENous Q8H  
 sodium chloride (NS) flush 5-40 mL  5-40 mL IntraVENous PRN Social History: 
Social History Tobacco Use  Smoking status: Light Tobacco Smoker Packs/day: 1.00 Years: 45.00 Pack years: 45.00 Types: Cigarettes  Smokeless tobacco: Never Used  Tobacco comment: QUIT 12/14 Substance Use Topics  Alcohol use: No  
  Alcohol/week: 5.0 standard drinks Types: 6 Cans of beer per week Comment: QUIT 12/14 Pt denies any history of drug use, blood transfusions, or tattoos. Family History: 
Family History Problem Relation Age of Onset  Hypertension Father STROKE  Stroke Mother HYPERTENSION  
 Hypertension Sister   
     4 LIVING SISTERS  
 Heart Disease Sister   
     stent- no MI  
 Other Sister  Other Child   
     6 LOCAL, ONE IN [place] IS RN  
 Other Brother Review of Systems: A detailed 10 system ROS is obtained, with pertinent positives as listed above. All others are negative.  
 
Diet:   
 
Objective:  
 
Physical Exam: 
Vitals: 
Visit Vitals BP (!) 160/79 Pulse 80 Temp 98 °F (36.7 °C) Resp 16 Ht 5' 8\" (1.727 m) Wt 58.7 kg (129 lb 8 oz) SpO2 97% BMI 19.69 kg/m² Gen:  Pt is alert, cooperative, no acute distress Skin:  Extremities and face reveal no rashes. HEENT: Sclerae anicteric. Extra-occular muscles are intact. No oral ulcers. No abnormal pigmentation of the lips. The neck is supple. Cardiovascular: Regular rate and rhythm. No murmurs, gallops, or rubs. Respiratory:  Comfortable breathing with no accessory muscle use. Clear breath sounds anteriorly with no wheezes, rales, or rhonchi. GI:  Abdomen nondistended, soft, and nontender. Normal active bowel sounds. No enlargement of the liver or spleen. No masses palpable. Rectal:  Deferred Musculoskeletal:  No pitting edema of the lower legs. Neurological:  Gross memory appears intact. Patient is alert and oriented. Psychiatric:  Mood appears appropriate with judgement intact. Lymphatic:  No cervical or supraclavicular adenopathy. Laboratory:   
Recent Labs 04/16/21 
3885 04/15/21 
0424 04/14/21 
0510 WBC 8.4  --   --   
HGB 12.7*  --   --   
HCT 37.9*  --   --   
  --   --   
MCV 79.6  --   --   
 143 145  
K 3.9 3.8 3.9 * 113* 114* CO2 27 25 26 BUN 27* 27* 26* CREA 1.01 0.99 1.01  
CA 8.8 8.6 8.9 MG 2.1  --  2.3 GLU 97 122* 120* Assessment:  
 
Principal Problem: 
  Hemorrhagic cerebrovascular accident (CVA) ([facility] [address] (4/2/2021) Active Problems: 
  Iron deficiency anemia (1/25/2015) S/P PTCA (percutaneous transluminal coronary angioplasty) () Chronic tuberculosis () Coronary atherosclerosis of native coronary vessel () Hyperlipidemia () Acute left-sided weakness (4/2/2021) Elevated troponin (4/4/2021) Dysphagia following cerebral infarction (4/6/2021) Elevated liver enzymes (4/6/2021) Severe protein-calorie malnutrition ([facility] [address] (4/9/2021)   Patient is a 67 y.o. male with PMH of including but not limited to HTN, CAD, HLP, COPD, CVA, HCV untreated, hx of TB treated in 2014, NSTEMI with LENA in 11/2017, who is seen in consultation at the request of Dr. Jaja Savage for evaluation for PEG. Pt is s/p CVA on 4/2/21. He underwent EVD 4/7/2021. He has been receiving TF via NGT. GI consulted for PEG evaluation. Family has been advised by primary team and had provided consent for procedure. Plan:  
 
-Will plan on PEG placement 4/19/2021 with Dr Armani Mg 
-Will stop TF at midnight pm prior to procedure JEFFREY Aguilera Patient is seen and examined in collaboration with Dr. Jenni Wilson. Assessment and plan as per Dr. Jenni Wilson.

## 2021-04-16 NOTE — PROGRESS NOTES
Care Management Interventions  Mode of Transport at Discharge: Other (see comment)(Anton vs family)  Transition of Care Consult (CM Consult): Discharge Planning(Pt is insured by medicare with pharmacy benefits.  )  Discharge Durable Medical Equipment: No  Physical Therapy Consult: Yes  Occupational Therapy Consult: Yes  Speech Therapy Consult: Yes  Current Support Network: Relative's Home, Family Lives Nearby(Pt lives with his brother and is normally independent with all ADL's. Daughter Isabell Emerson [phone] is also supportive.  )  Confirm Follow Up Transport: Family  The Plan for Transition of Care is Related to the Following Treatment Goals : Pt will need rehab services to return to his functional baseline. The Patient and/or Patient Representative was Provided with a Choice of Provider and Agrees with the Discharge Plan?: Yes  Name of the Patient Representative Who was Provided with a Choice of Provider and Agrees with the Discharge Plan: pt/daughter  Freedom of Choice List was Provided with Basic Dialogue that Supports the Patient's Individualized Plan of Care/Goals, Treatment Preferences and Shares the Quality Data Associated with the Providers?: Yes  [place] Resource Information Provided?: No  Discharge Location  [facility]  CM following therapy notes; OT recommending LTC bed. PT yet to see pt today. Consent obtained by Dr Akua Pinon from pt's daughter, Rafat Rudd for PEG insertion. Neuro continuing to adjust EVD for eventual clamping. [facility] following for possible placement. Family has list of SNF for 2nd choice if [facility] doesn't accept pt. CM contacted pt's [facility] ([phone]) and directed her to pick 3-4 preferences for SNF placement in the event [facility] declines pt. She verbalizes understanding. CM following.

## 2021-04-16 NOTE — PROGRESS NOTES
SPEECH PATHOLOGY NOTE:    Speech therapy attempt this AM. Patient awake, but with no command following. He did shake his head \"no\" to indicated he was not in pain, but no additional communication attempts. Per RN, he recently worked with PT/OT and fatigue likely contributing to decreased responsiveness. Deep oral suction provided with thick yellow secretions. No volitional cough or in response to suctioning. Will defer speech therapy session as patient is unable to participate. Will follow up at later time for ongoing dysphagia and cognitive-linguistic treatment.      [address] CCC-SLP

## 2021-04-16 NOTE — PROGRESS NOTES
ACUTE PHYSICAL THERAPY GOALS:  (Developed with and agreed upon by patient and/or caregiver.)  REMAIN APPROPRIATE 21  LTG:  (1.)Mr. Alf Almanzar will move from supine to sit and sit to supine , scoot up and down and roll side to side in bed with MINIMAL ASSIST within 7 treatment day(s). (2.)Mr. Alf Almanzar will tolerate sitting up in recliner chair for 2 hours with stable vital signs to increase upright tolerance within 7 treatment day(s). (3.)Mr. Alf Almanzar will maintain static/dynamic sitting x 15 minutes with MINIMAL ASSIST for improved balance within 7 treatment days. (4.)Mr. Alf Almanzar will be able to hold head in cervical rotation left of midline for 2 minutes within 7 treatment days. (5.)Mr. Alf Almanzar will participate in therapeutic activity/exercises x 25 minutes for increased strength within 7 treatment days. PHYSICAL THERAPY: Daily Note and AM Treatment Day # 1    Leidy Sagastume is a 67 y.o. male   PRIMARY DIAGNOSIS: Hemorrhagic cerebrovascular accident (CVA) ([facility] [address]  Hemorrhagic cerebrovascular accident (CVA) ([facility] [address] [I61.9]         ASSESSMENT:     REHAB RECOMMENDATIONS: CURRENT LEVEL OF FUNCTION:  (Most Recently Demonstrated)   Recommendation to date pending progress:  [address] [place] [address] vs. Short-term Rehab  Equipment:    To Be Determined Bed Mobility:   Maximal Assistance x 2  Sit to Stand:   Maximal Assistance x 2  Transfers:   Maximal Assistance x 2  Gait/Mobility:   Unable to perform     ASSESSMENT:  Mr. Alf Almanzar was up for re-evaluation but goals remain appropriate. RN clamped EVD prior to treatment. Pt continues to require maxA x 2 for bed mobility. He worked on sitting balance and stood with maxA x 2. Pt struggled taking steps and was assisted back to bed. No progress today.      SUBJECTIVE:   Mr. Alf Almanzar states, \"No.\"    SOCIAL HISTORY/ LIVING ENVIRONMENT: See eval  Home Environment: Private residence  # Steps to Enter: 5  One/Two Story Residence: One story  Living Alone: No  Support Systems: Family member(s)  OBJECTIVE:     PAIN: VITAL SIGNS: LINES/DRAINS:   Pre Treatment: Pain Screen  Pain Scale 1: Numeric (0 - 10)  Pain Intensity 1: 0  Post Treatment: 0   IV and external male catheter, EVD  O2 Device: None (Room air)     MOBILITY: I Mod I S SBA CGA Min Mod Max Total  NT x2 Comments:   Bed Mobility    Rolling [] [] [] [] [] [] [] [x] [x] [] [x]    Supine to Sit [] [] [] [] [] [] [] [x] [x] [] [x]    Scooting [] [] [] [] [] [] [] [] [x] [] [x]    Sit to Supine [] [] [] [] [] [] [] [x] [x] [] [x]    Transfers    Sit to Stand [] [] [] [] [] [] [] [x] [] [] [x]    Bed to Chair [] [] [] [] [] [] [] [] [] [x] []    Stand to Sit [] [] [] [] [] [] [] [x] [] [] [x]    I=Independent, Mod I=Modified Independent, S=Supervision, SBA=Standby Assistance, CGA=Contact Guard Assistance,   Min=Minimal Assistance, Mod=Moderate Assistance, Max=Maximal Assistance, Total=Total Assistance, NT=Not Tested    BALANCE: Good Fair+ Fair Fair- Poor NT Comments   Sitting Static [] [] [] [] [x] []    Sitting Dynamic [] [] [] [] [x] []              Standing Static [] [] [] [] [x] []    Standing Dynamic [] [] [] [] [x] []      GAIT: I Mod I S SBA CGA Min Mod Max Total  NT x2 Comments:   Level of Assistance [] [] [] [] [] [] [] [x] [] [x] []    Distance NT    DME N/A    Gait Quality NT    Weightbearing  Status N/A     I=Independent, Mod I=Modified Independent, S=Supervision, SBA=Standby Assistance, CGA=Contact Guard Assistance,   Min=Minimal Assistance, Mod=Moderate Assistance, Max=Maximal Assistance, Total=Total Assistance, NT=Not Tested    PLAN:   FREQUENCY/DURATION: PT Plan of Care: 3 times/week for duration of hospital stay or until stated goals are met, whichever comes first.  TREATMENT:     TREATMENT:   ($$ Therapeutic Activity: 23-37 mins    )  Co-Treatment PT/OT necessary due to patient's decreased overall endurance/tolerance levels, as well as need for high level skilled assistance to complete functional transfers/mobility and functional tasks  Therapeutic Activity (23 Minutes): Therapeutic activity included Rolling, Supine to Sit, Sit to Supine, Scooting, Transfer Training, Sitting balance  and Standing balance to improve functional Mobility, Strength, ROM and Activity tolerance.     TREATMENT GRID:  N/A    AFTER TREATMENT POSITION/PRECAUTIONS:  Bed, Needs within reach, RN notified and RUE restrained    INTERDISCIPLINARY COLLABORATION:  RN/PCT, PT/PTA and OT/LYNCH    TOTAL TREATMENT DURATION:  PT Patient Time In/Time Out  Time In: 0936  Time Out: [address], PT, DPT

## 2021-04-16 NOTE — CONSULTS
[facility] Consult Note           Referring Provider:  Dr Long Núñez Date:  4/16/2021    Admit Date:  4/2/2021    Chief Complaint:  PEG eval    Subjective:     History of Present Illness:  Patient is a 67 y.o. male with PMH of including but not limited to HTN, CAD, HLP, COPD, CVA, HCV untreated, hx of TB treated in 2014, NSTEMI with LNEA in 11/2017, who is seen in consultation at the request of Dr. Doyle Newton for evaluation for PEG. Pt admitted 4/2/2021 with acute right basal ganglia hemorrhagic CVA. Neurosurgery did not recommend acute interventions. Repeat head CT 4/7/2021 with increased IVH and concern for early hydrocephalus. He underwent EVD 4/7/2021. Cardiology following for elevated troponin, felt to be related to demand ischemia. He is currently receiving TF via NGT. Speech pathology with attempted evaluation 4/16/2021, but pt unable to follow commands. Was on Zosyn from 4/9/21 to 4/16/2021 for PNA and possible aspiration. GI consulted for PEG placement. No family at bedside. Pt opens eyes. He shakes head no to any pain. Does not respond or interact otherwise. Nurse at bedside reports that primary team has already discussed PEG placement with POA (pt's daughter Rakel Crandall) who has already given consent. EGD 1/22/2015 with Dr Marcel Martinez for anemia revealed small HH and esophagitis. Colonoscopy 1/23/2015 with Dr Linda Garcia for anemia revealed single diverticulum in R colon. Recall recommended for 5 years.      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 GHULAM. PET positive. Navigational bronch 10/16/13--non-diagnositic     Near syncope 1/21/2015    NSTEMI (non-ST elevated myocardial infarction) ([facility] [address] 12/31/2014    Pulmonary tuberculosis     S/P PTCA (percutaneous transluminal coronary angioplasty)     Shortness of breath 1/21/2015    TB peritonitis 5/2/2014       PSH:  Past Surgical History:   Procedure Laterality Date    HX OTHER SURGICAL      LUNG NODULE BIOPSY, TB    HX PTCA      [place] ABDOMEN SURGERY PROC UNLISTED      hernia    [place] CHEST SURGERY PROCEDURE UNLISTED  11/2013    cervical mediastinal bx    [place] LEFT HEART CATH,PERCUTANEOUS  12/31/2014    Xience stents to 2nd OM and pCirc       Allergies:  No Known Allergies    Home Medications:  Prior to Admission medications    Medication Sig Start Date End Date Taking? Authorizing Provider   cyclobenzaprine (FLEXERIL) 5 mg tablet Take 1 Tab by mouth two (2) times a day. 5/7/18   CONNOR Dunn   aspirin 81 mg chewable tablet Take 1 Tab by mouth daily. 8/30/17   Radha Romo PA   nitroglycerin (NITROSTAT) 0.4 mg SL tablet 1 Tab by SubLINGual route every five (5) minutes as needed for Chest Pain. 8/30/17   Radha Romo PA   atorvastatin (LIPITOR) 80 mg tablet Take 1 Tab by mouth nightly. 8/30/17   Radha Romo PA   metoprolol tartrate (LOPRESSOR) 25 mg tablet Take 1 Tab by mouth two (2) times a day.  8/30/17   Taran Romo PA       Hospital Medications:  Current Facility-Administered Medications   Medication Dose Route Frequency    insulin lispro (HUMALOG) injection   SubCUTAneous Q6H    metoprolol tartrate (LOPRESSOR) tablet 50 mg  50 mg Per NG tube Q12H    amLODIPine (NORVASC) tablet 10 mg  10 mg Per NG tube DAILY    lip protectant (BLISTEX) ointment 1 Each  1 Each Topical PRN    0.9% sodium chloride infusion  75 mL/hr IntraVENous CONTINUOUS    NUTRITIONAL SUPPORT ELECTROLYTE PRN ORDERS   Does Not Apply PRN    acetaminophen (TYLENOL) solution 650 mg  650 mg Per NG tube Q4H PRN    atorvastatin (LIPITOR) tablet 40 mg  40 mg Per NG tube QHS    metoprolol (LOPRESSOR) injection 5 mg  5 mg IntraVENous Q6H PRN    [Held by provider] morphine injection 1 mg  1 mg IntraVENous Q4H PRN    [Held by provider] oxyCODONE IR (ROXICODONE) tablet 5 mg  5 mg Oral Q6H PRN    enalaprilat (VASOTEC) injection 1.25 mg  1.25 mg IntraVENous Q6H PRN    lidocaine 4 % patch 1 Patch  1 Patch TransDERmal Q24H    niCARdipine (CARDENE) 25 mg in 0.9% sodium chloride (MBP/ADV) 250 mL infusion  0-15 mg/hr IntraVENous TITRATE    sodium chloride (NS) flush 5-40 mL  5-40 mL IntraVENous Q8H    sodium chloride (NS) flush 5-40 mL  5-40 mL IntraVENous PRN       Social History:  Social History     Tobacco Use    Smoking status: Light Tobacco Smoker     Packs/day: 1.00     Years: 45.00     Pack years: 45.00     Types: Cigarettes    Smokeless tobacco: Never Used    Tobacco comment: QUIT 12/14   Substance Use Topics    Alcohol use: No     Alcohol/week: 5.0 standard drinks     Types: 6 Cans of beer per week     Comment: QUIT 12/14       Pt denies any history of drug use, blood transfusions, or tattoos. Family History:  Family History   Problem Relation Age of Onset    Hypertension Father         STROKE    Stroke Mother         HYPERTENSION    Hypertension Sister         4 LIVING SISTERS    Heart Disease Sister         stent- no MI    Other Sister     Other Child         6 LOCAL, ONE IN [place] IS RN    Other Brother        Review of Systems:  A detailed 10 system ROS is obtained, with pertinent positives as listed above. All others are negative.     Diet:      Objective:     Physical Exam:  Vitals:  Visit Vitals  BP (!) 160/79   Pulse 80   Temp 98 °F (36.7 °C)   Resp 16   Ht 5' 8\" (1.727 m)   Wt 58.7 kg (129 lb 8 oz)   SpO2 97%   BMI 19.69 kg/m²     Gen:  Pt is alert, cooperative, no acute distress  Skin:  Extremities and face reveal no rashes. HEENT: Sclerae anicteric. Extra-occular muscles are intact. No oral ulcers. No abnormal pigmentation of the lips. The neck is supple. Cardiovascular: Regular rate and rhythm. No murmurs, gallops, or rubs. Respiratory:  Comfortable breathing with no accessory muscle use. Clear breath sounds anteriorly with no wheezes, rales, or rhonchi. GI:  Abdomen nondistended, soft, and nontender. Normal active bowel sounds. No enlargement of the liver or spleen. No masses palpable. Rectal:  Deferred  Musculoskeletal:  No pitting edema of the lower legs. Neurological:  Gross memory appears intact. Patient is alert and oriented. Psychiatric:  Mood appears appropriate with judgement intact. Lymphatic:  No cervical or supraclavicular adenopathy.     Laboratory:    Recent Labs     04/16/21  0529 04/15/21  0424 04/14/21  0510   WBC 8.4  --   --    HGB 12.7*  --   --    HCT 37.9*  --   --      --   --    MCV 79.6  --   --     143 145   K 3.9 3.8 3.9   * 113* 114*   CO2 27 25 26   BUN 27* 27* 26*   CREA 1.01 0.99 1.01   CA 8.8 8.6 8.9   MG 2.1  --  2.3   GLU 97 122* 120*          Assessment:     Principal Problem:    Hemorrhagic cerebrovascular accident (CVA) ([facility] [address] (4/2/2021)    Active Problems:    Iron deficiency anemia (1/25/2015)      S/P PTCA (percutaneous transluminal coronary angioplasty) ()      Chronic tuberculosis ()      Coronary atherosclerosis of native coronary vessel ()      Hyperlipidemia ()      Acute left-sided weakness (4/2/2021)      Elevated troponin (4/4/2021)      Dysphagia following cerebral infarction (4/6/2021)      Elevated liver enzymes (4/6/2021)      Severe protein-calorie malnutrition ([facility] [address] (4/9/2021)      Patient is a 67 y.o. male with PMH of including but not limited to HTN, CAD, HLP, COPD, CVA, HCV untreated, hx of TB treated in 2014, NSTEMI with LENA in 11/2017, who is seen in consultation at the request of Dr. Alex Clemons for evaluation for PEG. Pt is s/p CVA on 4/2/21. He underwent EVD 4/7/2021. He has been receiving TF via NGT. GI consulted for PEG evaluation. Family has been advised by primary team and had provided consent for procedure. Plan:     -Will plan on PEG placement 4/19/2021 with Dr Crista Linda  -Will stop TF at midnight pm prior to procedure    JEFFREY Roland     Patient is seen and examined in collaboration with Dr. Jose Flores. Assessment and plan as per Dr. Jose Flores.

## 2021-04-16 NOTE — PROGRESS NOTES
Bedside shift change report recieved from [facility], [address] [place] [address]. Report included the following information SBAR, Kardex, ED Summary, OR Summary, Procedure Summary, Intake/Output, MAR, Recent Results, Med Rec Status, Cardiac Rhythm NSR, Alarm Parameters , Pre Procedure Checklist, Quality Measures and Dual Neuro and NIH completed at bedside.

## 2021-04-16 NOTE — PROGRESS NOTES
NEUROSURGERY PROGRESS NOTE:   Admit Date: 4/2/2021  Subjective:   No acute overnight events. Patient only put out 6 cc of drainage with EVD at 15 cmH2O      Objective:  Visit Vitals  BP (!) 157/84   Pulse 82   Temp 98.2 °F (36.8 °C)   Resp 24   Ht 5' 8\" (1.727 m)   Wt 129 lb 8 oz (58.7 kg)   SpO2 97%   BMI 19.69 kg/m²     Oriented only to name, hospital and year   Eyes open to spontaneous and tracking observer   PERRL  Hearing grossly intact   Follows commands simple commands in the right upper and right lower extremity briskly   Withdraws/purposeful in the right lower and localizes in the right upper extremity. ICP less than 10 cmH2O this am at bedside, EVD 6 cc with xanthrochromic CSF with EVD at 15 cmH2O. Assessment and Plan:   Jenn Enciso 67 y.o. male with right thalamic intracerebral hemorrhage with interval development of acute obstructive hydrocephalus who underwent placement of an EVD on 04/07/2021  - Continue EVD raise to 20 cmH2O today monitor will begin EVD weaning and ICP monitoring process. Call if concern for acute neurological decline. Plan for clamp trial of 48 hour this weekend. - Will obtain a repeat CT Head WO if concern for acute neurological decline   - Continue q1H neuro checks  - Will continue to monitor closely, Na 142, Na 140 to 150 an ideal range given hemorrhage and edema surrounding hemorrhage. Ceasar Hernandez.  Sangeeta Avalos, [address] [place] [address]

## 2021-04-17 ENCOUNTER — APPOINTMENT (OUTPATIENT)
Dept: CT IMAGING | Age: 76
DRG: 023 | End: 2021-04-17
Attending: INTERNAL MEDICINE
Payer: MEDICARE

## 2021-04-17 LAB
ANION GAP SERPL CALC-SCNC: 9 MMOL/L (ref 7–16)
BUN SERPL-MCNC: 29 MG/DL (ref 8–23)
CALCIUM SERPL-MCNC: 8.9 MG/DL (ref 8.3–10.4)
CHLORIDE SERPL-SCNC: 112 MMOL/L (ref 98–107)
CO2 SERPL-SCNC: 21 MMOL/L (ref 21–32)
CREAT SERPL-MCNC: 0.82 MG/DL (ref 0.8–1.5)
GLUCOSE BLD STRIP.AUTO-MCNC: 109 MG/DL (ref 65–100)
GLUCOSE BLD STRIP.AUTO-MCNC: 110 MG/DL (ref 65–100)
GLUCOSE BLD STRIP.AUTO-MCNC: 116 MG/DL (ref 65–100)
GLUCOSE BLD STRIP.AUTO-MCNC: 117 MG/DL (ref 65–100)
GLUCOSE SERPL-MCNC: 103 MG/DL (ref 65–100)
POTASSIUM SERPL-SCNC: 4.6 MMOL/L (ref 3.5–5.1)
SERVICE CMNT-IMP: ABNORMAL
SODIUM SERPL-SCNC: 142 MMOL/L (ref 138–145)

## 2021-04-17 PROCEDURE — 65610000006 HC RM INTENSIVE CARE

## 2021-04-17 PROCEDURE — 74011250637 HC RX REV CODE- 250/637: Performed by: FAMILY MEDICINE

## 2021-04-17 PROCEDURE — 95816 EEG AWAKE AND DROWSY: CPT | Performed by: INTERNAL MEDICINE

## 2021-04-17 PROCEDURE — 36415 COLL VENOUS BLD VENIPUNCTURE: CPT

## 2021-04-17 PROCEDURE — 82962 GLUCOSE BLOOD TEST: CPT

## 2021-04-17 PROCEDURE — 95816 EEG AWAKE AND DROWSY: CPT | Performed by: PSYCHIATRY & NEUROLOGY

## 2021-04-17 PROCEDURE — 74011250637 HC RX REV CODE- 250/637: Performed by: INTERNAL MEDICINE

## 2021-04-17 PROCEDURE — 70450 CT HEAD/BRAIN W/O DYE: CPT

## 2021-04-17 PROCEDURE — 80048 BASIC METABOLIC PNL TOTAL CA: CPT

## 2021-04-17 PROCEDURE — 99222 1ST HOSP IP/OBS MODERATE 55: CPT | Performed by: PSYCHIATRY & NEUROLOGY

## 2021-04-17 PROCEDURE — 74011000250 HC RX REV CODE- 250: Performed by: FAMILY MEDICINE

## 2021-04-17 PROCEDURE — APPSS60 APP SPLIT SHARED TIME 46-60 MINUTES: Performed by: NURSE PRACTITIONER

## 2021-04-17 RX ADMIN — METOPROLOL TARTRATE 50 MG: 50 TABLET, FILM COATED ORAL at 09:49

## 2021-04-17 RX ADMIN — METOPROLOL TARTRATE 50 MG: 50 TABLET, FILM COATED ORAL at 21:01

## 2021-04-17 RX ADMIN — ACETAMINOPHEN 650 MG: 325 SUSPENSION ORAL at 12:06

## 2021-04-17 RX ADMIN — Medication 10 ML: at 05:05

## 2021-04-17 RX ADMIN — Medication 10 ML: at 14:14

## 2021-04-17 RX ADMIN — Medication 10 ML: at 21:01

## 2021-04-17 RX ADMIN — AMLODIPINE BESYLATE 10 MG: 10 TABLET ORAL at 09:49

## 2021-04-17 RX ADMIN — ATORVASTATIN CALCIUM 40 MG: 40 TABLET, FILM COATED ORAL at 21:01

## 2021-04-17 NOTE — PROCEDURES
This electroencephalogram is performed on a multichannel digital EEG device utilizing dermal electrodes. In the light of the patient's ventricular drain and associated skin covering modified electrode placement was utilized with a bilateral temporal chain being employed and two parasagittal electrodes on each side    Then the overall voltage in the left hemicerebrum is clearly higher than that on the right side and there is evidence of fragmentary 6 to 7 Hz without substantial posterior anterior voltage development. On the right side slower frequencies and a substantially lower voltage of 20 to 25 µV are observed.     There are no ictal discharges    There are no ictal runs    Impression    Abnormal EEG with evidence of right greater than left cerebral dysfunction no evidence of seizures the findings are consistent with cortical and subcortical involvement and consistent with the patient's history of basal ganglia hemorrhage

## 2021-04-17 NOTE — CONSULTS
[facility] Neurology [facility]  [address] [facility], [address] [place] [address]          Chief Complaint   Patient presents with   Chandana Anguiano is a 67 y.o. male who presents on referral from the hospitalist service  The patient has had an extensive hospitalization and having initially presented with the acute onset of left leg weakness developed a left hemiplegia initial CT scan demonstrated the presence of a right basal ganglia hemorrhage. He was followed initially conservatively however then developed obstructive hydrocephalus and had a ventricular drain placed by Dr. Madison Vaughan presents    Early this morning there was a change in mental status according to staff teleneurology was consulted no seizures were witnessed. 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 GHULAM. PET positive.  Navigational bronch 10/16/13--non-diagnositic     Near syncope 1/21/2015    NSTEMI (non-ST elevated myocardial infarction) ([facility] [address] 12/31/2014    Pulmonary tuberculosis     S/P PTCA (percutaneous transluminal coronary angioplasty)     Shortness of breath 1/21/2015    TB peritonitis 5/2/2014       Past Surgical History:   Procedure Laterality Date    HX OTHER SURGICAL      LUNG NODULE BIOPSY, TB    HX PTCA      WA ABDOMEN SURGERY PROC UNLISTED      hernia    WA CHEST SURGERY PROCEDURE UNLISTED  11/2013    cervical mediastinal bx    [place] LEFT HEART CATH,PERCUTANEOUS  12/31/2014    Xience stents to 2nd OM and pCirc       Family History   Problem Relation Age of Onset    Hypertension Father         STROKE    Stroke Mother         HYPERTENSION    Hypertension Sister         4 LIVING SISTERS    Heart Disease Sister         stent- no MI    Other Sister     Other Child         6 LOCAL, ONE IN [place] IS RN    Other Brother        Social History     Socioeconomic History    Marital status: SINGLE     Spouse name: Not on file    Number of children: Not on file    Years of education: Not on file    Highest education level: Not on file   Tobacco Use    Smoking status: Light Tobacco Smoker     Packs/day: 1.00     Years: 45.00     Pack years: 45.00     Types: Cigarettes    Smokeless tobacco: Never Used    Tobacco comment: QUIT 12/14   Substance and Sexual Activity    Alcohol use: No     Alcohol/week: 5.0 standard drinks     Types: 6 Cans of beer per week     Comment: QUIT 12/14    Drug use: Yes     Types: Marijuana   Social History Narrative    1/21/15:  PATIENT IS SINGLE, LIVES WITH BROTHER. RETIRED FROM [employer]. HAS SEVEN CHILDREN, 6 LOCAL. ONE DAUGHTER IN [place] IS AN RN.              Current Facility-Administered Medications:     insulin lispro (HUMALOG) injection, , SubCUTAneous, Q6H, Rafael Collier MD, Stopped at 04/16/21 1200    metoprolol tartrate (LOPRESSOR) tablet 50 mg, 50 mg, Per NG tube, Q12H, Muna Collier MD, 50 mg at 04/16/21 2058    [START ON 4/19/2021] ceFAZolin (ANCEF) 2 g/20 mL in sterile water IV syringe, 2 g, IntraVENous, ON CALL TO OR, Rafael Collier MD    amLODIPine (NORVASC) tablet 10 mg, 10 mg, Per NG tube, DAILY, Kailey Cassidy MD, 10 mg at 04/16/21 0908    lip protectant (BLISTEX) ointment 1 Each, 1 Each, Topical, PRN, [place] Form[place], Gail Sewell MD, 1 Each at 04/13/21 1204    0.9% sodium chloride infusion, 75 mL/hr, IntraVENous, CONTINUOUS, Anel Sunshine MD, Last Rate: 75 mL/hr at 04/16/21 2001, 75 mL/hr at 04/16/21 2001    NUTRITIONAL SUPPORT ELECTROLYTE PRN ORDERS, , Does Not Apply, PRN, Shan Keller DO    acetaminophen (TYLENOL) solution 650 mg, 650 mg, Per NG tube, Q4H PRN, Jarod Rangel, DO, 650 mg at 04/15/21 1301    atorvastatin (LIPITOR) tablet 40 mg, 40 mg, Per NG tube, QHS, Tamara Ruiz MD, 40 mg at 04/16/21 2131    metoprolol (LOPRESSOR) injection 5 mg, 5 mg, IntraVENous, Q6H PRN, Jarod Rangel, DO, 5 mg at 04/14/21 0431    [Held by provider] morphine injection 1 mg, 1 mg, IntraVENous, Q4H PRN, Jarod Rangel, DO    [Held by provider] oxyCODONE IR (ROXICODONE) tablet 5 mg, 5 mg, Oral, Q6H PRN, Jarod Rangel E, DO, 5 mg at 04/07/21 0155    enalaprilat (VASOTEC) injection 1.25 mg, 1.25 mg, IntraVENous, Q6H PRN, Jarod Rangel E, DO, 1.25 mg at 04/14/21 1617    lidocaine 4 % patch 1 Patch, 1 Patch, TransDERmal, Q24H, Kurt Jesus MD, 1 Patch at 04/16/21 1204    niCARdipine (CARDENE) 25 mg in 0.9% sodium chloride (MBP/ADV) 250 mL infusion, 0-15 mg/hr, IntraVENous, TITRATE, Elijah Parker, DO, Stopped at 04/15/21 1200    sodium chloride (NS) flush 5-40 mL, 5-40 mL, IntraVENous, Q8H, Usman Vazquez MD, 10 mL at 04/17/21 0505    sodium chloride (NS) flush 5-40 mL, 5-40 mL, IntraVENous, PRN, Dona Narayan [facility], MD    No Known Allergies    Review of Systems  Patient's overall level of responsiveness precludes a 10 point review of systems  Visit Vitals  BP (!) 158/88 (BP 1 Location: Left upper arm, BP Patient Position: Lying)   Pulse 76   Temp 97.7 °F (36.5 °C)   Resp 15   Ht 5' 8\" (1.727 m)   Wt 129 lb 8 oz (58.7 kg)   SpO2 100%   BMI 19.69 kg/m²       Neurologic Exam    Arousable with some degree of response on the right  There is persistent preferential downward gaze and 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 Most recent MRA  No results found for this or any previous visit. Most recent CTA  Results from [place] encounter on 04/02/21   CT CODE NEURO HEAD WO CONTRAST    Narrative EXAMINATION: CT CODE NEURO HEAD WO CONTRAST 4/17/2021 7:37 AM    INDICATION: Altered mental status. Status post ILDA on April 7, 2021    COMPARISON: CT head April 14, 2021    TECHNIQUE: Multiple-row detector helical CT examination of the head without  intravenous contrast.     Radiation dose reduction techniques were used for this study. Our CT scanners  use one or all of the following: Automated exposure control, adjustment of the  mA and/or kV according to patient size, iterative reconstruction. FINDINGS:  Redemonstrated moderate right thalamic hemorrhage. It measures approximately 3.7  x 2.5 cm, unchanged. Unchanged moderate surrounding edema there is slightly  increased size of the lateral ventricles. Right frontal approach EVD is  unchanged in position terminating in the left ventricle body. Unchanged leftward  midline shift measuring 7 mm. Unchanged intraventricular hemorrhage. No new loss  of gray-white differentiation. Essentially clear paranasal sinuses and mastoid  air cells. Grossly normal orbital structures. Impression 1.  Unchanged size of moderate right thalamic hemorrhage with intraventricular  extension. There is slightly increased size of the lateral ventricles with  otherwise unchanged mass effect from the hemorrhage. No new hemorrhage. Most recent Echo  Results for orders placed or performed during the hospital encounter of 21   2D ECHO COMPLETE ADULT (TTE) W OR [address] [facility]  [address], [address] [facility]  [phone]    Transthoracic Echocardiogram  2D, M-mode, Doppler, and Color Doppler    Patient: Adolfo Costa  MR #: [id number]  :   Age: 67 years  Gender: Male  Study date: 2021  Account #: [de-identified]  Height: 68 in  Weight: 133.8 lb  BSA: 1.72 mï¾²  Status:Routine  Location: [facility]  BP: 137/ 77    Allergies: NO KNOWN ALLERGENS    Sonographer:  Lisandra Correa [facility]  Group:  [id number] Cardiology  Referring Physician:  Rosemary Boogie DO  Reading Physician:  Dr. Gabi Thomas    INDICATIONS: TIA with transient neurological disturbance    PROCEDURE: This was a routine study. A transthoracic echocardiogram was  performed. The study included complete 2D imaging, M-mode, complete spectral  Doppler, and color Doppler. Intravenous contrast (Definity) was administered. Intravenous contrast (agitated saline) was administered. Image quality was  adequate. LEFT VENTRICLE: Size was normal. Systolic function was normal. Ejection  fraction was estimated in the range of 55 % to 60 %. There were no regional  wall motion abnormalities. Wall thickness was mildly increased. Left  ventricular diastolic function parameters were normal. Avg E/e': 4.54. RIGHT VENTRICLE: The size was normal. Systolic function was normal. The  tricuspid jet envelope definition was inadequate for estimation of RV   systolic  pressure. LEFT ATRIUM: Size was normal.    ATRIAL SEPTUM: Agitated saline contrast injection (bubble study) was   performed.   There was no right-to-left shunt, at rest or induced by the Valsalva   maneuver. RIGHT ATRIUM: Size was normal.    SYSTEMIC VEINS: IVC: The inferior vena cava was normal in size. The  respirophasic change in diameter was more than 50%. AORTIC VALVE: There is mild aortic annular calcification. The valve was  trileaflet. Leaflets exhibited mild sclerosis. MITRAL VALVE: Valve structure was normal.    TRICUSPID VALVE: The valve structure was normal. There was trivial  regurgitation. PULMONIC VALVE: Not well visualized. PERICARDIUM: There was no pericardial effusion. AORTA: The root exhibited normal size. SUMMARY:    -  Left ventricle: Systolic function was normal. Ejection fraction was  estimated in the range of 55 % to 60 %. -  Atrial septum: Agitated saline contrast injection (bubble study) was  performed. There was no right-to-left shunt, at rest or induced by the   Valsalva  maneuver. SYSTEM MEASUREMENT TABLES    2D  Ao Diam: 2.8 cm  LAEDV Index (A-L): 26.9 ml/m2  IVSd: 1.1 cm  LVIDd: 5.2 cm  LVPWd: 0.8 cm    PW  E/E' Av.5    Prepared and signed by    Dr. Kelton Hernandez 2021 22:44:08           Most recent lipid panels  Lab Results   Component Value Date/Time    Cholesterol, total 157 2021 04:25 AM    HDL Cholesterol 55 2021 04:25 AM    LDL, calculated 92.2 2021 04:25 AM    VLDL, calculated 9.8 2021 04:25 AM    Triglyceride 49 2021 04:25 AM    CHOL/HDL Ratio 2.9 2021 04:25 AM       Most recent Hgb A1C  Lab Results   Component Value Date/Time    Hemoglobin A1c 5.9 2021 04:25 AM                 Diagnoses and all orders for this visit:    1. Basal ganglia hemorrhage (HCC)    2. Elevated troponin    3. CAD in native artery    4. Acute left-sided weakness    5. Hypertension, unspecified type    6. Atherosclerosis of native coronary artery without angina pectoris, unspecified whether native or transplanted heart    7. Pure hypercholesterolemia    8.  Obstructive hydrocephalus (HCC)    Other orders  - NURSING-MISCELLANEOUS:; Standing  -     CT CODE NEURO HEAD WO CONTRAST; Standing  -     EKG, 12 LEAD, INITIAL; Standing  -     CBC WITH AUTOMATED DIFF; Standing  -     METABOLIC PANEL, BASIC; Standing  -     TROPONIN-HIGH SENSITIVITY; Standing  -     POC GLUCOSE; Standing  -     WEIGH PATIENT; Standing  -     NIH STROKE SCALE ASSESSMENT; Standing  -     Jonberg; Standing  -     POC PT/INR; Standing  -     niCARdipine (CARDENE) 25 mg in 0.9% sodium chloride (MBP/ADV) 250 mL infusion  -     sodium chloride 0.9 % bolus infusion 100 mL  -     sodium chloride (NS) flush 10 mL  -     iopamidoL (ISOVUE-370) 76 % injection 50 mL  -     GLUCOSE, POC; Standing  -     HEPATIC FUNCTION PANEL; Standing  -     XR CHEST PORT; Standing  -     desmopressin (DDAVP) 24.32 mcg in 0.9% sodium chloride 50 mL IVPB  -     CRITICAL CARE (ASAP ONLY); Standing  -     NEURO/VASCULAR CHECKS; Standing  -     FALL PRECAUTIONS; Standing  -     NURSING-MISCELLANEOUS:; Standing  -     sodium chloride (NS) flush 5-40 mL  -     sodium chloride (NS) flush 5-40 mL  -     2D ECHO COMPLETE ADULT (TTE) W OR WO CONTR; Standing  -     IP CONSULT TO PHYSIATRIST(REHAB MEDICINE);  Standing  -     IP CONSULT TO OCCUPATIONAL THERAPY; Standing  -     IP CONSULT TO PHYSICAL THERAPY; Standing  -     IP CONSULT TO SPEECH THERAPY; Standing  -     IP CONSULT TO STROKE COORDINATOR; Standing  -     INITIAL PHYSICIAN ORDER: INPATIENT; Standing  -     FULL CODE; Standing  -     NEUROLOGIC STATUS ASSESSMENT; Standing  -     NOTIFY PROVIDER: VITAL SIGNS CHANGES; Standing  -     NO VTE PROPHYLAXIS NEEDED; Standing  -     IP CONSULT TO CASE MANAGEMENT; Standing  -     TSH 3RD GENERATION; Standing  -     HEMOGLOBIN A1C WITH EAG; Standing  -     LIPID PANEL; Standing  -     HEPATITIS PANEL, ACUTE; Standing  -     US ABD LTD; Standing  -     CT HEAD WO CONT; Standing  -     TROPONIN-HIGH SENSITIVITY; Standing  -     CBC WITH AUTOMATED DIFF; Standing  -     METABOLIC PANEL, BASIC; Standing  -     EKG, 12 LEAD, SUBSEQUENT; Standing  -     TROPONIN-HIGH SENSITIVITY; Standing  -     lidocaine 4 % patch 1 Patch  -     perflutren lipid microspheres (DEFINITY) in NS bolus IV  -     HCV AB W/RFLX TO BRUCE; Standing  -     CT HEAD WO CONT; Standing  -     METABOLIC PANEL, BASIC; Standing  -     acetaminophen (TYLENOL) tablet 650 mg  -     CT HEAD WO CONT; Standing  -     CBC WITH AUTOMATED DIFF; Standing  -     METABOLIC PANEL, BASIC; Standing  -     NEURO/VASCULAR CHECKS; Standing  -     DIET NPO; Standing  -     XR SWALLOW FUNC VIDEO; Standing  -     SLP--MODIFIED BARIUM SWALLOW; Standing  -     HEPATIC FUNCTION PANEL; Standing  -     CBC WITH AUTOMATED DIFF; Standing  -     METABOLIC PANEL, BASIC; Standing  -     barium Sulfate (E-Z-HD) 98 % contrast susp 30 mL  -     barium sulfate (VARIBAR NECTAR) 40 % (w/v) contrast suspension 30 mL  -     barium sulfate (VARIBAR PUDDING) 40 % (w/v), 30% (w/w) contrast oral paste 30 mL  -     barium sulfate (VARIBAR THIN HONEY) 40 %(w/v), 29% (w/w)(1500 CPS) contrast suspension 30 mL  -     PLEASE READ & DOCUMENT PPD TEST IN 24 HRS; Standing  -     PLEASE READ & DOCUMENT PPD TEST IN 48 HRS; Standing  -     PLEASE READ & DOCUMENT PPD TEST IN 72 HRS; Standing  -     tuberculin injection 5 Units  -     NG TUBE, INSERTION; Standing  -     IP CONSULT TO NUTRITION SERVICES; Standing  -     metoprolol (LOPRESSOR) injection 5 mg  -     morphine injection 1 mg  -     oxyCODONE IR (ROXICODONE) tablet 5 mg  -     enalaprilat (VASOTEC) injection 1.25 mg  -     METABOLIC PANEL, BASIC; Standing  -     PHOSPHORUS; Standing  -     MAGNESIUM; Standing  -     MATERIAL DISTRIBUTION MESSAGE; Standing  -     COVID-19 RAPID TEST; Standing  -     NUTRITIONAL SUPPORT ELECTROLYTE PRN ORDERS  -     PHOSPHORUS; Standing  -     MAGNESIUM; Standing  -     XR CHEST SNGL V; Standing  -     CT HEAD WO CONT; Standing  -     acetaminophen (TYLENOL) solution 650 mg  -     NURSING-MISCELLANEOUS:; Standing  -     fentaNYL citrate (PF) injection 50 mcg  -     midazolam (VERSED) injection 2 mg  -     fentaNYL citrate (PF) 50 mcg/mL injection  -     midazolam (VERSED) 1 mg/mL injection  -     RESTRAINTS (NON-VIOLENT); Standing  -     atorvastatin (LIPITOR) tablet 40 mg  -     CBC W/O DIFF; Standing  -     RESTRAINTS (NON-VIOLENT); Standing  -     CT HEAD WO CONT; Standing  -     RESTRAINTS (NON-VIOLENT); Standing  -     CBC WITH AUTOMATED DIFF; Standing  -     XR CHEST SNGL V; Standing  -     CULTURE, BLOOD; Standing  -     CULTURE, BLOOD; Standing  -     piperacillin-tazobactam (ZOSYN) 3.375 g in 0.9% sodium chloride (MBP/ADV) 100 mL MBP  -     vancomycin (VANCOCIN) 1500 mg in  ml infusion  -     VANCOMYCIN, RANDOM; Standing  -     DIET TUBE FEEDING; Standing  -     HCV RNA BRUCE QUALITATIVE; Standing  -     HCV INTERPRETATION; Standing  -     RESTRAINTS (NON-VIOLENT); Standing  -     dextrose 5% infusion  -     SODIUM; Standing  -     vancomycin (VANCOCIN) 750 mg in 0.9% sodium chloride 250 mL (VIAL-MATE)  -     VANCOMYCIN, RANDOM; Standing  -     0.9% sodium chloride infusion  -     GLUCOSE, POC; Standing  -     CBC WITH AUTOMATED DIFF; Standing  -     GLUCOSE, POC; Standing  -     GLUCOSE, POC; Standing  -     GLUCOSE, POC; Standing  -     GLUCOSE, POC; Standing  -     GLUCOSE, POC; Standing  -     GLUCOSE, POC; Standing  -     GLUCOSE, POC; Standing  -     MATERIAL DISTRIBUTION MESSAGE; Standing  -     RESTRAINTS (NON-VIOLENT); Standing  -     GLUCOSE, POC; Standing  -     MAGNESIUM; Standing  -     PHOSPHORUS; Standing  -     GLUCOSE, POC; Standing  -     GLUCOSE, POC; Standing  -     amLODIPine (NORVASC) tablet 10 mg  -     GLUCOSE, POC; Standing  -     lip protectant (BLISTEX) ointment 1 Each  -     GLUCOSE, POC; Standing  -     RESTRAINTS (NON-VIOLENT);  Standing  -     GLUCOSE, POC; Standing  -     CT HEAD WO CONT; Standing  -     BLOOD GAS, ARTERIAL; Standing  -     GLUCOSE, POC; Standing  -     POC G3; Standing  -     GLUCOSE, POC; Standing  -     RESTRAINTS (NON-VIOLENT); Standing  -     GLUCOSE, POC; Standing  -     GLUCOSE, POC; Standing  -     NURSING-MISCELLANEOUS:; Standing  -     CBC WITH AUTOMATED DIFF; Standing  -     GLUCOSE, POC; Standing  -     GLUCOSE, POC; Standing  -     GLUCOSE, POC; Standing  -     GLUCOSE, POC; Standing  -     insulin lispro (HUMALOG) injection  -     metoprolol tartrate (LOPRESSOR) tablet 50 mg  -     RESTRAINTS (NON-VIOLENT);  Standing  -     GLUCOSE, POC; Standing  -     IP CONSULT TO GASTROENTEROLOGY; Standing  -     VERIFY CONSENT HAS BEEN OBTAINED; Standing  -     EGD; Standing  -     NURSING-MISCELLANEOUS:; Standing  -     ceFAZolin (ANCEF) 2 g/20 mL in sterile water IV syringe  -     GLUCOSE, POC; Standing  -     GLUCOSE, POC; Standing  -     GLUCOSE, POC; Standing  -     CT CODE NEURO HEAD WO CONTRAST; Standing  -     IP CONSULT TO NEUROLOGY; Standing  -     GLUCOSE, POC; Standing  -     EEG; Standing  -     IP CONSULT TO NEUROLOGY; Standing  -     NURSING-MISCELLANEOUS:; Standing      Impression  Upper midbrain dysfunction appears likely which is likely secondary to the location of bleeding hydrocephalus etc.    Agree with current management I have no specific additional suggestions we will review an EEG for seizure activity I think that that it is probably not particularly likely  I would not preemptively address the situation with levetiracetam or phenytoin          Ofelia Riggins MD

## 2021-04-17 NOTE — PROGRESS NOTES
NS  AFEBRILE  CT HEAD STAT THIS AM  FOR LETHARGY IS BASICALLY UNCHANGED.   NO NEW BLEEDING  VENTRICULOMEGALY IS  ABOUT THE SAME  EXAM  EYES OPEN TO VOICE  L HP  FOLLOWS COMMANDS ON  RIGHT EVD:  SCANT OUTPUT  A/P CLAMP EVD  TO OFF  PT Douglas Copeland MD

## 2021-04-17 NOTE — PROGRESS NOTES
Bedside shift change report recieved from Kennedi Arriaga, [facility]. Report included the following information SBAR, Kardex, Intake/Output, MAR, Recent Results, Cardiac Rhythm, Alarm Parameters, Quality Measures.   Dual Neuro and NIH completed at bedside

## 2021-04-17 NOTE — PROGRESS NOTES
Dr. Lucero Cervantes came by to assess patient at bedside and gave order to clamp EVD and leave EVD clamped.

## 2021-04-17 NOTE — PROGRESS NOTES
GI DAILY PROGRESS NOTE    Admit Date: 4/2/2021    CC: feeding difficulties    Subjective:     Patient had \"stroke\" code called this AM.  He is being evaluated by Neuro. He has been seen by Dr Kala Shaw. Per Dr Harrell Jere' note, head CT, stat EEG. Patient still not communicative. Spoke with his nurse and apparently he had waxing waning level of consciousness.     Medications:  Current Facility-Administered Medications   Medication Dose Route Frequency    insulin lispro (HUMALOG) injection   SubCUTAneous Q6H    metoprolol tartrate (LOPRESSOR) tablet 50 mg  50 mg Per NG tube Q12H    [START ON 4/19/2021] ceFAZolin (ANCEF) 2 g/20 mL in sterile water IV syringe  2 g IntraVENous ON CALL TO OR    amLODIPine (NORVASC) tablet 10 mg  10 mg Per NG tube DAILY    lip protectant (BLISTEX) ointment 1 Each  1 Each Topical PRN    0.9% sodium chloride infusion  75 mL/hr IntraVENous CONTINUOUS    NUTRITIONAL SUPPORT ELECTROLYTE PRN ORDERS   Does Not Apply PRN    acetaminophen (TYLENOL) solution 650 mg  650 mg Per NG tube Q4H PRN    atorvastatin (LIPITOR) tablet 40 mg  40 mg Per NG tube QHS    metoprolol (LOPRESSOR) injection 5 mg  5 mg IntraVENous Q6H PRN    [Held by provider] morphine injection 1 mg  1 mg IntraVENous Q4H PRN    [Held by provider] oxyCODONE IR (ROXICODONE) tablet 5 mg  5 mg Oral Q6H PRN    enalaprilat (VASOTEC) injection 1.25 mg  1.25 mg IntraVENous Q6H PRN    lidocaine 4 % patch 1 Patch  1 Patch TransDERmal Q24H    niCARdipine (CARDENE) 25 mg in 0.9% sodium chloride (MBP/ADV) 250 mL infusion  0-15 mg/hr IntraVENous TITRATE    sodium chloride (NS) flush 5-40 mL  5-40 mL IntraVENous Q8H    sodium chloride (NS) flush 5-40 mL  5-40 mL IntraVENous PRN       Objective:   Vitals:  Visit Vitals  BP (!) 158/88 (BP 1 Location: Left upper arm, BP Patient Position: Lying)   Pulse 76   Temp 97.7 °F (36.5 °C)   Resp 15   Ht 5' 8\" (1.727 m)   Wt 58.7 kg (129 lb 8 oz)   SpO2 100%   BMI 19.69 kg/m²       Intake/Output:  No intake/output data recorded.   04/15 1901 - 04/17 0700  In: 4455 [I.V.:900]  Out: 8158 [Urine:3775; Drains:3]    Exam:    Data Review (Labs):    Recent Results (from the past 24 hour(s))   GLUCOSE, POC    Collection Time: 04/16/21 11:51 AM   Result Value Ref Range    Glucose (POC) 58 (L) 65 - 100 mg/dL    Performed by [facility]    GLUCOSE, POC    Collection Time: 04/16/21  5:44 PM   Result Value Ref Range    Glucose (POC) 106 (H) 65 - 100 mg/dL    Performed by [facility]    GLUCOSE, POC    Collection Time: 04/16/21 11:14 PM   Result Value Ref Range    Glucose (POC) 111 (H) 65 - 100 mg/dL    Performed by [facility] [employer]    METABOLIC PANEL, BASIC    Collection Time: 04/17/21  5:00 AM   Result Value Ref Range    Sodium 142 138 - 145 mmol/L    Potassium 4.6 3.5 - 5.1 mmol/L    Chloride 112 (H) 98 - 107 mmol/L    CO2 21 21 - 32 mmol/L    Anion gap 9 7 - 16 mmol/L    Glucose 103 (H) 65 - 100 mg/dL    BUN 29 (H) 8 - 23 MG/DL    Creatinine 0.82 0.8 - 1.5 MG/DL    GFR est AA >60 >60 ml/min/1.73m2    GFR est non-AA >60 >60 ml/min/1.73m2    Calcium 8.9 8.3 - 10.4 MG/DL   GLUCOSE, POC    Collection Time: 04/17/21  5:03 AM   Result Value Ref Range    Glucose (POC) 109 (H) 65 - 100 mg/dL    Performed by [facility] [employer]    GLUCOSE, POC    Collection Time: 04/17/21  7:16 AM   Result Value Ref Range    Glucose (POC) 116 (H) 65 - 100 mg/dL    Performed by [facility]        Assessment:     Principal Problem:    Hemorrhagic cerebrovascular accident (CVA) ([facility] [address] (4/2/2021)    Active Problems:    Iron deficiency anemia (1/25/2015)      S/P PTCA (percutaneous transluminal coronary angioplasty) ()      Chronic tuberculosis ()      Coronary atherosclerosis of native coronary vessel ()      Hyperlipidemia ()      Acute left-sided weakness (4/2/2021)      Elevated troponin (4/4/2021)      Dysphagia following cerebral infarction (4/6/2021)      Elevated liver enzymes (4/6/2021)      Severe protein-calorie malnutrition (HCC) (4/9/2021)      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 Plan:     Feeding difficulties. I called his daughter, Marcela Ann again this morning to discuss delaying his PEG. Given the ongoing neuro evaluation, would rather have my partner Dr Elizabeth Angeles re evaluate with our shift change. She expressed understanding and agreed with my concerns.

## 2021-04-17 NOTE — PROGRESS NOTES
[facility] Hospitalist Progress Note     Name:  Giuseppe Hickman  Age:76 y.o. Sex:male   :  1945    MRN:  [id number]     Admit Date:  2021    Reason for Admission:  Hemorrhagic cerebrovascular accident (CVA) [facility]) [I61.9]    Assessment & Plan       Hemorrhagic CVA s/p EVD 21:  CODE S on 21, STAT CT head and tele neuro consults-discussed with Dr. Burnie Osler tele neuro who recommends neurosurgery followup, STAT EEG for possible seizure  Followed by neurosurgery, discussed with Dr. Bal Zapien today who will evaluate  NPO  NGT feeds  SLP following  GI consulted for PEG placement   Goal sodium 140 to 150, currently on NS IVF      Sepsis due to Pneumonia:   zosyn EOT 21  On room air        Elevated troponin:  Followed by cariology, due to demand ischemia       DENI on CKD:  followup BMP,stable  On NS IVF    CAD:  Continued lipitor    HTN:  Continued norvasc   increased  Metoprolol    IV cardene as needed for goal < 140/90  Also Has prn IV antihypertensives       Diet:  DIET NPO  DIET TUBE FEEDING  DVT PPx: anticoagulation contra indicated due to hemorrhagic CVA  GI Ppx:  none  Code: Full Code    Dispo/Discharge Planning: pending, will need PEG        45 minutes of critical care time spent evaluating, discussing with providers and care plan  I attempted to call his daughter Reva Salomon and got no answer    Hospital Course/Subjective:     Mr. Garry Morrow is a 68 yo male PMH of HTN, CAD, HLP, COPD, admitted 21 with acute right basal ganglia hemorrhagic CVA. Neurosurgery did not recommend acute interventions on admit. On 21 he was more drowsy and repeat CT head showed increased IVH and concern for early hydrocephalus. He is s/p EVD 21. Repeat CT head  stable. Goal sodium is 140 to 150. He was covered with antibiotics for pneumonia, possible aspiration. Cardiology followed for elevated troponin, feeling this was due to demand ischemia. He is receiving NGT feeds and  will require PEG. SLP following.  GI consulted for PEG placement. He has resumed antihypertensives via NGT and also required IV cardene. Discharge plans pending. Subjective/24 hr Events (04/17/21):    CODE S called due to being less responsive, less verbal,waking up more during exam      Objective:     Patient Vitals for the past 24 hrs:   Temp Pulse Resp BP SpO2   04/17/21 0600  78 30 (!) 152/83 99 %   04/17/21 0500  73 18 133/71 99 %   04/17/21 0400  82 24 (!) 155/78 94 %   04/17/21 0349 98.6 °F (37 °C)       04/17/21 0347  79 18 (!) 148/80 98 %   04/17/21 0300 98.6 °F (37 °C) 81 25 (!) 167/81 99 %   04/17/21 0200  75 18 (!) 153/75 98 %   04/17/21 0100  77 (!) 44 (!) 155/87 100 %   04/17/21 0000  75 21 (!) 149/83 99 %   04/16/21 2300 98.5 °F (36.9 °C) 76 18 (!) 146/85 100 %   04/16/21 2200  74 25  99 %   04/16/21 2100  94 (!) 56 (!) 149/83 95 %   04/16/21 2000  94 (!) 33 (!) 140/81 98 %   04/16/21 1900 98.6 °F (37 °C) 86 19 (!) 160/82 97 %   04/16/21 1800  90 20 (!) 144/80 97 %   04/16/21 1700  94 (!) 43 (!) 154/76 95 %   04/16/21 1600  91 (!) 34 134/78 97 %   04/16/21 1500 98.6 °F (37 °C) 100 16 (!) 157/95 98 %   04/16/21 1400  90 26 (!) 149/83 98 %   04/16/21 1300  87 23 (!) 151/85 97 %   04/16/21 1200  80 16 (!) 160/79 97 %   04/16/21 1130 98 °F (36.7 °C) 74 17 (!) 160/80 97 %   04/16/21 1100  82 24     04/16/21 1030  71 15  97 %   04/16/21 1000  75 20  98 %   04/16/21 0930  75 19 (!) 157/84 98 %   04/16/21 0900  93 23 136/70    04/16/21 0800  92 26 (!) 148/77      Oxygen Therapy  O2 Sat (%): 99 % (04/17/21 0600)  Pulse via Oximetry: 78 beats per minute (04/17/21 0600)  O2 Device: None (Room air) (04/16/21 0700)  Skin Assessment: Clean, dry, & intact (04/16/21 0700)  Skin Protection for O2 Device: N/A (04/13/21 1900)  O2 Flow Rate (L/min): 2 l/min (04/13/21 0700)    Body mass index is 19.69 kg/m².     Physical Exam:   General: No acute distress, alert   Lungs: Clear to auscultation bilaterally anterior Cardiovascular: Regular rate and rhythm with normal S1 and S2, no edema    Abdomen: Soft, nontender, nondistended, normoactive bowel sounds   Extremities: No cyanosis    Neuro: 5/5 right UE and LE strength, 0/5 left UE and LE  Psych: appropriate affect, alert, nodding, not verbal    Data Review:  I have reviewed all labs, meds, and studies from the last 24 hours:    Labs:    Recent Results (from the past 24 hour(s))   GLUCOSE, POC    Collection Time: 04/16/21 11:51 AM   Result Value Ref Range    Glucose (POC) 58 (L) 65 - 100 mg/dL    Performed by [facility]    GLUCOSE, POC    Collection Time: 04/16/21  5:44 PM   Result Value Ref Range    Glucose (POC) 106 (H) 65 - 100 mg/dL    Performed by [facility]    GLUCOSE, POC    Collection Time: 04/16/21 11:14 PM   Result Value Ref Range    Glucose (POC) 111 (H) 65 - 100 mg/dL    Performed by [facility]    METABOLIC PANEL, BASIC    Collection Time: 04/17/21  5:00 AM   Result Value Ref Range    Sodium 142 138 - 145 mmol/L    Potassium 4.6 3.5 - 5.1 mmol/L    Chloride 112 (H) 98 - 107 mmol/L    CO2 21 21 - 32 mmol/L    Anion gap 9 7 - 16 mmol/L    Glucose 103 (H) 65 - 100 mg/dL    BUN 29 (H) 8 - 23 MG/DL    Creatinine 0.82 0.8 - 1.5 MG/DL    GFR est AA >60 >60 ml/min/1.73m2    GFR est non-AA >60 >60 ml/min/1.73m2    Calcium 8.9 8.3 - 10.4 MG/DL   GLUCOSE, POC    Collection Time: 04/17/21  5:03 AM   Result Value Ref Range    Glucose (POC) 109 (H) 65 - 100 mg/dL    Performed by [facility]        All Micro Results     Procedure Component Value Units Date/Time    CULTURE, BLOOD [[id number]] Collected: 04/09/21 1053    Order Status: Completed Specimen: Blood Updated: 04/14/21 0721     Special Requests: --        RIGHT  HAND       Culture result: NO GROWTH 5 DAYS       CULTURE, BLOOD [[id number]] Collected: 04/09/21 1058    Order Status: Completed Specimen: Blood Updated: 04/14/21 0721     Special Requests: --        RIGHT  Antecubital       Culture result: NO GROWTH 5 DAYS       COVID-19 RAPID TEST [[id number]] Collected: 04/07/21 1221    Order Status: Completed Specimen: Nasopharyngeal Updated: 04/07/21 1301     Specimen source Nasopharyngeal        COVID-19 rapid test Not detected        Comment:      The specimen is NEGATIVE for SARS-CoV-2, the novel coronavirus associated with COVID-19. A negative result does not rule out COVID-19. This test has been authorized by the FDA under an Emergency Use Authorization (EUA) for use by authorized laboratories.         Fact sheet for Healthcare Providers: [employer]co.nz  Fact sheet for Patients: AG&Pco.nz       Methodology: Isothermal Nucleic Acid Amplification               EKG Results     Procedure 720 Value Units Date/Time    EKG, 12 LEAD, SUBSEQUENT [[id number]] Collected: 04/03/21 0821    Order Status: Completed Updated: 04/03/21 1227     Ventricular Rate 74 BPM      Atrial Rate 74 BPM      P-R Interval 160 ms      QRS Duration 94 ms      Q-T Interval 388 ms      QTC Calculation (Bezet) 430 ms      Calculated P Axis 71 degrees      Calculated R Axis -61 degrees      Calculated T Axis 88 degrees      Diagnosis --     Sinus rhythm with occasional Premature ventricular complexes  Left anterior fascicular block  ST & T wave abnormality, consider lateral ischemia  Abnormal ECG  When compared with ECG of 02-APR-2021 18:26,  Premature ventricular complexes are now Present  Left anterior fascicular block is now Present  Confirmed by Sherry Wolfe ([id number]) on 4/3/2021 12:27:42 PM      Initial ECG [[id number]] Collected: 04/02/21 1826    Order Status: Completed Updated: 04/03/21 0732     Ventricular Rate 77 BPM      Atrial Rate 78 BPM      P-R Interval 156 ms      QRS Duration 96 ms      Q-T Interval 356 ms      QTC Calculation (Bezet) 402 ms      Calculated P Axis 76 degrees      Calculated R Axis 69 degrees      Calculated T Axis 79 degrees      Diagnosis --     Normal sinus rhythm  Normal ECG  When compared with ECG of 28-AUG-2017 21:42,  Questionable change in QRS axis  Nonspecific T wave abnormality, improved in Lateral leads  Confirmed by Marcia Recinos ([id number]) on 4/3/2021 7:32:01 AM      EKG 12 LEAD INITIAL [[id number]]     Order Status: Canceled           Other Studies:  Ct Head Wo Cont    Result Date: 4/3/2021  NONCONTRAST HEAD CT CLINICAL HISTORY:  Follow-up intracranial hemorrhage. TECHNIQUE:  Axial images were obtained with spiral technique. Radiation dose reduction was achieved using one or all of the following techniques: automated exposure control, weight-based dosing, iterative reconstruction. COMPARISON:  CT yesterday and 0617 hours today. REPORT:   Standard noncontrast head CT demonstrates continued increase in size of the right thalamic hemorrhage with maximum diameter now measured at approximately 3.6 cm compared with 2.9 cm earlier today. Moreover, there is new bilateral lateral intraventricular extension of hemorrhage, primarily localized to the atria. There is associated slight increase in bilateral lateral ventriculomegaly with biatrial diameter now measuring approximately 4.2 cm compared with 3.8 cm this morning. Mass effect is little changed with midline shift from right to left of approximately 5 mm compared with 4 mm this morning. Extensive small vessel ischemic disease and small focal lateral right frontal infarct appear unchanged. INTERVAL INCREASE IN SIZE OF THE RIGHT THALAMIC HEMORRHAGE WITH NEW BILATERAL LATERAL INTRAVENTRICULAR EXTENSION AND SLIGHT WORSENING OF VENTRICULOMEGALY. Ct Head Wo Cont    Result Date: 4/3/2021  EXAM: CT HEAD WO CONT HISTORY: .  TECHNIQUE: Axial images of the brain were performed without the administration of intravenous contrast. Images were obtained axial plane and coronal reformatted images were submitted. CT scan performed using appropriate/available dose optimization/reduction/ALARA techniques.  COMPARISON: None. FINDINGS: The known right thalamic hemorrhage is again observed. There is surrounding edema. The body of this hemorrhage has not changed significantly. Currently it measures 2.4 x 2.9 cm when previously it measured 2.4 x 2.7 cm. There has been interval increase in an area of adjacent or extension of the hemorrhage posteriorly. Previously it measured 4.5 mm in width. Currently it measures 1.5 cm in width and 1.3 cm AP. There is persistent intraventricular hemorrhage in the posterior horn of the right lateral ventricle. Interval development of a small amount of hemorrhage in the left posterior horn. There is mild midline shift of the posterior septum pellucidum of approximately 4.1 mm. The lateral ventricles are mildly dilated, stable. Chronic ischemic white matter changes are again observed. Question small chronic infarction in the right frontal lobe. Interval increase in the size of the right thalamic hemorrhage. Interval development of mild right to left midline shift of the posterior aspect of the septum pellucidum. Stable hemorrhage in the posterior horn of the right lateral ventricle. Interval development of a small amount of hemorrhage in the posterior horn of the left lateral ventricle. Other findings as above. [address] [place] [address]    Result Date: 4/3/2021  RIGHT UPPER QUADRANT ULTRASOUND. HISTORY: Transaminitis. COMPARISON: No relevant comparison studies available. FINDINGS: Ultrasonographic Ramon's sign: is reported as negative Gallstones: There are intraluminal echogenic foci, probably stones. Gallbladder Wall: not thickened. Common Bile Duct: is not dilated, 4 mm. Intrahepatic Biliary Tree: is not dilated. Liver: Uniform parenchyma Included portion of the pancreas and right kidney: are unremarkable. Vasculature: Aorta: Normal caliber. IVC:  Patent. Portal vein: Hepatopedal flow. Probable gallstones. No biliary tree obstruction. No findings to suggest acute cholecystitis.      Xr Chest Port    Result Date: 4/2/2021  CHEST X-RAY, one view. HISTORY:  Chest pain. TECHNIQUE:  AP upright portable view. COMPARISON: August 2017 FINDINGS: Lungs: Atelectasis or scar right base, similar to prior exam. Costophrenic angles: are sharp. Heart size: is normal. Pulmonary vasculature: is unremarkable. Aorta: Arch calcifications. Included portion of the upper abdomen: is unremarkable. Bones: No gross bony lesions. Other: None. Negative for acute change. Ct Code Neuro Head Wo Contrast    Result Date: 4/2/2021  CT HEAD WITHOUT CONTRAST HISTORY:  CVA. COMPARISON: None. TECHNIQUE: Axial imaging was performed without intravenous contrast utilizing 5mm slice thickness. Sagittal and coronal reformats were performed. Radiation dose reduction techniques were used for this study. Our CT scanner uses one or all of the following: Automated exposure control, adjustment of the MAS or KUB according to patient's size and iterative reconstruction. FINDINGS:    *BRAIN:    -  There are no early signs of territorial or lacunar infarction by CT.    -  2.7 x 2.4 cm acute hemorrhage into the right basal ganglia and thalamus. Right lateral ventricle intraventricular hemorrhage.    -  No gross white matter abnormality by CT. *VISUALIZED PARANASAL SINUSES: Well aerated. *MASTOIDS:  Clear. *CALVARIUM AND SCALP: Unremarkable. Acute right basal ganglia hematoma likely on the basis of hypertension. Right intraventricular hemorrhage.  Date of Dictation: 4/2/2021 6:17 PM          Current Meds:   Current Facility-Administered Medications   Medication Dose Route Frequency    insulin lispro (HUMALOG) injection   SubCUTAneous Q6H    metoprolol tartrate (LOPRESSOR) tablet 50 mg  50 mg Per NG tube Q12H    [START ON 4/19/2021] ceFAZolin (ANCEF) 2 g/20 mL in sterile water IV syringe  2 g IntraVENous ON CALL TO OR    amLODIPine (NORVASC) tablet 10 mg  10 mg Per NG tube DAILY    lip protectant (BLISTEX) ointment 1 Each  1 Each Topical PRN  0.9% sodium chloride infusion  75 mL/hr IntraVENous CONTINUOUS    NUTRITIONAL SUPPORT ELECTROLYTE PRN ORDERS   Does Not Apply PRN    acetaminophen (TYLENOL) solution 650 mg  650 mg Per NG tube Q4H PRN    atorvastatin (LIPITOR) tablet 40 mg  40 mg Per NG tube QHS    metoprolol (LOPRESSOR) injection 5 mg  5 mg IntraVENous Q6H PRN    [Held by provider] morphine injection 1 mg  1 mg IntraVENous Q4H PRN    [Held by provider] oxyCODONE IR (ROXICODONE) tablet 5 mg  5 mg Oral Q6H PRN    enalaprilat (VASOTEC) injection 1.25 mg  1.25 mg IntraVENous Q6H PRN    lidocaine 4 % patch 1 Patch  1 Patch TransDERmal Q24H    niCARdipine (CARDENE) 25 mg in 0.9% sodium chloride (MBP/ADV) 250 mL infusion  0-15 mg/hr IntraVENous TITRATE    sodium chloride (NS) flush 5-40 mL  5-40 mL IntraVENous Q8H    sodium chloride (NS) flush 5-40 mL  5-40 mL IntraVENous PRN       Problem List:  Hospital Problems as of 4/17/2021 Date Reviewed: 4/4/2021          Codes Class Noted - Resolved POA    Severe protein-calorie malnutrition ([facility] [address] ICD-10-CM: E43  ICD-9-CM: 262  4/9/2021 - Present Yes        Dysphagia following cerebral infarction ICD-10-CM: I69.391  ICD-9-CM: 438.82  4/6/2021 - Present Yes        Elevated liver enzymes ICD-10-CM: R74.8  ICD-9-CM: 790.5  4/6/2021 - Present Yes        Elevated troponin ICD-10-CM: R77.8  ICD-9-CM: 790.6  4/4/2021 - Present Yes        * (Principal) Hemorrhagic cerebrovascular accident (CVA) ([facility] [address] ICD-10-CM: I61.9  ICD-9-CM: 896  4/2/2021 - Present Yes        Acute left-sided weakness ICD-10-CM: R53.1  ICD-9-CM: 728.87  4/2/2021 - Present Yes        S/P PTCA (percutaneous transluminal coronary angioplasty) ICD-10-CM: Z98.61  ICD-9-CM: V45.82  Unknown - Present Yes        Chronic tuberculosis ICD-10-CM: A15.9  ICD-9-CM: 011.90  Unknown - Present Yes        Coronary atherosclerosis of native coronary vessel ICD-10-CM: I25.10  ICD-9-CM: 414.01  Unknown - Present Yes        Hyperlipidemia ICD-10-CM: E78.5  ICD-9-CM: 272.4  Unknown - Present Yes        Iron deficiency anemia ICD-10-CM: D50.9  ICD-9-CM: 280.9  1/25/2015 - Present Yes        Microcytic anemia ICD-10-CM: D50.9  ICD-9-CM: 280.9  1/21/2015 - Present     Overview Signed 4/6/2016  9:52 AM by Kim Chan     Iron deficiency                    Part of this note was written by using a voice dictation software and the note has been proof read but may still contain some grammatical/other typographical errors.     Signed By: Antonia Lea MD   [employer]    April 17, 2021  9:08 AM

## 2021-04-17 NOTE — PROGRESS NOTES
Problem: Patient Education: Go to Patient Education Activity  Goal: Patient/Family Education  Outcome: Progressing Towards Goal     Problem: Hemorrhagic Stroke: Day 4  Goal: Off Pathway (Use only if patient is Off Pathway)  Outcome: Progressing Towards Goal     Problem: Hemorrhagic Stroke: Discharge Outcomes  Goal: *Verbalizes anxiety and depression are reduced or absent  Outcome: Progressing Towards Goal  Goal: *Verbalize understanding of risk factor modification(Stroke Metric)  Outcome: Progressing Towards Goal  Goal: *Optimal pain control at patient's stated goal  Outcome: Progressing Towards Goal  Goal: *Hemodynamically stable  Outcome: Progressing Towards Goal  Goal: *Absence of aspiration pneumonia  Outcome: Progressing Towards Goal  Goal: *Aware of needed dietary changes  Outcome: Progressing Towards Goal  Goal: *Verbalizes understanding and describes medication purposes and frequencies  Outcome: Progressing Towards Goal  Goal: *Tolerating diet  Outcome: Progressing Towards Goal  Goal: *Absence of signs and symptoms of DVT  Outcome: Progressing Towards Goal  Goal: *Absence of aspiration  Outcome: Progressing Towards Goal  Goal: *Progressive mobility and function  Outcome: Progressing Towards Goal     Problem: Falls - Risk of  Goal: *Absence of Falls  Description: Document Ofelia Fall Risk and appropriate interventions in the flowsheet.   Outcome: Progressing Towards Goal  Note: Fall Risk Interventions:  Mobility Interventions: Bed/chair exit alarm, Communicate number of staff needed for ambulation/transfer, Patient to call before getting OOB    Mentation Interventions: Bed/chair exit alarm, More frequent rounding, Increase mobility, Reorient patient    Medication Interventions: Bed/chair exit alarm, Patient to call before getting OOB, Teach patient to arise slowly    Elimination Interventions: Bed/chair exit alarm, Call light in reach, Toileting schedule/hourly rounds    History of Falls Interventions: Bed/chair exit alarm, Door open when patient unattended, Investigate reason for fall         Problem: Patient Education: Go to Patient Education Activity  Goal: Patient/Family Education  Outcome: Progressing Towards Goal     Problem: Pressure Injury - Risk of  Goal: *Prevention of pressure injury  Description: Document Chavez Scale and appropriate interventions in the flowsheet. Outcome: Progressing Towards Goal  Note: Pressure Injury Interventions:  Sensory Interventions: Maintain/enhance activity level, Minimize linen layers, Pressure redistribution bed/mattress (bed type), Turn and reposition approx.  every two hours (pillows and wedges if needed)    Moisture Interventions: Internal/External urinary devices, Minimize layers, Check for incontinence Q2 hours and as needed, Absorbent underpads    Activity Interventions: Pressure redistribution bed/mattress(bed type)    Mobility Interventions: Pressure redistribution bed/mattress (bed type), Float heels    Nutrition Interventions: Document food/fluid/supplement intake, Discuss nutritional consult with provider    Friction and Shear Interventions: Feet elevated on foot rest, Foam dressings/transparent film/skin sealants, Lift team/patient mobility team, Transfer aides:transfer board/Emperatriz lift/ceiling lift, Transferring/repositioning devices, Minimize layers                Problem: Patient Education: Go to Patient Education Activity  Goal: Patient/Family Education  Outcome: Progressing Towards Goal     Problem: Patient Education: Go to Patient Education Activity  Goal: Patient/Family Education  Outcome: Progressing Towards Goal     Problem: Patient Education: Go to Patient Education Activity  Goal: Patient/Family Education  Outcome: Progressing Towards Goal     Problem: Delirium Treatment  Goal: *Level of consciousness restored to baseline  Outcome: Progressing Towards Goal  Goal: *Level of environmental perceptions restored to baseline  Outcome: Progressing Towards Goal  Goal: *Sensory perception restored to baseline  Outcome: Progressing Towards Goal  Goal: *Emotional stability restored to baseline  Outcome: Progressing Towards Goal  Goal: *Functional assessment restored to baseline  Outcome: Progressing Towards Goal  Goal: *Absence of falls  Outcome: Progressing Towards Goal  Goal: *Will remain free of delirium, CAM Score negative  Outcome: Progressing Towards Goal  Goal: *Cognitive status will be restored to baseline  Outcome: Progressing Towards Goal  Goal: Interventions  Outcome: Progressing Towards Goal     Problem: Patient Education: Go to Patient Education Activity  Goal: Patient/Family Education  Outcome: Progressing Towards Goal     Problem: Non-Violent Restraints  Goal: Removal from restraints as soon as assessed to be safe  Outcome: Progressing Towards Goal  Goal: No harm/injury to patient while restraints in use  Outcome: Progressing Towards Goal  Goal: Patient's dignity will be maintained  Outcome: Progressing Towards Goal  Goal: Patient Interventions  Outcome: Progressing Towards Goal

## 2021-04-18 LAB
ANION GAP SERPL CALC-SCNC: 5 MMOL/L (ref 7–16)
BUN SERPL-MCNC: 33 MG/DL (ref 8–23)
CALCIUM SERPL-MCNC: 9 MG/DL (ref 8.3–10.4)
CHLORIDE SERPL-SCNC: 110 MMOL/L (ref 98–107)
CO2 SERPL-SCNC: 25 MMOL/L (ref 21–32)
CREAT SERPL-MCNC: 0.86 MG/DL (ref 0.8–1.5)
GLUCOSE BLD STRIP.AUTO-MCNC: 108 MG/DL (ref 65–100)
GLUCOSE BLD STRIP.AUTO-MCNC: 108 MG/DL (ref 65–100)
GLUCOSE BLD STRIP.AUTO-MCNC: 111 MG/DL (ref 65–100)
GLUCOSE BLD STRIP.AUTO-MCNC: 126 MG/DL (ref 65–100)
GLUCOSE SERPL-MCNC: 117 MG/DL (ref 65–100)
POTASSIUM SERPL-SCNC: 4.2 MMOL/L (ref 3.5–5.1)
SERVICE CMNT-IMP: ABNORMAL
SODIUM SERPL-SCNC: 140 MMOL/L (ref 136–145)

## 2021-04-18 PROCEDURE — 36415 COLL VENOUS BLD VENIPUNCTURE: CPT

## 2021-04-18 PROCEDURE — 74011250637 HC RX REV CODE- 250/637: Performed by: FAMILY MEDICINE

## 2021-04-18 PROCEDURE — 65610000006 HC RM INTENSIVE CARE

## 2021-04-18 PROCEDURE — 74011000250 HC RX REV CODE- 250: Performed by: FAMILY MEDICINE

## 2021-04-18 PROCEDURE — 74011250637 HC RX REV CODE- 250/637: Performed by: INTERNAL MEDICINE

## 2021-04-18 PROCEDURE — 82962 GLUCOSE BLOOD TEST: CPT

## 2021-04-18 PROCEDURE — 80048 BASIC METABOLIC PNL TOTAL CA: CPT

## 2021-04-18 PROCEDURE — 74011000250 HC RX REV CODE- 250: Performed by: INTERNAL MEDICINE

## 2021-04-18 PROCEDURE — 74011250636 HC RX REV CODE- 250/636: Performed by: INTERNAL MEDICINE

## 2021-04-18 RX ADMIN — Medication 10 ML: at 05:18

## 2021-04-18 RX ADMIN — ENALAPRILAT 1.25 MG: 1.25 INJECTION INTRAVENOUS at 18:15

## 2021-04-18 RX ADMIN — SODIUM CHLORIDE 75 ML/HR: 900 INJECTION, SOLUTION INTRAVENOUS at 01:11

## 2021-04-18 RX ADMIN — METOPROLOL TARTRATE 50 MG: 50 TABLET, FILM COATED ORAL at 08:32

## 2021-04-18 RX ADMIN — AMLODIPINE BESYLATE 10 MG: 10 TABLET ORAL at 08:32

## 2021-04-18 RX ADMIN — Medication 10 ML: at 13:58

## 2021-04-18 RX ADMIN — SODIUM CHLORIDE 75 ML/HR: 900 INJECTION, SOLUTION INTRAVENOUS at 13:57

## 2021-04-18 RX ADMIN — METOPROLOL TARTRATE 5 MG: 5 INJECTION INTRAVENOUS at 01:26

## 2021-04-18 RX ADMIN — Medication 10 ML: at 21:14

## 2021-04-18 RX ADMIN — ATORVASTATIN CALCIUM 40 MG: 40 TABLET, FILM COATED ORAL at 21:03

## 2021-04-18 RX ADMIN — METOPROLOL TARTRATE 50 MG: 50 TABLET, FILM COATED ORAL at 20:38

## 2021-04-18 NOTE — PROGRESS NOTES
Bedside shift change report recieved from [place]. Report included the following information SBAR, Intake/Output, MAR, Recent Results, Cardiac Rhythm, Alarm Parameters, Quality Measures. Dual Neuro and NIH completed at bedside.

## 2021-04-18 NOTE — PROGRESS NOTES
[facility] Hospitalist Progress Note     Name:  Jenn Enciso  Age:76 y.o. Sex:male   :  1945    MRN:  [id number]     Admit Date:  2021    Reason for Admission:  Hemorrhagic cerebrovascular accident (CVA) [facility]) [I61.9]    Assessment & Plan       Hemorrhagic CVA s/p EVD 21: Followed by neurosurgery and neurology  Possible d/c EVD   NPO  NGT feeds  SLP following  GI consulted for PEG placement   Goal sodium 140 to 150, currently on NS IVF      Sepsis due to Pneumonia:   zosyn EOT 21  On room air        Elevated troponin:  Followed by cardiology, due to demand ischemia       DENI on CKD:  followup BMP,stable  On NS IVF    CAD:  Continued lipitor    HTN:  Continued norvasc, Metoprolol    IV cardene as needed for goal < 140/90  Also Has prn IV antihypertensives       Diet:  DIET NPO  DIET TUBE FEEDING  DVT PPx: anticoagulation contra indicated due to hemorrhagic CVA  GI Ppx:  none  Code: Full Code    Dispo/Discharge Planning: pending, will need PEG        45 minutes of critical care time spent evaluating, discussing with providers and care plan  I attempted to call his daughter Ashish Cevallos and got no answer    Hospital Course/Subjective:     Mr. Sandi Carranza is a 68 yo male PMH of HTN, CAD, HLP, COPD, admitted 21 with acute right basal ganglia hemorrhagic CVA. Neurosurgery did not recommend acute interventions on admit. On 21 he was more drowsy and repeat CT head showed increased IVH and concern for early hydrocephalus. He is s/p EVD 21. Repeat CT head  stable. CODE S called on 21 due to decreased responsiveness. CT head no overall changes. EEG shows abnormal findings consistent with known NS hemorrhage. Neurology following. Goal sodium is 140 to 150. He was covered with antibiotics for pneumonia, possible aspiration. Cardiology followed for elevated troponin, feeling this was due to demand ischemia. He is receiving NGT feeds and  will require PEG. SLP following.  GI consulted for PEG placement. He has resumed antihypertensives via NGT and also required IV cardene. Discharge plans pending.      Subjective/24 hr Events (04/18/21):    Sleeping, family visited earlier today and he was talkative and interactive per RN      Objective:     Patient Vitals for the past 24 hrs:   Temp Pulse Resp BP SpO2   04/18/21 1230  73 (!) 31 128/74 100 %   04/18/21 1200  70 23 136/74 99 %   04/18/21 1130  70 16 (!) 141/76 100 %   04/18/21 1100 98.7 °F (37.1 °C) 68 21 129/87 99 %   04/18/21 1030  65 19 133/73 98 %   04/18/21 1000  61 (!) 37 131/71 98 %   04/18/21 0931  63 22 120/76 98 %   04/18/21 0900  79 21 134/74 98 %   04/18/21 0831  74 17 121/72 97 %   04/18/21 0800  75 18 (!) 150/73 98 %   04/18/21 0732  75 18 (!) 150/76 98 %   04/18/21 0700 98.1 °F (36.7 °C) 74 20 (!) 142/75 99 %   04/18/21 0631  81 26 (!) 140/90 100 %   04/18/21 0620  74 19 (!) 140/75 99 %   04/18/21 0600  76 28 (!) 145/73 95 %   04/18/21 0500  74 19 115/65 (!) 87 %   04/18/21 0400  74 18 (!) 141/77 99 %   04/18/21 0300 98.5 °F (36.9 °C) 75 23 135/81 98 %   04/18/21 0234  75 16 123/71 (!) 62 %   04/18/21 0200  72 (!) 33 (!) 146/85 100 %   04/18/21 0124  77 23 (!) 159/86 99 %   04/18/21 0100  77 21 (!) 147/84 99 %   04/18/21 0000  78 21 139/82 99 %   04/17/21 2301  74 21 138/76 100 %   04/17/21 2300 98 °F (36.7 °C) 76 24 138/76 95 %   04/17/21 2200  71 21 124/72 100 %   04/17/21 2100  85 30 (!) 140/87 98 %   04/17/21 2000  80 18 (!) 140/75 98 %   04/17/21 1900 98.1 °F (36.7 °C) 83 20 (!) 153/90 100 %   04/17/21 1831  80 28 138/84 99 %   04/17/21 1800  81 20 139/84 100 %   04/17/21 1731  84 26 135/80 99 %   04/17/21 1700  82 27 (!) 144/83 100 %   04/17/21 1631  80 24 136/78 99 %   04/17/21 1625 97.8 °F (36.6 °C) 79 21 138/70 99 %   04/17/21 1600  78 17 (!) 155/84 99 %   04/17/21 1531  75 30 (!) 147/80 100 %   04/17/21 1500  74 20 139/77 99 %   04/17/21 1451  75 21 119/70 99 %   04/17/21 1432  68 20 132/66 99 %   04/17/21 1400  72 15 124/73 100 %   04/17/21 1331  69 26 134/72 100 %     Oxygen Therapy  O2 Sat (%): 100 % (04/18/21 1230)  Pulse via Oximetry: 74 beats per minute (04/18/21 1230)  O2 Device: None (Room air) (04/18/21 1100)  Skin Assessment: Clean, dry, & intact (04/18/21 0700)  Skin Protection for O2 Device: N/A (04/13/21 1900)  O2 Flow Rate (L/min): 2 l/min (04/13/21 0700)    Body mass index is 19.46 kg/m².     Physical Exam:   General: No acute distress, sleeping soundly  Lungs: Clear to auscultation bilaterally anterior   Cardiovascular: Regular rate and rhythm with normal S1 and S2, no edema    Abdomen: Soft, nontender, nondistended, normoactive bowel sounds   Extremities: No cyanosis    Neuro: sleeping      Data Review:  I have reviewed all labs, meds, and studies from the last 24 hours:    Labs:    Recent Results (from the past 24 hour(s))   GLUCOSE, POC    Collection Time: 04/17/21  6:02 PM   Result Value Ref Range    Glucose (POC) 110 (H) 65 - 100 mg/dL    Performed by Aretha    GLUCOSE, POC    Collection Time: 04/18/21 12:26 AM   Result Value Ref Range    Glucose (POC) 126 (H) 65 - 100 mg/dL    Performed by [facility]    METABOLIC PANEL, BASIC    Collection Time: 04/18/21  3:09 AM   Result Value Ref Range    Sodium 140 136 - 145 mmol/L    Potassium 4.2 3.5 - 5.1 mmol/L    Chloride 110 (H) 98 - 107 mmol/L    CO2 25 21 - 32 mmol/L    Anion gap 5 (L) 7 - 16 mmol/L    Glucose 117 (H) 65 - 100 mg/dL    BUN 33 (H) 8 - 23 MG/DL    Creatinine 0.86 0.8 - 1.5 MG/DL    GFR est AA >60 >60 ml/min/1.73m2    GFR est non-AA >60 >60 ml/min/1.73m2    Calcium 9.0 8.3 - 10.4 MG/DL   GLUCOSE, POC    Collection Time: 04/18/21  5:09 AM   Result Value Ref Range    Glucose (POC) 111 (H) 65 - 100 mg/dL    Performed by Harry    GLUCOSE, POC    Collection Time: 04/18/21 11:22 AM   Result Value Ref Range    Glucose (POC) 108 (H) 65 - 100 mg/dL    Performed by Ashutosh        All Micro Results Procedure Component Value Units Date/Time    CULTURE, BLOOD [[id number]] Collected: 04/09/21 1053    Order Status: Completed Specimen: Blood Updated: 04/14/21 0721     Special Requests: --        RIGHT  HAND       Culture result: NO GROWTH 5 DAYS       CULTURE, BLOOD [[id number]] Collected: 04/09/21 1058    Order Status: Completed Specimen: Blood Updated: 04/14/21 0721     Special Requests: --        RIGHT  Antecubital       Culture result: NO GROWTH 5 DAYS       COVID-19 RAPID TEST [[id number]] Collected: 04/07/21 1221    Order Status: Completed Specimen: Nasopharyngeal Updated: 04/07/21 1301     Specimen source Nasopharyngeal        COVID-19 rapid test Not detected        Comment:      The specimen is NEGATIVE for SARS-CoV-2, the novel coronavirus associated with COVID-19. A negative result does not rule out COVID-19. This test has been authorized by the FDA under an Emergency Use Authorization (EUA) for use by authorized laboratories.         Fact sheet for Healthcare Providers: ConventionUpdate.co.nz  Fact sheet for Patients: ConventionUpdate.co.nz       Methodology: Isothermal Nucleic Acid Amplification               EKG Results     Procedure 720 Value Units Date/Time    EKG, 12 LEAD, SUBSEQUENT [[id number]] Collected: 04/03/21 0821    Order Status: Completed Updated: 04/03/21 1227     Ventricular Rate 74 BPM      Atrial Rate 74 BPM      P-R Interval 160 ms      QRS Duration 94 ms      Q-T Interval 388 ms      QTC Calculation (Bezet) 430 ms      Calculated P Axis 71 degrees      Calculated R Axis -61 degrees      Calculated T Axis 88 degrees      Diagnosis --     Sinus rhythm with occasional Premature ventricular complexes  Left anterior fascicular block  ST & T wave abnormality, consider lateral ischemia  Abnormal ECG  When compared with ECG of 02-APR-2021 18:26,  Premature ventricular complexes are now Present  Left anterior fascicular block is now Present  Confirmed by Nathan Bowman ([id number]) on 4/3/2021 12:27:42 PM      Initial ECG [[id number]] Collected: 04/02/21 1826    Order Status: Completed Updated: 04/03/21 0732     Ventricular Rate 77 BPM      Atrial Rate 78 BPM      P-R Interval 156 ms      QRS Duration 96 ms      Q-T Interval 356 ms      QTC Calculation (Bezet) 402 ms      Calculated P Axis 76 degrees      Calculated R Axis 69 degrees      Calculated T Axis 79 degrees      Diagnosis --     Normal sinus rhythm  Normal ECG  When compared with ECG of 28-AUG-2017 21:42,  Questionable change in QRS axis  Nonspecific T wave abnormality, improved in Lateral leads  Confirmed by Sowmya Blanton ([id number]) on 4/3/2021 7:32:01 AM      EKG 12 LEAD INITIAL [[id number]]     Order Status: Canceled           Other Studies:  Ct Head Wo Cont    Result Date: 4/3/2021  NONCONTRAST HEAD CT CLINICAL HISTORY:  Follow-up intracranial hemorrhage. TECHNIQUE:  Axial images were obtained with spiral technique. Radiation dose reduction was achieved using one or all of the following techniques: automated exposure control, weight-based dosing, iterative reconstruction. COMPARISON:  CT yesterday and 0617 hours today. REPORT:   Standard noncontrast head CT demonstrates continued increase in size of the right thalamic hemorrhage with maximum diameter now measured at approximately 3.6 cm compared with 2.9 cm earlier today. Moreover, there is new bilateral lateral intraventricular extension of hemorrhage, primarily localized to the atria. There is associated slight increase in bilateral lateral ventriculomegaly with biatrial diameter now measuring approximately 4.2 cm compared with 3.8 cm this morning. Mass effect is little changed with midline shift from right to left of approximately 5 mm compared with 4 mm this morning. Extensive small vessel ischemic disease and small focal lateral right frontal infarct appear unchanged.      INTERVAL INCREASE IN SIZE OF THE RIGHT THALAMIC HEMORRHAGE WITH NEW BILATERAL LATERAL INTRAVENTRICULAR EXTENSION AND SLIGHT WORSENING OF VENTRICULOMEGALY. Ct Head Wo Cont    Result Date: 4/3/2021  EXAM: CT HEAD WO CONT HISTORY: .  TECHNIQUE: Axial images of the brain were performed without the administration of intravenous contrast. Images were obtained axial plane and coronal reformatted images were submitted. CT scan performed using appropriate/available dose optimization/reduction/ALARA techniques. COMPARISON: None. FINDINGS: The known right thalamic hemorrhage is again observed. There is surrounding edema. The body of this hemorrhage has not changed significantly. Currently it measures 2.4 x 2.9 cm when previously it measured 2.4 x 2.7 cm. There has been interval increase in an area of adjacent or extension of the hemorrhage posteriorly. Previously it measured 4.5 mm in width. Currently it measures 1.5 cm in width and 1.3 cm AP. There is persistent intraventricular hemorrhage in the posterior horn of the right lateral ventricle. Interval development of a small amount of hemorrhage in the left posterior horn. There is mild midline shift of the posterior septum pellucidum of approximately 4.1 mm. The lateral ventricles are mildly dilated, stable. Chronic ischemic white matter changes are again observed. Question small chronic infarction in the right frontal lobe. Interval increase in the size of the right thalamic hemorrhage. Interval development of mild right to left midline shift of the posterior aspect of the septum pellucidum. Stable hemorrhage in the posterior horn of the right lateral ventricle. Interval development of a small amount of hemorrhage in the posterior horn of the left lateral ventricle. Other findings as above. [id number] [facility]    Result Date: 4/3/2021  RIGHT UPPER QUADRANT ULTRASOUND. HISTORY: Transaminitis. COMPARISON: No relevant comparison studies available. FINDINGS: Ultrasonographic Ramon's sign: is reported as negative Gallstones:  There are intraluminal echogenic foci, probably stones. Gallbladder Wall: not thickened. Common Bile Duct: is not dilated, 4 mm. Intrahepatic Biliary Tree: is not dilated. Liver: Uniform parenchyma Included portion of the pancreas and right kidney: are unremarkable. Vasculature: Aorta: Normal caliber. IVC:  Patent. Portal vein: Hepatopedal flow. Probable gallstones. No biliary tree obstruction. No findings to suggest acute cholecystitis. Xr Chest Port    Result Date: 4/2/2021  CHEST X-RAY, one view. HISTORY:  Chest pain. TECHNIQUE:  AP upright portable view. COMPARISON: August 2017 FINDINGS: Lungs: Atelectasis or scar right base, similar to prior exam. Costophrenic angles: are sharp. Heart size: is normal. Pulmonary vasculature: is unremarkable. Aorta: Arch calcifications. Included portion of the upper abdomen: is unremarkable. Bones: No gross bony lesions. Other: None. Negative for acute change. Ct Code Neuro Head Wo Contrast    Result Date: 4/2/2021  CT HEAD WITHOUT CONTRAST HISTORY:  CVA. COMPARISON: None. TECHNIQUE: Axial imaging was performed without intravenous contrast utilizing 5mm slice thickness. Sagittal and coronal reformats were performed. Radiation dose reduction techniques were used for this study. Our CT scanner uses one or all of the following: Automated exposure control, adjustment of the MAS or KUB according to patient's size and iterative reconstruction. FINDINGS:    *BRAIN:    -  There are no early signs of territorial or lacunar infarction by CT.    -  2.7 x 2.4 cm acute hemorrhage into the right basal ganglia and thalamus. Right lateral ventricle intraventricular hemorrhage.    -  No gross white matter abnormality by CT. *VISUALIZED PARANASAL SINUSES: Well aerated. *MASTOIDS:  Clear. *CALVARIUM AND SCALP: Unremarkable. Acute right basal ganglia hematoma likely on the basis of hypertension. Right intraventricular hemorrhage.  Date of Dictation: 4/2/2021 6:17 PM          Current Meds:   Current Facility-Administered Medications   Medication Dose Route Frequency    insulin lispro (HUMALOG) injection   SubCUTAneous Q6H    metoprolol tartrate (LOPRESSOR) tablet 50 mg  50 mg Per NG tube Q12H    [START ON 4/19/2021] ceFAZolin (ANCEF) 2 g/20 mL in sterile water IV syringe  2 g IntraVENous ON CALL TO OR    amLODIPine (NORVASC) tablet 10 mg  10 mg Per NG tube DAILY    lip protectant (BLISTEX) ointment 1 Each  1 Each Topical PRN    0.9% sodium chloride infusion  75 mL/hr IntraVENous CONTINUOUS    NUTRITIONAL SUPPORT ELECTROLYTE PRN ORDERS   Does Not Apply PRN    acetaminophen (TYLENOL) solution 650 mg  650 mg Per NG tube Q4H PRN    atorvastatin (LIPITOR) tablet 40 mg  40 mg Per NG tube QHS    metoprolol (LOPRESSOR) injection 5 mg  5 mg IntraVENous Q6H PRN    [Held by provider] morphine injection 1 mg  1 mg IntraVENous Q4H PRN    [Held by provider] oxyCODONE IR (ROXICODONE) tablet 5 mg  5 mg Oral Q6H PRN    enalaprilat (VASOTEC) injection 1.25 mg  1.25 mg IntraVENous Q6H PRN    lidocaine 4 % patch 1 Patch  1 Patch TransDERmal Q24H    niCARdipine (CARDENE) 25 mg in 0.9% sodium chloride (MBP/ADV) 250 mL infusion  0-15 mg/hr IntraVENous TITRATE    sodium chloride (NS) flush 5-40 mL  5-40 mL IntraVENous Q8H    sodium chloride (NS) flush 5-40 mL  5-40 mL IntraVENous PRN       Problem List:  Hospital Problems as of 4/18/2021 Date Reviewed: 4/4/2021          Codes Class Noted - Resolved POA    Severe protein[facility]calorie malnutrition ([facility] ICD-10-CM: E43  ICD-9-CM: 262  4/9/2021 - Present Yes        Dysphagia following cerebral infarction ICD-10-CM: I69.391  ICD-9-CM: 438.82  4/6/2021 - Present Yes        Elevated liver enzymes ICD-10-CM: R74.8  ICD-9-CM: 790.5  4/6/2021 - Present Yes        Elevated troponin ICD-10-CM: R77.8  ICD-9-CM: 790.6  4/4/2021 - Present Yes        * (Principal) Hemorrhagic cerebrovascular accident (CVA) ([address] ICD-10-CM: I61.9  ICD-9-CM: 877  4/2/2021 - Present Yes        Acute left-sided weakness ICD-10-CM: R53.1  ICD-9-CM: 728.87  4/2/2021 - Present Yes        S/P PTCA (percutaneous transluminal coronary angioplasty) ICD-10-CM: Z98.61  ICD-9-CM: V45.82  Unknown - Present Yes        Chronic tuberculosis ICD-10-CM: A15.9  ICD-9-CM: 011.90  Unknown - Present Yes        Coronary atherosclerosis of native coronary vessel ICD-10-CM: I25.10  ICD-9-CM: 414.01  Unknown - Present Yes        Hyperlipidemia ICD-10-CM: E78.5  ICD-9-CM: 272.4  Unknown - Present Yes        Iron deficiency anemia ICD-10-CM: D50.9  ICD-9-CM: 280.9  1/25/2015 - Present Yes        Microcytic anemia ICD-10-CM: D50.9  ICD-9-CM: 280.9  1/21/2015 - Present     Overview Signed 4/6/2016  9:52 AM by Wendy Jimenez     Iron deficiency                    Part of this note was written by using a voice dictation software and the note has been proof read but may still contain some grammatical/other typographical errors.     Signed By: Chai Cerna MD   [employer] [facility]    April 18, 2021  9:08 AM

## 2021-04-18 NOTE — PROGRESS NOTES
NS  AFEBRILE  MORE ALERT TODAY  DRY DRESSINGS  ICP:  3-4  CLAMPED  NO CHANGES OTHERWISE  A/P LIKELY PULL EVSERVANDO Wan MD

## 2021-04-18 NOTE — PROGRESS NOTES
Bedside shift change report recieved from Alison Suarez, [address] [place] [address]. Report included the following information SBAR, Intake/Output, MAR, Recent Results, Cardiac Rhythm, Alarm Parameters, Quality Measures. Dual Neuro and NIH completed at bedside.

## 2021-04-18 NOTE — PROGRESS NOTES
Bedside report received from [facility]. Dual neuro check completed. No changes noted.

## 2021-04-19 LAB
ANION GAP SERPL CALC-SCNC: 5 MMOL/L (ref 7–16)
BUN SERPL-MCNC: 31 MG/DL (ref 8–23)
CALCIUM SERPL-MCNC: 8.8 MG/DL (ref 8.3–10.4)
CHLORIDE SERPL-SCNC: 112 MMOL/L (ref 98–107)
CO2 SERPL-SCNC: 25 MMOL/L (ref 21–32)
CREAT SERPL-MCNC: 0.77 MG/DL (ref 0.8–1.5)
GLUCOSE BLD STRIP.AUTO-MCNC: 104 MG/DL (ref 65–100)
GLUCOSE BLD STRIP.AUTO-MCNC: 112 MG/DL (ref 65–100)
GLUCOSE BLD STRIP.AUTO-MCNC: 116 MG/DL (ref 65–100)
GLUCOSE BLD STRIP.AUTO-MCNC: 120 MG/DL (ref 65–100)
GLUCOSE SERPL-MCNC: 108 MG/DL (ref 65–100)
MAGNESIUM SERPL-MCNC: 2 MG/DL (ref 1.8–2.4)
PHOSPHATE SERPL-MCNC: 3.6 MG/DL (ref 2.3–3.7)
POTASSIUM SERPL-SCNC: 3.9 MMOL/L (ref 3.5–5.1)
SERVICE CMNT-IMP: ABNORMAL
SODIUM SERPL-SCNC: 142 MMOL/L (ref 138–145)

## 2021-04-19 PROCEDURE — 82962 GLUCOSE BLOOD TEST: CPT

## 2021-04-19 PROCEDURE — 80048 BASIC METABOLIC PNL TOTAL CA: CPT

## 2021-04-19 PROCEDURE — 74011250636 HC RX REV CODE- 250/636: Performed by: INTERNAL MEDICINE

## 2021-04-19 PROCEDURE — 84100 ASSAY OF PHOSPHORUS: CPT

## 2021-04-19 PROCEDURE — 74011250637 HC RX REV CODE- 250/637: Performed by: FAMILY MEDICINE

## 2021-04-19 PROCEDURE — 36415 COLL VENOUS BLD VENIPUNCTURE: CPT

## 2021-04-19 PROCEDURE — 92507 TX SP LANG VOICE COMM INDIV: CPT

## 2021-04-19 PROCEDURE — 77030018798 HC PMP KT ENTRL FED COVD -A

## 2021-04-19 PROCEDURE — 74011000250 HC RX REV CODE- 250: Performed by: FAMILY MEDICINE

## 2021-04-19 PROCEDURE — 74011250637 HC RX REV CODE- 250/637: Performed by: INTERNAL MEDICINE

## 2021-04-19 PROCEDURE — 92526 ORAL FUNCTION THERAPY: CPT

## 2021-04-19 PROCEDURE — 65610000006 HC RM INTENSIVE CARE

## 2021-04-19 PROCEDURE — 83735 ASSAY OF MAGNESIUM: CPT

## 2021-04-19 PROCEDURE — 74011000250 HC RX REV CODE- 250: Performed by: INTERNAL MEDICINE

## 2021-04-19 RX ORDER — FACIAL-BODY WIPES
10 EACH TOPICAL DAILY PRN
Status: DISCONTINUED | OUTPATIENT
Start: 2021-04-19 | End: 2021-04-27 | Stop reason: HOSPADM

## 2021-04-19 RX ADMIN — CEFAZOLIN 2 G: 10 INJECTION, POWDER, FOR SOLUTION INTRAVENOUS at 05:27

## 2021-04-19 RX ADMIN — AMLODIPINE BESYLATE 10 MG: 10 TABLET ORAL at 08:44

## 2021-04-19 RX ADMIN — Medication 10 ML: at 13:42

## 2021-04-19 RX ADMIN — METOPROLOL TARTRATE 50 MG: 50 TABLET, FILM COATED ORAL at 08:44

## 2021-04-19 RX ADMIN — ACETAMINOPHEN 650 MG: 325 SUSPENSION ORAL at 10:55

## 2021-04-19 RX ADMIN — METOPROLOL TARTRATE 50 MG: 50 TABLET, FILM COATED ORAL at 21:00

## 2021-04-19 RX ADMIN — SODIUM CHLORIDE 75 ML/HR: 900 INJECTION, SOLUTION INTRAVENOUS at 02:58

## 2021-04-19 RX ADMIN — Medication 10 ML: at 21:00

## 2021-04-19 RX ADMIN — Medication 10 ML: at 06:00

## 2021-04-19 RX ADMIN — ATORVASTATIN CALCIUM 40 MG: 40 TABLET, FILM COATED ORAL at 21:00

## 2021-04-19 NOTE — PROGRESS NOTES
Problem: Patient Education: Go to Patient Education Activity  Goal: Patient/Family Education  Outcome: Progressing Towards Goal     Problem: Falls - Risk of  Goal: *Absence of Falls  Description: Document Adam Toney Fall Risk and appropriate interventions in the flowsheet.   Outcome: Progressing Towards Goal  Note: Fall Risk Interventions:  Mobility Interventions: Bed/chair exit alarm, Patient to call before getting OOB    Mentation Interventions: Bed/chair exit alarm, Adequate sleep, hydration, pain control, Reorient patient, Increase mobility    Medication Interventions: Assess postural VS orthostatic hypotension, Patient to call before getting OOB, Bed/chair exit alarm    Elimination Interventions: Bed/chair exit alarm, Call light in reach    History of Falls Interventions: Bed/chair exit alarm, Consult care management for discharge planning, Assess for delayed presentation/identification of injury for 48 hrs (comment for end date)         Problem: Patient Education: Go to Patient Education Activity  Goal: Patient/Family Education  Outcome: Progressing Towards Goal     Problem: Patient Education: Go to Patient Education Activity  Goal: Patient/Family Education  Outcome: Progressing Towards Goal     Problem: Delirium Treatment  Goal: *Level of consciousness restored to baseline  Outcome: Progressing Towards Goal     Problem: Delirium Treatment  Goal: *Emotional stability restored to baseline  Outcome: Progressing Towards Goal     Problem: Delirium Treatment  Goal: *Absence of falls  Outcome: Progressing Towards Goal     Problem: Patient Education: Go to Patient Education Activity  Goal: Patient/Family Education  Outcome: Progressing Towards Goal     Problem: Non-Violent Restraints  Goal: Removal from restraints as soon as assessed to be safe  Outcome: Progressing Towards Goal     Problem: Non-Violent Restraints  Goal: No harm/injury to patient while restraints in use  Outcome: Progressing Towards Goal     Problem: Non-Violent Restraints  Goal: Patient's dignity will be maintained  Outcome: Progressing Towards Goal     Problem: Non-Violent Restraints  Goal: Patient Interventions  Outcome: Progressing Towards Goal

## 2021-04-19 NOTE — PROGRESS NOTES
OT Note:    OT attempted to see patient this afternoon for therapy. Pt sleep at this time and did not respond to verbal and physical stimulus. OT will re-attempt to see patient at a later date/time.     Thanks,  Nimphius C Kay Leyden

## 2021-04-19 NOTE — PROGRESS NOTES
GI DAILY PROGRESS NOTE    Admit Date: 4/2/2021    CC: feeding difficulties    Subjective:     Patient sleeping and hard to arouse but reportedly awake and conversational with others this morning. NG tube infusing at 60 ml/hr and tolerating well. Medications:  Current Facility-Administered Medications   Medication Dose Route Frequency    bisacodyL (DULCOLAX) suppository 10 mg  10 mg Rectal DAILY PRN    insulin lispro (HUMALOG) injection   SubCUTAneous Q6H    metoprolol tartrate (LOPRESSOR) tablet 50 mg  50 mg Per NG tube Q12H    amLODIPine (NORVASC) tablet 10 mg  10 mg Per NG tube DAILY    lip protectant (BLISTEX) ointment 1 Each  1 Each Topical PRN    0.9% sodium chloride infusion  75 mL/hr IntraVENous CONTINUOUS    NUTRITIONAL SUPPORT ELECTROLYTE PRN ORDERS   Does Not Apply PRN    acetaminophen (TYLENOL) solution 650 mg  650 mg Per NG tube Q4H PRN    atorvastatin (LIPITOR) tablet 40 mg  40 mg Per NG tube QHS    metoprolol (LOPRESSOR) injection 5 mg  5 mg IntraVENous Q6H PRN    [Held by provider] morphine injection 1 mg  1 mg IntraVENous Q4H PRN    [Held by provider] oxyCODONE IR (ROXICODONE) tablet 5 mg  5 mg Oral Q6H PRN    enalaprilat (VASOTEC) injection 1.25 mg  1.25 mg IntraVENous Q6H PRN    lidocaine 4 % patch 1 Patch  1 Patch TransDERmal Q24H    niCARdipine (CARDENE) 25 mg in 0.9% sodium chloride (MBP/ADV) 250 mL infusion  0-15 mg/hr IntraVENous TITRATE    sodium chloride (NS) flush 5-40 mL  5-40 mL IntraVENous Q8H    sodium chloride (NS) flush 5-40 mL  5-40 mL IntraVENous PRN       Objective:   Vitals:  Visit Vitals  BP (!) 91/53 (BP 1 Location: Right arm, BP Patient Position: At rest)   Pulse 63   Temp 98.2 °F (36.8 °C)   Resp (!) 31   Ht 5' 8\" (1.727 m)   Wt 58.1 kg (128 lb)   SpO2 98%   BMI 19.46 kg/m²       Intake/Output:  No intake/output data recorded.   04/17 1901 - 04/19 0700  In: 6319 [I.V.:3310]  Out: 3350 [Urine:3350]    Exam:  Constitutional: sleeping, hard to arouse, thin body habitus, NAD  Head: external ventricular drain intact  Lungs: CTA  CV: RRR, no audible murmur  Abd: soft, non-tender      Data Review (Labs):    Recent Results (from the past 24 hour(s))   GLUCOSE, POC    Collection Time: 04/18/21  5:21 PM   Result Value Ref Range    Glucose (POC) 108 (H) 65 - 100 mg/dL    Performed by Ashutosh    GLUCOSE, POC    Collection Time: 04/18/21 11:49 PM   Result Value Ref Range    Glucose (POC) 112 (H) 65 - 100 mg/dL    Performed by Harry    METABOLIC PANEL, BASIC    Collection Time: 04/19/21  4:40 AM   Result Value Ref Range    Sodium 142 138 - 145 mmol/L    Potassium 3.9 3.5 - 5.1 mmol/L    Chloride 112 (H) 98 - 107 mmol/L    CO2 25 21 - 32 mmol/L    Anion gap 5 (L) 7 - 16 mmol/L    Glucose 108 (H) 65 - 100 mg/dL    BUN 31 (H) 8 - 23 MG/DL    Creatinine 0.77 (L) 0.8 - 1.5 MG/DL    GFR est AA >60 >60 ml/min/1.73m2    GFR est non-AA >60 >60 ml/min/1.73m2    Calcium 8.8 8.3 - 10.4 MG/DL   PHOSPHORUS    Collection Time: 04/19/21  4:40 AM   Result Value Ref Range    Phosphorus 3.6 2.3 - 3.7 MG/DL   MAGNESIUM    Collection Time: 04/19/21  4:40 AM   Result Value Ref Range    Magnesium 2.0 1.8 - 2.4 mg/dL   GLUCOSE, POC    Collection Time: 04/19/21  5:39 AM   Result Value Ref Range    Glucose (POC) 116 (H) 65 - 100 mg/dL    Performed by Harry    GLUCOSE, POC    Collection Time: 04/19/21 11:30 AM   Result Value Ref Range    Glucose (POC) 120 (H) 65 - 100 mg/dL    Performed by Judith        Assessment:     Principal Problem:    Hemorrhagic cerebrovascular accident (CVA) ([facility] [address] (4/2/2021)    Active Problems:    Iron deficiency anemia (1/25/2015)      S/P PTCA (percutaneous transluminal coronary angioplasty) ()      Chronic tuberculosis ()      Coronary atherosclerosis of native coronary vessel ()      Hyperlipidemia ()      Acute left-sided weakness (4/2/2021)      Elevated troponin (4/4/2021)      Dysphagia following cerebral infarction (4/6/2021) Elevated liver enzymes (4/6/2021)      Severe protein-calorie malnutrition ([address] (4/9/2021)      68 yo male with 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 He has been evaluated by speech pathology with PEG recommended as he is having difficulty managing secretions. He is tolerating ng tube feeds at 60 ml per hour. Plan:     1. Neuro continues to follow ICP with tentative plans to dc drain tomorrow if ICP remains stable  2. Continue ng tube feeds for now  3. PEG placement Wednesday if remains stable    Patient seen and plan formulated in collaboration with Dr Awilda Garcia.   Vera Beebe NP

## 2021-04-19 NOTE — PROGRESS NOTES
Problem: Dysphagia (Adult)  Goal: *Acute Goals and Plan of Care (Insert Text)  Description: STG: Pt will demonstrate zero signs/sx of aspiration with mechanical soft textures with nectar thick liquids with no straws with 90% accuracy during all meals. Discontinued 4/5/21  STG: Pt will complete a full speech, language and cognitive evaluation without assistance with 100% accuracy during session. STG: Pt will complete a Modified barium swallow study with zero signs/sx of aspiration  with 100% accuracy during swallow study MET 4/6/21   LTG : Pt will demonstrate zero signs/sx of aspiration with least restrictive diet with 100% accuracy during all meals. LTG: Patient will increase receptive/expressive language skills demonstrated by the ability to communicate basic wants/needs across environments   STG: Patient will follow 1 step commands with 80% accuracy given min cueing  STG: Patient will follow 2 step commands with 80% accuracy given moderate cues. STG: Patient will respond to moderate level yes/no questions with 80% accuracy with moderate cues. STG: Patient will perform automatic speech task with 80% accuracy with moderate cues. SPEECH LANGUAGE PATHOLOGY: DYSPHAGIA AND SPEECH-LANGUAGE/COGNITION: Daily Note 4    NAME/AGE/GENDER: Adilene Sherwood is a 67 y.o. male  DATE: 4/19/2021  PRIMARY DIAGNOSIS: Hemorrhagic [facility]vascular accident ([facility]) ([facility] [I61.9]      ICD-10: Treatment Diagnosis: R13.12 Dysphagia, Oropharyngeal Phase    RECOMMENDATIONS   DIET:    NPO with alternative means of nutrition   NEEDS LONG TERM NUTRITION IF IN LINE WITH GOALS OF CARE    MEDICATIONS: Non-oral     ASPIRATION PRECAUTIONS  · Meticulous oral care Q 4 hours. COMPENSATORY STRATEGIES/MODIFICATIONS  · Upright positioning during tube feedings.       EDUCATION:  · Recommendations discussed with Hospitalist/Intensivist  · Nursing  · Patient     CONTINUATION OF SKILLED SERVICES/MEDICAL NECESSITY:   Patient is expected to demonstrate progress in  swallow strength, swallow timeliness, swallow function, diet tolerance and swallow safety in order to  improve swallow safety, work toward diet advancement and decrease aspiration risk.  Patient is expected to demonstrate progress in expressive communication and receptive ability to decrease assistance required communication, increase independence with activities of daily living and increase communication with family/caregivers.  Patient continues to require skilled intervention due to dysphagia; expressive/receptive language deficits. RECOMMENDATIONS for CONTINUED SPEECH THERAPY: YES: Anticipate need for ongoing speech therapy during this hospitalization and at next level of care. ASSESSMENT   Dysphagia: Poor secretion management persists. Limited ability to participate in dysphagia exercises due to severity of impairment. Remains poor candidate for oral diet. No functional improvement in swallow function. LONG TERM NUTRITION via PEG is recommended if in line with goals care. Language evaluation: Fluctuating ability to participate in cognitive-linguistic tasks today. He was initially responsive and communicating verbally, but only nodding to communicate at end of session and required moderate verbal cues to maintain alertness. Slow progress towards goals. Recommend continue ongoing speech therapy to address dysphagia and cognitive linguistic impairment. REHABILITATION POTENTIAL FOR STATED GOALS: Good    PLAN    FREQUENCY/DURATION: Continue to follow patient 3 times a week for duration of hospital stay to address above goals. - Recommendations for next treatment session: Next treatment session will address langauge treatment    SUBJECTIVE   Perseverating on \"coffee, coffee\". Wet vocal quality noted. Problem List:  (Impairments causing functional limitations):  1. Oropharyngeal dysphagia  2.  Expressive/receptive language impairments    Orientation: Person  Place  Time    Pain: Pain Scale 1: Numeric (0 - 10)  Pain Intensity 1: 8  Pain Location 1: Head  Pain Intervention(s) 1: Nurse notified    OBJECTIVE   Dysphagia:   Patient unable to consistently follow command for cough and throat clear in attempt to clear pharyngeal secretions. Unable to bring up any secretions orally. Deep oral suctioning with 14 Fr catheter returned large amounts of thick, yellow secretions. Improved vocal quality after suctioning. Discussed need for frequent oral care and suctioning with RN due to poor secretions management. Single wet swab provided during suctioning. Very delayed swallow palpated with limited hyolaryngeal excursion. Immediate throat clearing observed. He was unable to produce volitional swallow for dysphagia exercises. Cognitive Linguistic Assessment :  -1 step commands to ID body parts: 5/8. Slow responses and need for repetition in order to participate in tasks. He increased to 7/9 with verbal and visual cues. : 5/5; 2 step commands 4/5  - Yes/no questions: 1/3 for moderate level yes/no questions. - Speech 50% intelligible at phrase level. Secretions and dysarthria impacting intelligibility. Intelligibility improved to 70% once secretions were cleared. Cues for increased volume and over articulation could not be implemented by patient. INTERDISCIPLINARY COLLABORATION: RN  PRECAUTIONS/ALLERGIES: Patient has no known allergies.      Tool Used: Dysphagia Outcome and Severity Scale (DAYAMI)    Score Comments   Normal Diet  [] 7 With no strategies or extra time needed   Functional Swallow  [] 6 May have mild oral or pharyngeal delay   Mild Dysphagia  [] 5 Which may require one diet consistency restricted    Mild-Moderate Dysphagia  [] 4 With 1-2 diet consistencies restricted   Moderate Dysphagia  [] 3 With 2 or more diet consistencies restricted   Moderate-Severe Dysphagia  [] 2 With partial PO strategies (trials with ST only)   Severe Dysphagia  [] 1 With inability to tolerate any PO safely      Score:  Initial: 1 Most Recent: 1 (Date 04/19/21 )   Interpretation of Tool: The Dysphagia Outcome and Severity Scale (DAYAMI) is a simple, easy-to-use, 7-point scale developed to systematically rate the functional severity of dysphagia based on objective assessment and make recommendations for diet level, independence level, and type of nutrition. Tool Used: MODIFIED YAZMIN SCALE (mRS)   Score   No Symptoms  [] 0   No significant disability despite symptoms; able to carry out all usual duties and activities  [] 1   Slight disability; unable to carry out all previous activities but able to look after own affairs without assistance. [] 2   Moderate disability; requiring some help but able to walk without assistance  [] 3   Moderately severe disability; unable to walk without assistance and unable to attend to own bodily needs without assistance  [] 4   Severe disability; bedridden, incontinent, and requiring constant nursing care and attention  [] 5      Score:  Initial: 5 Most recent: 1   Interpretation of Tool: The Modified Shannon Scale is a 7-point scaled used to quantify level of disability as it relates to a patient's functional abilities. Current Medications:   No current facility-administered medications on file prior to encounter. Current Outpatient Medications on File Prior to Encounter   Medication Sig Dispense Refill    cyclobenzaprine (FLEXERIL) 5 mg tablet Take 1 Tab by mouth two (2) times a day. 14 Tab 0    aspirin 81 mg chewable tablet Take 1 Tab by mouth daily.  nitroglycerin (NITROSTAT) 0.4 mg SL tablet 1 Tab by SubLINGual route every five (5) minutes as needed for Chest Pain. 2 Bottle 4    atorvastatin (LIPITOR) 80 mg tablet Take 1 Tab by mouth nightly. 30 Tab 11    metoprolol tartrate (LOPRESSOR) 25 mg tablet Take 1 Tab by mouth two (2) times a day.  60 Tab 6       SAFETY:  After treatment position/precautions:  · Upright in bed  · RN notified    Total Treatment Duration:   Time In: 7157  Time Out: [address] [address] [facility]

## 2021-04-19 NOTE — CONSULTS
Comprehensive Nutrition Assessment    Type and Reason for Visit: Reassess  Tube Feeding Management (Hosptialist)    Nutrition Recommendations/Plan:   · Enteral Nutrition:  · Increase Jevity 1.5 via NGT at 60 ml/hour.   · Increase water flush to 165 every 4 hours.    · New regimen to provide: 1980 kcal (100% needs), 84 g pro (100% needs), 1993 ml free water (1 ml/kcal)  · Vitamin and Mineral Supplement Therapy:  · Electrolyte management replacement protocol implemented. · IVF per MD  · Labs:   · EN labs: BMP daily, Mg and Phos MWF. Repeat Mg and Phos in am.     Malnutrition Assessment:  Malnutrition Status: Severe malnutrition  Context: Chronic illness  Findings of clinical characteristics of malnutrition:   Energy Intake:  Unable to assess  Weight Loss:  Unable to assess     Body Fat Loss:  7 - Severe body fat loss, Fat overlying ribs   Muscle Mass Loss:  7 - Severe muscle mass loss, Clavicles (pectoralis &deltoids), Scapula (trapezius), Temples (temporalis)  Fluid Accumulation:  No significant fluid accumulation,     Strength:  Normal  strength       Nutrition Assessment:   Nutrition History: Patient states that a lady down the road from him has been providing him food foor ~3 months prior to admission. Nutrition Background: Patient presented with left side weakness and fall. He was admitted with hemorrhagic cererovascular accident from acute right basal ganglia hematoma with right intraventricular hemorrhage. His PMH is significant for MI, CAD, TB, COPD, GERD, GI bleed, hept C, HLD, HTN, NSTEMI. Daily Update:  Patient awake and talkative today. He is difficult to understand, but was able to talk to me about his neighbor who has been bringing him food for ~3 months. He states that her cooking was good and he ate whatever she brought. RN states that patient is stating that he is hungry. When patient asked if he was hungry, he nods his head and pats his stomach.  He states that he wants coffee but does not know if he will be able to drink it. RN reports stools are becoming pasty-braxton like. RD explained patient is on appropriate fiber containing formula. Patient may benefit from increased water to help with stools, but with Na target (140-450) would not recommend increasing free water flush over 1 ml/kcal. May need to consider stool softener if stool continues to harden/lends towards constipation. Discussed that patient seems to be gaining weight appropriately. Can increase TF rate, but patient likely would benefit from bolus feeds to help with satiety. Noted possible PEG today delayed due to Code S over the weekend. Abdominal Status (last documented): Intact, Soft abdomen with Active  bowel sounds. Last BM 04/19/21. Pertinent Medications: SSI (no coverage needed)  IVF: NS @ 75 ml/hr  Pertinent Labs:   Lab Results   Component Value Date/Time    Sodium 142 04/19/2021 04:40 AM    Potassium 3.9 04/19/2021 04:40 AM    Chloride 112 (H) 04/19/2021 04:40 AM    CO2 25 04/19/2021 04:40 AM    Anion gap 5 (L) 04/19/2021 04:40 AM    Glucose 108 (H) 04/19/2021 04:40 AM    BUN 31 (H) 04/19/2021 04:40 AM    Creatinine 0.77 (L) 04/19/2021 04:40 AM    Calcium 8.8 04/19/2021 04:40 AM    Albumin 3.2 04/06/2021 03:53 AM    Magnesium 2.0 04/19/2021 04:40 AM    Phosphorus 3.6 04/19/2021 04:40 AM       Nutrition Related Findings:   Full NFPE performed with results as above. TF initiated 7/3. Still infusing at 30 ml/hr with 135 ml water flush on reassessment 4/9. TF advanced to goal 4/9. Current Nutrition Therapies:  DIET NPO Except Meds  DIET TUBE FEEDING Open order for details. Keep HOB elevated >30 degrees.   Current Tube Feeding (TF) Orders:   · Feeding Route: Nasogastric  · Formula: Jevity 1.5  · Schedule:Continuous    · Regimen: 55 ml/hr  · Additives/Modulars: (none)  · Water Flushes: 150 ml Q4  · Current TF & Flush Orders Provides: at goal  · Goal TF & Flush Orders Provides: 1815 kcal (100% estimated calorie needs), 77 grams protein (100% estimated protein needs) and 1820 ml free fluid (~1 ml/kcal)      Current Intake:   Average Meal Intake: NPO(% prior to NPO)        Anthropometric Measures:  Height: 5' 8\" (172.7 cm)  Current Body Wt: 58.1 kg (128 lb 1.4 oz)(4/19), Weight source: Bed scale  BMI: 19.5, Underweight (BMI less than 22) age over 72     Ideal Body Weight (lbs) (Calculated): 154 lbs (70 kg), 87 %  Usual Body Wt: 63 kg (138 lb 14.2 oz)(per review of EMR (~63-68 kg)), Percent weight change: -12.5          Edema: Generalized: No Edema (4/19/2021  8:00 AM)     Estimated Daily Nutrient Needs:  Energy (kcal/day): 2140-6051 (Kcal/kg(30-35), Weight Used: Current(58.1 kg))  Protein (g/day): 70-87 (1.2-1.5 g/kg) Weight Used: (Current)  Fluid (ml/day):   (1 ml/kcal)    Nutrition Diagnosis:   · Inadequate oral intake related to swallowing difficulty as evidenced by (MBS, NPO)    · Severe malnutrition, In context of chronic illness related to catabolic illness(predicted inadequate oral intake, increased needs) as evidenced by (COPD, malnutrition criteria as above)    Nutrition Interventions:   Food and/or Nutrient Delivery: Modify tube feeding     Coordination of Nutrition Care: Continue to monitor while inpatient  Plan of Care discussed with Ly Yarbrough RN    Goals:   Previous Goal Met: Goal(s) achieved  Active Goal: Tolerate increas in TF within 3 days    Nutrition Monitoring and Evaluation:      Food/Nutrient Intake Outcomes: Enteral nutrition intake/tolerance  Physical Signs/Symptoms Outcomes: Biochemical data, GI status    Discharge Planning:     Too soon to determine    Lenore [place], LD on 4/19/2021 at 11:18 AM  Contact: [phone]

## 2021-04-19 NOTE — PROGRESS NOTES
Care Management Interventions  Mode of Transport at Discharge: Other (see comment)([place] vs family)  Transition of Care Consult (CM Consult): Discharge Planning(Pt is insured by medicare with pharmacy benefits.  )  Discharge Durable Medical Equipment: No  Physical Therapy Consult: Yes  Occupational Therapy Consult: Yes  Speech Therapy Consult: Yes  Current Support Network: Relative's Home, Family Lives Nearby(Pt lives with his brother and is normally independent with all ADL's. Daughter Isabell Emerson [phone] is also supportive.  )  Confirm Follow Up Transport: Family  The Plan for Transition of Care is Related to the Following Treatment Goals : Pt will need rehab services to return to his functional baseline. The Patient and/or Patient Representative was Provided with a Choice of Provider and Agrees with the Discharge Plan?: Yes  Name of the Patient Representative Who was Provided with a Choice of Provider and Agrees with the Discharge Plan: pt/daughter  Freedom of Choice List was Provided with Basic Dialogue that Supports the Patient's Individualized Plan of Care/Goals, Treatment Preferences and Shares the Quality Data Associated with the Providers?: Yes  [place] Resource Information Provided?: No  Discharge Location  Discharge Placement: [facility](IR[facility] vs SNF rehab)  CM reviewed pt's clinical record. Pt remains in ICU with EVD. Pt tolerating drain clamped with probable removal 4/20. CM updated by pt's primary nurse. Pt remains on NS infusion. TF via NG with plan for PEG this week. CM contacted pt's daughter, Rafat Rudd ([phone]) for SNF choices. She states her brother still has the list. She states the siblings will look over the list today and get choices to this CM ASAP. PT recommending SNF vs IRC. PPD completed. CM continuing to follow and firm up DCP.

## 2021-04-19 NOTE — PROGRESS NOTES
NEUROSURGERY PROGRESS NOTE:   Admit Date: 4/2/2021  Subjective:   No acute overnight events. Patient has tolerated clamping of his EVD well. He has been clamped since the morning of 04/17/2021. Objective:  Visit Vitals  BP (!) 147/72   Pulse 70   Temp 98 °F (36.7 °C)   Resp 17   Ht 5' 8\" (1.727 m)   Wt 128 lb (58.1 kg)   SpO2 98%   BMI 19.46 kg/m²     Oriented only to name, hospital and year   Eyes open to spontaneous and tracking observer   PERRL  Hearing grossly intact   Follows commands simple commands in the right upper and right lower extremity briskly   Withdraws/purposeful in the right lower and localizes in the right upper extremity. EVD clamped, no sustained elevated ICPs    Assessment and Plan:   Giuseppe Hickman 67 y.o. male with right thalamic intracerebral hemorrhage with interval development of acute obstructive hydrocephalus who underwent placement of an EVD on 04/07/2021  - Continue EVD clamp and monitor of ICP for today. If no issues after 72 hours of clamping (ie tomorrow) will plan to discontinue.  - Continue q2H neuro checks  - Will continue to monitor closely, Na 142, Na 140 to 150 an ideal range given hemorrhage and edema surrounding hemorrhage. Ashanti Fontanez.  Prabhu [facility], [address] [place] [address]

## 2021-04-20 ENCOUNTER — ANESTHESIA EVENT (OUTPATIENT)
Dept: ENDOSCOPY | Age: 76
DRG: 023 | End: 2021-04-20
Payer: MEDICARE

## 2021-04-20 LAB
ANION GAP SERPL CALC-SCNC: 6 MMOL/L (ref 7–16)
BASOPHILS # BLD: 0 K/UL (ref 0–0.2)
BASOPHILS NFR BLD: 0 % (ref 0–2)
BUN SERPL-MCNC: 28 MG/DL (ref 8–23)
CALCIUM SERPL-MCNC: 8.7 MG/DL (ref 8.3–10.4)
CHLORIDE SERPL-SCNC: 111 MMOL/L (ref 98–107)
CO2 SERPL-SCNC: 24 MMOL/L (ref 21–32)
CREAT SERPL-MCNC: 0.8 MG/DL (ref 0.8–1.5)
DIFFERENTIAL METHOD BLD: ABNORMAL
EOSINOPHIL # BLD: 0.2 K/UL (ref 0–0.8)
EOSINOPHIL NFR BLD: 3 % (ref 0.5–7.8)
ERYTHROCYTE [DISTWIDTH] IN BLOOD BY AUTOMATED COUNT: 13.8 % (ref 11.9–14.6)
GLUCOSE BLD STRIP.AUTO-MCNC: 104 MG/DL (ref 65–100)
GLUCOSE BLD STRIP.AUTO-MCNC: 128 MG/DL (ref 65–100)
GLUCOSE BLD STRIP.AUTO-MCNC: 132 MG/DL (ref 65–100)
GLUCOSE SERPL-MCNC: 111 MG/DL (ref 65–100)
HCT VFR BLD AUTO: 35.7 % (ref 41.1–50.3)
HGB BLD-MCNC: 11.7 G/DL (ref 13.6–17.2)
IMM GRANULOCYTES # BLD AUTO: 0 K/UL (ref 0–0.5)
IMM GRANULOCYTES NFR BLD AUTO: 1 % (ref 0–5)
LYMPHOCYTES # BLD: 1.5 K/UL (ref 0.5–4.6)
LYMPHOCYTES NFR BLD: 22 % (ref 13–44)
MCH RBC QN AUTO: 26.6 PG (ref 26.1–32.9)
MCHC RBC AUTO-ENTMCNC: 32.8 G/DL (ref 31.4–35)
MCV RBC AUTO: 81.1 FL (ref 79.6–97.8)
MONOCYTES # BLD: 0.7 K/UL (ref 0.1–1.3)
MONOCYTES NFR BLD: 11 % (ref 4–12)
NEUTS SEG # BLD: 4.3 K/UL (ref 1.7–8.2)
NEUTS SEG NFR BLD: 64 % (ref 43–78)
NRBC # BLD: 0 K/UL (ref 0–0.2)
PLATELET # BLD AUTO: 280 K/UL (ref 150–450)
PMV BLD AUTO: 12.3 FL (ref 9.4–12.3)
POTASSIUM SERPL-SCNC: 4.1 MMOL/L (ref 3.5–5.1)
RBC # BLD AUTO: 4.4 M/UL (ref 4.23–5.6)
SERVICE CMNT-IMP: ABNORMAL
SODIUM SERPL-SCNC: 141 MMOL/L (ref 136–145)
WBC # BLD AUTO: 6.8 K/UL (ref 4.3–11.1)

## 2021-04-20 PROCEDURE — 65610000006 HC RM INTENSIVE CARE

## 2021-04-20 PROCEDURE — 92526 ORAL FUNCTION THERAPY: CPT

## 2021-04-20 PROCEDURE — 97530 THERAPEUTIC ACTIVITIES: CPT

## 2021-04-20 PROCEDURE — 74011250637 HC RX REV CODE- 250/637: Performed by: INTERNAL MEDICINE

## 2021-04-20 PROCEDURE — 74011000250 HC RX REV CODE- 250: Performed by: FAMILY MEDICINE

## 2021-04-20 PROCEDURE — 80048 BASIC METABOLIC PNL TOTAL CA: CPT

## 2021-04-20 PROCEDURE — 97112 NEUROMUSCULAR REEDUCATION: CPT

## 2021-04-20 PROCEDURE — 74011250637 HC RX REV CODE- 250/637: Performed by: FAMILY MEDICINE

## 2021-04-20 PROCEDURE — 74011000250 HC RX REV CODE- 250: Performed by: INTERNAL MEDICINE

## 2021-04-20 PROCEDURE — 92507 TX SP LANG VOICE COMM INDIV: CPT

## 2021-04-20 PROCEDURE — 97535 SELF CARE MNGMENT TRAINING: CPT

## 2021-04-20 PROCEDURE — 85025 COMPLETE CBC W/AUTO DIFF WBC: CPT

## 2021-04-20 PROCEDURE — 36415 COLL VENOUS BLD VENIPUNCTURE: CPT

## 2021-04-20 RX ADMIN — METOPROLOL TARTRATE 50 MG: 50 TABLET, FILM COATED ORAL at 08:43

## 2021-04-20 RX ADMIN — Medication 10 ML: at 05:40

## 2021-04-20 RX ADMIN — METOPROLOL TARTRATE 50 MG: 50 TABLET, FILM COATED ORAL at 20:52

## 2021-04-20 RX ADMIN — ACETAMINOPHEN 650 MG: 325 SUSPENSION ORAL at 11:15

## 2021-04-20 RX ADMIN — AMLODIPINE BESYLATE 10 MG: 10 TABLET ORAL at 08:43

## 2021-04-20 RX ADMIN — ACETAMINOPHEN 650 MG: 325 SUSPENSION ORAL at 06:19

## 2021-04-20 RX ADMIN — Medication 10 ML: at 13:37

## 2021-04-20 RX ADMIN — Medication 10 ML: at 21:00

## 2021-04-20 RX ADMIN — ENALAPRILAT 1.25 MG: 1.25 INJECTION INTRAVENOUS at 15:10

## 2021-04-20 RX ADMIN — ACETAMINOPHEN 650 MG: 325 SUSPENSION ORAL at 18:00

## 2021-04-20 RX ADMIN — ATORVASTATIN CALCIUM 40 MG: 40 TABLET, FILM COATED ORAL at 21:00

## 2021-04-20 NOTE — PROGRESS NOTES
Bedside shift change report given to Deep Wade RN (oncoming nurse) by Colette Diana RN (offgoing nurse). Report included the following information SBAR, Kardex, Intake/Output, MAR and Cardiac Rhythm NSR. Dual NIH performed.

## 2021-04-20 NOTE — PROCEDURES
NEUROSURGERY PROCEDURE NOTE:     Patient: Westley Hagan    EVD site was prepped with multiple chlorhexidene scrubs the stay sutures were cut and the drain was removed without complication and a clean intact EVD catheter tip was visualized by myself and nursing. A single interrupted figured of eight 3-0 Nylon suture was placed at the exit site. The patient tolerated the procedure well without complication.      Emperatriz French MD

## 2021-04-20 NOTE — PROGRESS NOTES
[facility] Hospitalist Progress Note     Name:  Radha Cano  Age:76 y.o. Sex:male   :  1945    MRN:  [id number]     Admit Date:  2021    Reason for Admission:  Hemorrhagic cerebrovascular accident (CVA) [facility]) [I61.9]    Assessment & Plan       Hemorrhagic CVA s/p EVD 21: Followed by neurosurgery and neurology   d/c EVD   NPO  NGT feeds  SLP following  GI consulted for PEG placement for tomorrow   Goal sodium 140 to 150, stop NS IVF  PT,OT      Sepsis due to Pneumonia:   zosyn EOT 21  On room air        Elevated troponin:  Followed by cardiology, due to demand ischemia       DENI on CKD:  followup BMP,stable    CAD:  Continued lipitor    HTN:  Continued norvasc, Metoprolol    IV cardene as needed for goal < 140/90  Also Has prn IV antihypertensives       Diet:  DIET NPO  DIET TUBE FEEDING  DVT PPx: anticoagulation contra indicated due to hemorrhagic CVA  GI Ppx:  none  Code: Full Code    Dispo/Discharge Planning: pending, will need PEG        Hospital Course/Subjective:     Mr. Zac Duarte is a 68 yo male PMH of HTN, CAD, HLP, COPD, HEPC C admitted 21 with acute right basal ganglia hemorrhagic CVA. Neurosurgery did not recommend acute interventions on admit. On 21 he was more drowsy and repeat CT head showed increased IVH and concern for early hydrocephalus. He is s/p EVD 21 to 21. Repeat CT head  stable. CODE S called on 21 due to decreased responsiveness. CT head no overall changes. EEG shows abnormal findings consistent with known NS hemorrhage. Neurology following. Goal sodium is 140 to 150. He was covered with antibiotics for pneumonia, possible aspiration. Cardiology followed for elevated troponin, feeling this was due to demand ischemia. He is receiving NGT feeds and  will require PEG. SLP following. GI consulted for PEG placement. He has resumed antihypertensives via NGT and also required IV cardene. Discharge plans pending.      Subjective/24 hr Events (04/20/21):    Sleeping, RN relays that he was alert and interactive earlier     Objective:     Patient Vitals for the past 24 hrs:   Temp Pulse Resp BP SpO2   04/20/21 1300  65  115/69 99 %   04/20/21 1207 98.4 °F (36.9 °C) 62 20 125/67 98 %   04/20/21 1131  63 24 121/73 99 %   04/20/21 1031  63 25 129/75 99 %   04/20/21 1000  61  125/72 100 %   04/20/21 0931  (!) 59 24 134/71 100 %   04/20/21 0900  62  132/74 99 %   04/20/21 0859  61 23  99 %   04/20/21 0831  73 29 139/79 98 %   04/20/21 0800  77 21 137/72 98 %   04/20/21 0700  69 23 137/69 98 %   04/20/21 0600  69 15 129/68 99 %   04/20/21 0500  76 20 (!) 140/79 98 %   04/20/21 0400  75 26 138/75 97 %   04/20/21 0300 97.8 °F (36.6 °C) 78 21 (!) 141/76 98 %   04/20/21 0100  79 20 132/78 98 %   04/20/21 0000  73 24 (!) 144/76 98 %   04/19/21 2300 99 °F (37.2 °C) 64 19 127/62 95 %   04/19/21 2200  67 26 124/66 99 %   04/19/21 2100  73 19 136/65 100 %   04/19/21 2000  83 26 127/63 100 %   04/19/21 1930  81 21 129/68 100 %   04/19/21 1900 97.4 °F (36.3 °C) 84 21 129/85 97 %   04/19/21 1730  80 25 135/70 100 %   04/19/21 1700  78 19 134/77 100 %   04/19/21 1630  75 20 127/68 99 %   04/19/21 1600 98.5 °F (36.9 °C) 71 15 123/68 99 %   04/19/21 1530  73 16 138/78 99 %   04/19/21 1500  70 17 122/68 97 %   04/19/21 1430  72 22 126/60 98 %     Oxygen Therapy  O2 Sat (%): 99 % (04/20/21 1300)  Pulse via Oximetry: 64 beats per minute (04/20/21 1300)  O2 Device: None (Room air) (04/20/21 0700)  Skin Assessment: Clean, dry, & intact (04/19/21 1600)  Skin Protection for O2 Device: N/A (04/19/21 1600)  O2 Flow Rate (L/min): 2 l/min (04/13/21 0700)    Body mass index is 19.46 kg/m².     Physical Exam:   General: No acute distress, sleeping , wakes up and moves right arm and asks for water   Lungs: Clear to auscultation bilaterally anterior   Cardiovascular: Regular rate and rhythm with normal S1 and S2, no edema    Abdomen: Soft, nontender, nondistended, normoactive bowel sounds   Extremities: No cyanosis    Neuro: sleepy but wakes up and interacts      Data Review:  I have reviewed all labs, meds, and studies from the last 24 hours:    Labs:    Recent Results (from the past 24 hour(s))   GLUCOSE, POC    Collection Time: 04/19/21  5:20 PM   Result Value Ref Range    Glucose (POC) 104 (H) 65 - 100 mg/dL    Performed by Judith    GLUCOSE, POC    Collection Time: 04/19/21 11:54 PM   Result Value Ref Range    Glucose (POC) 128 (H) 65 - 100 mg/dL    [facility]    METABOLIC PANEL, BASIC    Collection Time: 04/20/21  5:05 AM   Result Value Ref Range    Sodium 141 136 - 145 mmol/L    Potassium 4.1 3.5 - 5.1 mmol/L    Chloride 111 (H) 98 - 107 mmol/L    CO2 24 21 - 32 mmol/L    Anion gap 6 (L) 7 - 16 mmol/L    Glucose 111 (H) 65 - 100 mg/dL    BUN 28 (H) 8 - 23 MG/DL    Creatinine 0.80 0.8 - 1.5 MG/DL    GFR est AA >60 >60 ml/min/1.73m2    GFR est non-AA >60 >60 ml/min/1.73m2    Calcium 8.7 8.3 - 10.4 MG/DL   CBC WITH AUTOMATED DIFF    Collection Time: 04/20/21  5:05 AM   Result Value Ref Range    WBC 6.8 4.3 - 11.1 K/uL    RBC 4.40 4.23 - 5.6 M/uL    HGB 11.7 (L) 13.6 - 17.2 g/dL    HCT 35.7 (L) 41.1 - 50.3 %    MCV 81.1 79.6 - 97.8 FL    MCH 26.6 26.1 - 32.9 PG    MCHC 32.8 31.4 - 35.0 g/dL    RDW 13.8 11.9 - 14.6 %    PLATELET 064 147 - 256 K/uL    MPV 12.3 9.4 - 12.3 FL    ABSOLUTE NRBC 0.00 0.0 - 0.2 K/uL    DF AUTOMATED      NEUTROPHILS 64 43 - 78 %    LYMPHOCYTES 22 13 - 44 %    MONOCYTES 11 4.0 - 12.0 %    EOSINOPHILS 3 0.5 - 7.8 %    BASOPHILS 0 0.0 - 2.0 %    IMMATURE GRANULOCYTES 1 0.0 - 5.0 %    ABS. NEUTROPHILS 4.3 1.7 - 8.2 K/UL    ABS. LYMPHOCYTES 1.5 0.5 - 4.6 K/UL    ABS. MONOCYTES 0.7 0.1 - 1.3 K/UL    ABS. EOSINOPHILS 0.2 0.0 - 0.8 K/UL    ABS. BASOPHILS 0.0 0.0 - 0.2 K/UL    ABS. IMM.  GRANS. 0.0 0.0 - 0.5 K/UL   GLUCOSE, POC    Collection Time: 04/20/21 12:09 PM   Result Value Ref Range    Glucose (POC) 132 (H) 65 - 100 mg/dL    Performed by Apolinar        All Micro Results     Procedure Component Value Units Date/Time    CULTURE, BLOOD [[id number]] Collected: 04/09/21 1053    Order Status: Completed Specimen: Blood Updated: 04/14/21 0721     Special Requests: --        RIGHT  HAND       Culture result: NO GROWTH 5 DAYS       CULTURE, BLOOD [[id number]] Collected: 04/09/21 1058    Order Status: Completed Specimen: Blood Updated: 04/14/21 0721     Special Requests: --        RIGHT  Antecubital       Culture result: NO GROWTH 5 DAYS       COVID-19 RAPID TEST [[id number]] Collected: 04/07/21 1221    Order Status: Completed Specimen: Nasopharyngeal Updated: 04/07/21 1301     Specimen source Nasopharyngeal        COVID-19 rapid test Not detected        Comment:      The specimen is NEGATIVE for SARS-CoV-2, the novel coronavirus associated with COVID-19. A negative result does not rule out COVID-19. This test has been authorized by the FDA under an Emergency Use Authorization (EUA) for use by authorized laboratories.         Fact sheet for Healthcare Providers: kstattoo.com  Fact sheet for Patients: kstattoo.com       Methodology: Isothermal Nucleic Acid Amplification               EKG Results     Procedure 720 Value Units Date/Time    EKG, 12 LEAD, SUBSEQUENT [[id number]] Collected: 04/03/21 0821    Order Status: Completed Updated: 04/03/21 1227     Ventricular Rate 74 BPM      Atrial Rate 74 BPM      P-R Interval 160 ms      QRS Duration 94 ms      Q-T Interval 388 ms      QTC Calculation (Bezet) 430 ms      Calculated P Axis 71 degrees      Calculated R Axis -61 degrees      Calculated T Axis 88 degrees      Diagnosis --     Sinus rhythm with occasional Premature ventricular complexes  Left anterior fascicular block  ST & T wave abnormality, consider lateral ischemia  Abnormal ECG  When compared with ECG of 02-APR-2021 18:26,  Premature ventricular complexes are now Present  Left anterior fascicular block is now Present  Confirmed by Juma Rock ([id number]) on 4/3/2021 12:27:42 PM      Initial ECG [[id number]] Collected: 04/02/21 1826    Order Status: Completed Updated: 04/03/21 0732     Ventricular Rate 77 BPM      Atrial Rate 78 BPM      P-R Interval 156 ms      QRS Duration 96 ms      Q-T Interval 356 ms      QTC Calculation (Bezet) 402 ms      Calculated P Axis 76 degrees      Calculated R Axis 69 degrees      Calculated T Axis 79 degrees      Diagnosis --     Normal sinus rhythm  Normal ECG  When compared with ECG of 28-AUG-2017 21:42,  Questionable change in QRS axis  Nonspecific T wave abnormality, improved in Lateral leads  Confirmed by Tyson Ac ([id number]) on 4/3/2021 7:32:01 AM      EKG 12 LEAD INITIAL [[id number]]     Order Status: Canceled           Other Studies:  Ct Head Wo Cont    Result Date: 4/3/2021  NONCONTRAST HEAD CT CLINICAL HISTORY:  Follow-up intracranial hemorrhage. TECHNIQUE:  Axial images were obtained with spiral technique. Radiation dose reduction was achieved using one or all of the following techniques: automated exposure control, weight-based dosing, iterative reconstruction. COMPARISON:  CT yesterday and 0617 hours today. REPORT:   Standard noncontrast head CT demonstrates continued increase in size of the right thalamic hemorrhage with maximum diameter now measured at approximately 3.6 cm compared with 2.9 cm earlier today. Moreover, there is new bilateral lateral intraventricular extension of hemorrhage, primarily localized to the atria. There is associated slight increase in bilateral lateral ventriculomegaly with biatrial diameter now measuring approximately 4.2 cm compared with 3.8 cm this morning. Mass effect is little changed with midline shift from right to left of approximately 5 mm compared with 4 mm this morning. Extensive small vessel ischemic disease and small focal lateral right frontal infarct appear unchanged.      INTERVAL INCREASE IN SIZE OF THE RIGHT THALAMIC HEMORRHAGE WITH NEW BILATERAL LATERAL INTRAVENTRICULAR EXTENSION AND SLIGHT WORSENING OF VENTRICULOMEGALY. Ct Head Wo Cont    Result Date: 4/3/2021  EXAM: CT HEAD WO CONT HISTORY: .  TECHNIQUE: Axial images of the brain were performed without the administration of intravenous contrast. Images were obtained axial plane and coronal reformatted images were submitted. CT scan performed using appropriate/available dose optimization/reduction/ALARA techniques. COMPARISON: None. FINDINGS: The known right thalamic hemorrhage is again observed. There is surrounding edema. The body of this hemorrhage has not changed significantly. Currently it measures 2.4 x 2.9 cm when previously it measured 2.4 x 2.7 cm. There has been interval increase in an area of adjacent or extension of the hemorrhage posteriorly. Previously it measured 4.5 mm in width. Currently it measures 1.5 cm in width and 1.3 cm AP. There is persistent intraventricular hemorrhage in the posterior horn of the right lateral ventricle. Interval development of a small amount of hemorrhage in the left posterior horn. There is mild midline shift of the posterior septum pellucidum of approximately 4.1 mm. The lateral ventricles are mildly dilated, stable. Chronic ischemic white matter changes are again observed. Question small chronic infarction in the right frontal lobe. Interval increase in the size of the right thalamic hemorrhage. Interval development of mild right to left midline shift of the posterior aspect of the septum pellucidum. Stable hemorrhage in the posterior horn of the right lateral ventricle. Interval development of a small amount of hemorrhage in the posterior horn of the left lateral ventricle. Other findings as above. [id number]    Result Date: 4/3/2021  RIGHT UPPER QUADRANT ULTRASOUND. HISTORY: Transaminitis. COMPARISON: No relevant comparison studies available.  FINDINGS: Ultrasonographic Ramon's sign: is reported as negative Gallstones: There are intraluminal echogenic foci, probably stones. Gallbladder Wall: not thickened. Common Bile Duct: is not dilated, 4 mm. Intrahepatic Biliary Tree: is not dilated. Liver: Uniform parenchyma Included portion of the pancreas and right kidney: are unremarkable. Vasculature: Aorta: Normal caliber. IVC:  Patent. Portal vein: Hepatopedal flow. Probable gallstones. No biliary tree obstruction. No findings to suggest acute cholecystitis. Xr Chest Port    Result Date: 4/2/2021  CHEST X-RAY, one view. HISTORY:  Chest pain. TECHNIQUE:  AP upright portable view. COMPARISON: August 2017 FINDINGS: Lungs: Atelectasis or scar right base, similar to prior exam. Costophrenic angles: are sharp. Heart size: is normal. Pulmonary vasculature: is unremarkable. Aorta: Arch calcifications. Included portion of the upper abdomen: is unremarkable. Bones: No gross bony lesions. Other: None. Negative for acute change. Ct Code Neuro Head Wo Contrast    Result Date: 4/2/2021  CT HEAD WITHOUT CONTRAST HISTORY:  CVA. COMPARISON: None. TECHNIQUE: Axial imaging was performed without intravenous contrast utilizing 5mm slice thickness. Sagittal and coronal reformats were performed. Radiation dose reduction techniques were used for this study. Our CT scanner uses one or all of the following: Automated exposure control, adjustment of the MAS or KUB according to patient's size and iterative reconstruction. FINDINGS:    *BRAIN:    -  There are no early signs of territorial or lacunar infarction by CT.    -  2.7 x 2.4 cm acute hemorrhage into the right basal ganglia and thalamus. Right lateral ventricle intraventricular hemorrhage.    -  No gross white matter abnormality by CT. *VISUALIZED PARANASAL SINUSES: Well aerated. *MASTOIDS:  Clear. *CALVARIUM AND SCALP: Unremarkable. Acute right basal ganglia hematoma likely on the basis of hypertension. Right intraventricular hemorrhage.  Date of Dictation: 4/2/2021 6:17 PM          Current Meds:   Current Facility-Administered Medications   Medication Dose Route Frequency    acetaminophen (TYLENOL) solution 650 mg  650 mg Per NG tube Q6H PRN    bisacodyL (DULCOLAX) suppository 10 mg  10 mg Rectal DAILY PRN    insulin lispro (HUMALOG) injection   SubCUTAneous Q6H    metoprolol tartrate (LOPRESSOR) tablet 50 mg  50 mg Per NG tube Q12H    amLODIPine (NORVASC) tablet 10 mg  10 mg Per NG tube DAILY    lip protectant (BLISTEX) ointment 1 Each  1 Each Topical PRN    0.9% sodium chloride infusion  75 mL/hr IntraVENous CONTINUOUS    NUTRITIONAL SUPPORT ELECTROLYTE PRN ORDERS   Does Not Apply PRN    atorvastatin (LIPITOR) tablet 40 mg  40 mg Per NG tube QHS    metoprolol (LOPRESSOR) injection 5 mg  5 mg IntraVENous Q6H PRN    [Held by provider] morphine injection 1 mg  1 mg IntraVENous Q4H PRN    [Held by provider] oxyCODONE IR (ROXICODONE) tablet 5 mg  5 mg Oral Q6H PRN    enalaprilat (VASOTEC) injection 1.25 mg  1.25 mg IntraVENous Q6H PRN    lidocaine 4 % patch 1 Patch  1 Patch TransDERmal Q24H    niCARdipine (CARDENE) 25 mg in 0.9% sodium chloride (MBP/ADV) 250 mL infusion  0-15 mg/hr IntraVENous TITRATE    sodium chloride (NS) flush 5-40 mL  5-40 mL IntraVENous Q8H    sodium chloride (NS) flush 5-40 mL  5-40 mL IntraVENous PRN       Problem List:  Hospital Problems as of 4/20/2021 Date Reviewed: 4/4/2021          Codes Class Noted - Resolved POA    Severe protein-calorie malnutrition ([facility] ICD-10-CM: E43  ICD-9-CM: 262  4/9/2021 - Present Yes        Dysphagia following cerebral infarction ICD-10-CM: I69.391  ICD-9-CM: 438.82  4/6/2021 - Present Yes        Elevated liver enzymes ICD-10-CM: R74.8  ICD-9-CM: 790.5  4/6/2021 - Present Yes        Elevated troponin ICD-10-CM: R77.8  ICD-9-CM: 790.6  4/4/2021 - Present Yes        * (Principal) Hemorrhagic cerebrovascular accident (CVA) ([facility] [address] ICD-10-CM: I61.9  ICD-9-CM: 441  4/2/2021 - Present Yes Acute left-sided weakness ICD-10-CM: R53.1  ICD-9-CM: 728.87  4/2/2021 - Present Yes        S/P PTCA (percutaneous transluminal coronary angioplasty) ICD-10-CM: Z98.61  ICD-9-CM: V45.82  Unknown - Present Yes        Chronic tuberculosis ICD-10-CM: A15.9  ICD-9-CM: 011.90  Unknown - Present Yes        Coronary atherosclerosis of native coronary vessel ICD-10-CM: I25.10  ICD-9-CM: 414.01  Unknown - Present Yes        Hyperlipidemia ICD-10-CM: E78.5  ICD-9-CM: 272.4  Unknown - Present Yes        Iron deficiency anemia ICD-10-CM: D50.9  ICD-9-CM: 280.9  1/25/2015 - Present Yes        Microcytic anemia ICD-10-CM: D50.9  ICD-9-CM: 280.9  1/21/2015 - Present     Overview Signed 4/6/2016  9:52 AM by Desiree Avila     Iron deficiency                    Part of this note was written by using a voice dictation software and the note has been proof read but may still contain some grammatical/other typographical errors.     Signed By: Sunny Valdez MD   [employer] [facility]    April 20, 2021  9:08 AM

## 2021-04-20 NOTE — PROGRESS NOTES
GI DAILY PROGRESS NOTE    Admit Date: 4/2/2021    CC: feeding difficulties    Subjective:     Patient with EVD removed this morning. Tolerated without difficulty. Continues with ng tube feedings. Plan for PEG tomorrow.       Medications:  Current Facility-Administered Medications   Medication Dose Route Frequency    acetaminophen (TYLENOL) solution 650 mg  650 mg Per NG tube Q6H PRN    bisacodyL (DULCOLAX) suppository 10 mg  10 mg Rectal DAILY PRN    insulin lispro (HUMALOG) injection   SubCUTAneous Q6H    metoprolol tartrate (LOPRESSOR) tablet 50 mg  50 mg Per NG tube Q12H    amLODIPine (NORVASC) tablet 10 mg  10 mg Per NG tube DAILY    lip protectant (BLISTEX) ointment 1 Each  1 Each Topical PRN    0.9% sodium chloride infusion  75 mL/hr IntraVENous CONTINUOUS    NUTRITIONAL SUPPORT ELECTROLYTE PRN ORDERS   Does Not Apply PRN    atorvastatin (LIPITOR) tablet 40 mg  40 mg Per NG tube QHS    metoprolol (LOPRESSOR) injection 5 mg  5 mg IntraVENous Q6H PRN    [Held by provider] morphine injection 1 mg  1 mg IntraVENous Q4H PRN    [Held by provider] oxyCODONE IR (ROXICODONE) tablet 5 mg  5 mg Oral Q6H PRN    enalaprilat (VASOTEC) injection 1.25 mg  1.25 mg IntraVENous Q6H PRN    lidocaine 4 % patch 1 Patch  1 Patch TransDERmal Q24H    niCARdipine (CARDENE) 25 mg in 0.9% sodium chloride (MBP/ADV) 250 mL infusion  0-15 mg/hr IntraVENous TITRATE    sodium chloride (NS) flush 5-40 mL  5-40 mL IntraVENous Q8H    sodium chloride (NS) flush 5-40 mL  5-40 mL IntraVENous PRN       Objective:   Vitals:  Visit Vitals  /75   Pulse 63   Temp 97.8 °F (36.6 °C)   Resp 25   Ht 5' 8\" (1.727 m)   Wt 58.1 kg (128 lb)   SpO2 99%   BMI 19.46 kg/m²       Intake/Output:  04/20 0701 - 04/20 1900  In: 80   Out: 560 [Urine:560]  04/18 1901 - 04/20 0700  In: 5613.5 [I.V.:2678.5]  Out: 4143 [Urine:4322]    Exam:  Constitutional: sleeping, hard to arouse, thin body habitus, NAD  Head: EVD has been remained; suture intact  Lungs: CTA  CV: RRR, no audible murmur  Abd: soft, non-tender; ng tube feeds infusing      Data Review (Labs):    Recent Results (from the past 24 hour(s))   GLUCOSE, POC    Collection Time: 04/19/21  5:20 PM   Result Value Ref Range    Glucose (POC) 104 (H) 65 - 100 mg/dL    Performed by Judith    GLUCOSE, POC    Collection Time: 04/19/21 11:54 PM   Result Value Ref Range    Glucose (POC) 128 (H) 65 - 100 mg/dL    Performed by [place]    METABOLIC PANEL, BASIC    Collection Time: 04/20/21  5:05 AM   Result Value Ref Range    Sodium 141 136 - 145 mmol/L    Potassium 4.1 3.5 - 5.1 mmol/L    Chloride 111 (H) 98 - 107 mmol/L    CO2 24 21 - 32 mmol/L    Anion gap 6 (L) 7 - 16 mmol/L    Glucose 111 (H) 65 - 100 mg/dL    BUN 28 (H) 8 - 23 MG/DL    Creatinine 0.80 0.8 - 1.5 MG/DL    GFR est AA >60 >60 ml/min/1.73m2    GFR est non-AA >60 >60 ml/min/1.73m2    Calcium 8.7 8.3 - 10.4 MG/DL   CBC WITH AUTOMATED DIFF    Collection Time: 04/20/21  5:05 AM   Result Value Ref Range    WBC 6.8 4.3 - 11.1 K/uL    RBC 4.40 4.23 - 5.6 M/uL    HGB 11.7 (L) 13.6 - 17.2 g/dL    HCT 35.7 (L) 41.1 - 50.3 %    MCV 81.1 79.6 - 97.8 FL    MCH 26.6 26.1 - 32.9 PG    MCHC 32.8 31.4 - 35.0 g/dL    RDW 13.8 11.9 - 14.6 %    PLATELET 733 852 - 816 K/uL    MPV 12.3 9.4 - 12.3 FL    ABSOLUTE NRBC 0.00 0.0 - 0.2 K/uL    DF AUTOMATED      NEUTROPHILS 64 43 - 78 %    LYMPHOCYTES 22 13 - 44 %    MONOCYTES 11 4.0 - 12.0 %    EOSINOPHILS 3 0.5 - 7.8 %    BASOPHILS 0 0.0 - 2.0 %    IMMATURE GRANULOCYTES 1 0.0 - 5.0 %    ABS. NEUTROPHILS 4.3 1.7 - 8.2 K/UL    ABS. LYMPHOCYTES 1.5 0.5 - 4.6 K/UL    ABS. MONOCYTES 0.7 0.1 - 1.3 K/UL    ABS. EOSINOPHILS 0.2 0.0 - 0.8 K/UL    ABS. BASOPHILS 0.0 0.0 - 0.2 K/UL    ABS. IMM.  GRANS. 0.0 0.0 - 0.5 K/UL       Assessment:     Principal Problem:    [facility] (4/2/2021)    Active Problems:    Iron deficiency anemia (1/25/2015)      S/P PTCA (percutaneous transluminal coronary angioplasty) ()      Chronic tuberculosis ()      Coronary atherosclerosis of native coronary vessel ()      Hyperlipidemia ()      Acute left-sided weakness (4/2/2021)      Elevated troponin (4/4/2021)      Dysphagia following cerebral infarction (4/6/2021)      Elevated liver enzymes (4/6/2021)      Severe protein-calorie malnutrition ([facility] [address] (4/9/2021)      66 yo male with 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 He has been evaluated by speech pathology with PEG recommended as he is having difficulty managing secretions. He is tolerating ng tube feeds at 60 ml per hour. Plan:     1. NG tube feeds in progress, tolerating well. 2.  NPO after midnight for PEG placement Wednesday if remains stable    Patient seen and plan formulated in collaboration with Dr Armani Mg.   Francesco Bhatt NP

## 2021-04-20 NOTE — PROGRESS NOTES
Bedside and Verbal shift change report given to Burnie Kehr  (oncoming nurse) by Bibi Garvin  (offgoing nurse). Report included the following information SBAR, Kardex, ED Summary, OR Summary, Procedure Summary, Intake/Output, MAR, Recent Results, Med Rec Status, Cardiac Rhythm SR, Alarm Parameters , Quality Measures and Dual Neuro Assessment.        04/20/21 0700   NIH Stroke Scale   Interval Other (comment)   LOC 0   LOC Questions 0   LOC Commands 0   Best Gaze 0   Visual 0   Facial Palsy 1   Motor Right Arm 0   Motor Left Arm 4   Motor Right Leg 0   Motor Left Leg 4   Limb Ataxia 2   Sensory 1   Best Language 1   Dysarthria 1   Extinction and Inattention 1   Total 15       CVP 5

## 2021-04-20 NOTE — PROGRESS NOTES
Problem: Dysphagia (Adult)  Goal: *Acute Goals and Plan of Care (Insert Text)  Description: STG: Pt will demonstrate zero signs/sx of aspiration with mechanical soft textures with nectar thick liquids with no straws with 90% accuracy during all meals. Discontinued 4/5/21  STG: Pt will complete a full speech, language and cognitive evaluation without assistance with 100% accuracy during session. STG: Pt will complete a Modified barium swallow study with zero signs/sx of aspiration  with 100% accuracy during swallow study MET 4/6/21   LTG : Pt will demonstrate zero signs/sx of aspiration with least restrictive diet with 100% accuracy during all meals. LTG: Patient will increase receptive/expressive language skills demonstrated by the ability to communicate basic wants/needs across environments   STG: Patient will follow 1 step commands with 80% accuracy given min cueing  STG: Patient will follow 2 step commands with 80% accuracy given moderate cues. STG: Patient will respond to moderate level yes/no questions with 80% accuracy with moderate cues. STG: Patient will perform automatic speech task with 80% accuracy with moderate cues. SPEECH LANGUAGE PATHOLOGY: DYSPHAGIA AND SPEECH-LANGUAGE/COGNITION: Daily Note 5    NAME/AGE/GENDER: Layo Cruz is a 67 y.o. male  DATE: 4/20/2021  PRIMARY DIAGNOSIS: Hemorrhagic cerebrovascular accident (CVA) ([facility] [I61.9]      ICD-10: Treatment Diagnosis: R13.12 Dysphagia, Oropharyngeal Phase    RECOMMENDATIONS   DIET:    NPO with alternative means of nutrition   NEEDS LONG TERM NUTRITION IF IN LINE WITH GOALS OF CARE    MEDICATIONS: Non-oral     ASPIRATION PRECAUTIONS  · Meticulous oral care Q 4 hours. COMPENSATORY STRATEGIES/MODIFICATIONS  · Upright positioning during tube feedings.       EDUCATION:  · Recommendations discussed with Hospitalist/Intensivist  · Nursing  · Patient     CONTINUATION OF SKILLED SERVICES/MEDICAL NECESSITY:   Patient is expected to demonstrate progress in  swallow strength, swallow timeliness, swallow function, diet tolerance and swallow safety in order to  improve swallow safety, work toward diet advancement and decrease aspiration risk.  Patient is expected to demonstrate progress in expressive communication and receptive ability to decrease assistance required communication, increase independence with activities of daily living and increase communication with family/caregivers.  Patient continues to require skilled intervention due to dysphagia; expressive/receptive language deficits. RECOMMENDATIONS for CONTINUED SPEECH THERAPY: YES: Anticipate need for ongoing speech therapy during this hospitalization and at next level of care. ASSESSMENT   Dysphagia: No progress towards dysphagia goals. Poor secretion management persists. Remains unable to produce volitional swallow. Recommend Q4 hour oral care with toothbrush and suction. Deep oral suctioning as needed to clear pharyngeal secretions. PEG planned for tomorrow. Language evaluation: Patient's participation and progress towards goals remains limited by endurance. Initially participating well and communicating in sentences. Moderate-max verbal cues to implement dysarthria strategies to improve intelligibility. However, abrupt change in responsiveness during cognitive tasks. No longer responding verbally; only answering yes/no via head nods and gesturing. Improved verbal responses as clinician was leaving room. Recommend continue ongoing speech therapy to address dysphagia and cognitive linguistic impairment. REHABILITATION POTENTIAL FOR STATED GOALS: Good    PLAN    FREQUENCY/DURATION: Continue to follow patient 3 times a week for duration of hospital stay to address above goals. - Recommendations for next treatment session: Next treatment session will address langauge treatment    SUBJECTIVE   C/o 8/10 headache- RN notified and MD contacted.  IVANA removed this morning. PEG planned for tomorrow. Patient continuing to ask for things to drink- poor awareness of dysphagia. Problem List:  (Impairments causing functional limitations):  1. Oropharyngeal dysphagia  2. Expressive/receptive language impairments    Orientation:   Person  Place  Time    Pain: Pain Scale 1: Numeric (0 - 10)  Pain Intensity 1: 8  Pain Location 1: Head  Pain Intervention(s) 1: Nurse notified    OBJECTIVE   Dysphagia:   Deep oral suctioning provided at beginning and throughout session due to poor secretion management and wet vocal quality. He remains unable to bring secretions into oral cavity despite cued cough and throat clears. Oral care also provided with suction and tooth brush. Wet swabs presented x2 in attempt to complete dysphagia exercises. Patient only able to complete swallow on 2/15. Swallows appear incomplete upon palpation. Limited ability to participate in exercises persists. Cognitive Linguistic Assessment :  - Primarily focus on dysarthria this session as patient exhibited increased verbalizations. He communicated at the sentence level with 50% intelligibility which was complicated by dysarthria, low volume, imprecise consonant formation, and wet vocal quality  - Education provided re: dysarthria strategies and implemented with automatic speech tasks (counting). 60% intelligibility initially. With moderate cues and clinician demonstration, increased to 90% intelligibility. He benefit from verbal cues to increase volume and \"strength\" of voicing. Unable to implement strategies independently, but can apply them with moderate verbal cues. - Convergent naming task attempted, but patient no longer responding verbally. He would nod head and gesture with encouragement. Session discontinued as patient would no longer complete tasks; however, he did respond verbally as clinician was leaving the room.     INTERDISCIPLINARY COLLABORATION: RN  PRECAUTIONS/ALLERGIES: Patient has no known allergies. Tool Used: Dysphagia Outcome and Severity Scale (DAYAMI)    Score Comments   Normal Diet  [] 7 With no strategies or extra time needed   Functional Swallow  [] 6 May have mild oral or pharyngeal delay   Mild Dysphagia  [] 5 Which may require one diet consistency restricted    Mild-Moderate Dysphagia  [] 4 With 1-2 diet consistencies restricted   Moderate Dysphagia  [] 3 With 2 or more diet consistencies restricted   Moderate-Severe Dysphagia  [] 2 With partial PO strategies (trials with ST only)   Severe Dysphagia  [] 1 With inability to tolerate any PO safely      Score:  Initial: 1 Most Recent: 1 (Date 04/20/21 )   Interpretation of Tool: The Dysphagia Outcome and Severity Scale (DAYAMI) is a simple, easy-to-use, 7-point scale developed to systematically rate the functional severity of dysphagia based on objective assessment and make recommendations for diet level, independence level, and type of nutrition. Tool Used: MODIFIED MARK SCALE (mRS)   Score   No Symptoms  [] 0   No significant disability despite symptoms; able to carry out all usual duties and activities  [] 1   Slight disability; unable to carry out all previous activities but able to look after own affairs without assistance. [] 2   Moderate disability; requiring some help but able to walk without assistance  [] 3   Moderately severe disability; unable to walk without assistance and unable to attend to own bodily needs without assistance  [] 4   Severe disability; bedridden, incontinent, and requiring constant nursing care and attention  [] 5      Score:  Initial: 5 Most recent: 1   Interpretation of Tool: The Modified Mark Scale is a 7-point scaled used to quantify level of disability as it relates to a patient's functional abilities. Current Medications:   No current facility-administered medications on file prior to encounter.       Current Outpatient Medications on File Prior to Encounter   Medication Sig Dispense Refill  cyclobenzaprine (FLEXERIL) 5 mg tablet Take 1 Tab by mouth two (2) times a day. 14 Tab 0    aspirin 81 mg chewable tablet Take 1 Tab by mouth daily.  nitroglycerin (NITROSTAT) 0.4 mg SL tablet 1 Tab by SubLINGual route every five (5) minutes as needed for Chest Pain. 2 Bottle 4    atorvastatin (LIPITOR) 80 mg tablet Take 1 Tab by mouth nightly. 30 Tab 11    metoprolol tartrate (LOPRESSOR) 25 mg tablet Take 1 Tab by mouth two (2) times a day.  60 Tab 6       SAFETY:  After treatment position/precautions:  · Upright in bed  · RN notified    Total Treatment Duration:   Time In: 3128  Time Out: [facility],[address] Floor, [facility] MEDICO DEL [facility] DONTE CHAVEZ, CCC-SLP

## 2021-04-20 NOTE — PROGRESS NOTES
NEUROSURGERY PROGRESS NOTE:   Admit Date: 4/2/2021  Subjective:   No acute overnight events. Patient has tolerated clamping of his EVD well. He has been clamped since the morning of 04/17/2021. Objective:  Visit Vitals  /75   Pulse 63   Temp 97.8 °F (36.6 °C)   Resp 25   Ht 5' 8\" (1.727 m)   Wt 128 lb (58.1 kg)   SpO2 99%   BMI 19.46 kg/m²     Oriented only to name, hospital and year   Eyes open to spontaneous and tracking observer   Slight Downgaze  Slight left gaze preference   PERRL  Hearing grossly intact   Follows commands simple commands in the right upper and right lower extremity briskly   Withdraws/purposeful in the right lower and localizes in the right upper extremity. EVD clamped, ICP 2 to 3 at the bedside. Assessment and Plan:   Maricruz Raya 67 y.o. male with right thalamic intracerebral hemorrhage with interval development of acute obstructive hydrocephalus who underwent placement of an EVD on 04/07/2021  - Patient has now had EVD clampepd with ICP monitored for 72 hours without significant change in neuro status   - Continue q2H neuro checks  - Will continue to monitor closely, Na 141, Na 140 to 150 an ideal range given hemorrhage and edema surrounding hemorrhage.  - Continue to monitor in ICU today     Theodore Vaughan, [address] [place] [address]

## 2021-04-20 NOTE — PROGRESS NOTES
Bedside shift change report given to Mary Found, [facility] (oncoming nurse) by Alison Gaytan RN (offgoing nurse). Report included the following information SBAR, Kardex, Intake/Output, MAR, Med Rec Status and Cardiac Rhythm NSR. Dual NIH completed. EVD clamped.

## 2021-04-20 NOTE — PROGRESS NOTES
ACUTE PHYSICAL THERAPY GOALS:  (Developed with and agreed upon by patient and/or caregiver.)  REMAIN APPROPRIATE 21  LTG:  (1.)Mr. Bailey Davison will move from supine to sit and sit to supine , scoot up and down and roll side to side in bed with MINIMAL ASSIST within 7 treatment day(s). (2.)Mr. Bailey Davison will tolerate sitting up in recliner chair for 2 hours with stable vital signs to increase upright tolerance within 7 treatment day(s). (3.)Mr. Bailey Davison will maintain static/dynamic sitting x 15 minutes with MINIMAL ASSIST for improved balance within 7 treatment days. (4.)Mr. Bailey Davison will be able to hold head in cervical rotation left of midline for 2 minutes within 7 treatment days. (5.)Mr. Bailey Davison will participate in therapeutic activity/exercises x 25 minutes for increased strength within 7 treatment days. PHYSICAL THERAPY: Daily Note and PM Treatment Day # 2    Rose Hodges is a 67 y.o. male   PRIMARY DIAGNOSIS: Hemorrhagic cerebrovascular accident (CVA) ([facility]  Hemorrhagic cerebrovascular accident (CVA) ([facility] [I61.9]         ASSESSMENT:     REHAB RECOMMENDATIONS: CURRENT LEVEL OF FUNCTION:  (Most Recently Demonstrated)   Recommendation to date pending progress:  [address]ett[address] [place] [address] vs. Short-term Rehab  Equipment:    To Be Determined Bed Mobility:   Maximal Assistance x 2  Sit to Stand:   Total Assistance x 2  Transfers:   Total Assistance x 2  Gait/Mobility:   Unable to perform     ASSESSMENT:  Mr. Bailey Davison was supine in bed with recent EVD pulled. He appeared less participative today with R side neglect. Pt required totalA x 2 for all mobility today and balance deficits appreciated in sitting/standing. Pt had poor posture today as well with increased cervical flexion. OT present to address neuro while PT focused on mobility/transfers. No progress today. SUBJECTIVE:   Mr. Bailey Davison states, \"Don't snatch me up. \"    SOCIAL HISTORY/ LIVING ENVIRONMENT: See eval  Home Environment: Private residence  # Steps to Enter: 5  One/Two Story Residence: One story  Living Alone: No  Support Systems: Family member(s)  OBJECTIVE:     PAIN: VITAL SIGNS: LINES/DRAINS:   Pre Treatment: Pain Screen  Pain Scale 1: FLACC  Pain Intensity 1: 1  Post Treatment: 0   IV and external male catheter,   O2 Device: None (Room air)     MOBILITY: I Mod I S SBA CGA Min Mod Max Total  NT x2 Comments:   Bed Mobility    Rolling [] [] [] [] [] [] [] [x] [x] [] [x]    Supine to Sit [] [] [] [] [] [] [] [x] [x] [] [x]    Scooting [] [] [] [] [] [] [] [] [x] [] [x]    Sit to Supine [] [] [] [] [] [] [] [x] [x] [] [x]    Transfers    Sit to Stand [] [] [] [] [] [] [] [] [x] [] [x]    Bed to Chair [] [] [] [] [] [] [] [] [] [x] []    Stand to Sit [] [] [] [] [] [] [] [] [x] [] [x]    I=Independent, Mod I=Modified Independent, S=Supervision, SBA=Standby Assistance, CGA=Contact Guard Assistance,   Min=Minimal Assistance, Mod=Moderate Assistance, Max=Maximal Assistance, Total=Total Assistance, NT=Not Tested    BALANCE: Good Fair+ Fair Fair- Poor NT Comments   Sitting Static [] [] [] [] [x] []    Sitting Dynamic [] [] [] [] [x] []              Standing Static [] [] [] [] [x] []    Standing Dynamic [] [] [] [] [x] []      GAIT: I Mod I S SBA CGA Min Mod Max Total  NT x2 Comments:   Level of Assistance [] [] [] [] [] [] [] [] [] [x] []    Distance NT    DME N/A    Gait Quality NT    Weightbearing  Status N/A     I=Independent, Mod I=Modified Independent, S=Supervision, SBA=Standby Assistance, CGA=Contact Guard Assistance,   Min=Minimal Assistance, Mod=Moderate Assistance, Max=Maximal Assistance, Total=Total Assistance, NT=Not Tested    PLAN:   FREQUENCY/DURATION: PT Plan of Care: 3 times/week for duration of hospital stay or until stated goals are met, whichever comes first.  TREATMENT:     TREATMENT:   ($$ Therapeutic Activity: 23-37 mins    )  Co-Treatment PT/OT necessary due to patient's decreased overall endurance/tolerance levels, as well as need for high level skilled assistance to complete functional transfers/mobility and functional tasks  Therapeutic Activity (23 Minutes): Therapeutic activity included Rolling, Supine to Sit, Sit to Supine, Scooting, Transfer Training, Sitting balance  and Standing balance to improve functional Mobility, Strength, ROM and Activity tolerance.     TREATMENT GRID:  N/A    AFTER TREATMENT POSITION/PRECAUTIONS:  Bed, Needs within reach, Restraints  and RN notified    INTERDISCIPLINARY COLLABORATION:  RN/PCT, PT/PTA and OT/LYNCH    TOTAL TREATMENT DURATION:  PT Patient Time In/Time Out  Time In: 1330  Time Out: Norman 296, PT, DPT

## 2021-04-20 NOTE — PROGRESS NOTES
ACUTE OT GOALS:  (Developed with and agreed upon by patient and/or caregiver.)  1. Complete functional transfers (I.e. toilet/bedside commode) with c min A. (UPDATED 2021)   2. Complete gentle active assisted L UE ROM focusing more on finger/wrist extensors and shoulder external rotators. CONTINUE TO ADDRESS (2021)  3. Utilize L UE as a support for supine to sitting and sit to standing at least 20% of the time. CONTINUE TO ADDRESS (2021)  4. Complete self-feeding (once cleared by SLP/MD/RN) with standby assistance. CONTINUE TO ADDRESS (2021)  5. Complete grooming/oral care with standby assistance. CONTINUE TO ADDRESS (2021)    NEW GOALS (Added 2021)  1. Patient will complete functional activity while seated EOB with min A and adaptive equipment as needed. 2. Patient will attend to L side of environment for 75% of session with min verbal cues from therapist.    OCCUPATIONAL THERAPY: Daily Note and PM    OT Treatment Day # 6    Uche White is a 67 y.o. male   PRIMARY DIAGNOSIS: Hemorrhagic cerebrovascular accident (CVA) ([facility]  Hemorrhagic cerebrovascular accident (CVA) ([facility] [I61.9]       Payor: Ricardo Navarro [id number] / Plan: VANDANA Will / Product Type: Managed Care Medicare /   ASSESSMENT:     REHAB RECOMMENDATIONS: CURRENT LEVEL OF FUNCTION:  (Most Recently Demonstrated)   Recommendation to date pending progress:  S[address]  Equipment:    To Be Determined Bathing:   Total Assistance  Dressing:   Total Assistance  Feeding/Grooming:   Total Assistance NG tube feeds, planned PEG   Toileting:   Total Assistance  Functional Mobility:   Total Assistance     ASSESSMENT:  Mr. Evelin Toney continues to present with deficits in overall strength, activity tolerance, ADL performance, and functional mobility. EVD drain removed this morning, but pt c/o head hurting.  Currently not following commands on the L side, probable 1 finger subluxation forward, pt complains with PROM; does follow commands on R side. B wrist restraints. Improving aphasia, stated several words today and answered some questions \"you are wearing blue\" \"that hurts\" \" I want to lay back down\", but most were negative and asking therapy to stop efforts and leave him alone. Bed mobility total Ax2 up to EOB, worked on sitting balance and finding midline, forward trunk flexion for low back stretch and B UEs flexion, needed help to find midline, unable to get upright posture and head up. Max verbal and tactile commands to maintain midline orientation. Passive stretch to neck laterally, rotation, and extension, but still prefers flexed head posture with chin nearly resting on chest. Sit to stand with max A x2 and 3rd person assisted with wiping up BM. New pad placed. Max to total A x 2 for return to supine. Completed dynamic weight shifts onto alternating R UE and LUE for proprioceptive input however limited d/t poor sitting balance and pt pushing. Able to wash face with RUE with set-up but needed some hand over hand for thoroughness. No progress towards goals. Will continue OT efforts as indicated. May need reeval and down grade of goals again. SUBJECTIVE:   Mr. Diomedes Gaitan states, \" I don't want to. \"    SOCIAL HISTORY/LIVING ENVIRONMENT: Lives with brother. Does not drive. Takes the bus to pay bills, etc. Totally independent per patient. Has Daughter who is helping as able.    Home Environment: Private residence  # Steps to Enter: 5  One/Two Story Residence: One story  Living Alone: No  Support Systems: Family member(s)    OBJECTIVE:     PAIN: VITAL SIGNS: LINES/DRAINS:   Pre Treatment: Pain Screen  Pain Scale 1: FLACC  Pain Intensity 1: 1  Post Treatment:  At rest after session, 0   IV, Nasogastric Tube and planned PEG 4/21  O2 Device: None (Room air)     ACTIVITIES OF DAILY LIVING: I Mod I S SBA CGA Min Mod Max Total NT Comments   BASIC ADLs:              Bathing/ Showering [] [] [] [] [] [] [] [] [] [x]    Toileting [] [] [] [] [] [] [] [] [x] [] Wiped in standing   Dressing [] [] [] [] [] [] [] [] [x] [] Clean gown   Feeding [] [] [] [] [] [] [] [] [x] [] NG tube   Grooming [] [] [] [] [] [x] [] [] [] [] Only washed chin then quit trying, hand over hand assist   Personal Device Care [] [] [] [] [] [] [] [] [] [x]    Functional Mobility [] [] [] [] [] [] [] [x] [x] [] X 2   I=Independent, Mod I=Modified Independent, S=Supervision, SBA=Standby Assistance, CGA=Contact Guard Assistance,   Min=Minimal Assistance, Mod=Moderate Assistance, Max=Maximal Assistance, Total=Total Assistance, NT=Not Tested    MOBILITY: I Mod I S SBA CGA Min Mod Max Total  NT x2 Comments:   Supine to sit [] [] [] [] [] [] [] [] [x] [] [x]    Sit to supine [] [] [] [] [] [] [] [] [x] [] [x]    Sit to stand [] [] [] [] [] [] [] [x] [] [] [x]    Bed to chair [] [] [] [] [] [] [] [] [] [x] [] Not safe   I=Independent, Mod I=Modified Independent, S=Supervision, SBA=Standby Assistance, CGA=Contact Guard Assistance,   Min=Minimal Assistance, Mod=Moderate Assistance, Max=Maximal Assistance, Total=Total Assistance, NT=Not Tested    BALANCE: Good Fair+ Fair Fair- Poor NT Comments   Sitting Static [] [] [] [] [x] []    Sitting Dynamic [] [] [] [] [x] []              Standing Static [] [] [] [] [x] []    Standing Dynamic [] [] [] [] [x] []      PLAN:   FREQUENCY/DURATION: OT Plan of Care: 3 times/week for duration of hospital stay or until stated goals are met, whichever comes first.    TREATMENT:   TREATMENT:   ($$ Self Care/Home Management: 8-22 mins$$ Neuromuscular Re-Education: 8-22 mins   )  Co-Treatment PT/OT necessary due to patient's decreased overall endurance/tolerance levels, as well as need for high level skilled assistance to complete functional transfers/mobility and functional tasks  Self Care (10 Minutes): Self care including Toileting, Upper Body Dressing, Lower Body Dressing and Grooming to increase independence and decrease level of assistance required. Neuromuscular Re-education (15 Minutes): Neuromuscular Re-education included Balance Training, Coordination training, Hemibody awareness training, Postural training, Sitting balance training, Standing balance training, Visual field awareness training and simple ADLs and use and awareness of L hemibody, possible 1 finger subluxation to improve Balance, Coordination, Functional Mobility, Postural Control, Proprioception and use of UEs in ADLs.     TREATMENT GRID:  N/A    AFTER TREATMENT POSITION/PRECAUTIONS:  Bed, Needs within reach, Restraints  and RN notified    INTERDISCIPLINARY COLLABORATION:  RN/PCT, PT/PTA and OT/LYNCH    TOTAL TREATMENT DURATION:  OT Patient Time In/Time Out  Time In: 1330  Time Out: Katja 8, OT    Monica Selby MS, OTR/L

## 2021-04-21 ENCOUNTER — ANESTHESIA (OUTPATIENT)
Dept: ENDOSCOPY | Age: 76
DRG: 023 | End: 2021-04-21
Payer: MEDICARE

## 2021-04-21 LAB
ANION GAP SERPL CALC-SCNC: 5 MMOL/L (ref 7–16)
BUN SERPL-MCNC: 26 MG/DL (ref 8–23)
CALCIUM SERPL-MCNC: 8.9 MG/DL (ref 8.3–10.4)
CHLORIDE SERPL-SCNC: 110 MMOL/L (ref 98–107)
CO2 SERPL-SCNC: 25 MMOL/L (ref 21–32)
CREAT SERPL-MCNC: 0.79 MG/DL (ref 0.8–1.5)
GLUCOSE BLD STRIP.AUTO-MCNC: 105 MG/DL (ref 65–100)
GLUCOSE BLD STRIP.AUTO-MCNC: 85 MG/DL (ref 65–100)
GLUCOSE BLD STRIP.AUTO-MCNC: 90 MG/DL (ref 65–100)
GLUCOSE BLD STRIP.AUTO-MCNC: 94 MG/DL (ref 65–100)
GLUCOSE SERPL-MCNC: 97 MG/DL (ref 65–100)
MAGNESIUM SERPL-MCNC: 2.2 MG/DL (ref 1.8–2.4)
PHOSPHATE SERPL-MCNC: 3.5 MG/DL (ref 2.3–3.7)
POTASSIUM SERPL-SCNC: 4.2 MMOL/L (ref 3.5–5.1)
SERVICE CMNT-IMP: ABNORMAL
SERVICE CMNT-IMP: NORMAL
SODIUM SERPL-SCNC: 140 MMOL/L (ref 136–145)

## 2021-04-21 PROCEDURE — 74011250636 HC RX REV CODE- 250/636: Performed by: REGISTERED NURSE

## 2021-04-21 PROCEDURE — 84100 ASSAY OF PHOSPHORUS: CPT

## 2021-04-21 PROCEDURE — 2709999900 HC NON-CHARGEABLE SUPPLY: Performed by: INTERNAL MEDICINE

## 2021-04-21 PROCEDURE — 74011250637 HC RX REV CODE- 250/637: Performed by: INTERNAL MEDICINE

## 2021-04-21 PROCEDURE — 77030005122 HC CATH GASTMY PEG BSC -B: Performed by: INTERNAL MEDICINE

## 2021-04-21 PROCEDURE — 74011000250 HC RX REV CODE- 250: Performed by: REGISTERED NURSE

## 2021-04-21 PROCEDURE — 77030036554: Performed by: INTERNAL MEDICINE

## 2021-04-21 PROCEDURE — 76060000031 HC ANESTHESIA FIRST 0.5 HR: Performed by: INTERNAL MEDICINE

## 2021-04-21 PROCEDURE — 74011250636 HC RX REV CODE- 250/636: Performed by: HOSPITALIST

## 2021-04-21 PROCEDURE — 83735 ASSAY OF MAGNESIUM: CPT

## 2021-04-21 PROCEDURE — 76040000025: Performed by: INTERNAL MEDICINE

## 2021-04-21 PROCEDURE — 65660000000 HC RM CCU STEPDOWN

## 2021-04-21 PROCEDURE — 82962 GLUCOSE BLOOD TEST: CPT

## 2021-04-21 PROCEDURE — 3E0G76Z INTRODUCTION OF NUTRITIONAL SUBSTANCE INTO UPPER GI, VIA NATURAL OR ARTIFICIAL OPENING: ICD-10-PCS | Performed by: INTERNAL MEDICINE

## 2021-04-21 PROCEDURE — 74011000250 HC RX REV CODE- 250: Performed by: FAMILY MEDICINE

## 2021-04-21 PROCEDURE — 74011250636 HC RX REV CODE- 250/636: Performed by: ANESTHESIOLOGY

## 2021-04-21 PROCEDURE — 74011250637 HC RX REV CODE- 250/637: Performed by: FAMILY MEDICINE

## 2021-04-21 PROCEDURE — 74011250636 HC RX REV CODE- 250/636: Performed by: INTERNAL MEDICINE

## 2021-04-21 PROCEDURE — 80048 BASIC METABOLIC PNL TOTAL CA: CPT

## 2021-04-21 PROCEDURE — 74011000250 HC RX REV CODE- 250: Performed by: INTERNAL MEDICINE

## 2021-04-21 PROCEDURE — 2709999900 HC NON-CHARGEABLE SUPPLY

## 2021-04-21 PROCEDURE — 0DH63UZ INSERTION OF FEEDING DEVICE INTO STOMACH, PERCUTANEOUS APPROACH: ICD-10-PCS | Performed by: INTERNAL MEDICINE

## 2021-04-21 PROCEDURE — 97530 THERAPEUTIC ACTIVITIES: CPT

## 2021-04-21 PROCEDURE — 36415 COLL VENOUS BLD VENIPUNCTURE: CPT

## 2021-04-21 RX ORDER — SODIUM CHLORIDE, SODIUM LACTATE, POTASSIUM CHLORIDE, CALCIUM CHLORIDE 600; 310; 30; 20 MG/100ML; MG/100ML; MG/100ML; MG/100ML
100 INJECTION, SOLUTION INTRAVENOUS CONTINUOUS
Status: DISCONTINUED | OUTPATIENT
Start: 2021-04-21 | End: 2021-04-21

## 2021-04-21 RX ORDER — LIDOCAINE HYDROCHLORIDE 20 MG/ML
INJECTION, SOLUTION EPIDURAL; INFILTRATION; INTRACAUDAL; PERINEURAL AS NEEDED
Status: DISCONTINUED | OUTPATIENT
Start: 2021-04-21 | End: 2021-04-21 | Stop reason: HOSPADM

## 2021-04-21 RX ORDER — PROPOFOL 10 MG/ML
INJECTION, EMULSION INTRAVENOUS AS NEEDED
Status: DISCONTINUED | OUTPATIENT
Start: 2021-04-21 | End: 2021-04-21 | Stop reason: HOSPADM

## 2021-04-21 RX ORDER — CEFAZOLIN SODIUM/WATER 2 G/20 ML
2 SYRINGE (ML) INTRAVENOUS
Status: COMPLETED | OUTPATIENT
Start: 2021-04-21 | End: 2021-04-21

## 2021-04-21 RX ORDER — PROPOFOL 10 MG/ML
INJECTION, EMULSION INTRAVENOUS
Status: DISCONTINUED | OUTPATIENT
Start: 2021-04-21 | End: 2021-04-21 | Stop reason: HOSPADM

## 2021-04-21 RX ORDER — EPHEDRINE SULFATE/0.9% NACL/PF 50 MG/5 ML
SYRINGE (ML) INTRAVENOUS AS NEEDED
Status: DISCONTINUED | OUTPATIENT
Start: 2021-04-21 | End: 2021-04-21 | Stop reason: HOSPADM

## 2021-04-21 RX ORDER — HYDROCODONE BITARTRATE AND ACETAMINOPHEN 5; 325 MG/1; MG/1
1 TABLET ORAL
Status: DISCONTINUED | OUTPATIENT
Start: 2021-04-21 | End: 2021-04-22

## 2021-04-21 RX ORDER — HYDROCODONE BITARTRATE AND ACETAMINOPHEN 10; 325 MG/1; MG/1
1 TABLET ORAL
Status: DISCONTINUED | OUTPATIENT
Start: 2021-04-21 | End: 2021-04-22

## 2021-04-21 RX ORDER — MORPHINE SULFATE 2 MG/ML
2 INJECTION, SOLUTION INTRAMUSCULAR; INTRAVENOUS
Status: DISCONTINUED | OUTPATIENT
Start: 2021-04-21 | End: 2021-04-22

## 2021-04-21 RX ADMIN — Medication 10 ML: at 20:58

## 2021-04-21 RX ADMIN — Medication 20 MG: at 11:32

## 2021-04-21 RX ADMIN — SODIUM CHLORIDE, SODIUM LACTATE, POTASSIUM CHLORIDE, AND CALCIUM CHLORIDE 100 ML/HR: 600; 310; 30; 20 INJECTION, SOLUTION INTRAVENOUS at 10:27

## 2021-04-21 RX ADMIN — Medication 10 ML: at 06:06

## 2021-04-21 RX ADMIN — AMLODIPINE BESYLATE 10 MG: 10 TABLET ORAL at 08:35

## 2021-04-21 RX ADMIN — ATORVASTATIN CALCIUM 40 MG: 40 TABLET, FILM COATED ORAL at 20:57

## 2021-04-21 RX ADMIN — METOPROLOL TARTRATE 50 MG: 50 TABLET, FILM COATED ORAL at 20:58

## 2021-04-21 RX ADMIN — MORPHINE SULFATE 2 MG: 2 INJECTION, SOLUTION INTRAMUSCULAR; INTRAVENOUS at 18:14

## 2021-04-21 RX ADMIN — LIDOCAINE HYDROCHLORIDE 50 MG: 20 INJECTION, SOLUTION EPIDURAL; INFILTRATION; INTRACAUDAL; PERINEURAL at 11:30

## 2021-04-21 RX ADMIN — PROPOFOL 50 MG: 10 INJECTION, EMULSION INTRAVENOUS at 11:30

## 2021-04-21 RX ADMIN — METOPROLOL TARTRATE 50 MG: 50 TABLET, FILM COATED ORAL at 08:35

## 2021-04-21 RX ADMIN — PROPOFOL 140 MCG/KG/MIN: 10 INJECTION, EMULSION INTRAVENOUS at 11:30

## 2021-04-21 RX ADMIN — Medication 20 MG: at 11:38

## 2021-04-21 RX ADMIN — CEFAZOLIN 2 G: 10 INJECTION, POWDER, FOR SOLUTION INTRAVENOUS at 11:07

## 2021-04-21 RX ADMIN — ACETAMINOPHEN 650 MG: 325 SUSPENSION ORAL at 20:58

## 2021-04-21 RX ADMIN — Medication 10 ML: at 13:04

## 2021-04-21 NOTE — PROGRESS NOTES
Bedside and Verbal shift change report given to Hossein Moncada RN (oncoming nurse) by Harlan Bowden  (offgoing nurse). Report included the following information SBAR, Kardex, ED Summary, Procedure Summary, Intake/Output, MAR, Recent Results, Cardiac Rhythm NSR and Alarm Parameters . Dual neuro assessment completed- L side flaccid and decreased sensation. Drowsy and delayed responses but oriented x4. Pupils 3 mm brisk and round. NGT clamped for PEG placement today. Condom catheter in place and draining.

## 2021-04-21 NOTE — OP NOTES
Esophagogastroduodenoscopy    DATE of PROCEDURE: 4/21/2021    INDICATION: Dysphagia    POSTPROCEDURE DIAGNOSIS:PEG placement    MEDICATIONS ADMINISTERED: MAC anesthesia (see anesthesia report)    INSTRUMENT:     PROCEDURE:  After obtaining informed consent, the patient was placed in the left lateral position and sedated. The endoscope was advanced under direct vision without difficulty. The esophagus, stomach (including retroflexed views) and duodenum were evaluated. The endoscope was directed at the anterior abd wall, where adequate transillumination is noted in the RUQ. The patient is draped and prepped sterily. Cutaneous and subcutaneous tissues are anesthetized with 1% lidocaineThe introducer needle is passed into the stomach under endoscopic guidance. The 20Fr pull-type PEG is placed by Dr Foreman Dane is secured at 1.5c,, and photo documented The patient was taken to the recovery area in stable condition.     FINDINGS:  ESOPHAGUS: normal  STOMACH:Normal, PEG placed, external bolster at 1.5cm  DUODENUM: Normal    Estimated blood loss: 0-minimal   Specimens obtained during procedure: none    PLAN:

## 2021-04-21 NOTE — PROGRESS NOTES
TRANSFER - OUT REPORT:    Verbal report given to Cassandra Chauhan RN (name) on Betzy Flores  being transferred to GI lab (unit) for ordered procedure       Report consisted of patients Situation, Background, Assessment and   Recommendations(SBAR). Information from the following report(s) SBAR, Kardex, Intake/Output, MAR and Cardiac Rhythm NSR was reviewed with the receiving nurse. Lines:   Peripheral IV 73/63/09 Left Basilic (Active)   Site Assessment Clean, dry, & intact 04/21/21 0701   Phlebitis Assessment 0 04/21/21 0701   Infiltration Assessment 0 04/21/21 0701   Dressing Status Clean, dry, & intact 04/21/21 0701   Dressing Type Transparent;Tape 04/21/21 0701   Hub Color/Line Status Green;Flushed;Patent 04/21/21 0701   Alcohol Cap Used No 04/21/21 0701       Peripheral IV 04/04/21 Anterior;Left;Proximal Forearm (Active)   Site Assessment Clean, dry, & intact 04/21/21 0701   Phlebitis Assessment 0 04/21/21 0701   Infiltration Assessment 0 04/21/21 0701   Dressing Status Clean, dry, & intact 04/21/21 0701   Dressing Type Transparent;Tape 04/21/21 0701   Hub Color/Line Status Pink;Flushed;Patent 04/21/21 0701   Alcohol Cap Used No 04/21/21 0701       Peripheral IV Posterior;Right Arm (Active)   Site Assessment Clean, dry, & intact 04/21/21 0701   Phlebitis Assessment 0 04/21/21 0701   Infiltration Assessment 0 04/21/21 0701   Dressing Status Clean, dry, & intact 04/21/21 0701   Dressing Type Transparent;Tape 04/21/21 0701   Hub Color/Line Status Blue;Flushed;Patent 04/21/21 0701   Alcohol Cap Used No 04/21/21 0701        Opportunity for questions and clarification was provided.       Patient transported with:   Monitor  Registered Nurse

## 2021-04-21 NOTE — PROGRESS NOTES
[facility] Hospitalist Progress Note     Name:  Gladys Lugo  Age:76 y.o. Sex:male   :  1945    MRN:  [id number]     Admit Date:  2021    Reason for Admission:  Hemorrhagic cerebrovascular accident (CVA) [facility]) [I61.9]    Assessment & Plan       Hemorrhagic CVA s/p EVD 21: Followed by neurosurgery and neurology   d/c EVD   NPO  S/p PEG placement   PT,OT  Needs discharge plans, family choosing SNF      Sepsis due to Pneumonia:   zosyn EOT 21  On room air        Elevated troponin:  Followed by cardiology, due to demand ischemia       DENI on CKD:  followup BMP,stable    CAD:  Continued lipitor    HTN:  Continued norvasc, Metoprolol    IV cardene as needed for goal < 140/90  Also Has prn IV antihypertensives       Diet:  DIET NPO  DIET TUBE FEEDING  DVT PPx: anticoagulation contra indicated due to hemorrhagic CVA  GI Ppx:  none  Code: Full Code    Dispo/Discharge Planning: floor transfer         Hospital Course/Subjective:     Mr. Denisha Cali is a 68 yo male PMH of HTN, CAD, HLP, COPD, HEPC C admitted 21 with acute right basal ganglia hemorrhagic CVA. Neurosurgery did not recommend acute interventions on admit. On 21 he was more drowsy and repeat CT head showed increased IVH and concern for early hydrocephalus. He is s/p EVD 21 to 21. Repeat CT head  stable. CODE S called on 21 due to decreased responsiveness. CT head no overall changes. EEG shows abnormal findings consistent with known NS hemorrhage. Neurology following. Goal sodium is 140 to 150. He was covered with antibiotics for pneumonia, possible aspiration. Cardiology followed for elevated troponin, feeling this was due to demand ischemia. He was receiving NGT feeds and  S/p  PEG on . SLP following. He has resumed antihypertensives via NGT and also required IV cardene. Discharge plans pending.      Subjective/24 hr Events (21):    Sleeping, discussed with RN ok per neurosurgery for floor Objective:     Patient Vitals for the past 24 hrs:   Temp Pulse Resp BP SpO2   04/21/21 1206 (!) 96 °F (35.6 °C) 76 16 101/60 95 %   04/21/21 1157  77 16 102/60 96 %   04/21/21 1150 97.6 °F (36.4 °C) 86 16 (!) 86/51 96 %   04/21/21 1149  85 16 (!) 90/54 95 %   04/21/21 1148  87 16 (!) 86/51 93 %   04/21/21 1018 97.5 °F (36.4 °C) (!) 59 18 109/65 99 %   04/21/21 0901  68 23 124/77 100 %   04/21/21 0802  73 17 138/73 100 %   04/21/21 0701 97.5 °F (36.4 °C) 79 20 125/77 99 %   04/21/21 0600  79 21 132/72 98 %   04/21/21 0500  72 17 124/73 98 %   04/21/21 0400  69 17 133/75 99 %   04/21/21 0300 97.7 °F (36.5 °C) 73 29 129/82 99 %   04/21/21 0200  69 28 136/74 98 %   04/21/21 0100  67 19 123/73 98 %   04/21/21 0000  63 22 135/73 99 %   04/20/21 2300 98 °F (36.7 °C) 64 27 121/65 99 %   04/20/21 2200  63 21 122/78 98 %   04/20/21 2100  69 26 113/66 99 %   04/20/21 2000  73 22 121/62 100 %   04/20/21 1900 98.3 °F (36.8 °C) 68 23 130/66 99 %   04/20/21 1800  70 16 131/69 98 %   04/20/21 1700  69 25 127/65 100 %   04/20/21 1600 98.1 °F (36.7 °C) 72 24 122/64 100 %   04/20/21 1508  68 15 (!) 142/74 100 %     Oxygen Therapy  O2 Sat (%): 95 % (04/21/21 1206)  Pulse via Oximetry: 77 beats per minute (04/21/21 1206)  O2 Device: None (Room air) (04/21/21 1206)  Skin Assessment: Clean, dry, & intact (04/21/21 0701)  Skin Protection for O2 Device: N/A (04/21/21 0701)  O2 Flow Rate (L/min): 3 l/min (04/21/21 1157)    Body mass index is 19.78 kg/m².     Physical Exam:   General: No acute distress, sleeping   Lungs: Clear to auscultation bilaterally anterior   Cardiovascular: Regular rate and rhythm with normal S1 and S2, no edema    Abdomen: Soft, nontender, nondistended, normoactive bowel sounds   Extremities: No cyanosis    Neuro: sleepy      Data Review:  I have reviewed all labs, meds, and studies from the last 24 hours:    Labs:    Recent Results (from the past 24 hour(s))   GLUCOSE, POC    Collection Time: 04/20/21  6:03 PM   Result Value Ref Range    Glucose (POC) 104 (H) 65 - 100 mg/dL    Performed by Apolinar    GLUCOSE, POC    Collection Time: 04/21/21  1:19 AM   Result Value Ref Range    Glucose (POC) 94 65 - 100 mg/dL    Performed by Salinas    METABOLIC PANEL, BASIC    Collection Time: 04/21/21  4:59 AM   Result Value Ref Range    Sodium 140 136 - 145 mmol/L    Potassium 4.2 3.5 - 5.1 mmol/L    Chloride 110 (H) 98 - 107 mmol/L    CO2 25 21 - 32 mmol/L    Anion gap 5 (L) 7 - 16 mmol/L    Glucose 97 65 - 100 mg/dL    BUN 26 (H) 8 - 23 MG/DL    Creatinine 0.79 (L) 0.8 - 1.5 MG/DL    GFR est AA >60 >60 ml/min/1.73m2    GFR est non-AA >60 >60 ml/min/1.73m2    Calcium 8.9 8.3 - 10.4 MG/DL   PHOSPHORUS    Collection Time: 04/21/21  4:59 AM   Result Value Ref Range    Phosphorus 3.5 2.3 - 3.7 MG/DL   MAGNESIUM    Collection Time: 04/21/21  4:59 AM   Result Value Ref Range    Magnesium 2.2 1.8 - 2.4 mg/dL   GLUCOSE, POC    Collection Time: 04/21/21 10:31 AM   Result Value Ref Range    Glucose (POC) 85 65 - 100 mg/dL    Performed by Mohan Drew    GLUCOSE, POC    Collection Time: 04/21/21  1:01 PM   Result Value Ref Range    Glucose (POC) 90 65 - 100 mg/dL    Performed by Sebastian (Helvey)        All Micro Results     Procedure Component Value Units Date/Time    CULTURE, BLOOD [[id number]] Collected: 04/09/21 1053    Order Status: Completed Specimen: Blood Updated: 04/14/21 0721     Special Requests: --        RIGHT  HAND       Culture result: NO GROWTH 5 DAYS       CULTURE, BLOOD [[id number]] Collected: 04/09/21 1058    Order Status: Completed Specimen: Blood Updated: 04/14/21 0721     Special Requests: --        RIGHT  Antecubital       Culture result: NO GROWTH 5 DAYS       COVID-19 RAPID TEST [[id number]] Collected: 04/07/21 1221    Order Status: Completed Specimen: Nasopharyngeal Updated: 04/07/21 1301     Specimen source Nasopharyngeal        COVID-19 rapid test Not detected        Comment: The specimen is NEGATIVE for SARS-CoV-2, the novel coronavirus associated with COVID-19. A negative result does not rule out COVID-19. This test has been authorized by the FDA under an Emergency Use Authorization (EUA) for use by authorized laboratories.         Fact sheet for Healthcare Providers: ConventionUpdate.co.nz  Fact sheet for Patients: ConventionUpdate.co.nz       Methodology: Isothermal Nucleic Acid Amplification               EKG Results     Procedure 720 Value Units Date/Time    EKG, 12 LEAD, SUBSEQUENT [[id number]] Collected: 04/03/21 0821    Order Status: Completed Updated: 04/03/21 1227     Ventricular Rate 74 BPM      Atrial Rate 74 BPM      P-R Interval 160 ms      QRS Duration 94 ms      Q-T Interval 388 ms      QTC Calculation (Bezet) 430 ms      Calculated P Axis 71 degrees      Calculated R Axis -61 degrees      Calculated T Axis 88 degrees      Diagnosis --     Sinus rhythm with occasional Premature ventricular complexes  Left anterior fascicular block  ST & T wave abnormality, consider lateral ischemia  Abnormal ECG  When compared with ECG of 02-APR-2021 18:26,  Premature ventricular complexes are now Present  Left anterior fascicular block is now Present  Confirmed by Allie Reyes ([id number]) on 4/3/2021 12:27:42 PM      Initial ECG [[id number]] Collected: 04/02/21 1826    Order Status: Completed Updated: 04/03/21 0732     Ventricular Rate 77 BPM      Atrial Rate 78 BPM      P-R Interval 156 ms      QRS Duration 96 ms      Q-T Interval 356 ms      QTC Calculation (Bezet) 402 ms      Calculated P Axis 76 degrees      Calculated R Axis 69 degrees      Calculated T Axis 79 degrees      Diagnosis --     Normal sinus rhythm  Normal ECG  When compared with ECG of 28-AUG-2017 21:42,  Questionable change in QRS axis  Nonspecific T wave abnormality, improved in Lateral leads  Confirmed by Suha Benz ([id number]) on 4/3/2021 7:32:01 AM      EKG 12 LEAD INITIAL [[id number]]     Order Status: Canceled           Other Studies:  Ct Head Wo Cont    Result Date: 4/3/2021  NONCONTRAST HEAD CT CLINICAL HISTORY:  Follow-up intracranial hemorrhage. TECHNIQUE:  Axial images were obtained with spiral technique. Radiation dose reduction was achieved using one or all of the following techniques: automated exposure control, weight-based dosing, iterative reconstruction. COMPARISON:  CT yesterday and 0617 hours today. REPORT:   Standard noncontrast head CT demonstrates continued increase in size of the right thalamic hemorrhage with maximum diameter now measured at approximately 3.6 cm compared with 2.9 cm earlier today. Moreover, there is new bilateral lateral intraventricular extension of hemorrhage, primarily localized to the atria. There is associated slight increase in bilateral lateral ventriculomegaly with biatrial diameter now measuring approximately 4.2 cm compared with 3.8 cm this morning. Mass effect is little changed with midline shift from right to left of approximately 5 mm compared with 4 mm this morning. Extensive small vessel ischemic disease and small focal lateral right frontal infarct appear unchanged. INTERVAL INCREASE IN SIZE OF THE RIGHT THALAMIC HEMORRHAGE WITH NEW BILATERAL LATERAL INTRAVENTRICULAR EXTENSION AND SLIGHT WORSENING OF VENTRICULOMEGALY. Ct Head Wo Cont    Result Date: 4/3/2021  EXAM: CT HEAD WO CONT HISTORY: .  TECHNIQUE: Axial images of the brain were performed without the administration of intravenous contrast. Images were obtained axial plane and coronal reformatted images were submitted. CT scan performed using appropriate/available dose optimization/reduction/ALARA techniques. COMPARISON: None. FINDINGS: The known right thalamic hemorrhage is again observed. There is surrounding edema. The body of this hemorrhage has not changed significantly. Currently it measures 2.4 x 2.9 cm when previously it measured 2.4 x 2.7 cm.  There has been interval increase in an area of adjacent or extension of the hemorrhage posteriorly. Previously it measured 4.5 mm in width. Currently it measures 1.5 cm in width and 1.3 cm AP. There is persistent intraventricular hemorrhage in the posterior horn of the right lateral ventricle. Interval development of a small amount of hemorrhage in the left posterior horn. There is mild midline shift of the posterior septum pellucidum of approximately 4.1 mm. The lateral ventricles are mildly dilated, stable. Chronic ischemic white matter changes are again observed. Question small chronic infarction in the right frontal lobe. Interval increase in the size of the right thalamic hemorrhage. Interval development of mild right to left midline shift of the posterior aspect of the septum pellucidum. Stable hemorrhage in the posterior horn of the right lateral ventricle. Interval development of a small amount of hemorrhage in the posterior horn of the left lateral ventricle. Other findings as above. [facility]    Result Date: 4/3/2021  RIGHT UPPER QUADRANT ULTRASOUND. HISTORY: Transaminitis. COMPARISON: No relevant comparison studies available. FINDINGS: Ultrasonographic Ramon's sign: is reported as negative Gallstones: There are intraluminal echogenic foci, probably stones. Gallbladder Wall: not thickened. Common Bile Duct: is not dilated, 4 mm. Intrahepatic Biliary Tree: is not dilated. Liver: Uniform parenchyma Included portion of the pancreas and right kidney: are unremarkable. Vasculature: Aorta: Normal caliber. IVC:  Patent. Portal vein: Hepatopedal flow. Probable gallstones. No biliary tree obstruction. No findings to suggest acute cholecystitis. Xr Chest Port    Result Date: 4/2/2021  CHEST X-RAY, one view. HISTORY:  Chest pain. TECHNIQUE:  AP upright portable view. COMPARISON: August 2017 FINDINGS: Lungs: Atelectasis or scar right base, similar to prior exam. Costophrenic angles: are sharp.  Heart size: is normal. Pulmonary vasculature: is unremarkable. Aorta: Arch calcifications. Included portion of the upper abdomen: is unremarkable. Bones: No gross bony lesions. Other: None. Negative for acute change. Ct Code Neuro Head Wo Contrast    Result Date: 4/2/2021  CT HEAD WITHOUT CONTRAST HISTORY:  CVA. COMPARISON: None. TECHNIQUE: Axial imaging was performed without intravenous contrast utilizing 5mm slice thickness. Sagittal and coronal reformats were performed. Radiation dose reduction techniques were used for this study. Our CT scanner uses one or all of the following: Automated exposure control, adjustment of the MAS or KUB according to patient's size and iterative reconstruction. FINDINGS:    *BRAIN:    -  There are no early signs of territorial or lacunar infarction by CT.    -  2.7 x 2.4 cm acute hemorrhage into the right basal ganglia and thalamus. Right lateral ventricle intraventricular hemorrhage.    -  No gross white matter abnormality by CT. *VISUALIZED PARANASAL SINUSES: Well aerated. *MASTOIDS:  Clear. *CALVARIUM AND SCALP: Unremarkable. Acute right basal ganglia hematoma likely on the basis of hypertension. Right intraventricular hemorrhage.  Date of Dictation: 4/2/2021 6:17 PM          Current Meds:   Current Facility-Administered Medications   Medication Dose Route Frequency    acetaminophen (TYLENOL) solution 650 mg  650 mg Per NG tube Q6H PRN    bisacodyL (DULCOLAX) suppository 10 mg  10 mg Rectal DAILY PRN    insulin lispro (HUMALOG) injection   SubCUTAneous Q6H    metoprolol tartrate (LOPRESSOR) tablet 50 mg  50 mg Per NG tube Q12H    amLODIPine (NORVASC) tablet 10 mg  10 mg Per NG tube DAILY    lip protectant (BLISTEX) ointment 1 Each  1 Each Topical PRN    NUTRITIONAL SUPPORT ELECTROLYTE PRN ORDERS   Does Not Apply PRN    atorvastatin (LIPITOR) tablet 40 mg  40 mg Per NG tube QHS    metoprolol (LOPRESSOR) injection 5 mg  5 mg IntraVENous Q6H PRN    [Held by provider] morphine injection 1 mg  1 mg IntraVENous Q4H PRN    [Held by provider] oxyCODONE IR (ROXICODONE) tablet 5 mg  5 mg Oral Q6H PRN    enalaprilat (VASOTEC) injection 1.25 mg  1.25 mg IntraVENous Q6H PRN    lidocaine 4 % patch 1 Patch  1 Patch TransDERmal Q24H    sodium chloride (NS) flush 5-40 mL  5-40 mL IntraVENous Q8H    sodium chloride (NS) flush 5-40 mL  5-40 mL IntraVENous PRN       Problem List:  Hospital Problems as of 4/21/2021 Date Reviewed: 4/4/2021          Codes Class Noted - Resolved POA    Severe protein-calorie malnutrition ([facility] ICD-10-CM: E43  ICD-9-CM: 262  4/9/2021 - Present Yes        Dysphagia following cerebral infarction ICD-10-CM: I69.391  ICD-9-CM: 438.82  4/6/2021 - Present Yes        Elevated liver enzymes ICD-10-CM: R74.8  ICD-9-CM: 790.5  4/6/2021 - Present Yes        Elevated troponin ICD-10-CM: R77.8  ICD-9-CM: 790.6  4/4/2021 - Present Yes        * (Principal) Hemorrhagic cerebrovascular accident (CVA) ([facility] ICD-10-CM: I61.9  ICD-9-CM: 369  4/2/2021 - Present Yes        Acute left-sided weakness ICD-10-CM: R53.1  ICD-9-CM: 728.87  4/2/2021 - Present Yes        S/P PTCA (percutaneous transluminal coronary angioplasty) ICD-10-CM: Z98.61  ICD-9-CM: V45.82  Unknown - Present Yes        Chronic tuberculosis ICD-10-CM: A15.9  ICD-9-CM: 011.90  Unknown - Present Yes        Coronary atherosclerosis of native coronary vessel ICD-10-CM: I25.10  ICD-9-CM: 414.01  Unknown - Present Yes        Hyperlipidemia ICD-10-CM: E78.5  ICD-9-CM: 272.4  Unknown - Present Yes        Iron deficiency anemia ICD-10-CM: D50.9  ICD-9-CM: 280.9  1/25/2015 - Present Yes        Microcytic anemia ICD-10-CM: D50.9  ICD-9-CM: 280.9  1/21/2015 - Present     Overview Signed 4/6/2016  9:52 AM by Beverly oClmenares     Iron deficiency                    Part of this note was written by using a voice dictation software and the note has been proof read but may still contain some grammatical/other typographical errors.     Signed By: Ugo Amador Bronson Tapia MD   [facility] Hospitalist Service    April 21, 2021  9:08 AM

## 2021-04-21 NOTE — PROGRESS NOTES
ACUTE PHYSICAL THERAPY GOALS:  (Developed with and agreed upon by patient and/or caregiver. )  LTG:  (1.)Mr. Monk will move from supine to sit and sit to supine , scoot up and down and roll side to side in bed with MINIMAL ASSIST within 7 treatment day(s).    (2.)Mr. Monk will tolerate sitting up in recliner chair for 2 hours with stable vital signs to increase upright tolerance within 7 treatment day(s).    (3.)Mr. Monk will maintain static/dynamic sitting x 15 minutes with MINIMAL ASSIST for improved balance within 7 treatment days. (4.)Mr. Monk will be able to hold head in cervical rotation left of midline for 2 minutes within 7 treatment days. (5.)Mr. Monk will participate in therapeutic activity/exercises x 25 minutes for increased strength within 7 treatment days. PHYSICAL THERAPY: Daily Note and PM Treatment Day # 3    Betzy Flores is a 67 y.o. male   PRIMARY DIAGNOSIS: Hemorrhagic cerebrovascular accident (CVA) ([facility]  Hemorrhagic cerebrovascular accident (CVA) ([facility] [I61.9]  Procedure(s) (LRB):  ESOPHAGOGASTRODUODENOSCOPY (EGD) (N/A)  PERCUTANEOUS ENDOSCOPIC GASTROSTOMY TUBE INSERTION/  (N/A)  Day of Surgery    ASSESSMENT:     REHAB RECOMMENDATIONS: CURRENT LEVEL OF FUNCTION:  (Most Recently Demonstrated)   Recommendation to date pending progress:  [address] [place] [address] vs. Short-term Rehab  Equipment:    To Be Determined Bed Mobility:   Total Assistance  Sit to Stand:   Not tested  Transfers:   Not tested  Gait/Mobility:   Unable to perform     ASSESSMENT:  Mr. Nelson Suarez presents awake and alert today after having a PEG placed earlier today. He reports he felt \"awful\" today but was agreeable to have PT today for LLE movement and massage and ROM to his head/neck secondary to pain and discomfort. His LLE is very tight with increased extensor tone. His LUE has increased flexor tone and postures in flexion with awkward hand/wrist positioning.   He has active movement of his RUE/RLE and participated AA exercises on this side. Additional time was spent on his head/neck ROM as he positions his head to the left with increased stiffness and tightness on the right. His tolerance was low for head/neck movement but at the same time he stated it felt good to move it. He remains total assist for any bed mobility and the bed chair position was tried, but he did not tolerate it today. He was positioned for comfort with a change in his positioning to allow for some pressure relief and varied body position. He was kind and cooperative with therapy but remains limited secondary to his left hemiplegia. PT will continue to follow and progress as tolerated.       SUBJECTIVE:   Mr. Mesfin Perez states, \"I just feel awful today\"    SOCIAL HISTORY/ LIVING ENVIRONMENT:   Home Environment: Private residence  # Steps to Enter: 5  One/Two Story Residence: One story  Living Alone: No  Support Systems: Family member(s)  OBJECTIVE:     PAIN: VITAL SIGNS: LINES/DRAINS:   Pre Treatment:  4/10 patient complained of neck pain  Post Treatment: 4/10     Visit Vitals  /70 (BP 1 Location: Right arm, BP Patient Position: At rest)   Pulse 81   Temp 97 °F (36.1 °C)   Resp 22   Ht 5' 8\" (1.727 m)   Wt 59 kg (130 lb 1.1 oz)   SpO2 95%   BMI 19.78 kg/m²    Garrett Catheter, IV and PEG  O2 Device: None (Room air)     MOBILITY: I Mod I S SBA CGA Min Mod Max Total  NT x2 Comments:   Bed Mobility    Rolling [] [] [] [] [] [] [] [] [x] [] []    Supine to Sit [] [] [] [] [] [] [] [] [] [x] []    Scooting [] [] [] [] [] [] [] [] [x] [] [x]    Sit to Supine [] [] [] [] [] [] [] [] [] [x] []    Transfers    Sit to Stand [] [] [] [] [] [] [] [] [] [x] []    Bed to Chair [] [] [] [] [] [] [] [] [] [x] []    Stand to Sit [] [] [] [] [] [] [] [] [] [x] []    I=Independent, Mod I=Modified Independent, S=Supervision, SBA=Standby Assistance, CGA=Contact Guard Assistance,   Min=Minimal Assistance, Mod=Moderate Assistance, Max=Maximal Assistance, Total=Total Assistance, NT=Not Tested    BALANCE: Good Fair+ Fair Fair- Poor NT Comments   Sitting Static [] [] [] [] [] [x]    Sitting Dynamic [] [] [] [] [] [x]              Standing Static [] [] [] [] [] [x]    Standing Dynamic [] [] [] [] [] [x]      GAIT: I Mod I S SBA CGA Min Mod Max Total  NT x2 Comments:   Level of Assistance [] [] [] [] [] [] [] [] [] [x] []    Distance 0    DME N/A    Gait Quality N/A    Weightbearing  Status N/A     I=Independent, Mod I=Modified Independent, S=Supervision, SBA=Standby Assistance, CGA=Contact Guard Assistance,   Min=Minimal Assistance, Mod=Moderate Assistance, Max=Maximal Assistance, Total=Total Assistance, NT=Not Tested    PLAN:   FREQUENCY/DURATION: PT Plan of Care: 3 times/week for duration of hospital stay or until stated goals are met, whichever comes first.  TREATMENT:     TREATMENT:   ($$ Therapeutic Activity: 23-37 mins    )  Therapeutic Activity (24 Minutes): Therapeutic activity included Rolling and head neck ROM/massage and LUE/LLE PROM/positioning to improve functional Mobility, ROM and Activity tolerance.     TREATMENT GRID:   Date:  4/21/21 Date:   Date:     Activity/Exercise Parameters Parameters Parameters   PROM to LLE 10 reps across all joints     AA ankle pumps RLE 10      AA heel slides RLE 10      AA hip abd/adduction RLE 10                         AFTER TREATMENT POSITION/PRECAUTIONS:  Bed, Needs within reach and RN notified    INTERDISCIPLINARY COLLABORATION:  RN/PCT    TOTAL TREATMENT DURATION:  PT Patient Time In/Time Out  Time In: 1519  Time Out: [address] [place]

## 2021-04-21 NOTE — PROGRESS NOTES
TRANSFER - IN REPORT:    Verbal report received from Derek Navarrete on Caffie Boy  being received from [phone] for ordered procedure      Report consisted of patients Situation, Background, Assessment and   Recommendations(SBAR). Information from the following report(s) SBAR, Intake/Output, MAR, Recent Results and Med Rec Status was reviewed with the receiving nurse. Opportunity for questions and clarification was provided.

## 2021-04-21 NOTE — PROGRESS NOTES
Pt attempted treatment but the patient is off the floor today getting a PEG. PT will check back as schedule allows.     Claudette Robert DPT

## 2021-04-21 NOTE — ANESTHESIA POSTPROCEDURE EVALUATION
Procedure(s):  ESOPHAGOGASTRODUODENOSCOPY (EGD)  PERCUTANEOUS ENDOSCOPIC GASTROSTOMY TUBE INSERTION/ 19/ 334. total IV anesthesia    Anesthesia Post Evaluation        Patient location during evaluation: PACU  Patient participation: complete - patient cannot participate  Level of consciousness: awake  Pain management: adequate  Airway patency: patent  Anesthetic complications: no  Cardiovascular status: acceptable  Respiratory status: acceptable  Hydration status: acceptable  Post anesthesia nausea and vomiting:  controlled  Final Post Anesthesia Temperature Assessment:  Normothermia (36.0-37.5 degrees C)      INITIAL Post-op Vital signs:   Vitals Value Taken Time   /60 04/21/21 1206   Temp 36.4 °C (97.6 °F) 04/21/21 1150   Pulse 77 04/21/21 1256   Resp 16 04/21/21 1206   SpO2 91 % 04/21/21 1256   Vitals shown include unvalidated device data.

## 2021-04-21 NOTE — PROGRESS NOTES
Spiritual Care Visit, follow up visit. Visited with patient at bedside. Prayed for patient's healing and health. Visit by Jerome Mayers, Staff .  Harley., Zoe.B., B.A.

## 2021-04-21 NOTE — PROGRESS NOTES
Care Management Interventions  Mode of Transport at Discharge: Other (see comment)(Anton vs family)  Transition of Care Consult (CM Consult): Discharge Planning(Pt is insured by medicare with pharmacy benefits.  )  Discharge Durable Medical Equipment: No  Physical Therapy Consult: Yes  Occupational Therapy Consult: Yes  Speech Therapy Consult: Yes  Current Support Network: Relative's Home, Family Lives Nearby(Pt lives with his brother and is normally independent with all ADL's. Daughter Orquidea Ivan [phone] is also supportive.  )  Confirm Follow Up Transport: Family  The Plan for Transition of Care is Related to the Following Treatment Goals : Pt will need rehab services to return to his functional baseline. The Patient and/or Patient Representative was Provided with a Choice of Provider and Agrees with the Discharge Plan?: Yes  Name of the Patient Representative Who was Provided with a Choice of Provider and Agrees with the Discharge Plan: pt/daughter  Freedom of Choice List was Provided with Basic Dialogue that Supports the Patient's Individualized Plan of Care/Goals, Treatment Preferences and Shares the Quality Data Associated with the Providers?: Yes  [place] Resource Information Provided?: No  Discharge Location  [facility]  LARA contacted pt's daughter, Ivy Wilkins, for SNF preferences should pt not qualify for LTAC. 1) [place] 2) [facility] 3) [facility] 4) [place]. Pt had been declined from first 3 choices. LARA has contacted Rowan Farooq, with [facility], to update her on pt's PEG placement today. Rowan Farooq states she will begin pre cert for [employer]. CM to continue to follow and update pt's DCP.

## 2021-04-21 NOTE — PROGRESS NOTES
NEUROSURGERY PROGRESS NOTE:   Admit Date: 4/2/2021  Subjective:   No acute overnight events. No significant interval change in exam since discontinuation of the EVD. Objective:  Visit Vitals  /60   Pulse 76   Temp 97.6 °F (36.4 °C)   Resp 16   Ht 5' 8\" (1.727 m)   Wt 130 lb 1.1 oz (59 kg)   SpO2 95%   BMI 19.78 kg/m²     Oriented only to name, hospital and year   Eyes open to spontaneous and tracking observer   Slight Downgaze  Slight left gaze preference   PERRL  Hearing grossly intact   Follows commands simple commands in the right upper and right lower extremity briskly   Withdraws/purposeful in the right lower and localizes in the right upper extremity. EVD clamped, ICP 2 to 3 at the bedside. Assessment and Plan:   Michael Robert 67 y.o. 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 He was able to have his EVD removed on 04/20/2021  - Patient has now had EVD clampepd with ICP monitored for 72 hours without significant change in neuro status   - Continue q2H neuro checks, may transition to q4H neuro checks on the floor.   - Will continue to monitor closely, Na 140, Na 140 to 150 an ideal range given hemorrhage and edema surrounding hemorrhage. Katherine Cornejo.  Chitra Serna, [address] [place] [address]

## 2021-04-21 NOTE — PROGRESS NOTES
TRANSFER - IN REPORT:    Verbal report received from [facility] (name) on Michael Robert  being received from GI lab (unit) for routine post - op      Report consisted of patients Situation, Background, Assessment and   Recommendations(SBAR). Information from the following report(s) Procedure Summary was reviewed with the receiving nurse. Opportunity for questions and clarification was provided. Assessment completed upon patients arrival to unit and care assumed. Notified by 3rd floor  that GI lab was trying to reach x2, this RN called GI lab and report received from [address] [place] [address] prior to patient arrival to unit.

## 2021-04-21 NOTE — PROGRESS NOTES
04/21/21 1718   Dual Skin Pressure Injury Assessment   Dual Skin Pressure Injury Assessment WDL   Second Care Provider (Based on [address] [place] [address]) lesly uribe   Skin Integumentary   Skin Integumentary (WDL) WDL   Skin Integrity Incision (comment)

## 2021-04-21 NOTE — PROGRESS NOTES
TRANSFER - OUT REPORT:    Verbal report given to [facility] (name) on Bandar Cabrera  being transferred to [phone] (unit) for routine progression of care       Report consisted of patients Situation, Background, Assessment and   Recommendations(SBAR). Information from the following report(s) SBAR, Kardex, ED Summary, Procedure Summary, Intake/Output, MAR, Recent Results, Cardiac Rhythm NSR and Alarm Parameters  was reviewed with the receiving nurse. Lines:   Peripheral IV 29/09/52 Left Basilic (Active)   Site Assessment Clean, dry, & intact 04/21/21 0701   Phlebitis Assessment 0 04/21/21 0701   Infiltration Assessment 0 04/21/21 0701   Dressing Status Clean, dry, & intact 04/21/21 0701   Dressing Type Transparent;Tape 04/21/21 0701   Hub Color/Line Status Green;Flushed;Patent 04/21/21 0701   Alcohol Cap Used No 04/21/21 0701       Peripheral IV 04/04/21 Anterior;Left;Proximal Forearm (Active)   Site Assessment Clean, dry, & intact 04/21/21 0701   Phlebitis Assessment 0 04/21/21 0701   Infiltration Assessment 0 04/21/21 0701   Dressing Status Clean, dry, & intact 04/21/21 0701   Dressing Type Transparent;Tape 04/21/21 0701   Hub Color/Line Status Pink;Flushed;Patent 04/21/21 0701   Alcohol Cap Used No 04/21/21 0701       Peripheral IV Posterior;Right Arm (Active)   Site Assessment Clean, dry, & intact 04/21/21 0701   Phlebitis Assessment 0 04/21/21 0701   Infiltration Assessment 0 04/21/21 0701   Dressing Status Clean, dry, & intact 04/21/21 0701   Dressing Type Transparent;Tape 04/21/21 0701   Hub Color/Line Status Blue;Flushed;Patent 04/21/21 0701   Alcohol Cap Used No 04/21/21 0701        Opportunity for questions and clarification was provided.       Patient transported with:   [employer]

## 2021-04-21 NOTE — PROGRESS NOTES
04/21/21 1909   NIH Stroke Scale   Interval   (dual nih with tyson rn)   LOC 1   LOC Questions 0   LOC Commands 0   Best Gaze 0   Visual 0   Facial Palsy 0   Motor Right Arm 0   Motor Left Arm 4   Motor Right Leg 0   Motor Left Leg 4   Limb Ataxia 0   Sensory 2   Best Language 1   Dysarthria 1   Extinction and Inattention 1   Total 14

## 2021-04-21 NOTE — PROGRESS NOTES
Attempted to call report to primary RN at this time. Will call again later. 1208: Phone call placed second time to primary RN. Primary RN unable to be reached. Will transport this pt with this RN and monitor.

## 2021-04-21 NOTE — INTERVAL H&P NOTE
Update History & Physical 
 
The Patient's History and Physical of No changes. The surgical site was confirmed by the patient and me. Plan:  The risk, benefits, expected outcome, and alternative to the recommended procedure have been discussed with the patient. Patient understands and wants to proceed with the procedure.  
 
Electronically signed by Delaney Maher MD on 4/21/2021 at 11:23 AM

## 2021-04-21 NOTE — ANESTHESIA PREPROCEDURE EVALUATION
Anesthetic History   No history of anesthetic complications            Review of Systems / Medical History  Patient summary reviewed and pertinent labs reviewed    Pulmonary    COPD: mild      Smoker (former smoker)      Comments: H/o pneumonia   Neuro/Psych             Comments: Large CVA with residual deficits  Cardiovascular    Hypertension          Past MI, CAD, cardiac stents and hyperlipidemia    Exercise tolerance: <4 METS  Comments: Echo 4/2021 - normal LV function    GI/Hepatic/Renal     GERD: well controlled  Hepatitis: type C    Liver disease    Comments: Feeding difficulties secondary to stroke  Endo/Other        Arthritis, cancer (lung) and anemia     Other Findings              Physical Exam    Airway  Mallampati: II  TM Distance: 4 - 6 cm  Neck ROM: normal range of motion   Mouth opening: Normal     Cardiovascular  Regular rate and rhythm,  S1 and S2 normal,  no murmur, click, rub, or gallop  Rhythm: regular  Rate: normal         Dental    Dentition: Full upper dentures and Lower dentition intact     Pulmonary  Breath sounds clear to auscultation              Comments: Distant Abdominal  GI exam deferred       Other Findings            Anesthetic Plan    ASA: 4  Anesthesia type: total IV anesthesia          Induction: Intravenous  Anesthetic plan and risks discussed with: Patient and Son / Daughter      Discussed with patient's daughter over the phone.

## 2021-04-21 NOTE — PROGRESS NOTES
TRANSFER - OUT REPORT:    Verbal report given to Doris JOLLY on Yessica Mccarthy  being transferred to [facility](unit) for routine post - op       Report consisted of patients Situation, Background, Assessment and   Recommendations(SBAR). Information from the following report(s) SBAR, Intake/Output, MAR, Recent Results and Med Rec Status was reviewed with the receiving nurse. Lines:   Peripheral IV 92/27/63 Left Basilic (Active)   Site Assessment Clean, dry, & intact 04/21/21 0701   Phlebitis Assessment 0 04/21/21 0701   Infiltration Assessment 0 04/21/21 0701   Dressing Status Clean, dry, & intact 04/21/21 0701   Dressing Type Transparent;Tape 04/21/21 0701   Hub Color/Line Status Green;Flushed;Patent 04/21/21 0701   Alcohol Cap Used No 04/21/21 0701       Peripheral IV 04/04/21 Anterior;Left;Proximal Forearm (Active)   Site Assessment Clean, dry, & intact 04/21/21 0701   Phlebitis Assessment 0 04/21/21 0701   Infiltration Assessment 0 04/21/21 0701   Dressing Status Clean, dry, & intact 04/21/21 0701   Dressing Type Transparent;Tape 04/21/21 0701   Hub Color/Line Status Pink;Flushed;Patent 04/21/21 0701   Alcohol Cap Used No 04/21/21 0701       Peripheral IV Posterior;Right Arm (Active)   Site Assessment Clean, dry, & intact 04/21/21 0701   Phlebitis Assessment 0 04/21/21 0701   Infiltration Assessment 0 04/21/21 0701   Dressing Status Clean, dry, & intact 04/21/21 0701   Dressing Type Transparent;Tape 04/21/21 0701   Hub Color/Line Status Blue;Flushed;Patent 04/21/21 0701   Alcohol Cap Used No 04/21/21 0701        Opportunity for questions and clarification was provided. Patient transported with:   Registered Nurse     RN communication placed with orders about PEG use, verbalized order by Dr. Armani Mg.

## 2021-04-21 NOTE — PROGRESS NOTES
TRANSFER - IN REPORT:    Verbal report [facility] from [facility] on Amaryllis Barefoot  being received from ICU for routine progression of care      Report consisted of patients Situation, Background, Assessment and   Recommendations(SBAR). Information from the following report(s) SBAR was reviewed with the receiving nurse. Opportunity for questions and clarification was provided. Assessment completed upon patients arrival to unit and care assumed.

## 2021-04-22 ENCOUNTER — APPOINTMENT (OUTPATIENT)
Dept: CT IMAGING | Age: 76
DRG: 023 | End: 2021-04-22
Attending: STUDENT IN AN ORGANIZED HEALTH CARE EDUCATION/TRAINING PROGRAM
Payer: MEDICARE

## 2021-04-22 ENCOUNTER — APPOINTMENT (OUTPATIENT)
Dept: GENERAL RADIOLOGY | Age: 76
DRG: 023 | End: 2021-04-22
Attending: STUDENT IN AN ORGANIZED HEALTH CARE EDUCATION/TRAINING PROGRAM
Payer: MEDICARE

## 2021-04-22 LAB
ANION GAP SERPL CALC-SCNC: 6 MMOL/L (ref 7–16)
ANION GAP SERPL CALC-SCNC: 7 MMOL/L (ref 7–16)
BASOPHILS # BLD: 0 K/UL (ref 0–0.2)
BASOPHILS NFR BLD: 0 % (ref 0–2)
BUN SERPL-MCNC: 31 MG/DL (ref 8–23)
BUN SERPL-MCNC: 44 MG/DL (ref 8–23)
CALCIUM SERPL-MCNC: 9.2 MG/DL (ref 8.3–10.4)
CALCIUM SERPL-MCNC: 9.4 MG/DL (ref 8.3–10.4)
CHLORIDE SERPL-SCNC: 111 MMOL/L (ref 98–107)
CHLORIDE SERPL-SCNC: 111 MMOL/L (ref 98–107)
CO2 SERPL-SCNC: 23 MMOL/L (ref 21–32)
CO2 SERPL-SCNC: 25 MMOL/L (ref 21–32)
CREAT SERPL-MCNC: 0.92 MG/DL (ref 0.8–1.5)
CREAT SERPL-MCNC: 1.41 MG/DL (ref 0.8–1.5)
DIFFERENTIAL METHOD BLD: ABNORMAL
EOSINOPHIL # BLD: 0.1 K/UL (ref 0–0.8)
EOSINOPHIL NFR BLD: 1 % (ref 0.5–7.8)
ERYTHROCYTE [DISTWIDTH] IN BLOOD BY AUTOMATED COUNT: 14.1 % (ref 11.9–14.6)
GLUCOSE BLD STRIP.AUTO-MCNC: 142 MG/DL (ref 65–100)
GLUCOSE BLD STRIP.AUTO-MCNC: 166 MG/DL (ref 65–100)
GLUCOSE BLD STRIP.AUTO-MCNC: 82 MG/DL (ref 65–100)
GLUCOSE BLD STRIP.AUTO-MCNC: 89 MG/DL (ref 65–100)
GLUCOSE BLD STRIP.AUTO-MCNC: 98 MG/DL (ref 65–100)
GLUCOSE SERPL-MCNC: 100 MG/DL (ref 65–100)
GLUCOSE SERPL-MCNC: 137 MG/DL (ref 65–100)
HCT VFR BLD AUTO: 40.7 % (ref 41.1–50.3)
HGB BLD-MCNC: 13.7 G/DL (ref 13.6–17.2)
IMM GRANULOCYTES # BLD AUTO: 0.1 K/UL (ref 0–0.5)
IMM GRANULOCYTES NFR BLD AUTO: 1 % (ref 0–5)
LACTATE SERPL-SCNC: 3.8 MMOL/L (ref 0.4–2)
LYMPHOCYTES # BLD: 0.8 K/UL (ref 0.5–4.6)
LYMPHOCYTES NFR BLD: 6 % (ref 13–44)
MCH RBC QN AUTO: 26.8 PG (ref 26.1–32.9)
MCHC RBC AUTO-ENTMCNC: 33.7 G/DL (ref 31.4–35)
MCV RBC AUTO: 79.5 FL (ref 79.6–97.8)
MONOCYTES # BLD: 0.9 K/UL (ref 0.1–1.3)
MONOCYTES NFR BLD: 8 % (ref 4–12)
NEUTS SEG # BLD: 9.9 K/UL (ref 1.7–8.2)
NEUTS SEG NFR BLD: 84 % (ref 43–78)
NRBC # BLD: 0 K/UL (ref 0–0.2)
PLATELET # BLD AUTO: 402 K/UL (ref 150–450)
PMV BLD AUTO: 12.3 FL (ref 9.4–12.3)
POTASSIUM SERPL-SCNC: 4 MMOL/L (ref 3.5–5.1)
POTASSIUM SERPL-SCNC: 4.2 MMOL/L (ref 3.5–5.1)
PROCALCITONIN SERPL-MCNC: 0.33 NG/ML
RBC # BLD AUTO: 5.12 M/UL (ref 4.23–5.6)
SERVICE CMNT-IMP: ABNORMAL
SERVICE CMNT-IMP: ABNORMAL
SERVICE CMNT-IMP: NORMAL
SODIUM SERPL-SCNC: 141 MMOL/L (ref 138–145)
SODIUM SERPL-SCNC: 142 MMOL/L (ref 138–145)
WBC # BLD AUTO: 11.7 K/UL (ref 4.3–11.1)

## 2021-04-22 PROCEDURE — 70450 CT HEAD/BRAIN W/O DYE: CPT

## 2021-04-22 PROCEDURE — 74011250636 HC RX REV CODE- 250/636: Performed by: STUDENT IN AN ORGANIZED HEALTH CARE EDUCATION/TRAINING PROGRAM

## 2021-04-22 PROCEDURE — 92526 ORAL FUNCTION THERAPY: CPT

## 2021-04-22 PROCEDURE — 80048 BASIC METABOLIC PNL TOTAL CA: CPT

## 2021-04-22 PROCEDURE — 83605 ASSAY OF LACTIC ACID: CPT

## 2021-04-22 PROCEDURE — 74011250637 HC RX REV CODE- 250/637: Performed by: INTERNAL MEDICINE

## 2021-04-22 PROCEDURE — 97535 SELF CARE MNGMENT TRAINING: CPT

## 2021-04-22 PROCEDURE — 65660000000 HC RM CCU STEPDOWN

## 2021-04-22 PROCEDURE — 2709999900 HC NON-CHARGEABLE SUPPLY

## 2021-04-22 PROCEDURE — 82962 GLUCOSE BLOOD TEST: CPT

## 2021-04-22 PROCEDURE — 74011250636 HC RX REV CODE- 250/636

## 2021-04-22 PROCEDURE — 93005 ELECTROCARDIOGRAM TRACING: CPT | Performed by: STUDENT IN AN ORGANIZED HEALTH CARE EDUCATION/TRAINING PROGRAM

## 2021-04-22 PROCEDURE — 84145 PROCALCITONIN (PCT): CPT

## 2021-04-22 PROCEDURE — 71045 X-RAY EXAM CHEST 1 VIEW: CPT

## 2021-04-22 PROCEDURE — 92507 TX SP LANG VOICE COMM INDIV: CPT

## 2021-04-22 PROCEDURE — 74011250636 HC RX REV CODE- 250/636: Performed by: HOSPITALIST

## 2021-04-22 PROCEDURE — 81003 URINALYSIS AUTO W/O SCOPE: CPT

## 2021-04-22 PROCEDURE — 74011000250 HC RX REV CODE- 250: Performed by: FAMILY MEDICINE

## 2021-04-22 PROCEDURE — 87040 BLOOD CULTURE FOR BACTERIA: CPT

## 2021-04-22 PROCEDURE — 97110 THERAPEUTIC EXERCISES: CPT

## 2021-04-22 PROCEDURE — 74011000258 HC RX REV CODE- 258: Performed by: STUDENT IN AN ORGANIZED HEALTH CARE EDUCATION/TRAINING PROGRAM

## 2021-04-22 PROCEDURE — 85025 COMPLETE CBC W/AUTO DIFF WBC: CPT

## 2021-04-22 PROCEDURE — 77030019905 HC CATH URETH INTMIT MDII -A

## 2021-04-22 PROCEDURE — 74011636637 HC RX REV CODE- 636/637: Performed by: INTERNAL MEDICINE

## 2021-04-22 PROCEDURE — 36415 COLL VENOUS BLD VENIPUNCTURE: CPT

## 2021-04-22 RX ORDER — ATORVASTATIN CALCIUM 40 MG/1
40 TABLET, FILM COATED ORAL
Status: DISCONTINUED | OUTPATIENT
Start: 2021-04-22 | End: 2021-04-27 | Stop reason: HOSPADM

## 2021-04-22 RX ORDER — SODIUM CHLORIDE 9 MG/ML
75 INJECTION, SOLUTION INTRAVENOUS CONTINUOUS
Status: DISCONTINUED | OUTPATIENT
Start: 2021-04-22 | End: 2021-04-24

## 2021-04-22 RX ORDER — LORAZEPAM 2 MG/ML
1 INJECTION INTRAMUSCULAR
Status: COMPLETED | OUTPATIENT
Start: 2021-04-22 | End: 2021-04-22

## 2021-04-22 RX ORDER — METOPROLOL TARTRATE 50 MG/1
50 TABLET ORAL EVERY 12 HOURS
Status: DISCONTINUED | OUTPATIENT
Start: 2021-04-22 | End: 2021-04-27 | Stop reason: HOSPADM

## 2021-04-22 RX ORDER — ONDANSETRON 2 MG/ML
4 INJECTION INTRAMUSCULAR; INTRAVENOUS
Status: DISCONTINUED | OUTPATIENT
Start: 2021-04-22 | End: 2021-04-27 | Stop reason: HOSPADM

## 2021-04-22 RX ORDER — LORAZEPAM 2 MG/ML
INJECTION INTRAMUSCULAR
Status: COMPLETED
Start: 2021-04-22 | End: 2021-04-22

## 2021-04-22 RX ORDER — HYDROCODONE BITARTRATE AND ACETAMINOPHEN 10; 325 MG/1; MG/1
1 TABLET ORAL
Status: DISCONTINUED | OUTPATIENT
Start: 2021-04-22 | End: 2021-04-22

## 2021-04-22 RX ORDER — AMLODIPINE BESYLATE 10 MG/1
10 TABLET ORAL DAILY
Status: DISCONTINUED | OUTPATIENT
Start: 2021-04-22 | End: 2021-04-27 | Stop reason: HOSPADM

## 2021-04-22 RX ORDER — HYDROCODONE BITARTRATE AND ACETAMINOPHEN 5; 325 MG/1; MG/1
1 TABLET ORAL
Status: DISCONTINUED | OUTPATIENT
Start: 2021-04-22 | End: 2021-04-27 | Stop reason: HOSPADM

## 2021-04-22 RX ADMIN — ATORVASTATIN CALCIUM 40 MG: 40 TABLET, FILM COATED ORAL at 22:08

## 2021-04-22 RX ADMIN — ACETAMINOPHEN 650 MG: 325 SUSPENSION ORAL at 21:30

## 2021-04-22 RX ADMIN — METOPROLOL TARTRATE 50 MG: 50 TABLET, FILM COATED ORAL at 22:08

## 2021-04-22 RX ADMIN — INSULIN LISPRO 2 UNITS: 100 INJECTION, SOLUTION INTRAVENOUS; SUBCUTANEOUS at 23:56

## 2021-04-22 RX ADMIN — Medication 5 ML: at 22:08

## 2021-04-22 RX ADMIN — METOPROLOL TARTRATE 50 MG: 50 TABLET, FILM COATED ORAL at 09:20

## 2021-04-22 RX ADMIN — Medication 10 ML: at 04:45

## 2021-04-22 RX ADMIN — LORAZEPAM 1 MG: 2 INJECTION INTRAMUSCULAR; INTRAVENOUS at 21:30

## 2021-04-22 RX ADMIN — PIPERACILLIN SODIUM AND TAZOBACTAM SODIUM 3.38 G: 3; .375 INJECTION, POWDER, LYOPHILIZED, FOR SOLUTION INTRAVENOUS at 23:46

## 2021-04-22 RX ADMIN — SODIUM CHLORIDE 125 ML/HR: 900 INJECTION, SOLUTION INTRAVENOUS at 23:44

## 2021-04-22 RX ADMIN — AMLODIPINE BESYLATE 10 MG: 10 TABLET ORAL at 09:20

## 2021-04-22 RX ADMIN — MORPHINE SULFATE 2 MG: 2 INJECTION, SOLUTION INTRAMUSCULAR; INTRAVENOUS at 04:44

## 2021-04-22 RX ADMIN — Medication 10 ML: at 14:39

## 2021-04-22 RX ADMIN — LORAZEPAM 1 MG: 2 INJECTION INTRAMUSCULAR at 21:30

## 2021-04-22 NOTE — PROGRESS NOTES
Comprehensive Nutrition Assessment    Type and Reason for Visit: Reassess  Tube Feeding Management (Hospitalist)    Nutrition Recommendations/Plan:   When PEG tube appropriate for use per GI, begin:  Enteral Nutrition:   Bolus Enteral Feedings:   Jevity 1.5 via PEG   Initiate: Bolus 240ml times 6/day  Water flush 85ml before and after each bolus. Suggested Feeding Times:0600, 0900, 1200, 1500, 1800, 2100  At goal will provide 2130 kcal (100% estimated calorie needs), 91 grams protein (100% estimated protein needs) and 2100ml free fluid (~1ml/kcal). Vitamin and Mineral Supplement Therapy:  Electrolyte management replacement protocol implemented. Labs:   EN labs: BMP daily, Mg and Phos tomorrow MWF. Malnutrition Assessment:  Malnutrition Status: Severe malnutrition  Context: Chronic illness  Findings of clinical characteristics of malnutrition:   Energy Intake:  Unable to assess  Weight Loss:  Unable to assess     Body Fat Loss:  7 - Severe body fat loss, Fat overlying ribs   Muscle Mass Loss:  7 - Severe muscle mass loss, Clavicles (pectoralis &deltoids), Scapula (trapezius), Temples (temporalis)  Fluid Accumulation:  No significant fluid accumulation,     Strength:  Normal  strength     Nutrition Assessment:   Nutrition History: Patient states that a lady down the road from him has been providing him food foor ~3 months prior to admission. Nutrition Background: Patient presented with left side weakness and fall. He was admitted with hemorrhagic cererovascular accident from acute right basal ganglia hematoma with right intraventricular hemorrhage. His PMH is significant for MI, CAD, TB, COPD, GERD, GI bleed, hept C, HLD, HTN, NSTEMI. Daily Update:  Intern visited pt at bedside and discussed with RN today. Pt reports hunger but denies nausea. Pt had PEG placed yesterday and NGT was removed. Prior to removal NGT was infusing Jevity 1.5 at goal rate of 60 ml/hr.  Pt without TF since PEG placement. Pt awaiting GI to approve PEG for use. Abdominal Status (last documented): Soft, Flat, Intact abdomen with Active  bowel sounds. Last BM 04/20/21. Pertinent Medications: Dulcolax PRN, SSI  Pertinent Labs:   Lab Results   Component Value Date/Time    Sodium 142 04/22/2021 07:39 AM    Potassium 4.0 04/22/2021 07:39 AM    Chloride 111 (H) 04/22/2021 07:39 AM    CO2 25 04/22/2021 07:39 AM    Anion gap 6 (L) 04/22/2021 07:39 AM    Glucose 100 04/22/2021 07:39 AM    BUN 31 (H) 04/22/2021 07:39 AM    Creatinine 0.92 04/22/2021 07:39 AM    Calcium 9.4 04/22/2021 07:39 AM    Albumin 3.2 04/06/2021 03:53 AM    Magnesium 2.2 04/21/2021 04:59 AM    Phosphorus 3.5 04/21/2021 04:59 AM   POC Glucose 24 hr: 89, 82, 105, 90  Labs notable for low trending BG, Na, K, Mg, and Phos stable. Nutrition Related Findings:   Full NFPE performed with results as above. TF initiated 7/3. Still infusing at 30 ml/hr with 135 ml water flush on reassessment 4/9. TF advanced to goal 4/9. PEG placed 4/21 (20 fr). SLP recommendation for NPO with long term nutrition support if in line with GOC. Current Nutrition Therapies:  DIET NPO Except Meds  DIET TUBE FEEDING Open order for details. Keep HOB elevated >30 degrees.   Current Tube Feeding (TF) Orders:   · Feeding Route: (NGT removed 4/21)  · Formula: Jevity 1.5  · Schedule:Continuous    · Regimen: 55 ml/hr  · Additives/Modulars: (none)  · Water Flushes: 150 ml Q4  · Current TF & Flush Orders Provides: at goal  · Goal TF & Flush Orders Provides: 1815 kcal (100% estimated calorie needs), 77 grams protein (100% estimated protein needs) and 1820 ml free fluid (~1 ml/kcal)    Current Intake:   Average Meal Intake: NPO(% prior to NPO)        Anthropometric Measures:  Height: 5' 8\" (172.7 cm)  Current Body Wt: 58.1 kg (128 lb 1.4 oz)(4/19), Weight source: Bed scale  BMI: 19.5, Underweight (BMI less than 22) age over 72     Ideal Body Weight (lbs) (Calculated): 154 lbs (70 kg), 87 %  Usual Body Wt: 63 kg (138 lb 14.2 oz)(per review of EMR (~63-68 kg)), Percent weight change: -12.5          Edema: Generalized: No Edema (4/21/2021  7:01 AM)     Estimated Daily Nutrient Needs:  Energy (kcal/day): 7545-9163 (Kcal/kg(30-35), Weight Used: Current(58.1 kg))  Protein (g/day): 70-87 (1.2-1.5 g/kg) Weight Used: (Current)  Fluid (ml/day):   (1 ml/kcal)    Nutrition Diagnosis:   · Inadequate oral intake related to swallowing difficulty as evidenced by (MBS, NPO)    · Severe malnutrition, In context of chronic illness related to catabolic illness(predicted inadequate oral intake, increased needs) as evidenced by (COPD, malnutrition criteria as above)    Nutrition Interventions:   Food and/or Nutrient Delivery: Modify tube feeding     Coordination of Nutrition Care: Continue to monitor while inpatient  Plan of Care discussed with RIK Martinez    Goals:   Previous Goal Met: Goal(s) achieved  Active Goal: Tolerate TF at goal within 7 days. Nutrition Monitoring and Evaluation:      Food/Nutrient Intake Outcomes: Enteral nutrition intake/tolerance  Physical Signs/Symptoms Outcomes: Biochemical data, GI status    Discharge Planning:     Too soon to determine    Prabhu Barahona [address], Dietetic Intern, [phone]

## 2021-04-22 NOTE — PROGRESS NOTES
ACUTE OT GOALS:  (Developed with and agreed upon by patient and/or caregiver.)  1. Complete functional transfers (I.e. toilet/bedside commode) with c min A. (UPDATED 2021)   2. Complete gentle active assisted L UE ROM focusing more on finger/wrist extensors and shoulder external rotators. CONTINUE TO ADDRESS (2021)  3. Utilize L UE as a support for supine to sitting and sit to standing at least 20% of the time. CONTINUE TO ADDRESS (2021)  4. Complete self-feeding (once cleared by SLP/MD/RN) with standby assistance. CONTINUE TO ADDRESS (2021)  5. Complete grooming/oral care with standby assistance. CONTINUE TO ADDRESS (2021)    NEW GOALS (Added 2021)  1. Patient will complete functional activity while seated EOB with min A and adaptive equipment as needed. 2. Patient will attend to L side of environment for 75% of session with min verbal cues from therapist.    OCCUPATIONAL THERAPY: Daily Note    OT Treatment Day # 7    Nicolasa Garland is a 67 y.o. male   PRIMARY DIAGNOSIS: Hemorrhagic cerebrovascular accident (CVA) ([facility] [address]  Hemorrhagic cerebrovascular accident (CVA) ([facility] [address] [I61.9]    1 Day Post-Op  [place] / [place] / Product Type: Managed Care Medicare /   ASSESSMENT:     REHAB RECOMMENDATIONS: CURRENT LEVEL OF FUNCTION:  (Most Recently Demonstrated)   Recommendation to date pending progress:  [address]  Equipment:    To Be Determined Bathing:   Not tested  Dressing:   Not tested  Feeding/Grooming:   Total Assistance to wash face  Toileting:   Moderate Assistance for pericare in supine  Functional Mobility:   Not tested     ASSESSMENT:  Mr. Joseph Curtis is doing fair at this time. Pt was more awake at the beginning of the session. Pt oriented x2 at this time. Pt tolerated PROM to LUE this session. Pt not noted to have any active movement in LUE today. Pt noted to have saturated brief this session. Required max assist x2 for rolling. Pt was able to perform pericare in supine with min A. Pt became lethargic so session was ended. Pt left in supine with all needs in reach. Pillow under LUE for support. Minimal progress made today. Will continue to benefit from skilled OT during stay. SUBJECTIVE:   Mr. Rock Parham states, \" [place]\"    SOCIAL HISTORY/LIVING ENVIRONMENT: Lives with brother. Does not drive. Takes the bus to pay bills, etc. Totally independent per patient. Has Daughter who is helping as able.    Home Environment: Private residence  # Steps to Enter: 5  One/Two Story Residence: One story  Living Alone: No  Support Systems: Family member(s)    OBJECTIVE:     PAIN: VITAL SIGNS: LINES/DRAINS:   Pre Treatment: Pain Screen  Pain Scale 1: Visual  Pain Location 1: Abdomen  Post Treatment:  At rest after session, 0   IV and PEG  O2 Device: None (Room air)     ACTIVITIES OF DAILY LIVING: I Mod I S SBA CGA Min Mod Max Total NT Comments   BASIC ADLs:              Bathing/ Showering [] [] [] [] [] [] [] [] [] [x]    Toileting [] [] [] [] [] [] [x] [] [] []    Dressing [] [] [] [] [] [] [] [] [] [x]    Feeding [] [] [] [] [] [] [] [] [] [x]    Grooming [] [] [] [] [] [] [] [x] [] [] To wash face   Personal Device Care [] [] [] [] [] [] [] [] [] [x]    Functional Mobility [] [] [] [] [] [] [] [] [] []    I=Independent, Mod I=Modified Independent, S=Supervision, SBA=Standby Assistance, CGA=Contact Guard Assistance,   Min=Minimal Assistance, Mod=Moderate Assistance, Max=Maximal Assistance, Total=Total Assistance, NT=Not Tested    MOBILITY: I Mod I S SBA CGA Min Mod Max Total  NT x2 Comments:   Supine to sit [] [] [] [] [] [] [] [] [] [x] []    Sit to supine [] [] [] [] [] [] [] [] [] [x] []    Sit to stand [] [] [] [] [] [] [] [] [] [x] []    Bed to chair [] [] [] [] [] [] [] [] [] [x] []    I=Independent, Mod I=Modified Independent, S=Supervision, SBA=Standby Assistance, CGA=Contact Guard Assistance, Min=Minimal Assistance, Mod=Moderate Assistance, Max=Maximal Assistance, Total=Total Assistance, NT=Not Tested    BALANCE: Good Fair+ Fair Fair- Poor NT Comments   Sitting Static [] [] [] [] [] [x]    Sitting Dynamic [] [] [] [] [] [x]              Standing Static [] [] [] [] [] [x]    Standing Dynamic [] [] [] [] [] [x]      PLAN:   FREQUENCY/DURATION: OT Plan of Care: 3 times/week for duration of hospital stay or until stated goals are met, whichever comes first.    TREATMENT:   TREATMENT:   ($$ Self Care/Home Management: 8-22 mins$$ Therapeutic Exercises: 8-22 mins   )  Co-Treatment PT/OT necessary due to patient's decreased overall endurance/tolerance levels, as well as need for high level skilled assistance to complete functional transfers/mobility and functional tasks  Therapeutic Exercise (10 Minutes): Therapeutic exercises noted below to improve functional activity tolerance, AROM and strength. Self Care (15 Minutes): Self care including Toileting and Grooming to increase independence and decrease level of assistance required.     TREATMENT GRID:   Date:  4/22/21 Date:   Date:     Activity/Exercise Parameters Parameters Parameters   Shoulder Abd/Adduction 10 reps     Shoulder Flexion 10 reps     Elbow Flexion 10 reps     Punches 10 reps     Wrist Flexion 10 reps     Pro/Supination 10 reps                 AFTER TREATMENT POSITION/PRECAUTIONS:  Bed, Needs within reach, RN notified and Mitts    INTERDISCIPLINARY COLLABORATION:  RN/PCT and OT/LYNCH    TOTAL TREATMENT DURATION:  OT Patient Time In/Time Out  Time In: 1140  Time Out: [address] [facility]

## 2021-04-22 NOTE — PROGRESS NOTES
04/22/21 1901   NIH Stroke Scale   Interval   (dual NIH with bee uribe)   LOC 1   LOC Questions 0   LOC Commands 0   Best Gaze 0   Visual 0   Facial Palsy 0   Motor Right Arm 0   Motor Left Arm 4   Motor Right Leg 0   Motor Left Leg 4   Limb Ataxia 0   Sensory 2   Best Language 1   Dysarthria 1   Extinction and Inattention 1   Total 14

## 2021-04-22 NOTE — PROGRESS NOTES
GI DAILY PROGRESS NOTE    Admit Date: 4/2/2021    CC: feeding difficulties,    Subjective:     Patient S/P PEG placement 4/21. Continues to work with speech therapy. More awake today. Medications:  Current Facility-Administered Medications   Medication Dose Route Frequency    HYDROcodone-acetaminophen (NORCO) 5-325 mg per tablet 1 Tab  1 Tab Per G Tube Q4H PRN    acetaminophen (TYLENOL) solution 650 mg  650 mg Per G Tube Q6H PRN    amLODIPine (NORVASC) tablet 10 mg  10 mg Per G Tube DAILY    atorvastatin (LIPITOR) tablet 40 mg  40 mg Per G Tube QHS    metoprolol tartrate (LOPRESSOR) tablet 50 mg  50 mg Per G Tube Q12H    bisacodyL (DULCOLAX) suppository 10 mg  10 mg Rectal DAILY PRN    insulin lispro (HUMALOG) injection   SubCUTAneous Q6H    lip protectant (BLISTEX) ointment 1 Each  1 Each Topical PRN    NUTRITIONAL SUPPORT ELECTROLYTE PRN ORDERS   Does Not Apply PRN    metoprolol (LOPRESSOR) injection 5 mg  5 mg IntraVENous Q6H PRN    enalaprilat (VASOTEC) injection 1.25 mg  1.25 mg IntraVENous Q6H PRN    lidocaine 4 % patch 1 Patch  1 Patch TransDERmal Q24H    sodium chloride (NS) flush 5-40 mL  5-40 mL IntraVENous Q8H    sodium chloride (NS) flush 5-40 mL  5-40 mL IntraVENous PRN       Objective:   Vitals:  Visit Vitals  /73 (BP 1 Location: Right arm, BP Patient Position: At rest)   Pulse (!) 101   Temp 98.7 °F (37.1 °C)   Resp 14   Ht 5' 8\" (1.727 m)   Wt 49.9 kg (110 lb)   SpO2 96%   BMI 16.73 kg/m²       Intake/Output:  No intake/output data recorded.   04/20 1901 - 04/22 0700  In: 370 [I.V.:250]  Out: 1495 [Urine:1495]    Exam:  Constitutional: sleeping but arouses easily, thin body habitus, NAD  Head: EVD has been remained; suture intact  Lungs: CTA  CV: RRR, no audible murmur  Abd: soft, PEG intact with small amount of fresh red blood at site      Data Review (Labs):    Recent Results (from the past 24 hour(s))   GLUCOSE, POC    Collection Time: 04/21/21  1:01 PM   Result Value Ref Range    Glucose (POC) 90 65 - 100 mg/dL    Performed by Sebastian ([place])    GLUCOSE, POC    Collection Time: 04/21/21  5:52 PM   Result Value Ref Range    Glucose (POC) 105 (H) 65 - 100 mg/dL    Performed by Alexandra    GLUCOSE, POC    Collection Time: 04/22/21 12:07 AM   Result Value Ref Range    Glucose (POC) 82 65 - 100 mg/dL    Performed by Andry    GLUCOSE, POC    Collection Time: 04/22/21  6:19 AM   Result Value Ref Range    Glucose (POC) 89 65 - 100 mg/dL    Performed by [facility]    METABOLIC PANEL, BASIC    Collection Time: 04/22/21  7:39 AM   Result Value Ref Range    Sodium 142 138 - 145 mmol/L    Potassium 4.0 3.5 - 5.1 mmol/L    Chloride 111 (H) 98 - 107 mmol/L    CO2 25 21 - 32 mmol/L    Anion gap 6 (L) 7 - 16 mmol/L    Glucose 100 65 - 100 mg/dL    BUN 31 (H) 8 - 23 MG/DL    Creatinine 0.92 0.8 - 1.5 MG/DL    GFR est AA >60 >60 ml/min/1.73m2    GFR est non-AA >60 >60 ml/min/1.73m2    Calcium 9.4 8.3 - 10.4 MG/DL       Assessment:     Principal Problem:    Hemorrhagic cerebrovascular accident (CVA) ([facility] (4/2/2021)    Active Problems:    Iron deficiency anemia (1/25/2015)      S/P PTCA (percutaneous transluminal coronary angioplasty) ()      Chronic tuberculosis ()      Coronary atherosclerosis of native coronary vessel ()      Hyperlipidemia ()      Acute left-sided weakness (4/2/2021)      Elevated troponin (4/4/2021)      Dysphagia following cerebral infarction (4/6/2021)      Elevated liver enzymes (4/6/2021)      Severe protein-calorie malnutrition ([address] (4/9/2021)      66 yo male with 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 He has been evaluated by speech pathology with PEG recommended as he is having difficulty managing secretions. He tolerated ng tube feeds at 60 ml per hour. PEG placed 4/21 by Dr Nena Chapin:     1. Ok to use PEG for feeds  2.   Recommend abdominal binder be left off while he is restrained in mittens  3. We will sign off; please call as needed    Patient seen and plan formulated in collaboration with Dr Thierry Reddy.   Yumi Geiger NP

## 2021-04-22 NOTE — PROGRESS NOTES
Problem: Patient Education: Go to Patient Education Activity  Goal: Patient/Family Education  Outcome: Progressing Towards Goal     Problem: Hemorrhagic Stroke: Admission Day (0-3 hours)  Goal: Off Pathway (Use only if patient is Off Pathway)  Outcome: Progressing Towards Goal     Problem: Hemorrhagic Stroke:  3-24 hours  Goal: Off Pathway (Use only if patient is Off Pathway)  Outcome: Progressing Towards Goal     Problem: Hemorrhagic Stroke: Day 2  Goal: Off Pathway (Use only if patient is Off Pathway)  Outcome: Progressing Towards Goal     Problem: Hemorrhagic Stroke: Day 3  Goal: Off Pathway (Use only if patient is Off Pathway)  Outcome: Progressing Towards Goal     Problem: Hemorrhagic Stroke: Day 4  Goal: Off Pathway (Use only if patient is Off Pathway)  Outcome: Progressing Towards Goal     Problem: Hemorrhagic Stroke: Day 5 through Discharge  Goal: Off Pathway (Use only if patient is Off Pathway)  Outcome: Progressing Towards Goal  Goal: Activity/Safety  Outcome: Progressing Towards Goal  Goal: Consults, if ordered  Outcome: Progressing Towards Goal  Goal: Diagnostic Test/Procedures  Outcome: Progressing Towards Goal  Goal: Nutrition/Diet  Outcome: Progressing Towards Goal  Goal: Medications  Outcome: Progressing Towards Goal  Goal: Respiratory  Outcome: Progressing Towards Goal  Goal: Treatments/Interventions/Procedures  Outcome: Progressing Towards Goal  Goal: Psychosocial  Outcome: Progressing Towards Goal  Goal: *Hemodynamically stable  Outcome: Progressing Towards Goal  Goal: *Verbalizes anxiety and depression are reduced or absent  Outcome: Progressing Towards Goal  Goal: *Absence of aspiration  Outcome: Progressing Towards Goal  Goal: *Absence of signs and symptoms of DVT  Outcome: Progressing Towards Goal  Goal: *Optimal pain control at patient's stated goal  Outcome: Progressing Towards Goal  Goal: *Tolerating diet  Outcome: Progressing Towards Goal  Goal: *Progressive mobility and function  Outcome: Progressing Towards Goal  Goal: *Rehabilitation readiness  Outcome: Progressing Towards Goal     Problem: Hemorrhagic Stroke: Discharge Outcomes  Goal: *Verbalizes anxiety and depression are reduced or absent  Outcome: Progressing Towards Goal  Goal: *Verbalize understanding of risk factor modification(Stroke Metric)  Outcome: Progressing Towards Goal  Goal: *Optimal pain control at patient's stated goal  Outcome: Progressing Towards Goal  Goal: *Hemodynamically stable  Outcome: Progressing Towards Goal  Goal: *Absence of aspiration pneumonia  Outcome: Progressing Towards Goal  Goal: *Aware of needed dietary changes  Outcome: Progressing Towards Goal  Goal: *Verbalizes understanding and describes medication purposes and frequencies  Outcome: Progressing Towards Goal  Goal: *Tolerating diet  Outcome: Progressing Towards Goal  Goal: *Absence of signs and symptoms of DVT  Outcome: Progressing Towards Goal  Goal: *Absence of aspiration  Outcome: Progressing Towards Goal  Goal: *Progressive mobility and function  Outcome: Progressing Towards Goal  Goal: *Home safety concerns addressed  Outcome: Progressing Towards Goal     Problem: Falls - Risk of  Goal: *Absence of Falls  Description: Document Ofelia Fall Risk and appropriate interventions in the flowsheet.   Outcome: Progressing Towards Goal  Note: Fall Risk Interventions:  Mobility Interventions: Bed/chair exit alarm, OT consult for ADLs, Patient to call before getting OOB, PT Consult for mobility concerns    Mentation Interventions: Bed/chair exit alarm, Door open when patient unattended, Evaluate medications/consider consulting pharmacy    Medication Interventions: Bed/chair exit alarm, Patient to call before getting OOB, Teach patient to arise slowly    Elimination Interventions: Bed/chair exit alarm, Call light in reach, Patient to call for help with toileting needs, Toileting schedule/hourly rounds    History of Falls Interventions: Bed/chair exit alarm, Consult care management for discharge planning, Door open when patient unattended         Problem: Patient Education: Go to Patient Education Activity  Goal: Patient/Family Education  Outcome: Progressing Towards Goal     Problem: Pressure Injury - Risk of  Goal: *Prevention of pressure injury  Description: Document Chavez Scale and appropriate interventions in the flowsheet.   Outcome: Progressing Towards Goal     Problem: Patient Education: Go to Patient Education Activity  Goal: Patient/Family Education  Outcome: Progressing Towards Goal     Problem: Patient Education: Go to Patient Education Activity  Goal: Patient/Family Education  Outcome: Progressing Towards Goal     Problem: Patient Education: Go to Patient Education Activity  Goal: Patient/Family Education  Outcome: Progressing Towards Goal     Problem: Delirium Treatment  Goal: *Level of consciousness restored to baseline  Outcome: Progressing Towards Goal  Goal: *Level of environmental perceptions restored to baseline  Outcome: Progressing Towards Goal  Goal: *Sensory perception restored to baseline  Outcome: Progressing Towards Goal  Goal: *Emotional stability restored to baseline  Outcome: Progressing Towards Goal  Goal: *Functional assessment restored to baseline  Outcome: Progressing Towards Goal  Goal: *Absence of falls  Outcome: Progressing Towards Goal  Goal: *Will remain free of delirium, CAM Score negative  Outcome: Progressing Towards Goal  Goal: *Cognitive status will be restored to baseline  Outcome: Progressing Towards Goal  Goal: Interventions  Outcome: Progressing Towards Goal     Problem: Patient Education: Go to Patient Education Activity  Goal: Patient/Family Education  Outcome: Progressing Towards Goal     Problem: Patient Education: Go to Patient Education Activity  Goal: Patient/Family Education  Outcome: Progressing Towards Goal     Problem: Non-Violent Restraints  Goal: Removal from restraints as soon as assessed to be safe  Outcome: Progressing Towards Goal  Goal: No harm/injury to patient while restraints in use  Outcome: Progressing Towards Goal  Goal: Patient's dignity will be maintained  Outcome: Progressing Towards Goal  Goal: Patient Interventions  Outcome: Progressing Towards Goal

## 2021-04-22 NOTE — PROGRESS NOTES
04/22/21 0703   NIH Stroke Scale   Interval Other (comment)  (dual w/ Michelle RN)   LOC 1   LOC Questions 0   LOC Commands 0   Best Gaze 0   Visual 0   Facial Palsy 0   Motor Right Arm 0   Motor Left Arm 4   Motor Right Leg 0   Motor Left Leg 4   Limb Ataxia 0   Sensory 2   Best Language 1   Dysarthria 1   Extinction and Inattention 1   Total 14

## 2021-04-22 NOTE — PROGRESS NOTES
Problem: Dysphagia (Adult)  Goal: *Acute Goals and Plan of Care (Insert Text)  STG: Pt will demonstrate zero signs/sx of aspiration with mechanical soft textures with nectar thick liquids with no straws with 90% accuracy during all meals. Discontinued 4/5/21  STG: Pt will complete a full speech, language and cognitive evaluation without assistance with 100% accuracy during session. MET 4/7   STG: Pt will complete a Modified barium swallow study with zero signs/sx of aspiration  with 100% accuracy during swallow study MET 4/6/21   LTG : Pt will demonstrate zero signs/sx of aspiration with least restrictive diet with 100% accuracy during all meals. LTG: Patient will increase receptive/expressive language skills demonstrated by the ability to communicate basic wants/needs across environments   STG: Patient will follow 1 step commands with 80% accuracy given min cueing  STG: Patient will follow 2 step commands with 80% accuracy given moderate cues. STG: Patient will respond to moderate level yes/no questions with 80% accuracy with moderate cues. STG: Patient will perform automatic speech task with 80% accuracy with moderate cues.        SPEECH LANGUAGE PATHOLOGY: DYSPHAGIA AND SPEECH-LANGUAGE/COGNITION: Daily Note 6    NAME/AGE/GENDER: Bandar Cabrera is a 67 y.o. male  DATE: 4/22/2021  PRIMARY DIAGNOSIS: Hemorrhagic cerebrovascular accident (CVA) ([facility] [I61.9]   1 Day Post-Op  ICD-10: Treatment Diagnosis: R13.12 Dysphagia, Oropharyngeal Phase  R47.1 Dysarthria and Anarthria  R41.841 Cognitive-Communication Deficit    RECOMMENDATIONS   DIET:    NPO with alternative means of nutrition (PEG placed by GI 4/21)    MEDICATIONS: Non-oral via PEG     ASPIRATION PRECAUTIONS  · Meticulous oral care Q 4 hours  · Suction PRN  · Elevate HOB     COMPENSATORY STRATEGIES/MODIFICATIONS  · Upright positioning during tube feedings and 30 min after      EDUCATION:  · Recommendations discussed with RN and patient     CONTINUATION OF SKILLED SERVICES/MEDICAL NECESSITY:   Patient is expected to demonstrate progress in  swallow strength, swallow timeliness, swallow function, diet tolerance and swallow safety in order to  improve swallow safety, work toward diet advancement and decrease aspiration risk.  Patient is expected to demonstrate progress in expressive communication and receptive ability to decrease assistance required communication, increase independence with activities of daily living and increase communication with family/caregivers.  Patient continues to require skilled intervention due to dysphagia; expressive/receptive language deficits. RECOMMENDATIONS for CONTINUED SPEECH THERAPY: YES: Anticipate need for ongoing speech therapy during this hospitalization and at next level of care. ASSESSMENT   Dysphagia: patient continues to demonstrate significant oropharyngeal dysphagia. Impaired secretion management with audible oral and pharyngeal secretions. Suction now set up at bedside on floor. Patient needs aggressive oral care and suction PRN. Continue NPO with alternate means of nutrition. Suction PRN. PEG was placed 4/21 by GI. Language evaluation: Patient demonstrates severe dysarthria and inconsistent language abilities. Attention appears to impact performance across structured language treatment as tactile prompt, repetition, and rephrasing of question are needed to prompt response, however other times, timely/appropriate response, regardless of complexity. Patient functioning significantly below baseline. Recommend continue ongoing speech therapy to address dysphagia, dysarthria, and cognitive linguistic impairment during this hospitalization AND next level of care. REHABILITATION POTENTIAL FOR STATED GOALS: Good    PLAN    FREQUENCY/DURATION: Continue to follow patient 3 times a week for duration of hospital stay to address above goals.     - Recommendations for next treatment session: Next treatment session will address langauge treatment/dysphagia treatment    SUBJECTIVE   Reports stomach discomfort. RN notified. Problem List:  (Impairments causing functional limitations):  1. Oropharyngeal dysphagia  2. Expressive/receptive language impairments    Orientation:   Person  Place  Time    Pain: Pain Scale 1: Numeric (0 - 10)  Pain Intensity 1: 0  Pain Location 1: Abdomen  Pain Intervention(s) 1: Medication (see MAR)    OBJECTIVE   Dysphagia treatment:   +oral and pharyngeal secretions. No suction set up at bedside. Notified RN, suction now set up. Patient biting yankeur limiting oral suctioning. Discussed with RN need for deep suctioning PRN to clear pharyngeal secretions as patient unable to do so. Patient has weak throat clear attempts. Weak, incomplete swallow upon palpation and wet vocal quality persists. Difficulty completing volitional swallows to clear oral/pharyngeal secretions. Speech/language/cognitive treatment:  -Automatic speech tasks(DELVIS, OMKAR, counting) completed using dysarthria strategies: explicit verbal prompting needed to increase volume and intermittently throat clear to achieve clearer quality. Patient also needed verbal prompt of first item to initiate (I.e. Monday, ). -Responsive namin/5, first two answered appropriately and timely. On remaining 3 questions, patient only answered 1 correctly given yes/no format. Otherwise, no response, despite prompting including repetition, verbal field of 2, and yes/no format. -Confrontational namin/4  -Naming functional of item: 1/3 (no response on other 2 trials)  -Basic 1-step commands: 6/8 (no response on 2 trials)    INTERDISCIPLINARY COLLABORATION: RN  PRECAUTIONS/ALLERGIES: Patient has no known allergies.      Tool Used: Dysphagia Outcome and Severity Scale (DAYAMI)    Score Comments   Normal Diet  [] 7 With no strategies or extra time needed   Functional Swallow  [] 6 May have mild oral or pharyngeal delay Mild Dysphagia  [] 5 Which may require one diet consistency restricted    Mild-Moderate Dysphagia  [] 4 With 1-2 diet consistencies restricted   Moderate Dysphagia  [] 3 With 2 or more diet consistencies restricted   Moderate-Severe Dysphagia  [] 2 With partial PO strategies (trials with ST only)   Severe Dysphagia  [] 1 With inability to tolerate any PO safely      Score:  Initial: 1 Most Recent: 1 (Date 04/22/21 )   Interpretation of Tool: The Dysphagia Outcome and Severity Scale (DAYAMI) is a simple, easy-to-use, 7-point scale developed to systematically rate the functional severity of dysphagia based on objective assessment and make recommendations for diet level, independence level, and type of nutrition. Tool Used: MODIFIED MARK SCALE (mRS)   Score   No Symptoms  [] 0   No significant disability despite symptoms; able to carry out all usual duties and activities  [] 1   Slight disability; unable to carry out all previous activities but able to look after own affairs without assistance. [] 2   Moderate disability; requiring some help but able to walk without assistance  [] 3   Moderately severe disability; unable to walk without assistance and unable to attend to own bodily needs without assistance  [] 4   Severe disability; bedridden, incontinent, and requiring constant nursing care and attention  [] 5      Score:  Initial: 5 Most recent: 1   Interpretation of Tool: The Modified Mrak Scale is a 7-point scaled used to quantify level of disability as it relates to a patient's functional abilities.    SAFETY:  After treatment position/precautions:  · Upright in bed  · RN notified   · Call light within reach  · Visitor at bedside    Total Treatment Duration:   Time In: 4570  Time Out: [address], [facility], [id number] [facility]

## 2021-04-22 NOTE — PROGRESS NOTES
TC to pt's dtr, Ami. Left  mail message requesting a return call to discuss dc planning. Pt was accepted for admission to [facility] but their liaison is concerned that pt's insurance may not approve. Pt has also been accepted for admission to [facility]  which seems to be the more appropriate plan for the pt. SW will discuss this information with the dtr tomorrow.

## 2021-04-22 NOTE — PROGRESS NOTES
[facility]ist Progress Note     Name:  Cynthia Guerrero  Age:76 y.o. Sex:male   :  1945    MRN:  [id number]     Admit Date:  2021    Reason for Admission:  Hemorrhagic cerebrovascular accident (CVA) [facility]) [I61.9]    Assessment & Plan       Hemorrhagic CVA s/p EVD 21: Followed by neurosurgery and neurology   d/c EVD   NPO  S/p PEG placement   PT,OT  Needs discharge plans, family choosing SNF, discussed with case management       Sepsis due to Pneumonia:   zosyn EOT 21  On room air        Elevated troponin:  Followed by cardiology, due to demand ischemia       DENI on CKD:  followup BMP,stable    CAD:  Continued lipitor    HTN:  Continued norvasc, Metoprolol    prn IV antihypertensives       Diet:  DIET NPO  DIET TUBE FEEDING  DVT PPx: anticoagulation contra indicated due to hemorrhagic CVA  GI Ppx:  none  Code: Full Code    Dispo/Discharge Planning: floor          Hospital Course/Subjective:     Mr. Diomedes Gaitan is a 68 yo male PMH of HTN, CAD, HLP, COPD, HEPC C admitted 21 with acute right basal ganglia hemorrhagic CVA. Neurosurgery did not recommend acute interventions on admit. On 21 he was more drowsy and repeat CT head showed increased IVH and concern for early hydrocephalus. He is s/p EVD 21 to 21. Repeat CT head  stable. CODE S called on 21 due to decreased responsiveness. CT head no overall changes. EEG shows abnormal findings consistent with known CNS hemorrhage. Neurology following. Goal sodium was 140 to 150. He was covered with antibiotics for pneumonia, possible aspiration. Cardiology followed for elevated troponin, feeling this was due to demand ischemia. He was receiving NGT feeds and  S/p  PEG on . SLP following. He has resumed antihypertensives via NGT and also required IV cardene. Discharge plans pending to SNF. Subjective/24 hr Events (21):     Alert, waving with right hand, not verbal, in mitts     Objective: Patient Vitals for the past 24 hrs:   Temp Pulse Resp BP SpO2   04/22/21 0800 98.7 °F (37.1 °C) (!) 101 14 129/73 96 %   04/22/21 0400 97.4 °F (36.3 °C) 96 18 129/76 90 %   04/22/21 0000 97.7 °F (36.5 °C) 65 16 131/73 96 %   04/21/21 2347  63      04/21/21 2000 97.4 °F (36.3 °C) 87 16 132/78 93 %   04/21/21 1725 98.6 °F (37 °C) 89 15 139/78 94 %   04/21/21 1600  75 18 139/76 97 %   04/21/21 1500 97 °F (36.1 °C) 81 22 129/70 95 %   04/21/21 1459  84 27  94 %   04/21/21 1400  73 17 122/74 98 %   04/21/21 1301  73 15 121/73 95 %   04/21/21 1206 (!) 96 °F (35.6 °C) 76 16 101/60 95 %   04/21/21 1157  77 16 102/60 96 %   04/21/21 1150 97.6 °F (36.4 °C) 86 16 (!) 86/51 96 %   04/21/21 1149  85 16 (!) 90/54 95 %   04/21/21 1148  87 16 (!) 86/51 93 %   04/21/21 1018 97.5 °F (36.4 °C) (!) 59 18 109/65 99 %     Oxygen Therapy  O2 Sat (%): 96 % (04/22/21 0800)  Pulse via Oximetry: 76 beats per minute (04/21/21 1600)  O2 Device: None (Room air) (04/21/21 1500)  Skin Assessment: Clean, dry, & intact (04/21/21 0701)  Skin Protection for O2 Device: N/A (04/21/21 0701)  O2 Flow Rate (L/min): 3 l/min (04/21/21 1157)    Body mass index is 16.73 kg/m².     Physical Exam:   General: No acute distress, alert   Lungs: Clear to auscultation bilaterally anterior   Cardiovascular: Regular rate and rhythm with normal S1 and S2, no edema    Abdomen: Soft, nontender, nondistended, normoactive bowel sounds   Extremities: No cyanosis    Neuro:alert and interactive       Data Review:  I have reviewed all labs, meds, and studies from the last 24 hours:    Labs:    Recent Results (from the past 24 hour(s))   GLUCOSE, POC    Collection Time: 04/21/21 10:31 AM   Result Value Ref Range    Glucose (POC) 85 65 - 100 mg/dL    Performed by French Jessica    GLUCOSE, POC    Collection Time: 04/21/21  1:01 PM   Result Value Ref Range    Glucose (POC) 90 65 - 100 mg/dL    Performed by Sebastian ([place])    GLUCOSE, POC    Collection Time: 04/21/21  5:52 PM   Result Value Ref Range    Glucose (POC) 105 (H) 65 - 100 mg/dL    Performed by Alexandra    GLUCOSE, POC    Collection Time: 04/22/21 12:07 AM   Result Value Ref Range    Glucose (POC) 82 65 - 100 mg/dL    Performed by Andry    GLUCOSE, POC    Collection Time: 04/22/21  6:19 AM   Result Value Ref Range    Glucose (POC) 89 65 - 100 mg/dL    Performed by [facility]    METABOLIC PANEL, BASIC    Collection Time: 04/22/21  7:39 AM   Result Value Ref Range    Sodium 142 138 - 145 mmol/L    Potassium 4.0 3.5 - 5.1 mmol/L    Chloride 111 (H) 98 - 107 mmol/L    CO2 25 21 - 32 mmol/L    Anion gap 6 (L) 7 - 16 mmol/L    Glucose 100 65 - 100 mg/dL    BUN 31 (H) 8 - 23 MG/DL    Creatinine 0.92 0.8 - 1.5 MG/DL    GFR est AA >60 >60 ml/min/1.73m2    GFR est non-AA >60 >60 ml/min/1.73m2    Calcium 9.4 8.3 - 10.4 MG/DL       All Micro Results     Procedure Component Value Units Date/Time    CULTURE, BLOOD [[id number]] Collected: 04/09/21 1053    Order Status: Completed Specimen: Blood Updated: 04/14/21 0721     Special Requests: --        RIGHT  HAND       Culture result: NO GROWTH 5 DAYS       CULTURE, BLOOD [[id number]] Collected: 04/09/21 1058    Order Status: Completed Specimen: Blood Updated: 04/14/21 0721     Special Requests: --        RIGHT  Antecubital       Culture result: NO GROWTH 5 DAYS       COVID-19 RAPID TEST [[id number]] Collected: 04/07/21 1221    Order Status: Completed Specimen: Nasopharyngeal Updated: 04/07/21 1301     Specimen source Nasopharyngeal        COVID-19 rapid test Not detected        Comment:      The specimen is NEGATIVE for SARS-CoV-2, the novel coronavirus associated with COVID-19. A negative result does not rule out COVID-19. This test has been authorized by the FDA under an Emergency Use Authorization (EUA) for use by authorized laboratories.         Fact sheet for Healthcare Providers: [employer]com  Fact sheet for Patients: [facility].       Methodology: Isothermal Nucleic Acid Amplification               EKG Results     Procedure 720 Value Units Date/Time    EKG, 12 LEAD, SUBSEQUENT [[id number]] Collected: 04/03/21 0821    Order Status: Completed Updated: 04/03/21 1227     Ventricular Rate 74 BPM      Atrial Rate 74 BPM      P-R Interval 160 ms      QRS Duration 94 ms      Q-T Interval 388 ms      QTC Calculation (Bezet) 430 ms      Calculated P Axis 71 degrees      Calculated R Axis -61 degrees      Calculated T Axis 88 degrees      Diagnosis --     Sinus rhythm with occasional Premature ventricular complexes  Left anterior fascicular block  ST & T wave abnormality, consider lateral ischemia  Abnormal ECG  When compared with ECG of 02-APR-2021 18:26,  Premature ventricular complexes are now Present  Left anterior fascicular block is now Present  Confirmed by Nish Kearns ([id number]) on 4/3/2021 12:27:42 PM      Initial ECG [[id number]] Collected: 04/02/21 1826    Order Status: Completed Updated: 04/03/21 0732     Ventricular Rate 77 BPM      Atrial Rate 78 BPM      P-R Interval 156 ms      QRS Duration 96 ms      Q-T Interval 356 ms      QTC Calculation (Bezet) 402 ms      Calculated P Axis 76 degrees      Calculated R Axis 69 degrees      Calculated T Axis 79 degrees      Diagnosis --     Normal sinus rhythm  Normal ECG  When compared with ECG of 28-AUG-2017 21:42,  Questionable change in QRS axis  Nonspecific T wave abnormality, improved in Lateral leads  Confirmed by Jacob Welch ([id number]) on 4/3/2021 7:32:01 AM      EKG 12 LEAD INITIAL [[id number]]     Order Status: Canceled           Other Studies:  Ct Head Wo Cont    Result Date: 4/3/2021  NONCONTRAST HEAD CT CLINICAL HISTORY:  Follow-up intracranial hemorrhage. TECHNIQUE:  Axial images were obtained with spiral technique.   Radiation dose reduction was achieved using one or all of the following techniques: automated exposure control, weight-based dosing, iterative reconstruction. COMPARISON:  CT yesterday and 0617 hours today. REPORT:   Standard noncontrast head CT demonstrates continued increase in size of the right thalamic hemorrhage with maximum diameter now measured at approximately 3.6 cm compared with 2.9 cm earlier today. Moreover, there is new bilateral lateral intraventricular extension of hemorrhage, primarily localized to the atria. There is associated slight increase in bilateral lateral ventriculomegaly with biatrial diameter now measuring approximately 4.2 cm compared with 3.8 cm this morning. Mass effect is little changed with midline shift from right to left of approximately 5 mm compared with 4 mm this morning. Extensive small vessel ischemic disease and small focal lateral right frontal infarct appear unchanged. INTERVAL INCREASE IN SIZE OF THE RIGHT THALAMIC HEMORRHAGE WITH NEW BILATERAL LATERAL INTRAVENTRICULAR EXTENSION AND SLIGHT WORSENING OF VENTRICULOMEGALY. Ct Head Wo Cont    Result Date: 4/3/2021  EXAM: CT HEAD WO CONT HISTORY: .  TECHNIQUE: Axial images of the brain were performed without the administration of intravenous contrast. Images were obtained axial plane and coronal reformatted images were submitted. CT scan performed using appropriate/available dose optimization/reduction/ALARA techniques. COMPARISON: None. FINDINGS: The known right thalamic hemorrhage is again observed. There is surrounding edema. The body of this hemorrhage has not changed significantly. Currently it measures 2.4 x 2.9 cm when previously it measured 2.4 x 2.7 cm. There has been interval increase in an area of adjacent or extension of the hemorrhage posteriorly. Previously it measured 4.5 mm in width. Currently it measures 1.5 cm in width and 1.3 cm AP. There is persistent intraventricular hemorrhage in the posterior horn of the right lateral ventricle.  Interval development of a small amount of hemorrhage in the left posterior horn. There is mild midline shift of the posterior septum pellucidum of approximately 4.1 mm. The lateral ventricles are mildly dilated, stable. Chronic ischemic white matter changes are again observed. Question small chronic infarction in the right frontal lobe. Interval increase in the size of the right thalamic hemorrhage. Interval development of mild right to left midline shift of the posterior aspect of the septum pellucidum. Stable hemorrhage in the posterior horn of the right lateral ventricle. Interval development of a small amount of hemorrhage in the posterior horn of the left lateral ventricle. Other findings as above. [facility]    Result Date: 4/3/2021  RIGHT UPPER QUADRANT ULTRASOUND. HISTORY: Transaminitis. COMPARISON: No relevant comparison studies available. FINDINGS: Ultrasonographic Ramon's sign: is reported as negative Gallstones: There are intraluminal echogenic foci, probably stones. Gallbladder Wall: not thickened. Common Bile Duct: is not dilated, 4 mm. Intrahepatic Biliary Tree: is not dilated. Liver: Uniform parenchyma Included portion of the pancreas and right kidney: are unremarkable. Vasculature: Aorta: Normal caliber. IVC:  Patent. Portal vein: Hepatopedal flow. Probable gallstones. No biliary tree obstruction. No findings to suggest acute cholecystitis. Xr Chest Port    Result Date: 4/2/2021  CHEST X-RAY, one view. HISTORY:  Chest pain. TECHNIQUE:  AP upright portable view. COMPARISON: August 2017 FINDINGS: Lungs: Atelectasis or scar right base, similar to prior exam. Costophrenic angles: are sharp. Heart size: is normal. Pulmonary vasculature: is unremarkable. Aorta: Arch calcifications. Included portion of the upper abdomen: is unremarkable. Bones: No gross bony lesions. Other: None. Negative for acute change. Ct Code Neuro Head Wo Contrast    Result Date: 4/2/2021  CT HEAD WITHOUT CONTRAST HISTORY:  CVA. COMPARISON: None.  TECHNIQUE: Axial imaging was performed without intravenous contrast utilizing 5mm slice thickness. Sagittal and coronal reformats were performed. Radiation dose reduction techniques were used for this study. Our CT scanner uses one or all of the following: Automated exposure control, adjustment of the MAS or KUB according to patient's size and iterative reconstruction. FINDINGS:    *BRAIN:    -  There are no early signs of territorial or lacunar infarction by CT.    -  2.7 x 2.4 cm acute hemorrhage into the right basal ganglia and thalamus. Right lateral ventricle intraventricular hemorrhage.    -  No gross white matter abnormality by CT. *VISUALIZED PARANASAL SINUSES: Well aerated. *MASTOIDS:  Clear. *CALVARIUM AND SCALP: Unremarkable. Acute right basal ganglia hematoma likely on the basis of hypertension. Right intraventricular hemorrhage.  Date of Dictation: 4/2/2021 6:17 PM          Current Meds:   Current Facility-Administered Medications   Medication Dose Route Frequency    HYDROcodone-acetaminophen (NORCO) 5-325 mg per tablet 1 Tab  1 Tab Per G Tube Q4H PRN    acetaminophen (TYLENOL) solution 650 mg  650 mg Per G Tube Q6H PRN    amLODIPine (NORVASC) tablet 10 mg  10 mg Per G Tube DAILY    atorvastatin (LIPITOR) tablet 40 mg  40 mg Per G Tube QHS    metoprolol tartrate (LOPRESSOR) tablet 50 mg  50 mg Per G Tube Q12H    bisacodyL (DULCOLAX) suppository 10 mg  10 mg Rectal DAILY PRN    insulin lispro (HUMALOG) injection   SubCUTAneous Q6H    lip protectant (BLISTEX) ointment 1 Each  1 Each Topical PRN    NUTRITIONAL SUPPORT ELECTROLYTE PRN ORDERS   Does Not Apply PRN    metoprolol (LOPRESSOR) injection 5 mg  5 mg IntraVENous Q6H PRN    enalaprilat (VASOTEC) injection 1.25 mg  1.25 mg IntraVENous Q6H PRN    lidocaine 4 % patch 1 Patch  1 Patch TransDERmal Q24H    sodium chloride (NS) flush 5-40 mL  5-40 mL IntraVENous Q8H    sodium chloride (NS) flush 5-40 mL  5-40 mL IntraVENous PRN       Problem List:  Hospital Problems as of 4/22/2021 Date Reviewed: 4/4/2021          Codes Class Noted - Resolved POA    Severe protein-calorie malnutrition ([facility].) ICD-10-CM: E43  ICD-9-CM: 262  4/9/2021 - Present Yes        Dysphagia following cerebral infarction ICD-10-CM: I69.391  ICD-9-CM: 438.82  4/6/2021 - Present Yes        Elevated liver enzymes ICD-10-CM: R74.8  ICD-9-CM: 790.5  4/6/2021 - Present Yes        Elevated troponin ICD-10-CM: R77.8  ICD-9-CM: 790.6  4/4/2021 - Present Yes        * (Principal) Hemorrhagic cerebrovascular accident (CVA) ([facility] 75.) ICD-10-CM: I61.9  ICD-9-CM: 226  4/2/2021 - Present Yes        Acute left-sided weakness ICD-10-CM: R53.1  ICD-9-CM: 728.87  4/2/2021 - Present Yes        S/P PTCA (percutaneous transluminal coronary angioplasty) ICD-10-CM: Z98.61  ICD-9-CM: V45.82  Unknown - Present Yes        Chronic tuberculosis ICD-10-CM: A15.9  ICD-9-CM: 011.90  Unknown - Present Yes        Coronary atherosclerosis of native coronary vessel ICD-10-CM: I25.10  ICD-9-CM: 414.01  Unknown - Present Yes        Hyperlipidemia ICD-10-CM: E78.5  ICD-9-CM: 272.4  Unknown - Present Yes        Iron deficiency anemia ICD-10-CM: D50.9  ICD-9-CM: 280.9  1/25/2015 - Present Yes        Microcytic anemia ICD-10-CM: D50.9  ICD-9-CM: 280.9  1/21/2015 - Present     Overview Signed 4/6/2016  9:52 AM by Nick Chaudhari     Iron deficiency                    Part of this note was written by using a voice dictation software and the note has been proof read but may still contain some grammatical/other typographical errors.     Signed By: Sri Cordero MD   [employer] Hospitalist [facility]    April 22, 2021  9:08 AM

## 2021-04-22 NOTE — PROGRESS NOTES
Pt has been on an NGT with tube feedings for the past three weeks. PEG tube was placed 4/21. His BG was 82 at 0007 today and he doesn't have any fluids ordered. Selena March MD notified and no orders received.

## 2021-04-23 LAB
ANION GAP SERPL CALC-SCNC: 6 MMOL/L (ref 7–16)
APPEARANCE UR: CLEAR
ATRIAL RATE: 122 BPM
BILIRUB UR QL: NEGATIVE
BUN SERPL-MCNC: 45 MG/DL (ref 8–23)
CALCIUM SERPL-MCNC: 8.9 MG/DL (ref 8.3–10.4)
CALCULATED P AXIS, ECG09: 56 DEGREES
CALCULATED R AXIS, ECG10: -59 DEGREES
CALCULATED T AXIS, ECG11: 77 DEGREES
CHLORIDE SERPL-SCNC: 116 MMOL/L (ref 98–107)
CO2 SERPL-SCNC: 24 MMOL/L (ref 21–32)
COLOR UR: YELLOW
CREAT SERPL-MCNC: 1.22 MG/DL (ref 0.8–1.5)
DIAGNOSIS, 93000: NORMAL
GLUCOSE BLD STRIP.AUTO-MCNC: 127 MG/DL (ref 65–100)
GLUCOSE BLD STRIP.AUTO-MCNC: 127 MG/DL (ref 65–100)
GLUCOSE BLD STRIP.AUTO-MCNC: 83 MG/DL (ref 65–100)
GLUCOSE BLD STRIP.AUTO-MCNC: 97 MG/DL (ref 65–100)
GLUCOSE SERPL-MCNC: 119 MG/DL (ref 65–100)
GLUCOSE UR STRIP.AUTO-MCNC: NEGATIVE MG/DL
HGB UR QL STRIP: NEGATIVE
KETONES UR QL STRIP.AUTO: NEGATIVE MG/DL
LACTATE SERPL-SCNC: 1.2 MMOL/L (ref 0.4–2)
LEUKOCYTE ESTERASE UR QL STRIP.AUTO: NEGATIVE
MAGNESIUM SERPL-MCNC: 2.4 MG/DL (ref 1.8–2.4)
NITRITE UR QL STRIP.AUTO: NEGATIVE
P-R INTERVAL, ECG05: 126 MS
PH UR STRIP: 5.5 [PH] (ref 5–9)
PHOSPHATE SERPL-MCNC: 3.8 MG/DL (ref 2.3–3.7)
POTASSIUM SERPL-SCNC: 3.7 MMOL/L (ref 3.5–5.1)
PROT UR STRIP-MCNC: NEGATIVE MG/DL
Q-T INTERVAL, ECG07: 306 MS
QRS DURATION, ECG06: 78 MS
QTC CALCULATION (BEZET), ECG08: 436 MS
SERVICE CMNT-IMP: ABNORMAL
SERVICE CMNT-IMP: ABNORMAL
SERVICE CMNT-IMP: NORMAL
SERVICE CMNT-IMP: NORMAL
SODIUM SERPL-SCNC: 146 MMOL/L (ref 138–145)
SP GR UR REFRACTOMETRY: 1.01 (ref 1–1.02)
UROBILINOGEN UR QL STRIP.AUTO: 1 EU/DL (ref 0.2–1)
VENTRICULAR RATE, ECG03: 122 BPM

## 2021-04-23 PROCEDURE — 97530 THERAPEUTIC ACTIVITIES: CPT

## 2021-04-23 PROCEDURE — 99233 SBSQ HOSP IP/OBS HIGH 50: CPT | Performed by: PSYCHIATRY & NEUROLOGY

## 2021-04-23 PROCEDURE — 97535 SELF CARE MNGMENT TRAINING: CPT

## 2021-04-23 PROCEDURE — 83735 ASSAY OF MAGNESIUM: CPT

## 2021-04-23 PROCEDURE — 82962 GLUCOSE BLOOD TEST: CPT

## 2021-04-23 PROCEDURE — 65660000000 HC RM CCU STEPDOWN

## 2021-04-23 PROCEDURE — 36415 COLL VENOUS BLD VENIPUNCTURE: CPT

## 2021-04-23 PROCEDURE — APPSS30 APP SPLIT SHARED TIME 16-30 MINUTES: Performed by: NURSE PRACTITIONER

## 2021-04-23 PROCEDURE — 74011000258 HC RX REV CODE- 258: Performed by: STUDENT IN AN ORGANIZED HEALTH CARE EDUCATION/TRAINING PROGRAM

## 2021-04-23 PROCEDURE — 77030018798 HC PMP KT ENTRL FED COVD -A

## 2021-04-23 PROCEDURE — 84100 ASSAY OF PHOSPHORUS: CPT

## 2021-04-23 PROCEDURE — 74011000250 HC RX REV CODE- 250: Performed by: FAMILY MEDICINE

## 2021-04-23 PROCEDURE — 97112 NEUROMUSCULAR REEDUCATION: CPT

## 2021-04-23 PROCEDURE — 77030040393 HC DRSG OPTIFOAM GENT MDII -B

## 2021-04-23 PROCEDURE — 83605 ASSAY OF LACTIC ACID: CPT

## 2021-04-23 PROCEDURE — 74011250636 HC RX REV CODE- 250/636: Performed by: STUDENT IN AN ORGANIZED HEALTH CARE EDUCATION/TRAINING PROGRAM

## 2021-04-23 PROCEDURE — 97140 MANUAL THERAPY 1/> REGIONS: CPT

## 2021-04-23 PROCEDURE — 2709999900 HC NON-CHARGEABLE SUPPLY

## 2021-04-23 PROCEDURE — 80048 BASIC METABOLIC PNL TOTAL CA: CPT

## 2021-04-23 PROCEDURE — 74011250637 HC RX REV CODE- 250/637: Performed by: INTERNAL MEDICINE

## 2021-04-23 RX ADMIN — METOPROLOL TARTRATE 50 MG: 50 TABLET, FILM COATED ORAL at 22:01

## 2021-04-23 RX ADMIN — ATORVASTATIN CALCIUM 40 MG: 40 TABLET, FILM COATED ORAL at 22:01

## 2021-04-23 RX ADMIN — METOPROLOL TARTRATE 50 MG: 50 TABLET, FILM COATED ORAL at 09:17

## 2021-04-23 RX ADMIN — Medication 10 ML: at 22:01

## 2021-04-23 RX ADMIN — PIPERACILLIN SODIUM AND TAZOBACTAM SODIUM 3.38 G: 3; .375 INJECTION, POWDER, LYOPHILIZED, FOR SOLUTION INTRAVENOUS at 23:51

## 2021-04-23 RX ADMIN — PIPERACILLIN SODIUM AND TAZOBACTAM SODIUM 3.38 G: 3; .375 INJECTION, POWDER, LYOPHILIZED, FOR SOLUTION INTRAVENOUS at 16:45

## 2021-04-23 RX ADMIN — Medication 5 ML: at 06:00

## 2021-04-23 RX ADMIN — PIPERACILLIN SODIUM AND TAZOBACTAM SODIUM 3.38 G: 3; .375 INJECTION, POWDER, LYOPHILIZED, FOR SOLUTION INTRAVENOUS at 08:23

## 2021-04-23 RX ADMIN — AMLODIPINE BESYLATE 10 MG: 10 TABLET ORAL at 09:18

## 2021-04-23 NOTE — PROGRESS NOTES
Called by primary RN for tachycardia, tachypnea, decreased oxygenation with 2L O2 via NC requirement and decreased responsiveness. Came to bedside to evaluate patient. Lung exam without obvious crackles or wheezing. Heart rate tachycardic and regular. Confirm with telemetry room sinus tach 120s. Stat head CT obtained and showing no significant change to intracranial hemorrhage or hydrocephalus. Chest x-ray also with improving left sided infiltrate. Only change to patient's medical care has been bolus feeding through peg tube today. Patient also with vomiting during CT and again while back to the floor. Patient's tachypnea improved with suctioning. On my reevaluation, patient was having left arm shaking, mild deviation of eyes down and out to the left. unclear whether shakiness are chills or seizure-like activity. With concern for seizure activity, will try 1 mg IV Ativan to see if this stops the shakiness. Patient at high risk with intracranial hemorrhage for seizure disorder. I have ordered labs as well to rule out sepsis, although patient afebrile at this time. ADDENDUM: 1mg IV ativan did not abort clonic activity. These are likely chills with patient developing sepsis, as below. Labs now back, showing elevated white count, lactic acid of 3.8, pro-Sachin 0.33 and acute kidney injury. Although chest x-ray stable this may lag behind possible aspiration event and subsequent pneumonia. As bolus feeds were started today and he had subsequent emesis, I will start patient on Zosyn for suspected aspiration pneumonia. Started on IV fluids for elevated lactic acid. Blood cultures drawn. We will also check UA for possible source of sepsis. Patient at risk for further deterioration from hypoxia, tachycardia, sepsis from suspected aspiration pneumonia. Patient required critical care interventions including labs, imaging and bedside evaluation.      Total critical care time spent: 40 minutes    Critical care time includes time spent at bedside performing history and exam, performing chart review, discussing findings and treatment plan with patient and/or family, discussing patient with consultants and colleagues, ordering and reviewing pertinent laboratory and radiographic evaluations, and discussing patient with nursing staff. Time excludes procedures.

## 2021-04-23 NOTE — PROGRESS NOTES
SPEECH PATHOLOGY NOTE:    Attempted to see patient this AM, however RN requested hold today due to events overnight. Will check back at later date.        Hima Mansfield, [facility], [facility] DONTE CHAVEZ, CCC-SLP

## 2021-04-23 NOTE — PROGRESS NOTES
Pt has been accepted for admission to [facility]  pending insurance approval.  TC to pt's dtr, [address] @ [phone]. Left voice mail message with update that pt has been accepted for admission to [facility]  and they will start the insurance approval process today. SW anticipates an answer early next week. SW following.

## 2021-04-23 NOTE — PROGRESS NOTES
Neurology Daily Progress Note     Assessment:     67year old male being seen for seizure like activity. Admitted for right basal ganglia IPH. EVD discontinued on 4/20. NS following. Repeat CT of head stable IPH in right basal ganglia and hydrocephalus. These events are secondary to upper midbrain dysfunction secondary to the location of bleeding and hydrocephalus. Unlikely to be seizure given no cortical involvement. No need to AED at this time. No additional neurological workup is needed at this time. Neurology will sign off. Plan:     Continue supportive therapy. Maintain SBP < 140/90    NA levels 140-150 per NS recommendation    Subjective: Interval history:  Somnolent. Arouses to voice and tactile stimulation. Left hemiplegia. Nonverbal. Repeat CT of head stable IPH in right basal ganglia and hydrocephalus. History:    Laverne Boy is a 67 y.o. male who is being seen for seizure like activity. Review of systems negative with exception of pertinent positives and negatives noted above.        Objective:     Vitals:    04/23/21 0553 04/23/21 0800 04/23/21 1200 04/23/21 1600   BP:  127/76 121/72 112/73   Pulse:  82 73 77   Resp:  18 20 18   Temp:  97.6 °F (36.4 °C) 97.7 °F (36.5 °C) 97.9 °F (36.6 °C)   SpO2:  100% 99% 95%   Weight: 114 lb 9.6 oz (52 kg)      Height:              Current Facility-Administered Medications:     piperacillin-tazobactam (ZOSYN) 3.375 g in 0.9% sodium chloride (MBP/ADV) 100 mL MBP, 3.375 g, IntraVENous, Q8H, Marva Olivia MD, Last Rate: 25 mL/hr at 04/23/21 0823, 3.375 g at 04/23/21 0823    HYDROcodone-acetaminophen (NORCO) 5-325 mg per tablet 1 Tab, 1 Tab, Per G Tube, Q4H PRN, Maximus Collier MD    acetaminophen (TYLENOL) solution 650 mg, 650 mg, Per G Tube, Q6H PRN, Muna Collier MD, 650 mg at 04/22/21 2130    amLODIPine (NORVASC) tablet 10 mg, 10 mg, Per G Tube, DAILY, Maximus Collier MD, 10 mg at 04/23/21 5361   atorvastatin (LIPITOR) tablet 40 mg, 40 mg, Per G Tube, QHS, Patricia Collier MD, 40 mg at 04/22/21 2208    metoprolol tartrate (LOPRESSOR) tablet 50 mg, 50 mg, Per G Tube, Q12H, Patricia Collier MD, 50 mg at 04/23/21 0917    0.9% sodium chloride infusion, 75 mL/hr, IntraVENous, CONTINUOUS, Maria L Cunningham DO, Last Rate: 75 mL/hr at 04/23/21 1513, 75 mL/hr at 04/23/21 1513    ondansetron (ZOFRAN) injection 4 mg, 4 mg, IntraVENous, Q6H PRN, Brandon Sharp MD    bisacodyL (DULCOLAX) suppository 10 mg, 10 mg, Rectal, DAILY PRN, Patricia Collier MD    insulin lispro (HUMALOG) injection, , SubCUTAneous, Q6H, Patricia Collier MD, Stopped at 04/23/21 0600    lip protectant (BLISTEX) ointment 1 Each, 1 Each, Topical, PRN, Jamil Hamilton MD, 1 Each at 04/13/21 1204    NUTRITIONAL SUPPORT ELECTROLYTE PRN ORDERS, , Does Not Apply, PRN, Jarod Rangel, DO    metoprolol (LOPRESSOR) injection 5 mg, 5 mg, IntraVENous, Q6H PRN, Jarod Rangel, DO, 5 mg at 04/18/21 0126    enalaprilat (VASOTEC) injection 1.25 mg, 1.25 mg, IntraVENous, Q6H PRN, Jarod Rangel, DO, 1.25 mg at 04/20/21 1510    lidocaine 4 % patch 1 Patch, 1 Patch, TransDERmal, Q24H, Kurt Jesus MD, 1 Patch at 04/23/21 1328    sodium chloride (NS) flush 5-40 mL, 5-40 mL, IntraVENous, Q8H, Usman Mora MD, 5 mL at 04/23/21 0600    sodium chloride (NS) flush 5-40 mL, 5-40 mL, IntraVENous, PRN, [place], Bridget Love MD    Recent Results (from the past 12 hour(s))   METABOLIC PANEL, BASIC    Collection Time: 04/23/21  4:51 AM   Result Value Ref Range    Sodium 146 (H) 138 - 145 mmol/L    Potassium 3.7 3.5 - 5.1 mmol/L    Chloride 116 (H) 98 - 107 mmol/L    CO2 24 21 - 32 mmol/L    Anion gap 6 (L) 7 - 16 mmol/L    Glucose 119 (H) 65 - 100 mg/dL    BUN 45 (H) 8 - 23 MG/DL    Creatinine 1.22 0.8 - 1.5 MG/DL    GFR est AA >60 >60 ml/min/1.73m2    GFR est non-AA >60 >60 ml/min/1.73m2    Calcium 8.9 8.3 - 10.4 MG/DL   MAGNESIUM    Collection Time: 04/23/21  4:51 AM   Result Value Ref Range    Magnesium 2.4 1.8 - 2.4 mg/dL   PHOSPHORUS    Collection Time: 04/23/21  4:51 AM   Result Value Ref Range    Phosphorus 3.8 (H) 2.3 - 3.7 MG/DL   LACTIC ACID    Collection Time: 04/23/21  4:51 AM   Result Value Ref Range    Lactic acid 1.2 0.4 - 2.0 MMOL/L   GLUCOSE, POC    Collection Time: 04/23/21  5:58 AM   Result Value Ref Range    Glucose (POC) 127 (H) 65 - 100 mg/dL    Performed by Eloy    GLUCOSE, POC    Collection Time: 04/23/21 11:50 AM   Result Value Ref Range    Glucose (POC) 97 65 - 100 mg/dL    Performed by Daniel      CT Results (most recent):  Results from Hospital Encounter encounter on 04/02/21   CT HEAD WO CONT    Narrative CT HEAD WITHOUT CONTRAST, 4/22/2021     History: Altered mental status, and decreased level of consciousness. Comparison: CT head without contrast 4/17/2021     Technique:   5 mm axial scans from the skull base to the vertex. All CT scans  performed at this facility use one or all of the following: Automated exposure  control, adjustment of the mA and/or kVp according to patient's size, iterative  reconstruction. Findings:  Hyperdense hematoma in the right basal ganglia appears slightly  decreased in size now measuring 3.3 cm x 2.4 cm. Persistent surrounding  edematous changes are seen. Persistent right to left subfalcine herniation is  seen once again measuring 7 mm at the third ventricle. No new areas of acute  intracranial hemorrhage are seen. No abnormal extra-axial fluid collections are  seen. No evidence for acute hydrocephalus is seen. No abnormal edema pattern is  seen in a vascular distribution to suggest large artery infarction. Distribution  of edematous changes about the right basal ganglia hemorrhage has not  significant changed. Evaluation with bone windows shows no acute osseous changes.   The visualized  sinuses, middle ears, and mastoid air cells are well aerated. Impression 1. No evolving changes. Only some evolution of prior hemorrhage in the right  basal ganglia seen which appears slightly decreased in size. This report was made using voice transcription. Despite my best efforts to avoid  any, transcription errors may persist. If there is any question about the  accuracy of the report or need for clarification, then please call [phone], or text me through perfectserv for clarification or correction. Physical Exam:  General - Frail, cachetic, in no apparent distress. Somnolent. HEENT - Normocephalic, atraumatic. Conjunctiva are clear. Neck - Supple without masses  Cardiovascular - Regular rate and rhythm. Normal S1, S2 without murmurs, rubs, or gallops. Lungs - Clear to auscultation. Abdomen - Soft, nontender with normal bowel sounds. Extremities - Peripheral pulses intact. No edema and no rashes. Neurological examination - Comprehension, attention, memory and reasoning are impaired. Language and speech are nonverbal.   On cranial nerve examination, (II, III, IV, VI) pupils are equal, round, and reactive to light. Visual acuity is adequate. Visual fields are full to finger confrontation. Extraocular motility is normal. (V, VII) mild left facial droop (VIII) Hearing is intact. (IX, X) Palate and uvula elevate symmetrically. (XI) Shoulder shrug is strong and equal bilaterally. (XII)Tongue is midline. Motor examination - There is normal muscle tone and bulk. Spontaneous movement in RUE/LE, flaccid LUE/LE. Muscle stretch reflexes are normoactive and there are no pathological reflexes present. Plantar response is flexor on right, mute on left. Unable to assess cerebellar examination, sensation, or gait/stance.      Signed By: JEFFREY Vásquez     April 23, 2021

## 2021-04-23 NOTE — PROGRESS NOTES
04/23/21 1917   NIH Stroke Scale   Interval   (dual with arnoldo uribe)   LOC 1   LOC Questions 0   LOC Commands 0   Best Gaze 0   Visual 0   Facial Palsy 0   Motor Right Arm 0   Motor Left Arm 4   Motor Right Leg 0   Motor Left Leg 4   Limb Ataxia 0   Sensory 2   Best Language 1   Dysarthria 1   Extinction and Inattention 1   Total 14

## 2021-04-23 NOTE — CONSULTS
Comprehensive Nutrition Assessment    Type and Reason for Visit: Reassess  Tube Feeding Management (Hospitalist) Change back to continuous feeds for suspected aspiration    Nutrition Recommendations/Plan:   Enteral Nutrition:  Resume previous continuous regimen: Jevity 1.5 via PEG at 60ml/hour  Water flush 45ml/hr. At goal will provide 1980 kcal (100% estimated calorie needs), 84 grams protein (100% estimated protein needs) and 1993ml free fluid (1ml/kcal) calculations based on 22 hours infusion. IV Fluids:  Per MD.   Vitamin and Mineral Supplement Therapy:  Electrolyte management replacement protocol implemented. Labs:   EN labs: BMP daily, Mg and Phos MWF. Malnutrition Assessment:  Malnutrition Status: Severe malnutrition  Context: Chronic illness  Findings of clinical characteristics of malnutrition:   Energy Intake:  Unable to assess  Weight Loss:  Unable to assess     Body Fat Loss:  7 - Severe body fat loss, Fat overlying ribs   Muscle Mass Loss:  7 - Severe muscle mass loss, Clavicles (pectoralis &deltoids), Scapula (trapezius), Temples (temporalis)  Fluid Accumulation:  No significant fluid accumulation,     Strength:  Normal  strength     Nutrition Assessment:   Nutrition History: Patient states that a lady down the road from him has been providing him food foor ~3 months prior to admission. Nutrition Background: Patient presented with left side weakness and fall. He was admitted with hemorrhagic cererovascular accident from acute right basal ganglia hematoma with right intraventricular hemorrhage. His PMH is significant for MI, CAD, TB, COPD, GERD, GI bleed, hept C, HLD, HTN, NSTEMI. Daily Update:  Intern visited patient at bedside and discussed with CNA. Noted events last night pt was tachycardic, tachypneic, with decreased responsiveness, vomiting. Suspected aspiration pneumonia. IVF and Zosyn were started. Pt discussed with RN, no suctioning needed.  She is completing standard oral care. Abdominal Status (last documented): Nausea, Flat abdomen with Active  bowel sounds. Last BM 04/20/21  Pertinent Medications: Dulcolax PRN, SSI. Zosyn  IVF: NS @ 125 ml/hr  Pertinent Labs:   Lab Results   Component Value Date/Time    Sodium 146 (H) 04/23/2021 04:51 AM    Potassium 3.7 04/23/2021 04:51 AM    Chloride 116 (H) 04/23/2021 04:51 AM    CO2 24 04/23/2021 04:51 AM    Anion gap 6 (L) 04/23/2021 04:51 AM    Glucose 119 (H) 04/23/2021 04:51 AM    BUN 45 (H) 04/23/2021 04:51 AM    Creatinine 1.22 04/23/2021 04:51 AM    Calcium 8.9 04/23/2021 04:51 AM    Albumin 3.2 04/06/2021 03:53 AM    Magnesium 2.4 04/23/2021 04:51 AM    Phosphorus 3.8 (H) 04/23/2021 04:51 AM   Labs notable for elevated Na, Phos, Creatinine and BUN. K slightly decreased. Nutrition Related Findings:   Full NFPE performed with results as above. TF initiated 7/3. Still infusing at 30 ml/hr with 135 ml water flush on reassessment 4/9. TF advanced to goal 4/9. PEG placed 4/21 (20 fr). SLP recommendation for NPO with long term nutrition support if in line with GOC.        Current Nutrition Therapies:  DIET NPO Except Meds    Current Intake:   Average Meal Intake: NPO(% prior to NPO)        Anthropometric Measures:  Height: 5' 8\" (172.7 cm)  Current Body Wt: 58.1 kg (128 lb 1.4 oz)(4/19), Weight source: Bed scale  BMI: 19.5, Underweight (BMI less than 22) age over 72     Ideal Body Weight (lbs) (Calculated): 154 lbs (70 kg), 87 %  Usual Body Wt: 63 kg (138 lb 14.2 oz)(per review of EMR (~63-68 kg)), Percent weight change: -12.5    Edema: No data recorded     Estimated Daily Nutrient Needs:  Energy (kcal/day): 4081-2828 (Kcal/kg(30-35), Weight Used: Current(58.1 kg))  Protein (g/day): 70-87 (1.2-1.5 g/kg) Weight Used: (Current)  Fluid (ml/day):   (1 ml/kcal)    Nutrition Diagnosis:   · Inadequate oral intake related to swallowing difficulty as evidenced by (MBS, NPO)    · Severe malnutrition, In context of chronic illness related to catabolic illness(predicted inadequate oral intake, increased needs) as evidenced by (COPD, malnutrition criteria as above)    Nutrition Interventions:   Food and/or Nutrient Delivery: Modify tube feeding     Coordination of Nutrition Care: Continue to monitor while inpatient  Plan of Care discussed with [place] CNA    Goals:   Previous Goal Met: Goal(s) achieved  Active Goal: Tolerate TF at goal within 3 days. Nutrition Monitoring and Evaluation:      Food/Nutrient Intake Outcomes: Enteral nutrition intake/tolerance  Physical Signs/Symptoms Outcomes: Biochemical data, GI status    Discharge Planning:     Too soon to determine    Prabhu [address], Dietetic Intern, [phone]

## 2021-04-23 NOTE — PROGRESS NOTES
Per Dr. Magdalene Cantu EVD sutures (4/20)  located on the right side of patient's scalp needs to remain for 10 more days.

## 2021-04-23 NOTE — PROGRESS NOTES
ACUTE PHYSICAL THERAPY GOALS:  (Developed with and agreed upon by patient and/or caregiver. )  LTG:  (1.)Mr. Monk will move from supine to sit and sit to supine , scoot up and down and roll side to side in bed with MINIMAL ASSIST within 7 treatment day(s).    (2.)Mr. Monk will tolerate sitting up in recliner chair for 2 hours with stable vital signs to increase upright tolerance within 7 treatment day(s).    (3.)Mr. Monk will maintain static/dynamic sitting x 15 minutes with MINIMAL ASSIST for improved balance within 7 treatment days. (4.)Mr. Monk will be able to hold head in cervical rotation left of midline for 2 minutes within 7 treatment days. (5.)Mr. Monk will participate in therapeutic activity/exercises x 25 minutes for increased strength within 7 treatment days. PHYSICAL THERAPY: Daily Note and PM Treatment Day # 4    Lay De La Fuente is a 67 y.o. male   PRIMARY DIAGNOSIS: Hemorrhagic cerebrovascular accident (CVA) ([facility]  Hemorrhagic cerebrovascular accident (CVA) ([facility] [address] [I61.9]  Procedure(s) (LRB):  ESOPHAGOGASTRODUODENOSCOPY (EGD) (N/A)  PERCUTANEOUS ENDOSCOPIC GASTROSTOMY TUBE INSERTION/  (N/A)  2 Days Post-Op    ASSESSMENT:     REHAB RECOMMENDATIONS: CURRENT LEVEL OF FUNCTION:  (Most Recently Demonstrated)   Recommendation to date pending progress:  [address] [place] [address] vs. Short-term Rehab  Equipment:    To Be Determined Bed Mobility:   Total Assistance  Sit to Stand:   Not tested  Transfers:   Not tested  Gait/Mobility:   Unable to perform     ASSESSMENT:  Mr. Ashli Young presents drowsy this PM, but agreeable to physical therapy. Total assist x2 for bed mobility and with strong posterior lean with cervical flexion impeding balance. Worked on manual stretch of cervical muscles while OT completed self care tasks to improve posture for improved sitting balance and tolerance.  Able to sit for very short periods of time without trunk support 4 x 10 seconds, however fatigued quickly with this. Stood requiring mod A x2 (increased support req. On L side with knee block) however extensor tone appeared to be helpful in sit to stand transfers. Was able to follow commands with RLE however put his foot into the chair and began pushing posteriorly. Attempted to lift head a few times with max cues during session. He was positioned for comfort with a change in his positioning to allow for some pressure relief and varied body position. He was kind and cooperative with therapy but remains limited secondary to his left hemiplegia. PT will continue to follow and progress as tolerated. SUBJECTIVE:   Mr. Junior Sis states, \"Tom\" Not very talkative today.     SOCIAL HISTORY/ LIVING ENVIRONMENT:   Home Environment: Private residence  # Steps to Enter: 5  One/Two Story Residence: One story  Living Alone: No  Support Systems: Family member(s)  OBJECTIVE:     PAIN: VITAL SIGNS: LINES/DRAINS:   Pre Treatment:  4/10 patient complained of neck pain  Post Treatment: 4/10     Visit Vitals  /72 (BP 1 Location: Right upper arm, BP Patient Position: At rest;Lying)   Pulse 73   Temp 97.7 °F (36.5 °C)   Resp 20   Ht 5' 8\" (1.727 m)   Wt 52 kg (114 lb 9.6 oz)   SpO2 99%   BMI 17.42 kg/m²    Garrett Catheter, IV and PEG  O2 Device: None (Room air)     MOBILITY: I Mod I S SBA CGA Min Mod Max Total  NT x2 Comments:   Bed Mobility    Rolling [] [] [] [] [] [] [] [] [x] [] []    Supine to Sit [] [] [] [] [] [] [] [] [] [x] []    Scooting [] [] [] [] [] [] [] [] [x] [] [x]    Sit to Supine [] [] [] [] [] [] [] [] [] [x] []    Transfers    Sit to Stand [] [] [] [] [] [] [x] [] [] [] [x]    Bed to Chair [] [] [] [] [] [] [] [] [] [x] []    Stand to Sit [] [] [] [] [] [] [] [] [] [] []    I=Independent, Mod I=Modified Independent, S=Supervision, SBA=Standby Assistance, CGA=Contact Guard Assistance,   Min=Minimal Assistance, Mod=Moderate Assistance, Max=Maximal Assistance, Total=Total Assistance, NT=Not Tested    BALANCE: Good Fair+ Fair Fair- Poor NT Comments   Sitting Static [] [] [] [] [x] []    Sitting Dynamic [] [] [] [] [x] []              Standing Static [] [] [] [] [x] []    Standing Dynamic [] [] [] [] [x] []      GAIT: I Mod I S SBA CGA Min Mod Max Total  NT x2 Comments:   Level of Assistance [] [] [] [] [] [] [] [] [] [x] []    Distance 0    DME N/A    Gait Quality N/A    Weightbearing  Status N/A     I=Independent, Mod I=Modified Independent, S=Supervision, SBA=Standby Assistance, CGA=Contact Guard Assistance,   Min=Minimal Assistance, Mod=Moderate Assistance, Max=Maximal Assistance, Total=Total Assistance, NT=Not Tested    PLAN:   FREQUENCY/DURATION: PT Plan of Care: 3 times/week for duration of hospital stay or until stated goals are met, whichever comes first.  TREATMENT:     TREATMENT:   ($$ Therapeutic Activity: 23-37 mins$$ Manual Therapy: 8-22 mins   )  Co-Treatment PT/OT necessary due to patient's decreased overall endurance/tolerance levels, as well as need for high level skilled assistance to complete functional transfers/mobility and functional tasks  Therapeutic Activity (30 Minutes): Therapeutic activity included Rolling, Supine to Sit, Sit to Supine, Transfer Training, Sitting balance  and Standing balance to improve functional Mobility, Strength, ROM and Activity tolerance. Manual Therapy (8 Minutes): Manual Therapy included Manual stretching of cervical musculature into extension to improve functional posture to progress in sitting/standing balance and functional transfers.     TREATMENT GRID:   Date:  4/21/21 Date:   Date:     Activity/Exercise Parameters Parameters Parameters   PROM to LLE 10 reps across all joints     AA ankle pumps RLE 10      AA heel slides RLE 10      AA hip abd/adduction RLE 10                         AFTER TREATMENT POSITION/PRECAUTIONS:  Bed, Needs within reach and RN notified    INTERDISCIPLINARY COLLABORATION:  RN/PCT, PT/PTA and OT/LYNCH    TOTAL TREATMENT DURATION:  PT Patient Time In/Time Out  Time In: 1315  Time Out: 1354    Authur Kennedy, PT, DPT

## 2021-04-23 NOTE — PROGRESS NOTES
Pt breathing at 32 breaths per minute with labored respirations. Hr of 122. Dr. Sharri Rowan notified and came to see pt at bedside.

## 2021-04-23 NOTE — PROGRESS NOTES
04/23/21 0658   NIH Stroke Scale   Interval Other (comment)  (w/ Michelle, RIK)   LOC 1   LOC Questions 0   LOC Commands 0   Best Gaze 0   Visual 0   Facial Palsy 0   Motor Right Arm 0   Motor Left Arm 4   Motor Right Leg 0   Motor Left Leg 4   Limb Ataxia 0   Sensory 2   Best Language 1   Dysarthria 1   Extinction and Inattention 1   Total 14

## 2021-04-23 NOTE — PROGRESS NOTES
Chelsey Wetzel [phone]     Martita Arriaga Other Relative [phone]     Little Waterman. Son   [phone]       [employer] Hospitalist Progress Note     Name:  Gladys Lugo  Age:76 y.o. Sex:male   :  1945    MRN:  [id number]     Admit Date:  2021    Reason for Admission:  Hemorrhagic cerebrovascular accident (CVA) [facility]) [I61.9]    Assessment & Plan     Suspected recurrent aspiration of gastrostomy tube feedingssee nocturnist note    Updated daughter, Duc Quinonez. See documentation witnessed \"thymic\" like activity not responsive to IV Ativan suspected \"chills\" prior EEG no epileptiform discharges. Contacted neurology via text for opinion observation versus empiric antiepileptic versus repeat EEG. Nonurgent but mental status change today much less responsive. As per daughter was conversant up until last p.m. Changed gastrostomy feeds to continuous and observe. May need to pay closer attention to residuals. Continue current antibiotics and repeat serial chest x-rayspneumonitis versus pneumonia. Leukocytosis is noted today. No fevers. Blood cultures pending. reynaldo--  Suspect free water dehydration-improved overnight with IV fluidscontinue and observefurther work-up based on clinical course with IV hydration. Hemorrhagic CVA s/p EVD 21: Followed by neurosurgery and neurology   d/c EVD 4-20  NPO  S/p PEG placement -  PT,OT  Needs discharge plans, family choosing SNF, discussed with case management   patient has been accepted to [facility]. At present time unclear if he will need long-term IV antibioticswe will follow-up chest x-ray tomorrow follow serial CBCs. Possible aspiration pneumonitis versus other. Opinion regarding possible seizure. Prior EEG  - for epileptiform discharges but large intracerebral hemorrhage        Sepsis due to Pneumonia:   zosyn EOT 21  On room air  restarted Zosyn p.m.  .           Elevated troponin:  Followed by cardiology, due to demand ischemia   April 23this is unchanged        DENI on CKD:  followup BMP,stable  23rdno changes     CAD:  Continued lipitor     HTN:  Continued norvasc, Metoprolol    prn IV antihypertensives         Diet:  DIET NPO  DIET TUBE FEEDINGchanged to continuous  DVT PPx: anticoagulation contra indicated due to hemorrhagic CVA  Code: Full Code      Dispo/Discharge Planning: Accepted to [facility]awaiting planned duration of IV Zosyn will be based on follow-up CBC and chest radiographs and clinical course. Hospital Course/Subjective:   Mr. Debi Crabtree is a 66 yo male PMH of HTN, CAD, HLP, COPD, HEPC C admitted 4-2-21 with acute right basal ganglia hemorrhagic CVA. Neurosurgery did not recommend acute interventions on admit. On 4-7-21 he was more drowsy and repeat CT head showed increased IVH and concern for early hydrocephalus. He is s/p EVD 4-7-21 to 4-20-21. Repeat CT head 4-14 stable. CODE S called on 4-17-21 due to decreased responsiveness. CT head no overall changes. EEG shows abnormal findings consistent with known CNS hemorrhage. Neurology following. Goal sodium was 140 to 150. He was covered with antibiotics for pneumonia, possible aspiration. Cardiology followed for elevated troponin, feeling this was due to demand ischemia. He was receiving NGT feeds and  S/p  PEG on 4-21. SLP following. He has resumed antihypertensives via NGT and also required IV cardene. Discharge plans pending to SNF. Subjective/24 hr Events (04/23/21):  Events of last evening notedno witnessed aspiration but was receiving bolus feedsleukocytosis todayno fevers. Differential diagnosis per note included seizureEEG April 17 no epileptiform discharges but large volume intracerebral hemorrhageCVA. Updated daughter. Back on IV Zosyn.   Daughter reports patient was accepted at [facility] and awaiting precertificationthese plans were prior to events above and not clear what duration IV antibiotic patient will need if any but agree with continue antibiotics for now given suspected aspiration and leukocytosis and bolus feeds. Review of Systems: 14 point review of systems is otherwise negative with the exception of the elements mentioned above. Objective:     Patient Vitals for the past 24 hrs:   Temp Pulse Resp BP SpO2   04/23/21 1200 97.7 °F (36.5 °C) 73 20 121/72 99 %   04/23/21 0800 97.6 °F (36.4 °C) 82 18 127/76 100 %   04/23/21 0400 97.2 °F (36.2 °C) 92 24 125/78 100 %   04/23/21 0000 97.9 °F (36.6 °C) 86 22 111/68 99 %   04/22/21 2000  (!) 124      04/22/21 1930  (!) 118 (!) 32 139/82 95 %   04/22/21 1911 98.3 °F (36.8 °C) (!) 122 30 124/75 90 %   04/22/21 1600 98.3 °F (36.8 °C) 90 14 126/81 98 %     Oxygen Therapy  O2 Sat (%): 99 % (04/23/21 1200)  Pulse via Oximetry: 76 beats per minute (04/21/21 1600)  O2 Device: None (Room air) (04/21/21 1500)  Skin Assessment: Clean, dry, & intact (04/21/21 0701)  Skin Protection for O2 Device: N/A (04/21/21 0701)  O2 Flow Rate (L/min): 3 l/min (04/21/21 1157)    Body mass index is 17.42 kg/m². Physical Exam:   General: Nonverbal, not following commands. Was speaking prior per daughter. Prior provider notesnonverbal but waving right hand. HEENT: No facial asymmetry, thyroid is midline   Lungs: Clear to auscultation bilaterally   Cardiovascular: Regular rate and rhythm with normal S1 and S2   Abdomen: G-tube site clean and dry, bowel sounds all 4 quadrants.   Extremities: Right hand mitten, left hemiparesis  Neuro: Dense left hemiparesis, previously was moving right upper extremityat time of exam, sedate, altered compared to prior documentation      Data Review:  I have reviewed all labs, meds, and studies from the last 24 hours:    Labs:    Recent Results (from the past 24 hour(s))   GLUCOSE, POC    Collection Time: 04/22/21  6:09 PM   Result Value Ref Range    Glucose (POC) 142 (H) 65 - 100 mg/dL Performed by [facility]    EKG, 12 LEAD, SUBSEQUENT    Collection Time: 04/22/21  9:14 PM   Result Value Ref Range    Ventricular Rate 122 BPM    Atrial Rate 122 BPM    P-R Interval 126 ms    QRS Duration 78 ms    Q-T Interval 306 ms    QTC Calculation (Bezet) 436 ms    Calculated P Axis 56 degrees    Calculated R Axis -59 degrees    Calculated T Axis 77 degrees    Diagnosis       !!! Poor data quality, interpretation may be adversely affected  likely  Sinus tachycardia  Left axis deviation  Nonspecific ST abnormality  Abnormal ECG  When compared with ECG of 22-APR-2021 21:14,  Premature ventricular complexes are no longer Present  Confirmed by Lucero Welsh ([id number]) on 4/23/2021 6:47:52 AM     CBC WITH AUTOMATED DIFF    Collection Time: 04/22/21  9:23 PM   Result Value Ref Range    WBC 11.7 (H) 4.3 - 11.1 K/uL    RBC 5.12 4.23 - 5.6 M/uL    HGB 13.7 13.6 - 17.2 g/dL    HCT 40.7 (L) 41.1 - 50.3 %    MCV 79.5 (L) 79.6 - 97.8 FL    MCH 26.8 26.1 - 32.9 PG    MCHC 33.7 31.4 - 35.0 g/dL    RDW 14.1 11.9 - 14.6 %    PLATELET 943 362 - 023 K/uL    MPV 12.3 9.4 - 12.3 FL    ABSOLUTE NRBC 0.00 0.0 - 0.2 K/uL    DF AUTOMATED      NEUTROPHILS 84 (H) 43 - 78 %    LYMPHOCYTES 6 (L) 13 - 44 %    MONOCYTES 8 4.0 - 12.0 %    EOSINOPHILS 1 0.5 - 7.8 %    BASOPHILS 0 0.0 - 2.0 %    IMMATURE GRANULOCYTES 1 0.0 - 5.0 %    ABS. NEUTROPHILS 9.9 (H) 1.7 - 8.2 K/UL    ABS. LYMPHOCYTES 0.8 0.5 - 4.6 K/UL    ABS. MONOCYTES 0.9 0.1 - 1.3 K/UL    ABS. EOSINOPHILS 0.1 0.0 - 0.8 K/UL    ABS. BASOPHILS 0.0 0.0 - 0.2 K/UL    ABS. IMM.  GRANS. 0.1 0.0 - 0.5 K/UL   LACTIC ACID    Collection Time: 04/22/21  9:23 PM   Result Value Ref Range    Lactic acid 3.8 (HH) 0.4 - 2.0 MMOL/L   PROCALCITONIN    Collection Time: 04/22/21  9:23 PM   Result Value Ref Range    Procalcitonin 2.78 ng/mL   METABOLIC PANEL, BASIC    Collection Time: 04/22/21  9:23 PM   Result Value Ref Range    Sodium 141 138 - 145 mmol/L    Potassium 4.2 3.5 - 5.1 mmol/L Chloride 111 (H) 98 - 107 mmol/L    CO2 23 21 - 32 mmol/L    Anion gap 7 7 - 16 mmol/L    Glucose 137 (H) 65 - 100 mg/dL    BUN 44 (H) 8 - 23 MG/DL    Creatinine 1.41 0.8 - 1.5 MG/DL    GFR est AA >60 >60 ml/min/1.73m2    GFR est non-AA 52 (L) >60 ml/min/1.73m2    Calcium 9.2 8.3 - 10.4 MG/DL   CULTURE, BLOOD    Collection Time: 04/22/21 10:19 PM    Specimen: Blood   Result Value Ref Range    Special Requests: RIGHT  ARM        Culture result: NO GROWTH AFTER 7 HOURS     URINALYSIS W/ RFLX MICROSCOPIC    Collection Time: 04/22/21 11:26 PM   Result Value Ref Range    Color YELLOW      Appearance CLEAR      Specific gravity 1.010 1.001 - 1.023      pH (UA) 5.5 5.0 - 9.0      Protein Negative NEG mg/dL    Glucose Negative mg/dL    Ketone Negative NEG mg/dL    Bilirubin Negative NEG      Blood Negative NEG      Urobilinogen 1.0 0.2 - 1.0 EU/dL    Nitrites Negative NEG      Leukocyte Esterase Negative NEG     GLUCOSE, POC    Collection Time: 04/22/21 11:45 PM   Result Value Ref Range    Glucose (POC) 166 (H) 65 - 100 mg/dL    Performed by Andry    METABOLIC PANEL, BASIC    Collection Time: 04/23/21  4:51 AM   Result Value Ref Range    Sodium 146 (H) 138 - 145 mmol/L    Potassium 3.7 3.5 - 5.1 mmol/L    Chloride 116 (H) 98 - 107 mmol/L    CO2 24 21 - 32 mmol/L    Anion gap 6 (L) 7 - 16 mmol/L    Glucose 119 (H) 65 - 100 mg/dL    BUN 45 (H) 8 - 23 MG/DL    Creatinine 1.22 0.8 - 1.5 MG/DL    GFR est AA >60 >60 ml/min/1.73m2    GFR est non-AA >60 >60 ml/min/1.73m2    Calcium 8.9 8.3 - 10.4 MG/DL   MAGNESIUM    Collection Time: 04/23/21  4:51 AM   Result Value Ref Range    Magnesium 2.4 1.8 - 2.4 mg/dL   PHOSPHORUS    Collection Time: 04/23/21  4:51 AM   Result Value Ref Range    Phosphorus 3.8 (H) 2.3 - 3.7 MG/DL   LACTIC ACID    Collection Time: 04/23/21  4:51 AM   Result Value Ref Range    Lactic acid 1.2 0.4 - 2.0 MMOL/L   GLUCOSE, POC    Collection Time: 04/23/21  5:58 AM   Result Value Ref Range    Glucose (POC) 127 (H) 65 - 100 mg/dL    Performed by Eloy    GLUCOSE, POC    Collection Time: 04/23/21 11:50 AM   Result Value Ref Range    Glucose (POC) 97 65 - 100 mg/dL    Performed by Daniel        All Micro Results     Procedure Component Value Units Date/Time    CULTURE, BLOOD [[id number]] Collected: 04/22/21 2219    Order Status: Completed Specimen: Blood Updated: 04/23/21 0640     Special Requests: --        RIGHT  ARM       Culture result: NO GROWTH AFTER 7 HOURS       CULTURE, BLOOD [[id number]] Collected: 04/09/21 1053    Order Status: Completed Specimen: Blood Updated: 04/14/21 0721     Special Requests: --        RIGHT  HAND       Culture result: NO GROWTH 5 DAYS       CULTURE, BLOOD [[id number]] Collected: 04/09/21 1058    Order Status: Completed Specimen: Blood Updated: 04/14/21 0721     Special Requests: --        RIGHT  Antecubital       Culture result: NO GROWTH 5 DAYS       COVID-19 RAPID TEST [[id number]] Collected: 04/07/21 1221    Order Status: Completed Specimen: Nasopharyngeal Updated: 04/07/21 1301     Specimen source Nasopharyngeal        COVID-19 rapid test Not detected        Comment:      The specimen is NEGATIVE for SARS-CoV-2, the novel coronavirus associated with COVID-19. A negative result does not rule out COVID-19. This test has been authorized by the FDA under an Emergency Use Authorization (EUA) for use by authorized laboratories.         Fact sheet for Healthcare Providers: ConventionUpdate.co.nz  Fact sheet for Patients: ConventionUpdate.co.nz       Methodology: Isothermal Nucleic Acid Amplification               EKG Results     Procedure 720 Value Units Date/Time    EKG, 12 LEAD, SUBSEQUENT [[id number]] Collected: 04/22/21 2114    Order Status: Completed Updated: 04/23/21 0648     Ventricular Rate 122 BPM      Atrial Rate 122 BPM      P-R Interval 126 ms      QRS Duration 78 ms      Q-T Interval 306 ms      QTC Calculation (Bezet) 436 ms      Calculated P Axis 56 degrees      Calculated R Axis -59 degrees      Calculated T Axis 77 degrees      Diagnosis --     !!! Poor data quality, interpretation may be adversely affected  likely  Sinus tachycardia  Left axis deviation  Nonspecific ST abnormality  Abnormal ECG  When compared with ECG of 22-APR-2021 21:14,  Premature ventricular complexes are no longer Present  Confirmed by Tenzin Yoon ([id number]) on 4/23/2021 6:47:52 AM      EKG, 12 LEAD, SUBSEQUENT [[id number]] Collected: 04/03/21 0821    Order Status: Completed Updated: 04/03/21 1227     Ventricular Rate 74 BPM      Atrial Rate 74 BPM      P-R Interval 160 ms      QRS Duration 94 ms      Q-T Interval 388 ms      QTC Calculation (Bezet) 430 ms      Calculated P Axis 71 degrees      Calculated R Axis -61 degrees      Calculated T Axis 88 degrees      Diagnosis --     Sinus rhythm with occasional Premature ventricular complexes  Left anterior fascicular block  ST & T wave abnormality, consider lateral ischemia  Abnormal ECG  When compared with ECG of 02-APR-2021 18:26,  Premature ventricular complexes are now Present  Left anterior fascicular block is now Present  Confirmed by Dave Bowers ([id number]) on 4/3/2021 12:27:42 PM      Initial ECG [[id number]] Collected: 04/02/21 1826    Order Status: Completed Updated: 04/03/21 0732     Ventricular Rate 77 BPM      Atrial Rate 78 BPM      P-R Interval 156 ms      QRS Duration 96 ms      Q-T Interval 356 ms      QTC Calculation (Bezet) 402 ms      Calculated P Axis 76 degrees      Calculated R Axis 69 degrees      Calculated T Axis 79 degrees      Diagnosis --     Normal sinus rhythm  Normal ECG  When compared with ECG of 28-AUG-2017 21:42,  Questionable change in QRS axis  Nonspecific T wave abnormality, improved in Lateral leads  Confirmed by Tenzin Yoon ([id number]) on 4/3/2021 7:32:01 AM      EKG 12 LEAD INITIAL [[id number]]     Order Status: Canceled           Other Studies:  Ct Head Wo Cont    Result Date: 4/3/2021  NONCONTRAST HEAD CT CLINICAL HISTORY:  Follow-up intracranial hemorrhage. TECHNIQUE:  Axial images were obtained with spiral technique. Radiation dose reduction was achieved using one or all of the following techniques: automated exposure control, weight-based dosing, iterative reconstruction. COMPARISON:  CT yesterday and 0617 hours today. REPORT:   Standard noncontrast head CT demonstrates continued increase in size of the right thalamic hemorrhage with maximum diameter now measured at approximately 3.6 cm compared with 2.9 cm earlier today. Moreover, there is new bilateral lateral intraventricular extension of hemorrhage, primarily localized to the atria. There is associated slight increase in bilateral lateral ventriculomegaly with biatrial diameter now measuring approximately 4.2 cm compared with 3.8 cm this morning. Mass effect is little changed with midline shift from right to left of approximately 5 mm compared with 4 mm this morning. Extensive small vessel ischemic disease and small focal lateral right frontal infarct appear unchanged. INTERVAL INCREASE IN SIZE OF THE RIGHT THALAMIC HEMORRHAGE WITH NEW BILATERAL LATERAL INTRAVENTRICULAR EXTENSION AND SLIGHT WORSENING OF VENTRICULOMEGALY. Ct Head Wo Cont    Result Date: 4/3/2021  EXAM: CT HEAD WO CONT HISTORY: .  TECHNIQUE: Axial images of the brain were performed without the administration of intravenous contrast. Images were obtained axial plane and coronal reformatted images were submitted. CT scan performed using appropriate/available dose optimization/reduction/ALARA techniques. COMPARISON: None. FINDINGS: The known right thalamic hemorrhage is again observed. There is surrounding edema. The body of this hemorrhage has not changed significantly. Currently it measures 2.4 x 2.9 cm when previously it measured 2.4 x 2.7 cm.  There has been interval increase in an area of adjacent or extension of the hemorrhage posteriorly. Previously it measured 4.5 mm in width. Currently it measures 1.5 cm in width and 1.3 cm AP. There is persistent intraventricular hemorrhage in the posterior horn of the right lateral ventricle. Interval development of a small amount of hemorrhage in the left posterior horn. There is mild midline shift of the posterior septum pellucidum of approximately 4.1 mm. The lateral ventricles are mildly dilated, stable. Chronic ischemic white matter changes are again observed. Question small chronic infarction in the right frontal lobe. Interval increase in the size of the right thalamic hemorrhage. Interval development of mild right to left midline shift of the posterior aspect of the septum pellucidum. Stable hemorrhage in the posterior horn of the right lateral ventricle. Interval development of a small amount of hemorrhage in the posterior horn of the left lateral ventricle. Other findings as above. [address] [place] [address]    Result Date: 4/3/2021  RIGHT UPPER QUADRANT ULTRASOUND. HISTORY: Transaminitis. COMPARISON: No relevant comparison studies available. FINDINGS: Ultrasonographic Ramon's sign: is reported as negative Gallstones: There are intraluminal echogenic foci, probably stones. Gallbladder Wall: not thickened. Common Bile Duct: is not dilated, 4 mm. Intrahepatic Biliary Tree: is not dilated. Liver: Uniform parenchyma Included portion of the pancreas and right kidney: are unremarkable. Vasculature: Aorta: Normal caliber. IVC:  Patent. Portal vein: Hepatopedal flow. Probable gallstones. No biliary tree obstruction. No findings to suggest acute cholecystitis. Xr Chest Port    Result Date: 4/2/2021  CHEST X-RAY, one view. HISTORY:  Chest pain. TECHNIQUE:  AP upright portable view. COMPARISON: August 2017 FINDINGS: Lungs: Atelectasis or scar right base, similar to prior exam. Costophrenic angles: are sharp. Heart size: is normal. Pulmonary vasculature: is unremarkable. Aorta: Arch calcifications. Included portion of the upper abdomen: is unremarkable. Bones: No gross bony lesions. Other: None. Negative for acute change. Ct Code Neuro Head Wo Contrast    Result Date: 4/2/2021  CT HEAD WITHOUT CONTRAST HISTORY:  CVA. COMPARISON: None. TECHNIQUE: Axial imaging was performed without intravenous contrast utilizing 5mm slice thickness. Sagittal and coronal reformats were performed. Radiation dose reduction techniques were used for this study. Our CT scanner uses one or all of the following: Automated exposure control, adjustment of the MAS or KUB according to patient's size and iterative reconstruction. FINDINGS:    *BRAIN:    -  There are no early signs of territorial or lacunar infarction by CT.    -  2.7 x 2.4 cm acute hemorrhage into the right basal ganglia and thalamus. Right lateral ventricle intraventricular hemorrhage.    -  No gross white matter abnormality by CT. *VISUALIZED PARANASAL SINUSES: Well aerated. *MASTOIDS:  Clear. *CALVARIUM AND SCALP: Unremarkable. Acute right basal ganglia hematoma likely on the basis of hypertension. Right intraventricular hemorrhage.  Date of Dictation: 4/2/2021 6:17 PM          Current Meds:   Current Facility-Administered Medications   Medication Dose Route Frequency    piperacillin-tazobactam (ZOSYN) 3.375 g in 0.9% sodium chloride (MBP/ADV) 100 mL MBP  3.375 g IntraVENous Q8H    HYDROcodone-acetaminophen (NORCO) 5-325 mg per tablet 1 Tab  1 Tab Per G Tube Q4H PRN    acetaminophen (TYLENOL) solution 650 mg  650 mg Per G Tube Q6H PRN    amLODIPine (NORVASC) tablet 10 mg  10 mg Per G Tube DAILY    atorvastatin (LIPITOR) tablet 40 mg  40 mg Per G Tube QHS    metoprolol tartrate (LOPRESSOR) tablet 50 mg  50 mg Per G Tube Q12H    0.9% sodium chloride infusion  125 mL/hr IntraVENous CONTINUOUS    ondansetron (ZOFRAN) injection 4 mg  4 mg IntraVENous Q6H PRN    bisacodyL (DULCOLAX) suppository 10 mg  10 mg Rectal DAILY PRN    insulin lispro (HUMALOG) injection   SubCUTAneous Q6H    lip protectant (BLISTEX) ointment 1 Each  1 Each Topical PRN    NUTRITIONAL SUPPORT ELECTROLYTE PRN ORDERS   Does Not Apply PRN    metoprolol (LOPRESSOR) injection 5 mg  5 mg IntraVENous Q6H PRN    enalaprilat (VASOTEC) injection 1.25 mg  1.25 mg IntraVENous Q6H PRN    lidocaine 4 % patch 1 Patch  1 Patch TransDERmal Q24H    sodium chloride (NS) flush 5-40 mL  5-40 mL IntraVENous Q8H    sodium chloride (NS) flush 5-40 mL  5-40 mL IntraVENous PRN       Problem List:  Hospital Problems as of 4/23/2021 Date Reviewed: 4/4/2021          Codes Class Noted - Resolved POA    Severe protein-calorie malnutrition ([facility] ICD-10-CM: E43  ICD-9-CM: 733  4/9/2021 - Present Yes        Dysphagia following cerebral infarction ICD-10-CM: I69.391  ICD-9-CM: 438.82  4/6/2021 - Present Yes        Elevated liver enzymes ICD-10-CM: R74.8  ICD-9-CM: 790.5  4/6/2021 - Present Yes        Elevated troponin ICD-10-CM: R77.8  ICD-9-CM: 790.6  4/4/2021 - Present Yes        * (Principal) Hemorrhagic cerebrovascular accident (CVA) ([facility] ICD-10-CM: I61.9  ICD-9-CM: 921  4/2/2021 - Present Yes        Acute left-sided weakness ICD-10-CM: R53.1  ICD-9-CM: 728.87  4/2/2021 - Present Yes        S/P PTCA (percutaneous transluminal coronary angioplasty) ICD-10-CM: Z98.61  ICD-9-CM: V45.82  Unknown - Present Yes        Chronic tuberculosis ICD-10-CM: A15.9  ICD-9-CM: 011.90  Unknown - Present Yes        Coronary atherosclerosis of native coronary vessel ICD-10-CM: I25.10  ICD-9-CM: 414.01  Unknown - Present Yes        Hyperlipidemia ICD-10-CM: E78.5  ICD-9-CM: 272.4  Unknown - Present Yes        Iron deficiency anemia ICD-10-CM: D50.9  ICD-9-CM: 280.9  1/25/2015 - Present Yes        Microcytic anemia ICD-10-CM: D50.9  ICD-9-CM: 280.9  1/21/2015 - Present     Overview Signed 4/6/2016  9:52 AM by Candice Donahue     Iron deficiency                    Part of this note was written by using a voice dictation software and the note has been proof read but may still contain some grammatical/other typographical errors.     Signed By: Negrita Farley,    [facility] Hospitalist Service    April 23, 2021  2:50 PM

## 2021-04-23 NOTE — PROGRESS NOTES
ACUTE OT GOALS:  (Developed with and agreed upon by patient and/or caregiver.)  1. Complete functional transfers (I.e. toilet/bedside commode) with c . Mod A. Updated 2021  2. Complete gentle active assisted L UE ROM focusing more on finger/wrist extensors and shoulder external rotators. CONTINUE TO ADDRESS (2021) 2021  3. Utilize L UE as a support for supine to sitting and sit to standing at least 20% of the time. CONTINUE TO ADDRESS 2021   DC 2021 PEG NPO  5. Complete grooming/oral care with  Mod A and verbal and visual cues as needed. CONTINUE TO ADDRESS (2021) 2021     NEW GOALS (Added 2021)  1. Patient will complete functional activity while seated EOB with min A and adaptive equipment as needed. 2. Patient will attend to L side of environment for 75% of session with min verbal cues from therapist.  NEW Goals added 2021  3. Patient will sit up on EOB x 5 mins unsupported for activity with no LOB uncorrected. 4. Patient will participate in reaching activities with R UE from EOB or supported surface x8 mins.      OCCUPATIONAL THERAPY ASSESSMENT: Daily Note, Re-evaluation and PM    OT Treatment Day # 1   Protect LUE to prevent subluxation    Lay De La Fuente is a 67 y.o. male   PRIMARY DIAGNOSIS: Hemorrhagic cerebrovascular accident (CVA) ([facility] [address]  Hemorrhagic cerebrovascular accident (CVA) ([facility] [address] [I61.9]  Procedure(s) (LRB):  ESOPHAGOGASTRODUODENOSCOPY (EGD) (N/A)  PERCUTANEOUS ENDOSCOPIC GASTROSTOMY TUBE INSERTION/  (N/A)  2 Days Post-Op    ASSESSMENT:     REHAB RECOMMENDATIONS:   Recommendation to date pending progress:  [address] [place] [address] vs. Short-term Rehab  Equipment:    To Be Determined     PRIOR LEVEL OF FUNCTION:  (Prior to Hospitalization)  INITIAL/CURRENT LEVEL OF FUNCTION:  (Based on today's evaluation)   Bathing:   Independent  Dressing:   Independent  Feeding/Grooming:   Independent  Toileting:   Independent  Functional Mobility:   Independent Bathing:   Total Assistance  Dressing:   Total Assistance  Feeding/Grooming:   Maximal Assistance swabbing mouth with dental swabs  Toileting:   Total Assistance x 2 brief change in supine with bridging  Functional Mobility:   Total Assistance x 2     ASSESSMENT:  Mr. Denisha Cali presents for reeval, s/p PEG and EVD removed. Goals downgraded again. Minimally responsive, drowsy, but woke up more as session progressed. Kept asking for a flashlight. Total A bed mobility. Poor sitting balance, constant support. Nystagmus noted when sitting, tracking to both sides, but prefers L downward gaze. NO AROM in L UE but has increasing extensor tone, educated on positioning to prevent . PROM completed. Max A for dental swab, did finally reach up with R UE and brush some of his teeth probably because there was water on swab and he is now NPO. Did reach for some targets with R UE but was unable to keep his balance. Trouble lifting head up with strong cervical flexion. Worked on posture and increased arousal, awareness of L hemibody, trunk control and finding midline. Wanted to stand, mod Ax 2 extensor tone actually helping him to stand. Fatigues quickly. Returned to supine with pillows placed, R mitt replaced and brief changed with total A. Pt bridging to help. Awaiting rehab. Continue OT efforts per POC above. SUBJECTIVE:   Mr. Denisha Cali states, \"Flashlight. \"    SOCIAL HISTORY/LIVING ENVIRONMENT: Lives with brother. Does not drive. Takes the bus to pay bills, etc. Totally independent per patient.    Home Environment: Private residence  # Steps to Enter: 5  One/Two Story Residence: One story  Living Alone: No  Support Systems: Family member(s)    OBJECTIVE:     PAIN: VITAL SIGNS: LINES/DRAINS:   Pre Treatment: Pain Screen  Pain Scale 1: FLACC  Pain Intensity 1: 0  Post Treatment: no complaint of pain   IV, PEG and telemonitoring, adult brief  O2 Device: None (Room air)     GROSS EVALUATION:  B UEs, R dom is WFLs throughout Within Functional Limits Abnormal/ Functional Abnormal/ Non-Functional (see comments) Not Tested Comments:   AROM [] [] [x] [] L UE tone, no AROM   PROM [] [] [x] [] L UE, tight and hard to range   Strength [] [] [x] []    Balance [] [] [x] [] Unsteady, poor   Posture [] [] [x] [] Flexed head, rounded shoulders, L scapular winging   Sensation [] [x] [] [] Pulled trunk away from L nailbed pressure   Coordination [] [] [x] [] L UE none   Tone [] [] [x] [] L side increasing extensor   Edema [x] [] [] []    Activity Tolerance [] [x] [] [] Needs rest breaks    [] [] [] []      COGNITION/  PERCEPTION: Intact Impaired   (see comments) Comments:   Orientation [] [x] Minimal verbalizations   Vision [] [x] Nystagmus    Hearing [x] []    Judgment/ Insight [] [x] poor   Attention [] [x] Easily distracted   Memory [] [x] poor   Command Following [] [x] Not all commands, delayed response   Emotional Regulation [] []     [] []      ACTIVITIES OF DAILY LIVING: I Mod I S SBA CGA Min Mod Max Total NT Comments   BASIC ADLs:              Bathing/ Showering [] [] [] [] [] [] [] [] [x] []    Toileting [] [] [] [] [] [] [] [] [x] [] brief change   Dressing [] [] [] [] [] [] [] [] [x] [] socks   Feeding [] [] [] [] [] [] [] [] [] [x] NPO, new PEG   Grooming [] [] [] [] [] [] [] [x] [x] [] Dental oral care with swab and toothbrush   Personal Device Care [] [] [] [] [] [] [] [] [] []    Functional Mobility [] [] [] [] [] [] [] [] [x] [] EOB sitting   I=Independent, Mod I=Modified Independent, S=Supervision, SBA=Standby Assistance, CGA=Contact Guard Assistance,   Min=Minimal Assistance, Mod=Moderate Assistance, Max=Maximal Assistance, Total=Total Assistance, NT=Not Tested    MOBILITY: I Mod I S SBA CGA Min Mod Max Total  NT x2 Comments:   Supine to sit [] [] [] [] [] [] [] [] [x] [] [x]    Sit to supine [] [] [] [] [] [] [] [] [x] [] [x]    Sit to stand [] [] [] [] [] [] [x] [] [] [] [x] Short standing tone helping Bed to chair [] [] [] [] [] [] [] [] [] [x] []    I=Independent, Mod I=Modified Independent, S=Supervision, SBA=Standby Assistance, CGA=Contact Guard Assistance,   Min=Minimal Assistance, Mod=Moderate Assistance, Max=Maximal Assistance, Total=Total Assistance, NT=Not [place] [address]   Daily Activity Inpatient Short Form        How much help from another person does the patient currently need. .. Total A Lot A Little None   1. Putting on and taking off regular lower body clothing? [] 1   [x] 2   [] 3   [] 4   2. Bathing (including washing, rinsing, drying)? [] 1   [x] 2   [] 3   [] 4   3. Toileting, which includes using toilet, bedpan or urinal?   [x] 1   [] 2   [] 3   [] 4   4. Putting on and taking off regular upper body clothing? [] 1   [x] 2   [] 3   [] 4   5. Taking care of personal grooming such as brushing teeth? [] 1   [x] 2   [] 3   [] 4   6. Eating meals? [x] 1   [] 2   [] 3   [] 4   © 2007, Trustees of [address] [place] [address], under license to [employer]. All rights reserved     Score:  Initial: 9, completed 4/3/2021 Most Recent: 11 (Date: 4/6/2021 )  10 (4/23/2021)   Interpretation of Tool:  Represents activities that are increasingly more difficult (i.e. Bed mobility, Transfers, Gait). PLAN:   FREQUENCY/DURATION: OT Plan of Care: 3 times/week for duration of hospital stay or until stated goals are met, whichever comes first.    PROBLEM LIST:   (Skilled intervention is medically necessary to address:)  1. Decreased ADL/Functional Activities  2. Decreased Activity Tolerance  3. Decreased AROM/PROM  4. Decreased Balance  5. Decreased Cognition  6. Decreased Coordination  7. Decreased Gait Ability  8. Decreased Strength  9. Decreased Transfer Abilities  10. Increased Pain   INTERVENTIONS PLANNED:   (Benefits and precautions of occupational therapy have been discussed with the patient.)  1. Self Care Training  2. Therapeutic Activity  3.  Therapeutic Exercise/HEP  4. Neuromuscular Re-education  5. Manual Therapy  6. Modalities  7. Education     TREATMENT:     EVALUATION:  Reevaluation completed : (Untimed Charge)    TREATMENT:   ($$ Self Care/Home Management: 8-22 mins$$ Neuromuscular Re-Education: 23-37 mins   )  Co-Treatment PT/OT necessary due to patient's decreased overall endurance/tolerance levels, as well as need for high level skilled assistance to complete functional transfers/mobility and functional tasks  Self Care (10 Minutes): Self care including Upper Body Bathing, Toileting, Lower Body Dressing and Grooming to increase independence and decrease level of assistance required. Neuromuscular Re-education (29 Minutes): Neuromuscular Re-education included Balance Training, Coordination training, Functional mobility with facilitation, Hemibody awareness training, Postural training, Sitting balance training, Standing balance training and Visual field awareness training to improve Balance, Coordination, Functional Mobility, Postural Control, Proprioception and use of UEs in ADLs.     TREATMENT GRID:  N/A    AFTER TREATMENT POSITION/PRECAUTIONS:  Alarm Activated, Bed, Needs within reach, Restraints , RN notified and R mitt in place    INTERDISCIPLINARY COLLABORATION:  RN/PCT, PT/PTA, OT/LYNCH and RN Case Manager/     TOTAL TREATMENT DURATION:  OT Patient Time In/Time Out  Time In: 1315  Time Out: [facility], OT   Evelyn Pritchett MS, OTR/L

## 2021-04-23 NOTE — PROGRESS NOTES
Problem: Patient Education: Go to Patient Education Activity  Goal: Patient/Family Education  Outcome: Progressing Towards Goal     Problem: Hemorrhagic Stroke: Admission Day (0-3 hours)  Goal: Off Pathway (Use only if patient is Off Pathway)  Outcome: Progressing Towards Goal     Problem: Hemorrhagic Stroke:  3-24 hours  Goal: Off Pathway (Use only if patient is Off Pathway)  Outcome: Progressing Towards Goal     Problem: Hemorrhagic Stroke: Day 2  Goal: Off Pathway (Use only if patient is Off Pathway)  Outcome: Progressing Towards Goal     Problem: Hemorrhagic Stroke: Day 3  Goal: Off Pathway (Use only if patient is Off Pathway)  Outcome: Progressing Towards Goal     Problem: Hemorrhagic Stroke: Day 4  Goal: Off Pathway (Use only if patient is Off Pathway)  Outcome: Progressing Towards Goal     Problem: Hemorrhagic Stroke: Day 5 through Discharge  Goal: Off Pathway (Use only if patient is Off Pathway)  Outcome: Progressing Towards Goal  Goal: Activity/Safety  Outcome: Progressing Towards Goal  Goal: Consults, if ordered  Outcome: Progressing Towards Goal  Goal: Diagnostic Test/Procedures  Outcome: Progressing Towards Goal  Goal: Nutrition/Diet  Outcome: Progressing Towards Goal  Goal: Medications  Outcome: Progressing Towards Goal  Goal: Respiratory  Outcome: Progressing Towards Goal  Goal: Treatments/Interventions/Procedures  Outcome: Progressing Towards Goal  Goal: Psychosocial  Outcome: Progressing Towards Goal  Goal: *Hemodynamically stable  Outcome: Progressing Towards Goal  Goal: *Verbalizes anxiety and depression are reduced or absent  Outcome: Progressing Towards Goal  Goal: *Absence of aspiration  Outcome: Progressing Towards Goal  Goal: *Absence of signs and symptoms of DVT  Outcome: Progressing Towards Goal  Goal: *Optimal pain control at patient's stated goal  Outcome: Progressing Towards Goal  Goal: *Tolerating diet  Outcome: Progressing Towards Goal  Goal: *Progressive mobility and function  Outcome: Progressing Towards Goal  Goal: *Rehabilitation readiness  Outcome: Progressing Towards Goal     Problem: Hemorrhagic Stroke: Discharge Outcomes  Goal: *Verbalizes anxiety and depression are reduced or absent  Outcome: Progressing Towards Goal  Goal: *Verbalize understanding of risk factor modification(Stroke Metric)  Outcome: Progressing Towards Goal  Goal: *Optimal pain control at patient's stated goal  Outcome: Progressing Towards Goal  Goal: *Hemodynamically stable  Outcome: Progressing Towards Goal  Goal: *Absence of aspiration pneumonia  Outcome: Progressing Towards Goal  Goal: *Aware of needed dietary changes  Outcome: Progressing Towards Goal  Goal: *Verbalizes understanding and describes medication purposes and frequencies  Outcome: Progressing Towards Goal  Goal: *Tolerating diet  Outcome: Progressing Towards Goal  Goal: *Absence of signs and symptoms of DVT  Outcome: Progressing Towards Goal  Goal: *Absence of aspiration  Outcome: Progressing Towards Goal  Goal: *Progressive mobility and function  Outcome: Progressing Towards Goal  Goal: *Home safety concerns addressed  Outcome: Progressing Towards Goal     Problem: Falls - Risk of  Goal: *Absence of Falls  Description: Document Ofelia Fall Risk and appropriate interventions in the flowsheet.   Outcome: Progressing Towards Goal  Note: Fall Risk Interventions:  Mobility Interventions: OT consult for ADLs, PT Consult for mobility concerns    Mentation Interventions: Bed/chair exit alarm, Door open when patient unattended    Medication Interventions: Bed/chair exit alarm, Patient to call before getting OOB, Teach patient to arise slowly    Elimination Interventions: Bed/chair exit alarm, Call light in reach, Patient to call for help with toileting needs, Toileting schedule/hourly rounds    History of Falls Interventions: Bed/chair exit alarm, Consult care management for discharge planning, Door open when patient unattended         Problem: Patient Education: Go to Patient Education Activity  Goal: Patient/Family Education  Outcome: Progressing Towards Goal     Problem: Pressure Injury - Risk of  Goal: *Prevention of pressure injury  Description: Document Chavez Scale and appropriate interventions in the flowsheet.   Outcome: Progressing Towards Goal     Problem: Patient Education: Go to Patient Education Activity  Goal: Patient/Family Education  Outcome: Progressing Towards Goal     Problem: Patient Education: Go to Patient Education Activity  Goal: Patient/Family Education  Outcome: Progressing Towards Goal     Problem: Patient Education: Go to Patient Education Activity  Goal: Patient/Family Education  Outcome: Progressing Towards Goal     Problem: Delirium Treatment  Goal: *Level of consciousness restored to baseline  Outcome: Progressing Towards Goal  Goal: *Level of environmental perceptions restored to baseline  Outcome: Progressing Towards Goal  Goal: *Sensory perception restored to baseline  Outcome: Progressing Towards Goal  Goal: *Emotional stability restored to baseline  Outcome: Progressing Towards Goal  Goal: *Functional assessment restored to baseline  Outcome: Progressing Towards Goal  Goal: *Absence of falls  Outcome: Progressing Towards Goal  Goal: *Will remain free of delirium, CAM Score negative  Outcome: Progressing Towards Goal  Goal: *Cognitive status will be restored to baseline  Outcome: Progressing Towards Goal  Goal: Interventions  Outcome: Progressing Towards Goal     Problem: Patient Education: Go to Patient Education Activity  Goal: Patient/Family Education  Outcome: Progressing Towards Goal     Problem: Patient Education: Go to Patient Education Activity  Goal: Patient/Family Education  Outcome: Progressing Towards Goal     Problem: Non-Violent Restraints  Goal: Removal from restraints as soon as assessed to be safe  Outcome: Progressing Towards Goal  Goal: No harm/injury to patient while restraints in use  Outcome: Progressing Towards Goal  Goal: Patient's dignity will be maintained  Outcome: Progressing Towards Goal  Goal: Patient Interventions  Outcome: Progressing Towards Goal

## 2021-04-24 ENCOUNTER — APPOINTMENT (OUTPATIENT)
Dept: GENERAL RADIOLOGY | Age: 76
DRG: 023 | End: 2021-04-24
Attending: INTERNAL MEDICINE
Payer: MEDICARE

## 2021-04-24 LAB
ALBUMIN SERPL-MCNC: 2.2 G/DL (ref 3.2–4.6)
ALBUMIN/GLOB SERPL: 0.4 {RATIO} (ref 1.2–3.5)
ALP SERPL-CCNC: 108 U/L (ref 50–136)
ALT SERPL-CCNC: 79 U/L (ref 12–65)
ANION GAP SERPL CALC-SCNC: 5 MMOL/L (ref 7–16)
AST SERPL-CCNC: 89 U/L (ref 15–37)
BASOPHILS # BLD: 0 K/UL (ref 0–0.2)
BASOPHILS NFR BLD: 0 % (ref 0–2)
BILIRUB SERPL-MCNC: 0.5 MG/DL (ref 0.2–1.1)
BUN SERPL-MCNC: 37 MG/DL (ref 8–23)
CALCIUM SERPL-MCNC: 8.6 MG/DL (ref 8.3–10.4)
CHLORIDE SERPL-SCNC: 121 MMOL/L (ref 98–107)
CO2 SERPL-SCNC: 24 MMOL/L (ref 21–32)
CREAT SERPL-MCNC: 0.95 MG/DL (ref 0.8–1.5)
DIFFERENTIAL METHOD BLD: ABNORMAL
EOSINOPHIL # BLD: 0.1 K/UL (ref 0–0.8)
EOSINOPHIL NFR BLD: 2 % (ref 0.5–7.8)
ERYTHROCYTE [DISTWIDTH] IN BLOOD BY AUTOMATED COUNT: 14.5 % (ref 11.9–14.6)
GLOBULIN SER CALC-MCNC: 6.2 G/DL (ref 2.3–3.5)
GLUCOSE BLD STRIP.AUTO-MCNC: 119 MG/DL (ref 65–100)
GLUCOSE BLD STRIP.AUTO-MCNC: 127 MG/DL (ref 65–100)
GLUCOSE BLD STRIP.AUTO-MCNC: 128 MG/DL (ref 65–100)
GLUCOSE BLD STRIP.AUTO-MCNC: 133 MG/DL (ref 65–100)
GLUCOSE SERPL-MCNC: 123 MG/DL (ref 65–100)
HCT VFR BLD AUTO: 31.9 % (ref 41.1–50.3)
HGB BLD-MCNC: 10.5 G/DL (ref 13.6–17.2)
IMM GRANULOCYTES # BLD AUTO: 0 K/UL (ref 0–0.5)
IMM GRANULOCYTES NFR BLD AUTO: 0 % (ref 0–5)
LYMPHOCYTES # BLD: 1.4 K/UL (ref 0.5–4.6)
LYMPHOCYTES NFR BLD: 21 % (ref 13–44)
MAGNESIUM SERPL-MCNC: 2.1 MG/DL (ref 1.8–2.4)
MCH RBC QN AUTO: 27.1 PG (ref 26.1–32.9)
MCHC RBC AUTO-ENTMCNC: 32.9 G/DL (ref 31.4–35)
MCV RBC AUTO: 82.2 FL (ref 79.6–97.8)
MONOCYTES # BLD: 0.6 K/UL (ref 0.1–1.3)
MONOCYTES NFR BLD: 10 % (ref 4–12)
NEUTS SEG # BLD: 4.4 K/UL (ref 1.7–8.2)
NEUTS SEG NFR BLD: 67 % (ref 43–78)
NRBC # BLD: 0 K/UL (ref 0–0.2)
PLATELET # BLD AUTO: 289 K/UL (ref 150–450)
PMV BLD AUTO: 12.2 FL (ref 9.4–12.3)
POTASSIUM SERPL-SCNC: 3.8 MMOL/L (ref 3.5–5.1)
PROT SERPL-MCNC: 8.4 G/DL (ref 6.3–8.2)
RBC # BLD AUTO: 3.88 M/UL (ref 4.23–5.6)
SERVICE CMNT-IMP: ABNORMAL
SODIUM SERPL-SCNC: 150 MMOL/L (ref 138–145)
WBC # BLD AUTO: 6.5 K/UL (ref 4.3–11.1)

## 2021-04-24 PROCEDURE — 74011250637 HC RX REV CODE- 250/637: Performed by: INTERNAL MEDICINE

## 2021-04-24 PROCEDURE — 74011000258 HC RX REV CODE- 258: Performed by: STUDENT IN AN ORGANIZED HEALTH CARE EDUCATION/TRAINING PROGRAM

## 2021-04-24 PROCEDURE — 36415 COLL VENOUS BLD VENIPUNCTURE: CPT

## 2021-04-24 PROCEDURE — 2709999900 HC NON-CHARGEABLE SUPPLY

## 2021-04-24 PROCEDURE — 71045 X-RAY EXAM CHEST 1 VIEW: CPT

## 2021-04-24 PROCEDURE — 74011000250 HC RX REV CODE- 250: Performed by: INTERNAL MEDICINE

## 2021-04-24 PROCEDURE — 65660000000 HC RM CCU STEPDOWN

## 2021-04-24 PROCEDURE — 74011250636 HC RX REV CODE- 250/636: Performed by: STUDENT IN AN ORGANIZED HEALTH CARE EDUCATION/TRAINING PROGRAM

## 2021-04-24 PROCEDURE — 74011000250 HC RX REV CODE- 250: Performed by: FAMILY MEDICINE

## 2021-04-24 PROCEDURE — 85025 COMPLETE CBC W/AUTO DIFF WBC: CPT

## 2021-04-24 PROCEDURE — 83735 ASSAY OF MAGNESIUM: CPT

## 2021-04-24 PROCEDURE — 82962 GLUCOSE BLOOD TEST: CPT

## 2021-04-24 PROCEDURE — 80053 COMPREHEN METABOLIC PANEL: CPT

## 2021-04-24 RX ORDER — DEXTROSE MONOHYDRATE AND SODIUM CHLORIDE 5; .45 G/100ML; G/100ML
75 INJECTION, SOLUTION INTRAVENOUS CONTINUOUS
Status: DISCONTINUED | OUTPATIENT
Start: 2021-04-24 | End: 2021-04-26

## 2021-04-24 RX ADMIN — METOPROLOL TARTRATE 50 MG: 50 TABLET, FILM COATED ORAL at 09:37

## 2021-04-24 RX ADMIN — Medication 5 ML: at 23:02

## 2021-04-24 RX ADMIN — PIPERACILLIN SODIUM AND TAZOBACTAM SODIUM 3.38 G: 3; .375 INJECTION, POWDER, LYOPHILIZED, FOR SOLUTION INTRAVENOUS at 09:36

## 2021-04-24 RX ADMIN — AMLODIPINE BESYLATE 10 MG: 10 TABLET ORAL at 09:37

## 2021-04-24 RX ADMIN — DEXTROSE MONOHYDRATE AND SODIUM CHLORIDE 75 ML/HR: 5; .45 INJECTION, SOLUTION INTRAVENOUS at 14:34

## 2021-04-24 RX ADMIN — ATORVASTATIN CALCIUM 40 MG: 40 TABLET, FILM COATED ORAL at 23:02

## 2021-04-24 RX ADMIN — Medication 5 ML: at 06:06

## 2021-04-24 RX ADMIN — PIPERACILLIN SODIUM AND TAZOBACTAM SODIUM 3.38 G: 3; .375 INJECTION, POWDER, LYOPHILIZED, FOR SOLUTION INTRAVENOUS at 16:10

## 2021-04-24 RX ADMIN — HYDROCODONE BITARTRATE AND ACETAMINOPHEN 1 TABLET: 5; 325 TABLET ORAL at 23:02

## 2021-04-24 RX ADMIN — HYDROCODONE BITARTRATE AND ACETAMINOPHEN 1 TABLET: 5; 325 TABLET ORAL at 09:37

## 2021-04-24 RX ADMIN — METOPROLOL TARTRATE 50 MG: 50 TABLET, FILM COATED ORAL at 23:02

## 2021-04-24 NOTE — PROGRESS NOTES
04/24/21 0750   NIH Stroke Scale   Interval Other (comment)  (Dual [facility] w/ Perico Sutton RN)   LOC 0   LOC Questions 0   LOC Commands 0   Best Gaze 0   Visual 0   Facial Palsy 0   Motor Right Arm 0   Motor Left Arm 4   Motor Right Leg 0   Motor Left Leg 4   Limb Ataxia 0   Sensory 2   Best Language 1   Dysarthria 1   Extinction and Inattention 1   Total 13

## 2021-04-24 NOTE — PROGRESS NOTES
Reviewed notes for concerns    Noted good family support    Patient was resting peacefully at time of visit    Will continue to follow as needed

## 2021-04-24 NOTE — PROGRESS NOTES
Problem: Patient Education: Go to Patient Education Activity  Goal: Patient/Family Education  Outcome: Progressing Towards Goal     Problem: Hemorrhagic Stroke: Admission Day (0-3 hours)  Goal: Off Pathway (Use only if patient is Off Pathway)  Outcome: Progressing Towards Goal     Problem: Hemorrhagic Stroke:  3-24 hours  Goal: Off Pathway (Use only if patient is Off Pathway)  Outcome: Progressing Towards Goal     Problem: Hemorrhagic Stroke: Day 2  Goal: Off Pathway (Use only if patient is Off Pathway)  Outcome: Progressing Towards Goal     Problem: Hemorrhagic Stroke: Day 3  Goal: Off Pathway (Use only if patient is Off Pathway)  Outcome: Progressing Towards Goal     Problem: Hemorrhagic Stroke: Day 4  Goal: Off Pathway (Use only if patient is Off Pathway)  Outcome: Progressing Towards Goal     Problem: Hemorrhagic Stroke: Day 5 through Discharge  Goal: Off Pathway (Use only if patient is Off Pathway)  Outcome: Progressing Towards Goal  Goal: Activity/Safety  Outcome: Progressing Towards Goal  Goal: Consults, if ordered  Outcome: Progressing Towards Goal  Goal: Diagnostic Test/Procedures  Outcome: Progressing Towards Goal  Goal: Nutrition/Diet  Outcome: Progressing Towards Goal  Goal: Medications  Outcome: Progressing Towards Goal  Goal: Respiratory  Outcome: Progressing Towards Goal  Goal: Treatments/Interventions/Procedures  Outcome: Progressing Towards Goal  Goal: Psychosocial  Outcome: Progressing Towards Goal  Goal: *Hemodynamically stable  Outcome: Progressing Towards Goal  Goal: *Verbalizes anxiety and depression are reduced or absent  Outcome: Progressing Towards Goal  Goal: *Absence of aspiration  Outcome: Progressing Towards Goal  Goal: *Absence of signs and symptoms of DVT  Outcome: Progressing Towards Goal  Goal: *Optimal pain control at patient's stated goal  Outcome: Progressing Towards Goal  Goal: *Tolerating diet  Outcome: Progressing Towards Goal  Goal: *Progressive mobility and function  Outcome: Progressing Towards Goal  Goal: *Rehabilitation readiness  Outcome: Progressing Towards Goal     Problem: Hemorrhagic Stroke: Discharge Outcomes  Goal: *Verbalizes anxiety and depression are reduced or absent  Outcome: Progressing Towards Goal  Goal: *Verbalize understanding of risk factor modification(Stroke Metric)  Outcome: Progressing Towards Goal  Goal: *Optimal pain control at patient's stated goal  Outcome: Progressing Towards Goal  Goal: *Hemodynamically stable  Outcome: Progressing Towards Goal  Goal: *Absence of aspiration pneumonia  Outcome: Progressing Towards Goal  Goal: *Aware of needed dietary changes  Outcome: Progressing Towards Goal  Goal: *Verbalizes understanding and describes medication purposes and frequencies  Outcome: Progressing Towards Goal  Goal: *Tolerating diet  Outcome: Progressing Towards Goal  Goal: *Absence of signs and symptoms of DVT  Outcome: Progressing Towards Goal  Goal: *Absence of aspiration  Outcome: Progressing Towards Goal  Goal: *Progressive mobility and function  Outcome: Progressing Towards Goal  Goal: *Home safety concerns addressed  Outcome: Progressing Towards Goal     Problem: Falls - Risk of  Goal: *Absence of Falls  Description: Document Ofelia Fall Risk and appropriate interventions in the flowsheet. Outcome: Progressing Towards Goal  Note: Fall Risk Interventions:  Mobility Interventions: Communicate number of staff needed for ambulation/transfer, Bed/chair exit alarm    Mentation Interventions: Adequate sleep, hydration, pain control, Bed/chair exit alarm, Door open when patient unattended    Medication Interventions: Patient to call before getting OOB, Bed/chair exit alarm    Elimination Interventions:  Toileting schedule/hourly rounds    History of Falls Interventions: Bed/chair exit alarm, Door open when patient unattended         Problem: Patient Education: Go to Patient Education Activity  Goal: Patient/Family Education  Outcome: Progressing Towards Goal     Problem: Pressure Injury - Risk of  Goal: *Prevention of pressure injury  Description: Document Chavez Scale and appropriate interventions in the flowsheet.   Outcome: Progressing Towards Goal     Problem: Patient Education: Go to Patient Education Activity  Goal: Patient/Family Education  Outcome: Progressing Towards Goal     Problem: Patient Education: Go to Patient Education Activity  Goal: Patient/Family Education  Outcome: Progressing Towards Goal     Problem: Patient Education: Go to Patient Education Activity  Goal: Patient/Family Education  Outcome: Progressing Towards Goal     Problem: Delirium Treatment  Goal: *Level of consciousness restored to baseline  Outcome: Progressing Towards Goal  Goal: *Level of environmental perceptions restored to baseline  Outcome: Progressing Towards Goal  Goal: *Sensory perception restored to baseline  Outcome: Progressing Towards Goal  Goal: *Emotional stability restored to baseline  Outcome: Progressing Towards Goal  Goal: *Functional assessment restored to baseline  Outcome: Progressing Towards Goal  Goal: *Absence of falls  Outcome: Progressing Towards Goal  Goal: *Will remain free of delirium, CAM Score negative  Outcome: Progressing Towards Goal  Goal: *Cognitive status will be restored to baseline  Outcome: Progressing Towards Goal  Goal: Interventions  Outcome: Progressing Towards Goal     Problem: Patient Education: Go to Patient Education Activity  Goal: Patient/Family Education  Outcome: Progressing Towards Goal     Problem: Patient Education: Go to Patient Education Activity  Goal: Patient/Family Education  Outcome: Progressing Towards Goal     Problem: Non-Violent Restraints  Goal: Removal from restraints as soon as assessed to be safe  Outcome: Progressing Towards Goal  Goal: No harm/injury to patient while restraints in use  Outcome: Progressing Towards Goal  Goal: Patient's dignity will be maintained  Outcome: Progressing Towards Goal  Goal: Patient Interventions  Outcome: Progressing Towards Goal

## 2021-04-24 NOTE — PROGRESS NOTES
04/24/21 1900   NIH Stroke Scale   Interval Other (comment)  (Dual with Julia JOLLY)   LOC 0   LOC Questions 0   LOC Commands 0   Best Gaze 0   Visual 0   Facial Palsy 0   Motor Right Arm 0   Motor Left Arm 4   Motor Right Leg 0   Motor Left Leg 4   Limb Ataxia 0   Sensory 2   Best Language 1   Dysarthria 0   Extinction and Inattention 1   Total 12

## 2021-04-24 NOTE — PROGRESS NOTES
[facility]ist Progress Note     Name:  Adilene Sherwood  Age:76 y.o. Sex:male   :  1945    MRN:  [id number]     Admit Date:  2021    Reason for Admission:  Hemorrhagic cerebrovascular accident (CVA) [facility]) [I61.9]    Assessment & Plan     Suspected recurrent aspiration of gastrostomy tube feedingssee nocturnist note               Updated daughter, Nimesh Mtz. See documentation witnessed \"thymic\" like activity not responsive to IV Ativan suspected \"chills\" prior EEG no epileptiform discharges. Contacted neurology via text for opinion observation versus empiric antiepileptic versus repeat EEG. Nonurgent but mental status change today much less responsive. As per daughter was conversant up until last p.m. Changed gastrostomy feeds to continuous and observe. May need to pay closer attention to residuals. Continue current antibiotics and repeat serial chest x-rayspneumonitis versus pneumonia. Leukocytosis is noted today. No fevers. Blood cultures pending. much more alert today and attempting to conversespeaks full sentences but difficult to understand. Previous baseline described by daughter. Neurology did follow-up and did not recommend repeat EEG or empiric seizure meds and less recurrent episodes suggestive of seizures     reynaldo--  Suspect free water dehydration-improved overnight with IV fluidscontinue and observefurther work-up based on clinical course with IV hydration. resolved with saline hydration serum creatinine 0.95 but hypernatremia noted. Hypernatremia  change IV fluids to D5 half-normal and follow    Anemia  suspect this is secondary to hydrationfollow     Hemorrhagic CVA s/p EVD -: Followed by neurosurgery and neurology   d/c EVD 4-20  NPO  S/p PEG placement -  PT,OT  Needs discharge plans, family choosing SNF, discussed with case management   [facility] has been accepted to [facility]   At present time unclear if he will need long-term IV antibioticswe will follow-up chest x-ray tomorrow follow serial CBCs. Possible aspiration pneumonitis versus other. Opinion regarding possible seizure. Prior EEG April 17 - for epileptiform discharges but large intracerebral hemorrhage  April 24no new neuro deficitsclinically improving slowlyskilled nursing facility placement for rehab if remains stable with gastrostomy tube feeds.        Sepsis due to Pneumonia:   zosyn EOT 4-16-21  On room air  April 23restarted Zosyn p.m. April 22. April 24residual infiltrate left lung base reportedsuspect recurrent aspirationcontinue IV antibiotics and followdo not think he will require long duration as clinically pneumonitis.          Elevated troponin:  Followed by cardiology, due to demand ischemia   April 23this is unchanged  April 24no further comment or work-up at present time           CAD:  Continued lipitor     HTN:  Continued norvasc, Metoprolol    prn IV antihypertensives         Diet:  DIET --gastrostomy feeds. DIET TUBE FEEDINGchanged to continuous  DVT PPx: anticoagulation contra indicated due to hemorrhagic CVA  Code: Full Code        Dispo/Discharge Planning: Accepted to [facility] nursing [facility]awaiting planned duration of IV Zosyn will be based on follow-up CBC and chest radiographs and clinical course. Dispo/Discharge Planning: [facility] Course/Subjective:     Mr. Mesfin Perez is a 68 yo male PMH of HTN, CAD, HLP, COPD, HEPC C admitted 4-2-21 with acute right basal ganglia hemorrhagic CVA. Neurosurgery did not recommend acute interventions on admit. On 4-7-21 he was more drowsy and repeat CT head showed increased IVH and concern for early hydrocephalus. He is s/p EVD 4-7-21 to 4-20-21. Repeat CT head 4-14 stable. CODE S called on 4-17-21 due to decreased responsiveness. CT head no overall changes. EEG shows abnormal findings consistent with known CNS hemorrhage. Neurology following.   Goal sodium was 140 to 150.  He was covered with antibiotics for pneumonia, possible aspiration. Cardiology followed for elevated troponin, feeling this was due to demand ischemia. He was receiving NGT feeds and  S/p  PEG on 4-21. SLP following. He has resumed antihypertensives via NGT and also required IV cardene. Discharge plans pending to SNF. Subjective/24 hr Events (04/24/21): Much more alert today. Conversant and understand half of his sentencesyesterday respond only to tactile stimuli. Seen by neurology and they did not feel he was postictal and did not recommend empiric seizure meds or repeat EEG. Recommend observation. Repeat chest x-ray reported as \"mild residual left basilar infiltrate\" no new reported infiltrate. Receiving Zosyn following episode of suspected aspiration again. Now receiving continuous gastrostomy feedsmore correctlycontinuous gastrostomy feeds ordered. His creatinine improved with IV hydration his hemoglobin decreased and serum sodium increased to 150. Changing IV fluids to D5 half and will need free water via gastrostomy tube. Review of Systems: 14 point review of systems is otherwise negative with the exception of the elements mentioned above.     Objective:     Patient Vitals for the past 24 hrs:   Temp Pulse Resp BP SpO2   04/24/21 1200 97.9 °F (36.6 °C) 69 17 111/71 100 %   04/24/21 0800 97.7 °F (36.5 °C) 91 17 115/69 100 %   04/24/21 0400 97.6 °F (36.4 °C) 83 18 111/71 100 %   04/24/21 0000 97.3 °F (36.3 °C) 100 18 111/66 100 %   04/23/21 2000 97.5 °F (36.4 °C) 99 18 106/73 95 %   04/23/21 1600 97.9 °F (36.6 °C) 77 18 112/73 95 %     Oxygen Therapy  O2 Sat (%): 100 % (04/24/21 1200)  Pulse via Oximetry: 76 beats per minute (04/21/21 1600)  O2 Device: None (Room air) (04/21/21 1500)  Skin Assessment: Clean, dry, & intact (04/21/21 0701)  Skin Protection for O2 Device: N/A (04/21/21 0701)  O2 Flow Rate (L/min): 3 l/min (04/21/21 1157)    Body mass index is 17.42 kg/m². Physical Exam:   General:  Now exam consistent with prior notewaving right hand with mitten. Dense left hemiparesis. Speaking but difficult to understand. HEENT:  Mucosa moist nares patent  Lungs: Clear to auscultation bilaterally   Cardiovascularno gallop, no JVD no HJR  Abdomen:  No gross distention, bowel sounds in all 4 quadrants. Extremities: Right hand mitten, left hemiparesismuch more alertwaving goodbye with right  hand. Neuro:  As aboveleft hemiparesis moves rightnow speaking and alert. Cannot assess alertness to place or time. Data Review:  I have reviewed all labs, meds, and studies from the last 24 hours:    Labs:    Recent Results (from the past 24 hour(s))   GLUCOSE, POC    Collection Time: 04/23/21  5:47 PM   Result Value Ref Range    Glucose (POC) 83 65 - 100 mg/dL    Performed by Thanh    GLUCOSE, POC    Collection Time: 04/23/21 11:21 PM   Result Value Ref Range    Glucose (POC) 127 (H) 65 - 100 mg/dL    Performed by Blue    GLUCOSE, POC    Collection Time: 04/24/21  6:22 AM   Result Value Ref Range    Glucose (POC) 119 (H) 65 - 100 mg/dL    Performed by Blue    METABOLIC PANEL, COMPREHENSIVE    Collection Time: 04/24/21  6:31 AM   Result Value Ref Range    Sodium 150 (H) 138 - 145 mmol/L    Potassium 3.8 3.5 - 5.1 mmol/L    Chloride 121 (H) 98 - 107 mmol/L    CO2 24 21 - 32 mmol/L    Anion gap 5 (L) 7 - 16 mmol/L    Glucose 123 (H) 65 - 100 mg/dL    BUN 37 (H) 8 - 23 MG/DL    Creatinine 0.95 0.8 - 1.5 MG/DL    GFR est AA >60 >60 ml/min/1.73m2    GFR est non-AA >60 >60 ml/min/1.73m2    Calcium 8.6 8.3 - 10.4 MG/DL    Bilirubin, total 0.5 0.2 - 1.1 MG/DL    ALT (SGPT) 79 (H) 12 - 65 U/L    AST (SGOT) 89 (H) 15 - 37 U/L    Alk.  phosphatase 108 50 - 136 U/L    Protein, total 8.4 (H) 6.3 - 8.2 g/dL    Albumin 2.2 (L) 3.2 - 4.6 g/dL    Globulin 6.2 (H) 2.3 - 3.5 g/dL    A-G Ratio 0.4 (L) 1.2 - 3.5     CBC WITH AUTOMATED DIFF    Collection Time: 04/24/21  6:31 AM   Result Value Ref Range    WBC 6.5 4.3 - 11.1 K/uL    RBC 3.88 (L) 4.23 - 5.6 M/uL    HGB 10.5 (L) 13.6 - 17.2 g/dL    HCT 31.9 (L) 41.1 - 50.3 %    MCV 82.2 79.6 - 97.8 FL    MCH 27.1 26.1 - 32.9 PG    MCHC 32.9 31.4 - 35.0 g/dL    RDW 14.5 11.9 - 14.6 %    PLATELET 780 896 - 386 K/uL    MPV 12.2 9.4 - 12.3 FL    ABSOLUTE NRBC 0.00 0.0 - 0.2 K/uL    DF AUTOMATED      NEUTROPHILS 67 43 - 78 %    LYMPHOCYTES 21 13 - 44 %    MONOCYTES 10 4.0 - 12.0 %    EOSINOPHILS 2 0.5 - 7.8 %    BASOPHILS 0 0.0 - 2.0 %    IMMATURE GRANULOCYTES 0 0.0 - 5.0 %    ABS. NEUTROPHILS 4.4 1.7 - 8.2 K/UL    ABS. LYMPHOCYTES 1.4 0.5 - 4.6 K/UL    ABS. MONOCYTES 0.6 0.1 - 1.3 K/UL    ABS. EOSINOPHILS 0.1 0.0 - 0.8 K/UL    ABS. BASOPHILS 0.0 0.0 - 0.2 K/UL    ABS. IMM.  GRANS. 0.0 0.0 - 0.5 K/UL   MAGNESIUM    Collection Time: 04/24/21  6:31 AM   Result Value Ref Range    Magnesium 2.1 1.8 - 2.4 mg/dL   GLUCOSE, POC    Collection Time: 04/24/21 12:04 PM   Result Value Ref Range    Glucose (POC) 128 (H) 65 - 100 mg/dL    Performed by Thanh        All Micro Results     Procedure Component Value Units Date/Time    CULTURE, BLOOD [[id number]] Collected: 04/22/21 2219    Order Status: Completed Specimen: Blood Updated: 04/24/21 1055     Special Requests: --        RIGHT  ARM       Culture result: NO GROWTH 2 DAYS       CULTURE, BLOOD [[id number]] Collected: 04/09/21 1053    Order Status: Completed Specimen: Blood Updated: 04/14/21 5029     Special Requests: --        RIGHT  HAND       Culture result: NO GROWTH 5 DAYS       CULTURE, BLOOD [[id number]] Collected: 04/09/21 1058    Order Status: Completed Specimen: Blood Updated: 04/14/21 0721     Special Requests: --        RIGHT  Antecubital       Culture result: NO GROWTH 5 DAYS       COVID-19 RAPID TEST [[id number]] Collected: 04/07/21 1221    Order Status: Completed Specimen: Nasopharyngeal Updated: 04/07/21 1301     Specimen source Nasopharyngeal        COVID-19 rapid test Not detected        Comment:      The specimen is NEGATIVE for SARS-CoV-2, the novel coronavirus associated with COVID-19. A negative result does not rule out COVID-19. This test has been authorized by the FDA under an Emergency Use Authorization (EUA) for use by authorized laboratories.         Fact sheet for Healthcare Providers: [employer]Make[employer].ee  Fact sheet for Patients: [employer]Make[employer]com.ee       Methodology: Isothermal Nucleic Acid Amplification               EKG Results     Procedure 720 Value Units Date/Time    EKG, 12 LEAD, SUBSEQUENT [[id number]] Collected: 04/22/21 2114    Order Status: Completed Updated: 04/23/21 0648     Ventricular Rate 122 BPM      Atrial Rate 122 BPM      P-R Interval 126 ms      QRS Duration 78 ms      Q-T Interval 306 ms      QTC Calculation (Bezet) 436 ms      Calculated P Axis 56 degrees      Calculated R Axis -59 degrees      Calculated T Axis 77 degrees      Diagnosis --     !!! Poor data quality, interpretation may be adversely affected  likely  Sinus tachycardia  Left axis deviation  Nonspecific ST abnormality  Abnormal ECG  When compared with ECG of 22-APR-2021 21:14,  Premature ventricular complexes are no longer Present  Confirmed by Sowmya Blanton ([id number]) on 4/23/2021 6:47:52 AM      EKG, 12 LEAD, SUBSEQUENT [[id number]] Collected: 04/03/21 0821    Order Status: Completed Updated: 04/03/21 1227     Ventricular Rate 74 BPM      Atrial Rate 74 BPM      P-R Interval 160 ms      QRS Duration 94 ms      Q-T Interval 388 ms      QTC Calculation (Bezet) 430 ms      Calculated P Axis 71 degrees      Calculated R Axis -61 degrees      Calculated T Axis 88 degrees      Diagnosis --     Sinus rhythm with occasional Premature ventricular complexes  Left anterior fascicular block  ST & T wave abnormality, consider lateral ischemia  Abnormal ECG  When compared with ECG of 02-APR-2021 18:26,  Premature ventricular complexes are now Present  Left anterior fascicular block is now Present  Confirmed by Rita Long ([id number]) on 4/3/2021 12:27:42 PM      Initial ECG [[id number]] Collected: 04/02/21 1826    Order Status: Completed Updated: 04/03/21 0732     Ventricular Rate 77 BPM      Atrial Rate 78 BPM      P-R Interval 156 ms      QRS Duration 96 ms      Q-T Interval 356 ms      QTC Calculation (Bezet) 402 ms      Calculated P Axis 76 degrees      Calculated R Axis 69 degrees      Calculated T Axis 79 degrees      Diagnosis --     Normal sinus rhythm  Normal ECG  When compared with ECG of 28-AUG-2017 21:42,  Questionable change in QRS axis  Nonspecific T wave abnormality, improved in Lateral leads  Confirmed by Kt White ([id number]) on 4/3/2021 7:32:01 AM      EKG 12 LEAD INITIAL [[id number]]     Order Status: Canceled           Other Studies:  Ct Head Wo Cont    Result Date: 4/3/2021  NONCONTRAST HEAD CT CLINICAL HISTORY:  Follow-up intracranial hemorrhage. TECHNIQUE:  Axial images were obtained with spiral technique. Radiation dose reduction was achieved using one or all of the following techniques: automated exposure control, weight-based dosing, iterative reconstruction. COMPARISON:  CT yesterday and 0617 hours today. REPORT:   Standard noncontrast head CT demonstrates continued increase in size of the right thalamic hemorrhage with maximum diameter now measured at approximately 3.6 cm compared with 2.9 cm earlier today. Moreover, there is new bilateral lateral intraventricular extension of hemorrhage, primarily localized to the atria. There is associated slight increase in bilateral lateral ventriculomegaly with biatrial diameter now measuring approximately 4.2 cm compared with 3.8 cm this morning. Mass effect is little changed with midline shift from right to left of approximately 5 mm compared with 4 mm this morning.  Extensive small vessel ischemic disease and small focal lateral right frontal infarct appear unchanged. INTERVAL INCREASE IN SIZE OF THE RIGHT THALAMIC HEMORRHAGE WITH NEW BILATERAL LATERAL INTRAVENTRICULAR EXTENSION AND SLIGHT WORSENING OF VENTRICULOMEGALY. Ct Head Wo Cont    Result Date: 4/3/2021  EXAM: CT HEAD WO CONT HISTORY: .  TECHNIQUE: Axial images of the brain were performed without the administration of intravenous contrast. Images were obtained axial plane and coronal reformatted images were submitted. CT scan performed using appropriate/available dose optimization/reduction/ALARA techniques. COMPARISON: None. FINDINGS: The known right thalamic hemorrhage is again observed. There is surrounding edema. The body of this hemorrhage has not changed significantly. Currently it measures 2.4 x 2.9 cm when previously it measured 2.4 x 2.7 cm. There has been interval increase in an area of adjacent or extension of the hemorrhage posteriorly. Previously it measured 4.5 mm in width. Currently it measures 1.5 cm in width and 1.3 cm AP. There is persistent intraventricular hemorrhage in the posterior horn of the right lateral ventricle. Interval development of a small amount of hemorrhage in the left posterior horn. There is mild midline shift of the posterior septum pellucidum of approximately 4.1 mm. The lateral ventricles are mildly dilated, stable. Chronic ischemic white matter changes are again observed. Question small chronic infarction in the right frontal lobe. Interval increase in the size of the right thalamic hemorrhage. Interval development of mild right to left midline shift of the posterior aspect of the septum pellucidum. Stable hemorrhage in the posterior horn of the right lateral ventricle. Interval development of a small amount of hemorrhage in the posterior horn of the left lateral ventricle. Other findings as above. [id number] [facility]    Result Date: 4/3/2021  RIGHT UPPER QUADRANT ULTRASOUND. HISTORY: Transaminitis.  COMPARISON: No relevant comparison studies available. FINDINGS: Ultrasonographic Ramon's sign: is reported as negative Gallstones: There are intraluminal echogenic foci, probably stones. Gallbladder Wall: not thickened. Common Bile Duct: is not dilated, 4 mm. Intrahepatic Biliary Tree: is not dilated. Liver: Uniform parenchyma Included portion of the pancreas and right kidney: are unremarkable. Vasculature: Aorta: Normal caliber. IVC:  Patent. Portal vein: Hepatopedal flow. Probable gallstones. No biliary tree obstruction. No findings to suggest acute cholecystitis. Xr Chest Port    Result Date: 4/2/2021  CHEST X-RAY, one view. HISTORY:  Chest pain. TECHNIQUE:  AP upright portable view. COMPARISON: August 2017 FINDINGS: Lungs: Atelectasis or scar right base, similar to prior exam. Costophrenic angles: are sharp. Heart size: is normal. Pulmonary vasculature: is unremarkable. Aorta: Arch calcifications. Included portion of the upper abdomen: is unremarkable. Bones: No gross bony lesions. Other: None. Negative for acute change. Ct Code Neuro Head Wo Contrast    Result Date: 4/2/2021  CT HEAD WITHOUT CONTRAST HISTORY:  CVA. COMPARISON: None. TECHNIQUE: Axial imaging was performed without intravenous contrast utilizing 5mm slice thickness. Sagittal and coronal reformats were performed. Radiation dose reduction techniques were used for this study. Our CT scanner uses one or all of the following: Automated exposure control, adjustment of the MAS or KUB according to patient's size and iterative reconstruction. FINDINGS:    *BRAIN:    -  There are no early signs of territorial or lacunar infarction by CT.    -  2.7 x 2.4 cm acute hemorrhage into the right basal ganglia and thalamus. Right lateral ventricle intraventricular hemorrhage.    -  No gross white matter abnormality by CT. *VISUALIZED PARANASAL SINUSES: Well aerated. *MASTOIDS:  Clear. *CALVARIUM AND SCALP: Unremarkable.      Acute right basal ganglia hematoma likely on the basis of hypertension. Right intraventricular hemorrhage.  Date of Dictation: 4/2/2021 6:17 PM          Current Meds:   Current Facility-Administered Medications   Medication Dose Route Frequency    piperacillin-tazobactam (ZOSYN) 3.375 g in 0.9% sodium chloride (MBP/ADV) 100 mL MBP  3.375 g IntraVENous Q8H    HYDROcodone-acetaminophen (NORCO) 5-325 mg per tablet 1 Tab  1 Tab Per G Tube Q4H PRN    acetaminophen (TYLENOL) solution 650 mg  650 mg Per G Tube Q6H PRN    amLODIPine (NORVASC) tablet 10 mg  10 mg Per G Tube DAILY    atorvastatin (LIPITOR) tablet 40 mg  40 mg Per G Tube QHS    metoprolol tartrate (LOPRESSOR) tablet 50 mg  50 mg Per G Tube Q12H    0.9% sodium chloride infusion  75 mL/hr IntraVENous CONTINUOUS    ondansetron (ZOFRAN) injection 4 mg  4 mg IntraVENous Q6H PRN    bisacodyL (DULCOLAX) suppository 10 mg  10 mg Rectal DAILY PRN    insulin lispro (HUMALOG) injection   SubCUTAneous Q6H    lip protectant (BLISTEX) ointment 1 Each  1 Each Topical PRN    NUTRITIONAL SUPPORT ELECTROLYTE PRN ORDERS   Does Not Apply PRN    metoprolol (LOPRESSOR) injection 5 mg  5 mg IntraVENous Q6H PRN    enalaprilat (VASOTEC) injection 1.25 mg  1.25 mg IntraVENous Q6H PRN    lidocaine 4 % patch 1 Patch  1 Patch TransDERmal Q24H    sodium chloride (NS) flush 5-40 mL  5-40 mL IntraVENous Q8H    sodium chloride (NS) flush 5-40 mL  5-40 mL IntraVENous PRN       Problem List:  Hospital Problems as of 4/24/2021 Date Reviewed: 4/4/2021          Codes Class Noted - Resolved POA    Severe protein-calorie malnutrition ([facility] [address] ICD-10-CM: E43  ICD-9-CM: 262  4/9/2021 - Present Yes        Dysphagia following cerebral infarction ICD-10-CM: I69.391  ICD-9-CM: 438.82  4/6/2021 - Present Yes        Elevated liver enzymes ICD-10-CM: R74.8  ICD-9-CM: 790.5  4/6/2021 - Present Yes        Elevated troponin ICD-10-CM: R77.8  ICD-9-CM: 790.6  4/4/2021 - Present Yes        * (Principal) Hemorrhagic cerebrovascular accident (CVA) ([facility] [address] ICD-10-CM: I61.9  ICD-9-CM: 420  4/2/2021 - Present Yes        Acute left-sided weakness ICD-10-CM: R53.1  ICD-9-CM: 728.87  4/2/2021 - Present Yes        S/P PTCA (percutaneous transluminal coronary angioplasty) ICD-10-CM: Z98.61  ICD-9-CM: V45.82  Unknown - Present Yes        Chronic tuberculosis ICD-10-CM: A15.9  ICD-9-CM: 011.90  Unknown - Present Yes        Coronary atherosclerosis of native coronary vessel ICD-10-CM: I25.10  ICD-9-CM: 414.01  Unknown - Present Yes        Hyperlipidemia ICD-10-CM: E78.5  ICD-9-CM: 272.4  Unknown - Present Yes        Iron deficiency anemia ICD-10-CM: D50.9  ICD-9-CM: 280.9  1/25/2015 - Present Yes        Microcytic anemia ICD-10-CM: D50.9  ICD-9-CM: 280.9  1/21/2015 - Present     Overview Signed 4/6/2016  9:52 AM by Antione Cortes     Iron deficiency                    Part of this note was written by using a voice dictation software and the note has been proof read but may still contain some grammatical/other typographical errors.     Signed By: Wilton Burton DO   [employer] [facility]    April 24, 2021  1:20 PM

## 2021-04-25 LAB
ANION GAP SERPL CALC-SCNC: 7 MMOL/L (ref 7–16)
BASOPHILS # BLD: 0 K/UL (ref 0–0.2)
BASOPHILS NFR BLD: 0 % (ref 0–2)
BUN SERPL-MCNC: 28 MG/DL (ref 8–23)
CALCIUM SERPL-MCNC: 8.7 MG/DL (ref 8.3–10.4)
CHLORIDE SERPL-SCNC: 115 MMOL/L (ref 98–107)
CO2 SERPL-SCNC: 26 MMOL/L (ref 21–32)
CREAT SERPL-MCNC: 0.87 MG/DL (ref 0.8–1.5)
DIFFERENTIAL METHOD BLD: ABNORMAL
EOSINOPHIL # BLD: 0.2 K/UL (ref 0–0.8)
EOSINOPHIL NFR BLD: 5 % (ref 0.5–7.8)
ERYTHROCYTE [DISTWIDTH] IN BLOOD BY AUTOMATED COUNT: 14.6 % (ref 11.9–14.6)
GLUCOSE BLD STRIP.AUTO-MCNC: 122 MG/DL (ref 65–100)
GLUCOSE BLD STRIP.AUTO-MCNC: 124 MG/DL (ref 65–100)
GLUCOSE BLD STRIP.AUTO-MCNC: 132 MG/DL (ref 65–100)
GLUCOSE BLD STRIP.AUTO-MCNC: 138 MG/DL (ref 65–100)
GLUCOSE SERPL-MCNC: 127 MG/DL (ref 65–100)
HCT VFR BLD AUTO: 33.9 % (ref 41.1–50.3)
HGB BLD-MCNC: 10.6 G/DL (ref 13.6–17.2)
IMM GRANULOCYTES # BLD AUTO: 0 K/UL (ref 0–0.5)
IMM GRANULOCYTES NFR BLD AUTO: 1 % (ref 0–5)
LYMPHOCYTES # BLD: 1.1 K/UL (ref 0.5–4.6)
LYMPHOCYTES NFR BLD: 31 % (ref 13–44)
MCH RBC QN AUTO: 26.4 PG (ref 26.1–32.9)
MCHC RBC AUTO-ENTMCNC: 31.3 G/DL (ref 31.4–35)
MCV RBC AUTO: 84.3 FL (ref 79.6–97.8)
MONOCYTES # BLD: 0.5 K/UL (ref 0.1–1.3)
MONOCYTES NFR BLD: 13 % (ref 4–12)
NEUTS SEG # BLD: 1.9 K/UL (ref 1.7–8.2)
NEUTS SEG NFR BLD: 52 % (ref 43–78)
NRBC # BLD: 0 K/UL (ref 0–0.2)
PLATELET # BLD AUTO: 241 K/UL (ref 150–450)
PMV BLD AUTO: 12.1 FL (ref 9.4–12.3)
POTASSIUM SERPL-SCNC: 4 MMOL/L (ref 3.5–5.1)
RBC # BLD AUTO: 4.02 M/UL (ref 4.23–5.6)
SERVICE CMNT-IMP: ABNORMAL
SODIUM SERPL-SCNC: 148 MMOL/L (ref 138–145)
WBC # BLD AUTO: 3.7 K/UL (ref 4.3–11.1)

## 2021-04-25 PROCEDURE — 74011000258 HC RX REV CODE- 258: Performed by: STUDENT IN AN ORGANIZED HEALTH CARE EDUCATION/TRAINING PROGRAM

## 2021-04-25 PROCEDURE — 65660000000 HC RM CCU STEPDOWN

## 2021-04-25 PROCEDURE — 74011000250 HC RX REV CODE- 250: Performed by: INTERNAL MEDICINE

## 2021-04-25 PROCEDURE — 74011250637 HC RX REV CODE- 250/637: Performed by: INTERNAL MEDICINE

## 2021-04-25 PROCEDURE — 2709999900 HC NON-CHARGEABLE SUPPLY

## 2021-04-25 PROCEDURE — 80048 BASIC METABOLIC PNL TOTAL CA: CPT

## 2021-04-25 PROCEDURE — 77030018798 HC PMP KT ENTRL FED COVD -A

## 2021-04-25 PROCEDURE — 85025 COMPLETE CBC W/AUTO DIFF WBC: CPT

## 2021-04-25 PROCEDURE — 36415 COLL VENOUS BLD VENIPUNCTURE: CPT

## 2021-04-25 PROCEDURE — 82962 GLUCOSE BLOOD TEST: CPT

## 2021-04-25 PROCEDURE — 74011250636 HC RX REV CODE- 250/636: Performed by: STUDENT IN AN ORGANIZED HEALTH CARE EDUCATION/TRAINING PROGRAM

## 2021-04-25 RX ORDER — ADHESIVE BANDAGE
30 BANDAGE TOPICAL DAILY
Status: DISCONTINUED | OUTPATIENT
Start: 2021-04-26 | End: 2021-04-27 | Stop reason: HOSPADM

## 2021-04-25 RX ORDER — FACIAL-BODY WIPES
10 EACH TOPICAL ONCE
Status: COMPLETED | OUTPATIENT
Start: 2021-04-25 | End: 2021-04-25

## 2021-04-25 RX ADMIN — PIPERACILLIN SODIUM AND TAZOBACTAM SODIUM 3.38 G: 3; .375 INJECTION, POWDER, LYOPHILIZED, FOR SOLUTION INTRAVENOUS at 07:49

## 2021-04-25 RX ADMIN — METOPROLOL TARTRATE 50 MG: 50 TABLET, FILM COATED ORAL at 21:59

## 2021-04-25 RX ADMIN — DEXTROSE MONOHYDRATE AND SODIUM CHLORIDE 75 ML/HR: 5; .45 INJECTION, SOLUTION INTRAVENOUS at 07:54

## 2021-04-25 RX ADMIN — Medication 10 ML: at 13:24

## 2021-04-25 RX ADMIN — Medication 10 ML: at 05:39

## 2021-04-25 RX ADMIN — ATORVASTATIN CALCIUM 40 MG: 40 TABLET, FILM COATED ORAL at 22:00

## 2021-04-25 RX ADMIN — METOPROLOL TARTRATE 50 MG: 50 TABLET, FILM COATED ORAL at 07:48

## 2021-04-25 RX ADMIN — PIPERACILLIN SODIUM AND TAZOBACTAM SODIUM 3.38 G: 3; .375 INJECTION, POWDER, LYOPHILIZED, FOR SOLUTION INTRAVENOUS at 17:23

## 2021-04-25 RX ADMIN — BISACODYL 10 MG: 10 SUPPOSITORY RECTAL at 17:27

## 2021-04-25 RX ADMIN — HYDROCODONE BITARTRATE AND ACETAMINOPHEN 1 TABLET: 5; 325 TABLET ORAL at 07:53

## 2021-04-25 RX ADMIN — Medication 5 ML: at 22:00

## 2021-04-25 RX ADMIN — PIPERACILLIN SODIUM AND TAZOBACTAM SODIUM 3.38 G: 3; .375 INJECTION, POWDER, LYOPHILIZED, FOR SOLUTION INTRAVENOUS at 00:22

## 2021-04-25 RX ADMIN — AMLODIPINE BESYLATE 10 MG: 10 TABLET ORAL at 07:48

## 2021-04-25 NOTE — PROGRESS NOTES
04/25/21 0733   NIH Stroke Scale   Interval Other (comment)  (Dual NIH w/ [facility] Aguirre)   LOC 0   LOC Questions 0   LOC Commands 0   Best Gaze 0   Visual 0   Facial Palsy 0   Motor Right Arm 0   Motor Left Arm 4   Motor Right Leg 0   Motor Left Leg 4   Limb Ataxia 0   Sensory 2   Best Language 1   Dysarthria 1   Extinction and Inattention 1   Total 13

## 2021-04-25 NOTE — PROGRESS NOTES
Progress Note    Patient: August Forth MRN: [id number]  SSN: [id number]    YOB: 1945  Age: 67 y.o. Sex: male      Admit Date: 4/2/2021    LOS: 23 days     Subjective:     Patient with past medical history of    Hypertension   CAD  Hyperlipidemia  COPD  Hepatitis C     Admitted on 4-2-2021 due to acute right basal ganglia hemorrhage CVA. No intervention by neurosurgery initially. Patient then had hydrocephalus and underwent shunt placement on 4-7-2021. Code S was called on 4- due to decreased responsiveness. EEG shows abnormal findings consistent with known CNS hemorrhage. Goal of Na level is 140-150. Cardiology follows for elevated troponin, feeling this was due to demand ischemia. 4/25/21 Patient is opening eyes when called. Not following commands. Objective:     Vitals:    04/24/21 2000 04/25/21 0000 04/25/21 0400 04/25/21 0800   BP: 125/68 104/63 111/66 123/71   Pulse: 72 60 65 71   Resp: 16 18 16 15   Temp: 97.5 °F (36.4 °C) 97.5 °F (36.4 °C) 97.4 °F (36.3 °C) 97.6 °F (36.4 °C)   SpO2: 100% 100% 100% 100%   Weight:       Height:            Intake and Output:  Current Shift: 04/25 0701 - 04/25 1900  In: 115   Out: -   Last three shifts: 04/23 1901 - 04/25 0700  In: 80   Out: -     Physical Exam:     General:                    The patient is an elderly male in no acute respiratory distress. He appears acutely and chronically ill. Thin. Opening eyes when called. Not following commands. Eyes:                                   palpebral pallor, no scleral icterus. ENT:                                    External auricular and nasal exam within normal limits. Mucous membranes are moist.  Neck:                                   Supple, non-tender, no JVD. Lungs:                       Clear to auscultation bilaterally without wheezes or crackles. No respiratory distress or accessory muscle use. Heart:                                  Regular rate and rhythm, without murmurs, rubs, or gallops. Abdomen:                  Soft, non-tender, non-distended with normoactive bowel sounds. Gastrostomy tube in place. Genitourinary:           No tenderness over the bladder or bilateral CVAs. Extremities:               Without clubbing, cyanosis, or edema. Hands are in mittens. Skin:                                    Normal color, texture, and turgor. No rashes, lesions, or jaundice. Pulses:                      Radial and dorsalis pedis pulses present 2+ bilaterally. Capillary refill <2s. Neurologic:                CN II-XII grossly intact and symmetrical.                                              left side with hemiparesis.      Lab/Data Review:    Recent Results (from the past 24 hour(s))   GLUCOSE, POC    Collection Time: 04/24/21 12:04 PM   Result Value Ref Range    Glucose (POC) 128 (H) 65 - 100 mg/dL    Performed by Thanh    GLUCOSE, POC    Collection Time: 04/24/21  6:20 PM   Result Value Ref Range    Glucose (POC) 133 (H) 65 - 100 mg/dL    Performed by Mirna Fabian [id number], POC    Collection Time: 04/24/21 11:39 PM   Result Value Ref Range    Glucose (POC) 127 (H) 65 - 100 mg/dL    Performed by Blue    GLUCOSE, POC    Collection Time: 04/25/21  5:38 AM   Result Value Ref Range    Glucose (POC) 132 (H) 65 - 100 mg/dL    Performed by Beba    METABOLIC PANEL, BASIC    Collection Time: 04/25/21  5:47 AM   Result Value Ref Range    Sodium 148 (H) 138 - 145 mmol/L    Potassium 4.0 3.5 - 5.1 mmol/L    Chloride 115 (H) 98 - 107 mmol/L    CO2 26 21 - 32 mmol/L    Anion gap 7 7 - 16 mmol/L    Glucose 127 (H) 65 - 100 mg/dL    BUN 28 (H) 8 - 23 MG/DL    Creatinine 0.87 0.8 - 1.5 MG/DL    GFR est AA >60 >60 ml/min/1.73m2    GFR est non-AA >60 >60 ml/min/1.73m2    Calcium 8.7 8.3 - 10.4 MG/DL   CBC WITH AUTOMATED DIFF    Collection Time: 04/25/21  5:47 AM   Result Value Ref Range    WBC 3.7 (L) 4.3 - 11.1 K/uL    RBC 4.02 (L) 4.23 - 5.6 M/uL    HGB 10.6 (L) 13.6 - 17.2 g/dL    HCT 33.9 (L) 41.1 - 50.3 %    MCV 84.3 79.6 - 97.8 FL    MCH 26.4 26.1 - 32.9 PG    MCHC 31.3 (L) 31.4 - 35.0 g/dL    RDW 14.6 11.9 - 14.6 %    PLATELET 447 373 - 677 K/uL    MPV 12.1 9.4 - 12.3 FL    ABSOLUTE NRBC 0.00 0.0 - 0.2 K/uL    DF AUTOMATED      NEUTROPHILS 52 43 - 78 %    LYMPHOCYTES 31 13 - 44 %    MONOCYTES 13 (H) 4.0 - 12.0 %    EOSINOPHILS 5 0.5 - 7.8 %    BASOPHILS 0 0.0 - 2.0 %    IMMATURE GRANULOCYTES 1 0.0 - 5.0 %    ABS. NEUTROPHILS 1.9 1.7 - 8.2 K/UL    ABS. LYMPHOCYTES 1.1 0.5 - 4.6 K/UL    ABS. MONOCYTES 0.5 0.1 - 1.3 K/UL    ABS. EOSINOPHILS 0.2 0.0 - 0.8 K/UL    ABS. BASOPHILS 0.0 0.0 - 0.2 K/UL    ABS. IMM. GRANS. 0.0 0.0 - 0.5 K/UL     CT head   4-  IMPRESSION  1. No evolving changes. Only some evolution of prior hemorrhage in the right  basal ganglia seen which appears slightly decreased in size. XR chest   4-  IMPRESSION     1. Residual minimal infiltrate at left lung base.       Current Facility-Administered Medications:     dextrose 5 % - 0.45% NaCl infusion, 75 mL/hr, IntraVENous, CONTINUOUS, Esther Cunningham DO, Last Rate: 75 mL/hr at 04/25/21 0754, 75 mL/hr at 04/25/21 0754    piperacillin-tazobactam (ZOSYN) 3.375 g in 0.9% sodium chloride (MBP/ADV) 100 mL MBP, 3.375 g, IntraVENous, Q8H, Becca Chang MD, Last Rate: 25 mL/hr at 04/25/21 0749, 3.375 g at 04/25/21 0749    HYDROcodone-acetaminophen (NORCO) 5-325 mg per tablet 1 Tab, 1 Tab, Per G Tube, Q4H PRN, Yasir Bruce MD, 1 Tab at 04/25/21 0753    acetaminophen (TYLENOL) solution 650 mg, 650 mg, Per G Tube, Q6H PRN, Jose De Jesus Collier MD, 650 mg at 04/22/21 2130    amLODIPine (NORVASC) tablet 10 mg, 10 mg, Per G Tube, DAILY, Jose De Jesus Collier MD, 10 mg at 04/25/21 0748   atorvastatin (LIPITOR) tablet 40 mg, 40 mg, Per G Tube, QHS, Gaviota Collier MD, 40 mg at 04/24/21 2302    metoprolol tartrate (LOPRESSOR) tablet 50 mg, 50 mg, Per G Tube, Q12H, Gaviota Collier MD, 50 mg at 04/25/21 0748    ondansetron (ZOFRAN) injection 4 mg, 4 mg, IntraVENous, Q6H PRN, Inder Lainez MD    bisacodyL (DULCOLAX) suppository 10 mg, 10 mg, Rectal, DAILY PRN, Gaviota Collier MD    insulin lispro (HUMALOG) injection, , SubCUTAneous, Q6H, Gaviota Collier MD, Stopped at 04/23/21 0600    lip protectant (BLISTEX) ointment 1 Each, 1 Each, Topical, PRN, Elysia Zimmerman MD, 1 Each at 04/13/21 1204    NUTRITIONAL SUPPORT ELECTROLYTE PRN ORDERS, , Does Not Apply, PRN, Gilbert Rangelson E, DO    metoprolol (LOPRESSOR) injection 5 mg, 5 mg, IntraVENous, Q6H PRN, Jarod Rangel E, DO, 5 mg at 04/18/21 0126    enalaprilat (VASOTEC) injection 1.25 mg, 1.25 mg, IntraVENous, Q6H PRN, Gilbert Rangelson E, DO, 1.25 mg at 04/20/21 1510    lidocaine 4 % patch 1 Patch, 1 Patch, TransDERmal, Q24H, Kurt Jesus MD, 1 Patch at 04/24/21 1135    sodium chloride (NS) flush 5-40 mL, 5-40 mL, IntraVENous, Q8H, Usman Vanegas MD, 10 mL at 04/25/21 0539    sodium chloride (NS) flush 5-40 mL, 5-40 mL, IntraVENous, PRN, Cony Laguerre MD      Assessment:     Principal Problem:    Hemorrhagic cerebrovascular accident (CVA) ([facility] [address] (4/2/2021)    Active Problems:    Iron deficiency anemia (1/25/2015)      S/P PTCA (percutaneous transluminal coronary angioplasty) ()      Chronic tuberculosis ()      Coronary atherosclerosis of native coronary vessel ()      Hyperlipidemia ()      Acute left-sided weakness (4/2/2021)      Elevated troponin (4/4/2021)      Dysphagia following cerebral infarction (4/6/2021)      Elevated liver enzymes (4/6/2021)      Severe protein-calorie malnutrition ([facility] [address] (4/9/2021)        Plan:     hemorrhagic CVA   S/P hydrocephalus and s/p shunt placement. No new neurodeficit. Clinically the same   Continue feeding tube. Physical therapy. Sepsis due to aspiration pneumonia   On Zosyn with residual infiltrates. Kaylynn   Resolved. Hypertension   BP is controlled now on current regimen. I have discussed the plan of care with patient.       DVT prophylaxis : SCD     Signed By: lOivier Maxwell MD     April 25, 2021

## 2021-04-25 NOTE — PROGRESS NOTES
04/25/21 1906   NIH Stroke Scale   Interval Other (comment)  (dual with Julia)   LOC 0   LOC Questions 0   LOC Commands 0   Best Gaze 0   Visual 0   Facial Palsy 0   Motor Right Arm 0   Motor Left Arm 4   Motor Right Leg 0   Motor Left Leg 4   Limb Ataxia 0   Sensory 2   Best Language 1   Dysarthria 1   Extinction and Inattention 1   Total 13

## 2021-04-25 NOTE — CONSULTS
Comprehensive Nutrition Assessment    Type and Reason for Visit: Consult  Tube Feeding Management (Hospitalist)    Nutrition Recommendations/Plan:   · Enteral Nutrition:  · Continue Jevity 1.5 via PEG at 60ml/hour  · Increase water flush to 60ml/hr. · At goal will provide 1980 kcal (100% estimated calorie needs), 84 grams protein (100% estimated protein needs) and 2323ml free fluid (>1ml/kcal) calculations based on 22 hours infusion. · IV Fluids:  · Started 4/24 per MD to treat hypernatremia. · Vitamin and Mineral Supplement Therapy:  · Electrolyte management replacement protocol implemented. · Labs:   · EN labs: BMP daily, Mg and Phos MWF. · Bowel Management:   · Check for impaction. Place Dulcolax suppository. Start MOM. Malnutrition Assessment:  Malnutrition Status: Severe malnutrition  Context: Chronic illness  Findings of clinical characteristics of malnutrition:   Energy Intake:  Unable to assess  Weight Loss:  Unable to assess     Body Fat Loss:  7 - Severe body fat loss, Fat overlying ribs   Muscle Mass Loss:  7 - Severe muscle mass loss, Clavicles (pectoralis &deltoids), Scapula (trapezius), Temples (temporalis)  Fluid Accumulation:  No significant fluid accumulation,     Strength:  Normal  strength     Nutrition Assessment:   Nutrition History: Patient states that a lady down the road from him has been providing him food foor ~3 months prior to admission. Nutrition Background: Patient presented with left side weakness and fall. He was admitted with hemorrhagic cererovascular accident from acute right basal ganglia hematoma with right intraventricular hemorrhage. His PMH is significant for MI, CAD, TB, COPD, GERD, GI bleed, hept C, HLD, HTN, NSTEMI. Daily Update:  Visited the patient and observed his TF (Jevity 1.5) infusing at 60 ml/hr with 45 ml/hr water flush. He reports that his stomach \"feels like it always has. \" RN reports that he hasn't had a bowel movement in days.(Chart indicates that his last bowel movement was on 4/19.) Noted Zosyn was started 4/23 after his vomiting episode to treat possible aspiration. Hypernatremia is a known side effect of Zosyn. Abdominal Status (last documented): Semi-soft, Tender abdomen with Active  bowel sounds. Last BM 04/20/21. Pertinent Medications: Zosyn  IVF: D5,0.45% NaCl @ 75 ml/hr  Pertinent Labs:   Lab Results   Component Value Date/Time    Sodium 148 (H) 04/25/2021 05:47 AM    Potassium 4.0 04/25/2021 05:47 AM    Chloride 115 (H) 04/25/2021 05:47 AM    CO2 26 04/25/2021 05:47 AM    Anion gap 7 04/25/2021 05:47 AM    Glucose 127 (H) 04/25/2021 05:47 AM    BUN 28 (H) 04/25/2021 05:47 AM    Creatinine 0.87 04/25/2021 05:47 AM    Calcium 8.7 04/25/2021 05:47 AM    Albumin 2.2 (L) 04/24/2021 06:31 AM    Magnesium 2.1 04/24/2021 06:31 AM    Phosphorus 3.8 (H) 04/23/2021 04:51 AM     Nutrition Related Findings:   Full NFPE performed with results as above. TF initiated 7/3. Still infusing at 30 ml/hr with 135 ml water flush on reassessment 4/9. TF advanced to goal 4/9. PEG placed 4/21 (20 fr). SLP recommendation for NPO with long term nutrition support if in line with [address]. 4/22 vomited after TF changed to bolus, now with PNA, start Zosyn. Current Nutrition Therapies:  Current Tube Feeding (TF) Orders:   · Feeding Route: PEG  · Formula: Jevity 1.5  · Schedule:Continuous    · Regimen: 60 ml/hr  · Water Flushes: 45 ml/hr  · Current TF & Flush Orders Provides: 1980 calories/day, 84 gm protein/day in 1993 ml water/day  · Goal TF & Flush Orders Provides: 1980 calories/day, 84 gm protein/day in 1993 ml water/day.     Current Intake:   Average Meal Intake: NPO(% prior to NPO)        Anthropometric Measures:  Height: 5' 8\" (172.7 cm)  Current Body Wt: 52 kg (114 lb 10.2 oz)(4/23/21 7th floor), Weight source: Bed scale  BMI: 17.4, Underweight (BMI less than 22) age over 72     Ideal Body Weight (lbs) (Calculated): 154 lbs (70 kg), 87 %  Usual Body Wt: 63 kg (138 lb 14.2 oz)(per review of EMR (~63-68 kg)), Percent weight change: -12.5          Edema: No data recorded     Estimated Daily Nutrient Needs:  Energy (kcal/day): 7869-5620 (Kcal/kg(30-35), Weight Used: Current(58.1 kg))  Protein (g/day): 70-87 (1.2-1.5 g/kg) Weight Used: (Current)  Fluid (ml/day):   (1 ml/kcal)    Nutrition Diagnosis:   · Inadequate oral intake related to swallowing difficulty as evidenced by nutrition support-enteral nutrition(MBS, NPO)    · Severe malnutrition, In context of chronic illness related to catabolic illness(predicted inadequate oral intake, increased needs) as evidenced by (COPD, malnutrition criteria as above)    · Altered nutrition-related lab values related to (Zosyn started, inadequate water intake) as evidenced by (Na 150, IVF started, incr water flush)    Nutrition Interventions:   Food and/or Nutrient Delivery: Modify tube feeding     Coordination of Nutrition Care: Continue to monitor while inpatient  Plan of Care discussed with Erinn Shaffer RN    Goals:   Previous Goal Met: Goal(s) achieved  Active Goal: Tolerate TF at goal within 3 days. Nutrition Monitoring and Evaluation:      Food/Nutrient Intake Outcomes: Enteral nutrition intake/tolerance  Physical Signs/Symptoms Outcomes: Biochemical data, GI status    Discharge Planning: Too soon to determine    Noe Bee.  Verito Pena RD, LD on 4/25/2021 at 4:15 PM  Contact: [phone]

## 2021-04-26 LAB
ANION GAP SERPL CALC-SCNC: 6 MMOL/L (ref 7–16)
BUN SERPL-MCNC: 21 MG/DL (ref 8–23)
CALCIUM SERPL-MCNC: 8.6 MG/DL (ref 8.3–10.4)
CHLORIDE SERPL-SCNC: 109 MMOL/L (ref 98–107)
CO2 SERPL-SCNC: 28 MMOL/L (ref 21–32)
COVID-19 RAPID TEST, COVR: NOT DETECTED
CREAT SERPL-MCNC: 0.73 MG/DL (ref 0.8–1.5)
GLUCOSE BLD STRIP.AUTO-MCNC: 119 MG/DL (ref 65–100)
GLUCOSE BLD STRIP.AUTO-MCNC: 122 MG/DL (ref 65–100)
GLUCOSE BLD STRIP.AUTO-MCNC: 138 MG/DL (ref 65–100)
GLUCOSE SERPL-MCNC: 121 MG/DL (ref 65–100)
MAGNESIUM SERPL-MCNC: 1.9 MG/DL (ref 1.8–2.4)
PHOSPHATE SERPL-MCNC: 3.5 MG/DL (ref 2.3–3.7)
POTASSIUM SERPL-SCNC: 3.9 MMOL/L (ref 3.5–5.1)
SERVICE CMNT-IMP: ABNORMAL
SODIUM SERPL-SCNC: 143 MMOL/L (ref 136–145)
SOURCE, COVRS: NORMAL

## 2021-04-26 PROCEDURE — 74011250637 HC RX REV CODE- 250/637: Performed by: INTERNAL MEDICINE

## 2021-04-26 PROCEDURE — 36415 COLL VENOUS BLD VENIPUNCTURE: CPT

## 2021-04-26 PROCEDURE — 92526 ORAL FUNCTION THERAPY: CPT

## 2021-04-26 PROCEDURE — 97535 SELF CARE MNGMENT TRAINING: CPT

## 2021-04-26 PROCEDURE — 74011250636 HC RX REV CODE- 250/636: Performed by: STUDENT IN AN ORGANIZED HEALTH CARE EDUCATION/TRAINING PROGRAM

## 2021-04-26 PROCEDURE — 80048 BASIC METABOLIC PNL TOTAL CA: CPT

## 2021-04-26 PROCEDURE — 92507 TX SP LANG VOICE COMM INDIV: CPT

## 2021-04-26 PROCEDURE — 65270000029 HC RM PRIVATE

## 2021-04-26 PROCEDURE — 74011000250 HC RX REV CODE- 250: Performed by: FAMILY MEDICINE

## 2021-04-26 PROCEDURE — 74011000258 HC RX REV CODE- 258: Performed by: STUDENT IN AN ORGANIZED HEALTH CARE EDUCATION/TRAINING PROGRAM

## 2021-04-26 PROCEDURE — 82962 GLUCOSE BLOOD TEST: CPT

## 2021-04-26 PROCEDURE — 84100 ASSAY OF PHOSPHORUS: CPT

## 2021-04-26 PROCEDURE — 83735 ASSAY OF MAGNESIUM: CPT

## 2021-04-26 PROCEDURE — 87635 SARS-COV-2 COVID-19 AMP PRB: CPT

## 2021-04-26 RX ORDER — AMOXICILLIN AND CLAVULANATE POTASSIUM 875; 125 MG/1; MG/1
1 TABLET, FILM COATED ORAL EVERY 12 HOURS
Status: DISCONTINUED | OUTPATIENT
Start: 2021-04-26 | End: 2021-04-27

## 2021-04-26 RX ADMIN — Medication 5 ML: at 06:46

## 2021-04-26 RX ADMIN — ATORVASTATIN CALCIUM 40 MG: 40 TABLET, FILM COATED ORAL at 21:38

## 2021-04-26 RX ADMIN — AMLODIPINE BESYLATE 10 MG: 10 TABLET ORAL at 08:28

## 2021-04-26 RX ADMIN — AMOXICILLIN AND CLAVULANATE POTASSIUM 1 TABLET: 875; 125 TABLET, FILM COATED ORAL at 11:58

## 2021-04-26 RX ADMIN — Medication 10 ML: at 21:39

## 2021-04-26 RX ADMIN — MAGNESIUM HYDROXIDE 30 ML: 2400 SUSPENSION ORAL at 08:38

## 2021-04-26 RX ADMIN — AMOXICILLIN AND CLAVULANATE POTASSIUM 1 TABLET: 875; 125 TABLET, FILM COATED ORAL at 21:38

## 2021-04-26 RX ADMIN — PIPERACILLIN SODIUM AND TAZOBACTAM SODIUM 3.38 G: 3; .375 INJECTION, POWDER, LYOPHILIZED, FOR SOLUTION INTRAVENOUS at 08:38

## 2021-04-26 RX ADMIN — METOPROLOL TARTRATE 50 MG: 50 TABLET, FILM COATED ORAL at 21:38

## 2021-04-26 RX ADMIN — METOPROLOL TARTRATE 50 MG: 50 TABLET, FILM COATED ORAL at 08:28

## 2021-04-26 RX ADMIN — Medication 10 ML: at 13:10

## 2021-04-26 RX ADMIN — PIPERACILLIN SODIUM AND TAZOBACTAM SODIUM 3.38 G: 3; .375 INJECTION, POWDER, LYOPHILIZED, FOR SOLUTION INTRAVENOUS at 00:36

## 2021-04-26 NOTE — PROGRESS NOTES
ACUTE OT GOALS:  (Developed with and agreed upon by patient and/or caregiver.)  1. Complete functional transfers (I.e. toilet/bedside commode) with c . Mod A. Updated 4/23/2021  2. Complete gentle active assisted L UE ROM focusing more on finger/wrist extensors and shoulder external rotators. CONTINUE TO ADDRESS (4/6/2021) 4/23/2021  3. Utilize L UE as a support for supine to sitting and sit to standing at least 20% of the time. CONTINUE TO ADDRESS 4/23/2021   DC 4/23/2021 PEG NPO  5. Complete grooming/oral care with  Mod A and verbal and visual cues as needed. CONTINUE TO ADDRESS (4/6/2021) 4/23/2021     NEW GOALS (Added 4/6/2021)  1. Patient will complete functional activity while seated EOB with min A and adaptive equipment as needed. 2. Patient will attend to L side of environment for 75% of session with min verbal cues from therapist.  NEW Goals added 4/23/2021  3. Patient will sit up on EOB x 5 mins unsupported for activity with no LOB uncorrected. 4. Patient will participate in reaching activities with R UE from EOB or supported surface x8 mins.      OCCUPATIONAL THERAPY ASSESSMENT: Daily Note    OT Treatment Day # 2   Protect LUE to prevent subluxation    Collette Leather is a 67 y.o. male   PRIMARY DIAGNOSIS: Hemorrhagic cerebrovascular accident (CVA) ([facility] [address]  Hemorrhagic cerebrovascular accident (CVA) ([facility] [address] [I61.9]  Procedure(s) (LRB):  ESOPHAGOGASTRODUODENOSCOPY (EGD) (N/A)  PERCUTANEOUS ENDOSCOPIC GASTROSTOMY TUBE INSERTION/ 19/ 334 (N/A)  5 Days Post-Op    ASSESSMENT:     REHAB RECOMMENDATIONS:   Recommendation to date pending progress:  Setting:   Short-term Rehab  Equipment:    To Be Determined     PRIOR LEVEL OF FUNCTION:  (Prior to Hospitalization)  INITIAL/CURRENT LEVEL OF FUNCTION:  (Based on today's evaluation)   Bathing:   Independent  Dressing:   Independent  Feeding/Grooming:   Independent  Toileting:   Independent  Functional Mobility:   Independent Bathing:   Total Assistance  Dressing:   Total Assistance  Feeding/Grooming:   Maximal Assistance swabbing mouth with dental swabs  Toileting:   Total Assistance x 2  Functional Mobility:   Total Assistance x 2     ASSESSMENT:  Mr. Franco Kunz presents minimally responsive, drowsy, but woke up more as session progressed. Required max assist x2 (Total A) for bed mobility. Poor sitting balance, constant support. Continues to present with no AROM in L UE with significant hypertonicity. Completed PROM of shoulder IR and ER as well as elbow flexion and extension, unable to achieve end ranges of elbow extension. Educated on positioning to prevent subluxation. Max A for toothbrushing and dental swab, with some attempts to brush his teeth using R UE. Swabs used to rinse minimal amount of water out. Pt choking on tiny bit of toothpaste and water with RN notified and use of yaunker to suction. Returned to supine with pillows placed, R mitt replaced, and call bell within reach. Continues benefit from skilled OT services per POC above. SUBJECTIVE:   Mr. Franco Kunz states, \"towel on my head. \"    SOCIAL HISTORY/LIVING ENVIRONMENT: Lives with brother. Does not drive. Takes the bus to pay bills, etc. Totally independent per patient.    Home Environment: Private residence  # Steps to Enter: 5  One/Two Story Residence: One story  Living Alone: No  Support Systems: Family member(s)    OBJECTIVE:     PAIN: VITAL SIGNS: LINES/DRAINS:   Pre Treatment:    Post Treatment: no complaint of pain   IV, PEG and telemonitoring, adult brief  O2 Device: None (Room air)     GROSS EVALUATION:  B UEs, R dom is WFLs throughout Within Functional Limits Abnormal/ Functional Abnormal/ Non-Functional (see comments) Not Tested Comments:   AROM [] [] [x] [] L UE tone, no AROM   PROM [] [] [x] [] L UE, tight and hard to range   Strength [] [] [x] []    Balance [] [] [x] [] Unsteady, poor   Posture [] [] [x] [] Flexed head, rounded shoulders, L scapular winging   Sensation [] [x] [] [] Pulled trunk away from L nailbed pressure   Coordination [] [] [x] [] L UE none   Tone [] [] [x] [] L side increasing extensor   Edema [x] [] [] []    Activity Tolerance [] [x] [] [] Needs rest breaks    [] [] [] []      COGNITION/  PERCEPTION: Intact Impaired   (see comments) Comments:   Orientation [] [x] Minimal verbalizations   Vision [] [x] Nystagmus    Hearing [x] []    Judgment/ Insight [] [x] poor   Attention [] [x] Easily distracted   Memory [] [x] poor   Command Following [] [x] Not all commands, delayed response   Emotional Regulation [] []     [] []      ACTIVITIES OF DAILY LIVING: I Mod I S SBA CGA Min Mod Max Total NT Comments   BASIC ADLs:              Bathing/ Showering [] [] [] [] [] [] [] [] [x] []    Toileting [] [] [] [] [] [] [] [] [x] [] brief change   Dressing [] [] [] [] [] [] [] [] [x] [] socks   Feeding [] [] [] [] [] [] [] [] [] [x] NPO, new PEG   Grooming [] [] [] [] [] [] [] [x] [x] [] Dental oral care with swab and toothbrush   Personal Device Care [] [] [] [] [] [] [] [] [] []    Functional Mobility [] [] [] [] [] [] [] [] [x] [] EOB sitting   I=Independent, Mod I=Modified Independent, S=Supervision, SBA=Standby Assistance, CGA=Contact Guard Assistance,   Min=Minimal Assistance, Mod=Moderate Assistance, Max=Maximal Assistance, Total=Total Assistance, NT=Not Tested    MOBILITY: I Mod I S SBA CGA Min Mod Max Total  NT x2 Comments:   Supine to sit [] [] [] [] [] [] [] [] [x] [] [x]    Sit to supine [] [] [] [] [] [] [] [] [x] [] [x]    Sit to stand [] [] [] [] [] [] [x] [] [] [] [x] Short standing tone helping   Bed to chair [] [] [] [] [] [] [] [] [] [x] []    I=Independent, Mod I=Modified Independent, S=Supervision, SBA=Standby Assistance, CGA=Contact Guard Assistance,   Min=Minimal Assistance, Mod=Moderate Assistance, Max=Maximal Assistance, Total=Total Assistance, NT=Not Tested    [employer] AM-[employer] 6 Clicks   Daily Activity Inpatient Short Form        How much help from another person does the patient currently need. .. Total A Lot A Little None   1. Putting on and taking off regular lower body clothing? [x] 1   [] 2   [] 3   [] 4   2. Bathing (including washing, rinsing, drying)? [] 1   [x] 2   [] 3   [] 4   3. Toileting, which includes using toilet, bedpan or urinal?   [x] 1   [] 2   [] 3   [] 4   4. Putting on and taking off regular upper body clothing? [] 1   [x] 2   [] 3   [] 4   5. Taking care of personal grooming such as brushing teeth? [] 1   [x] 2   [] 3   [] 4   6. Eating meals? [x] 1   [] 2   [] 3   [] 4   © 2007, Trustees of [facility] [employer], under license to [employer]. All rights reserved     Score:  Initial: 9, completed 4/3/2021 Most Recent: 11 (Date: 4/6/2021 )  10 (4/23/2021)   10 (4/26/2021)   Interpretation of Tool:  Represents activities that are increasingly more difficult (i.e. Bed mobility, Transfers, Gait). PLAN:   FREQUENCY/DURATION: OT Plan of Care: 3 times/week for duration of hospital stay or until stated goals are met, whichever comes first.    PROBLEM LIST:   (Skilled intervention is medically necessary to address:)  1. Decreased ADL/Functional Activities  2. Decreased Activity Tolerance  3. Decreased AROM/PROM  4. Decreased Balance  5. Decreased Cognition  6. Decreased Coordination  7. Decreased Gait Ability  8. Decreased Strength  9. Decreased Transfer Abilities  10. Increased Pain   INTERVENTIONS PLANNED:   (Benefits and precautions of occupational therapy have been discussed with the patient.)  1. Self Care Training  2. Therapeutic Activity  3. Therapeutic Exercise/HEP  4. Neuromuscular Re-education  5. Manual Therapy  6. Modalities  7.  Education     TREATMENT:     EVALUATION:  Reevaluation completed : (Untimed Charge)    TREATMENT:   ($$ Self Care/Home Management: 8-22 mins    )  Co-Treatment PT/OT necessary due to patient's decreased overall endurance/tolerance levels, as well as need for high level skilled assistance to complete functional transfers/mobility and functional tasks  Self Care (10 Minutes): Self care including Upper Body Bathing and Grooming to increase independence and decrease level of assistance required. Neuromuscular Re-education (5 Minutes): Neuromuscular Re-education included Balance Training, Coordination training, Functional mobility with facilitation, Hemibody awareness training, Postural training, Sitting balance training, Standing balance training and Visual field awareness training to improve Balance, Coordination, Functional Mobility, Postural Control, Proprioception and use of UEs in ADLs.     TREATMENT GRID:  N/A    AFTER TREATMENT POSITION/PRECAUTIONS:  Alarm Activated, Bed, Needs within reach, Restraints , RN notified and R mitt in place    INTERDISCIPLINARY COLLABORATION:  RN/PCT, PT/PTA, OT/LYNCH and RN Case Manager/     TOTAL TREATMENT DURATION:  OT Patient Time In/Time Out  Time In: 1144  Time Out: 1200    Greg Xiao OTR/L

## 2021-04-26 NOTE — PROGRESS NOTES
04/26/21 0641   NIH Stroke Scale   Interval Other (comment)  (Dual NIH w/ RIK Aguirre)   LOC 0   LOC Questions 0   LOC Commands 0   Best Gaze 0   Visual 0   Facial Palsy 0   Motor Right Arm 0   Motor Left Arm 4   Motor Right Leg 0   Motor Left Leg 4   Limb Ataxia 0   Sensory 2   Best Language 1   Dysarthria 1   Extinction and Inattention 1   Total 13

## 2021-04-26 NOTE — PROGRESS NOTES
[facility]ist Progress Note     Name:  Terra Laughlin  Age:76 y.o. Sex:male   :  1945    MRN:  [id number]     Admit Date:  2021    Reason for Admission:  Hemorrhagic cerebrovascular accident (CVA) [facility]) [I61.9]    Hospital Course/Interval history:     67yo male with Hypertension, CAD, HLD, COPD, Hep C admitted on 2021 with acute right basal ganglia hemorrhage CVA. No intervention by neurosurgery initially, later developed hydrocephalus and underwent shunt placement on 2021. S/p Gtube. Subjective (21): No new issues. Awaiting placement. Review of Systems: 14 point review of systems is otherwise negative with the exception of the elements mentioned above. Assessment & Plan     Hemorrhagic CVA   Hydrocephalus s/p shunt placement  - awaiting placement     Sepsis due to aspiration pneumonia   - change abx to Augmentin through      DENI  Resolved.      Hypertension   - cont current meds     Diet:  DIET TUBE FEEDING  DVT PPx: SCDs  Code status: Full Code  Disposition/Expected LOS: medically stable for d/c, awaiting placement    Objective:     Patient Vitals for the past 24 hrs:   Temp Pulse Resp BP SpO2   21 0800 98 °F (36.7 °C) 75 18 (!) 153/80 98 %   21 0400 97.9 °F (36.6 °C) 78 15 121/68 100 %   21 0000 97.6 °F (36.4 °C) 76 15 124/72 93 %   21 2000 97.8 °F (36.6 °C) 72 14 122/68 100 %   21 1600 98.3 °F (36.8 °C) 79 14 120/69 100 %   21 1200 97.6 °F (36.4 °C) 74 16 97/63 100 %     Oxygen Therapy  O2 Sat (%): 98 % (21 0800)  Pulse via Oximetry: 76 beats per minute (21 1600)  O2 Device: None (Room air) (21 1500)  Skin Assessment: Clean, dry, & intact (21 07)  Skin Protection for O2 Device: N/A (21 07)  O2 Flow Rate (L/min): 3 l/min (21 1157)    Body mass index is 17.42 kg/m².     Physical Exam:   General:  sleeping  Lungs:  Clear to auscultation bilaterally   CV:  Regular rate and rhythm with normal S1 and S2   Abdomen:  Soft, nontender, nondistended, +Gtube  Extremities:  No cyanosis clubbing or edema   Neuro:   Does not follow commands    Data Review:  I have reviewed all labs, meds, and studies from the last 24 hours:    Labs:    Recent Results (from the past 24 hour(s))   GLUCOSE, POC    Collection Time: 04/25/21 11:28 AM   Result Value Ref Range    Glucose (POC) 122 (H) 65 - 100 mg/dL    Performed by [employer]    GLUCOSE, POC    Collection Time: 04/25/21  5:30 PM   Result Value Ref Range    Glucose (POC) 124 (H) 65 - 100 mg/dL    Performed by [employer]    GLUCOSE, POC    Collection Time: 04/25/21 11:18 PM   Result Value Ref Range    Glucose (POC) 138 (H) 65 - 100 mg/dL    Performed by [facility]    METABOLIC PANEL, BASIC    Collection Time: 04/26/21  4:02 AM   Result Value Ref Range    Sodium 143 136 - 145 mmol/L    Potassium 3.9 3.5 - 5.1 mmol/L    Chloride 109 (H) 98 - 107 mmol/L    CO2 28 21 - 32 mmol/L    Anion gap 6 (L) 7 - 16 mmol/L    Glucose 121 (H) 65 - 100 mg/dL    BUN 21 8 - 23 MG/DL    Creatinine 0.73 (L) 0.8 - 1.5 MG/DL    GFR est AA >60 >60 ml/min/1.73m2    GFR est non-AA >60 >60 ml/min/1.73m2    Calcium 8.6 8.3 - 10.4 MG/DL   MAGNESIUM    Collection Time: 04/26/21  4:02 AM   Result Value Ref Range    Magnesium 1.9 1.8 - 2.4 mg/dL   PHOSPHORUS    Collection Time: 04/26/21  4:02 AM   Result Value Ref Range    Phosphorus 3.5 2.3 - 3.7 MG/DL   GLUCOSE, POC    Collection Time: 04/26/21  6:46 AM   Result Value Ref Range    Glucose (POC) 138 (H) 65 - 100 mg/dL    Performed by [facility]        All Micro Results     Procedure Component Value Units Date/Time    CULTURE, BLOOD [[id number]] Collected: 04/22/21 2219    Order Status: Completed Specimen: Blood Updated: 04/26/21 0633     Special Requests: --        RIGHT  ARM       Culture result: NO GROWTH 4 DAYS       CULTURE, BLOOD [[id number]] Collected: 04/09/21 1053    Order Status: Completed Specimen: Blood Updated: 04/14/21 0721     Special Requests: --        RIGHT  HAND       Culture result: NO GROWTH 5 DAYS       CULTURE, BLOOD [[id number]] Collected: 04/09/21 1058    Order Status: Completed Specimen: Blood Updated: 04/14/21 0721     Special Requests: --        RIGHT  Antecubital       Culture result: NO GROWTH 5 DAYS       COVID-19 RAPID TEST [[id number]] Collected: 04/07/21 1221    Order Status: Completed Specimen: Nasopharyngeal Updated: 04/07/21 1301     Specimen source Nasopharyngeal        COVID-19 rapid test Not detected        Comment:      The specimen is NEGATIVE for SARS-CoV-2, the novel coronavirus associated with COVID-19. A negative result does not rule out COVID-19. This test has been authorized by the FDA under an Emergency Use Authorization (EUA) for use by authorized laboratories.         Fact sheet for Healthcare Providers: Crediidate.co.nz  Fact sheet for Patients: Fuzmo.co.nz       Methodology: Isothermal Nucleic Acid Amplification               EKG Results     Procedure 720 Value Units Date/Time    EKG, 12 LEAD, SUBSEQUENT [[id number]] Collected: 04/22/21 2114    Order Status: Completed Updated: 04/23/21 0648     Ventricular Rate 122 BPM      Atrial Rate 122 BPM      P-R Interval 126 ms      QRS Duration 78 ms      Q-T Interval 306 ms      QTC Calculation (Bezet) 436 ms      Calculated P Axis 56 degrees      Calculated R Axis -59 degrees      Calculated T Axis 77 degrees      Diagnosis --     !!! Poor data quality, interpretation may be adversely affected  likely  Sinus tachycardia  Left axis deviation  Nonspecific ST abnormality  Abnormal ECG  When compared with ECG of 22-APR-2021 21:14,  Premature ventricular complexes are no longer Present  Confirmed by Kayden Yoo ([id number]) on 4/23/2021 6:47:52 AM      EKG, 12 LEAD, SUBSEQUENT [[id number]] Collected: 04/03/21 0821    Order Status: Completed Updated: 04/03/21 1227     Ventricular Rate 74 BPM      Atrial Rate 74 BPM      P-R Interval 160 ms      QRS Duration 94 ms      Q-T Interval 388 ms      QTC Calculation (Bezet) 430 ms      Calculated P Axis 71 degrees      Calculated R Axis -61 degrees      Calculated T Axis 88 degrees      Diagnosis --     Sinus rhythm with occasional Premature ventricular complexes  Left anterior fascicular block  ST & T wave abnormality, consider lateral ischemia  Abnormal ECG  When compared with ECG of 02-APR-2021 18:26,  Premature ventricular complexes are now Present  Left anterior fascicular block is now Present  Confirmed by Ronak Tyler ([id number]) on 4/3/2021 12:27:42 PM      Initial ECG [[id number]] Collected: 04/02/21 1826    Order Status: Completed Updated: 04/03/21 0732     Ventricular Rate 77 BPM      Atrial Rate 78 BPM      P-R Interval 156 ms      QRS Duration 96 ms      Q-T Interval 356 ms      QTC Calculation (Bezet) 402 ms      Calculated P Axis 76 degrees      Calculated R Axis 69 degrees      Calculated T Axis 79 degrees      Diagnosis --     Normal sinus rhythm  Normal ECG  When compared with ECG of 28-AUG-2017 21:42,  Questionable change in QRS axis  Nonspecific T wave abnormality, improved in Lateral leads  Confirmed by Jono Spencer ([id number]) on 4/3/2021 7:32:01 AM      EKG 12 LEAD INITIAL [[id number]]     Order Status: Canceled           Other Studies:  No results found.     Current Meds:   Current Facility-Administered Medications   Medication Dose Route Frequency    magnesium hydroxide (MILK OF MAGNESIA) 400 mg/5 mL oral suspension 30 mL  30 mL Per G Tube DAILY    piperacillin-tazobactam (ZOSYN) 3.375 g in 0.9% sodium chloride (MBP/ADV) 100 mL MBP  3.375 g IntraVENous Q8H    HYDROcodone-acetaminophen (NORCO) 5-325 mg per tablet 1 Tab  1 Tab Per G Tube Q4H PRN    acetaminophen (TYLENOL) solution 650 mg  650 mg Per G Tube Q6H PRN    amLODIPine (NORVASC) tablet 10 mg  10 mg Per G Tube DAILY    atorvastatin (LIPITOR) tablet 40 mg  40 mg Per G Tube QHS    metoprolol tartrate (LOPRESSOR) tablet 50 mg  50 mg Per G Tube Q12H    ondansetron (ZOFRAN) injection 4 mg  4 mg IntraVENous Q6H PRN    bisacodyL (DULCOLAX) suppository 10 mg  10 mg Rectal DAILY PRN    insulin lispro (HUMALOG) injection   SubCUTAneous Q6H    lip protectant (BLISTEX) ointment 1 Each  1 Each Topical PRN    NUTRITIONAL SUPPORT ELECTROLYTE PRN ORDERS   Does Not Apply PRN    metoprolol (LOPRESSOR) injection 5 mg  5 mg IntraVENous Q6H PRN    enalaprilat (VASOTEC) injection 1.25 mg  1.25 mg IntraVENous Q6H PRN    lidocaine 4 % patch 1 Patch  1 Patch TransDERmal Q24H    sodium chloride (NS) flush 5-40 mL  5-40 mL IntraVENous Q8H    sodium chloride (NS) flush 5-40 mL  5-40 mL IntraVENous PRN       Problem List:  Hospital Problems as of 4/26/2021 Date Reviewed: 4/4/2021          Codes Class Noted - Resolved POA    Severe protein-calorie malnutrition ([facility] 75.) ICD-10-CM: E43  ICD-9-CM: 262  4/9/2021 - Present Yes        Dysphagia following cerebral infarction ICD-10-CM: I69.391  ICD-9-CM: 438.82  4/6/2021 - Present Yes        Elevated liver enzymes ICD-10-CM: R74.8  ICD-9-CM: 790.5  4/6/2021 - Present Yes        Elevated troponin ICD-10-CM: R77.8  ICD-9-CM: 790.6  4/4/2021 - Present Yes        * (Principal) Hemorrhagic cerebrovascular accident (CVA) ([facility] 75.) ICD-10-CM: I61.9  ICD-9-CM: 067  4/2/2021 - Present Yes        Acute left-sided weakness ICD-10-CM: R53.1  ICD-9-CM: 728.87  4/2/2021 - Present Yes        S/P PTCA (percutaneous transluminal coronary angioplasty) ICD-10-CM: Z98.61  ICD-9-CM: V45.82  Unknown - Present Yes        Chronic tuberculosis ICD-10-CM: A15.9  ICD-9-CM: 011.90  Unknown - Present Yes        Coronary atherosclerosis of native coronary vessel ICD-10-CM: I25.10  ICD-9-CM: 414.01  Unknown - Present Yes        Hyperlipidemia ICD-10-CM: E78.5  ICD-9-CM: 272.4  Unknown - Present Yes        Iron deficiency anemia ICD-10-CM: D50.9  ICD-9-CM: 280.9  1/25/2015 - Present Yes Microcytic anemia ICD-10-CM: D50.9  ICD-9-CM: 280.9  1/21/2015 - Present     Overview Signed 4/6/2016  9:52 AM by Mariola Rutherford     Iron deficiency                        Part of this note was written by using a voice dictation software and the note has been proof read but may still contain some grammatical/other typographical errors.     Signed By: Narciso Carter MD   [facility] Hospitalist Service    April 26, 2021  5:15 PM

## 2021-04-26 NOTE — PROGRESS NOTES
04/26/21 1852   NIH Stroke Scale   Interval Other (comment)  (dual with Julia)   LOC 0   LOC Questions 0   LOC Commands 0   Best Gaze 0   Visual 0   Facial Palsy 0   Motor Right Arm 0   Motor Left Arm 4   Motor Right Leg 0   Motor Left Leg 4   Limb Ataxia 0   Sensory 2   Best Language 1   Dysarthria 1   Extinction and Inattention 1   Total 13

## 2021-04-26 NOTE — PROGRESS NOTES
Insurance approval received. Pt can transfer to [facility]. TC to pt's dtr-unable to leave message. TC to pt's son, Ritesh Thomas @ #[phone]. Updated him with this information. He verbalized understanding. Rapid COVID ordered today. SW following to facilitate pt's transfer to rehab at [facility].

## 2021-04-26 NOTE — PROGRESS NOTES
Problem: Dysphagia (Adult)  Goal: *Acute Goals and Plan of Care (Insert Text)  STG: Pt will demonstrate zero signs/sx of aspiration with mechanical soft textures with nectar thick liquids with no straws with 90% accuracy during all meals. Discontinued 4/5/21  STG: Pt will complete a full speech, language and cognitive evaluation without assistance with 100% accuracy during session. MET 4/7   STG: Pt will complete a Modified barium swallow study with zero signs/sx of aspiration  with 100% accuracy during swallow study MET 4/6/21   LTG : Pt will demonstrate zero signs/sx of aspiration with least restrictive diet with 100% accuracy during all meals. LTG: Patient will increase receptive/expressive language skills demonstrated by the ability to communicate basic wants/needs across environments   STG: Patient will follow 1 step commands with 80% accuracy given min cueing  STG: Patient will follow 2 step commands with 80% accuracy given moderate cues. STG: Patient will respond to moderate level yes/no questions with 80% accuracy with moderate cues. STG: Patient will perform automatic speech task with 80% accuracy with moderate cues.        SPEECH LANGUAGE PATHOLOGY: DYSPHAGIA AND SPEECH-LANGUAGE/COGNITION: Daily Note 7    NAME/AGE/GENDER: Simona Ratliff is a 67 y.o. male  DATE: 4/26/2021  PRIMARY DIAGNOSIS: Hemorrh[facility] cerebrovascular accident [facility]) [facility] [I61.9]   5 Days Post-Op  ICD-10: Treatment Diagnosis: R13.12 Dysphagia, Oropharyngeal Phase  R47.1 Dysarthria and Anarthria  R41.841 Cognitive-Communication Deficit    RECOMMENDATIONS   DIET:    NPO with alternative means of nutrition (PEG placed by GI 4/21)    MEDICATIONS: Non-oral via PEG     ASPIRATION PRECAUTIONS  · Meticulous oral care Q 4 hours  · Suction PRN  · Elevate HOB     COMPENSATORY STRATEGIES/MODIFICATIONS  · Upright positioning during tube feedings and 30 min after      EDUCATION:  · Recommendations discussed with RN and patient and patient's daughter   CONTINUATION OF SKILLED SERVICES/MEDICAL NECESSITY:   Patient is expected to demonstrate progress in  swallow strength, swallow timeliness, swallow function, diet tolerance and swallow safety in order to  improve swallow safety, work toward diet advancement and decrease aspiration risk.  Patient is expected to demonstrate progress in expressive communication and receptive ability to decrease assistance required communication, increase independence with activities of daily living and increase communication with family/caregivers.  Patient continues to require skilled intervention due to dysphagia; expressive/receptive language deficits. RECOMMENDATIONS for CONTINUED SPEECH THERAPY: YES: Anticipate need for ongoing speech therapy during this hospitalization and at next level of care. ASSESSMENT   Dysphagia: patient continues to demonstrate significant oropharyngeal dysphagia, however improved secretion management this date with no audible secretions at baseline. Slightly improved ability to participate in effort swallows with small ice chips, however still has great difficulty initiating dry swallow. Continue NPO with alternate means of nutrition. Suction PRN. PEG was placed 4/21 by GI. Notified RN of oral thrush. Language evaluation: Patient demonstrates moderate-severe dysarthria today and inconsistent language abilities that continue to appear complicated by attention. Able to express basic wants/needs but breakdown during basic structured expressive language tasks. Recommend continue ongoing speech therapy to address dysphagia, dysarthria, and cognitive linguistic impairment during this hospitalization AND next level of care. REHABILITATION POTENTIAL FOR STATED GOALS: Good    PLAN    FREQUENCY/DURATION: Continue to follow patient 3 times a week for duration of hospital stay to address above goals.     - Recommendations for next treatment session: Next treatment session will address langauge treatment/dysphagia treatment    SUBJECTIVE   Asking for suppository. Daughter present for beginning of session. Patient continues to ask for po (pancakes, zaxbys, apple sauce)    Problem List:  (Impairments causing functional limitations):  1. Oropharyngeal dysphagia  2. Expressive/receptive language impairments    Orientation:   Person  Place  Time    Pain: Pain Scale 1: Numeric (0 - 10)  Pain Intensity 1: 0    OBJECTIVE   Dysphagia treatment:   Patient seen for dysphagia exercises in efforts to improve swallow function/strength   No audible secretions at baseline with clear vocal quality. +oral thrush(RN notified)  Patient completed effortful swallows with verbal cueing and increased time with 6 small ice chips. Significant Difficulty initiating dry swallows. Weak throat clear intermittently. Speech/language/cognitive treatment:  Confrontational namin/6  Function of basic item: 3/6. Reduced attention impacting, but only able to improve to 4/6 with min cueing      INTERDISCIPLINARY COLLABORATION: RN  PRECAUTIONS/ALLERGIES: Patient has no known allergies.      Tool Used: Dysphagia Outcome and Severity Scale (DAYAMI)    Score Comments   Normal Diet  [] 7 With no strategies or extra time needed   Functional Swallow  [] 6 May have mild oral or pharyngeal delay   Mild Dysphagia  [] 5 Which may require one diet consistency restricted    Mild-Moderate Dysphagia  [] 4 With 1-2 diet consistencies restricted   Moderate Dysphagia  [] 3 With 2 or more diet consistencies restricted   Moderate-Severe Dysphagia  [] 2 With partial PO strategies (trials with ST only)   Severe Dysphagia  [] 1 With inability to tolerate any PO safely      Score:  Initial: 1 Most Recent: 1 (Date 21 )   Interpretation of Tool: The Dysphagia Outcome and Severity Scale (DAYAMI) is a simple, easy-to-use, 7-point scale developed to systematically rate the functional severity of dysphagia based on objective assessment and make recommendations for diet level, independence level, and type of nutrition. Tool Used: MODIFIED YAZMIN SCALE (mRS)   Score   No Symptoms  [] 0   No significant disability despite symptoms; able to carry out all usual duties and activities  [] 1   Slight disability; unable to carry out all previous activities but able to look after own affairs without assistance. [] 2   Moderate disability; requiring some help but able to walk without assistance  [] 3   Moderately severe disability; unable to walk without assistance and unable to attend to own bodily needs without assistance  [] 4   Severe disability; bedridden, incontinent, and requiring constant nursing care and attention  [] 5      Score:  Initial: 5 Most recent: 1   Interpretation of Tool: The Modified [place] Scale is a 7-point scaled used to quantify level of disability as it relates to a patient's functional abilities.    SAFETY:  After treatment position/precautions:  · Upright in bed  · RN notified   · Call light within reach    Total Treatment Duration:   Time In: 8734  Time Out: [address], INST MEDICO DEL [facility], [address] [facility], [facility]

## 2021-04-27 LAB
ANION GAP SERPL CALC-SCNC: 6 MMOL/L (ref 7–16)
BACTERIA SPEC CULT: NORMAL
BUN SERPL-MCNC: 19 MG/DL (ref 8–23)
CALCIUM SERPL-MCNC: 8.8 MG/DL (ref 8.3–10.4)
CHLORIDE SERPL-SCNC: 103 MMOL/L (ref 98–107)
CO2 SERPL-SCNC: 27 MMOL/L (ref 21–32)
CREAT SERPL-MCNC: 0.63 MG/DL (ref 0.8–1.5)
GLUCOSE BLD STRIP.AUTO-MCNC: 110 MG/DL (ref 65–100)
GLUCOSE BLD STRIP.AUTO-MCNC: 120 MG/DL (ref 65–100)
GLUCOSE BLD STRIP.AUTO-MCNC: 128 MG/DL (ref 65–100)
GLUCOSE SERPL-MCNC: 109 MG/DL (ref 65–100)
POTASSIUM SERPL-SCNC: 4.1 MMOL/L (ref 3.5–5.1)
SERVICE CMNT-IMP: ABNORMAL
SERVICE CMNT-IMP: NORMAL
SODIUM SERPL-SCNC: 136 MMOL/L (ref 136–145)

## 2021-04-27 PROCEDURE — 74011250637 HC RX REV CODE- 250/637: Performed by: INTERNAL MEDICINE

## 2021-04-27 PROCEDURE — 36415 COLL VENOUS BLD VENIPUNCTURE: CPT

## 2021-04-27 PROCEDURE — 92526 ORAL FUNCTION THERAPY: CPT

## 2021-04-27 PROCEDURE — 80048 BASIC METABOLIC PNL TOTAL CA: CPT

## 2021-04-27 PROCEDURE — 74011000250 HC RX REV CODE- 250: Performed by: FAMILY MEDICINE

## 2021-04-27 PROCEDURE — 82962 GLUCOSE BLOOD TEST: CPT

## 2021-04-27 PROCEDURE — 2709999900 HC NON-CHARGEABLE SUPPLY

## 2021-04-27 RX ORDER — METOPROLOL TARTRATE 25 MG/1
50 TABLET, FILM COATED ORAL 2 TIMES DAILY
Qty: 60 TAB | Refills: 6 | Status: SHIPPED | OUTPATIENT
Start: 2021-04-27

## 2021-04-27 RX ORDER — AMOXICILLIN AND CLAVULANATE POTASSIUM 875; 125 MG/1; MG/1
1 TABLET, FILM COATED ORAL EVERY 12 HOURS
Qty: 2 TAB | Refills: 0 | Status: SHIPPED
Start: 2021-04-27 | End: 2021-04-28

## 2021-04-27 RX ORDER — AMOXICILLIN AND CLAVULANATE POTASSIUM 875; 125 MG/1; MG/1
1 TABLET, FILM COATED ORAL EVERY 12 HOURS
Status: DISCONTINUED | OUTPATIENT
Start: 2021-04-27 | End: 2021-04-27 | Stop reason: HOSPADM

## 2021-04-27 RX ORDER — AMLODIPINE BESYLATE 10 MG/1
10 TABLET ORAL DAILY
Qty: 30 TAB | Refills: 0 | Status: SHIPPED
Start: 2021-04-28

## 2021-04-27 RX ADMIN — AMOXICILLIN AND CLAVULANATE POTASSIUM 1 TABLET: 875; 125 TABLET, FILM COATED ORAL at 09:51

## 2021-04-27 RX ADMIN — Medication 10 ML: at 17:12

## 2021-04-27 RX ADMIN — METOPROLOL TARTRATE 50 MG: 50 TABLET, FILM COATED ORAL at 09:27

## 2021-04-27 RX ADMIN — HYDROCODONE BITARTRATE AND ACETAMINOPHEN 1 TABLET: 5; 325 TABLET ORAL at 06:44

## 2021-04-27 RX ADMIN — AMLODIPINE BESYLATE 10 MG: 10 TABLET ORAL at 09:27

## 2021-04-27 RX ADMIN — Medication 10 ML: at 05:46

## 2021-04-27 RX ADMIN — MAGNESIUM HYDROXIDE 30 ML: 2400 SUSPENSION ORAL at 09:29

## 2021-04-27 NOTE — PROGRESS NOTES
Problem: Dysphagia (Adult)  Goal: *Acute Goals and Plan of Care (Insert Text)  STG: Pt will demonstrate zero signs/sx of aspiration with mechanical soft textures with nectar thick liquids with no straws with 90% accuracy during all meals. Discontinued 4/5/21  STG: Pt will complete a full speech, language and cognitive evaluation without assistance with 100% accuracy during session. MET 4/7   STG: Pt will complete a Modified barium swallow study with zero signs/sx of aspiration  with 100% accuracy during swallow study MET 4/6/21   LTG : Pt will demonstrate zero signs/sx of aspiration with least restrictive diet with 100% accuracy during all meals. LTG: Patient will increase receptive/expressive language skills demonstrated by the ability to communicate basic wants/needs across environments   STG: Patient will follow 1 step commands with 80% accuracy given min cueing  STG: Patient will follow 2 step commands with 80% accuracy given moderate cues. STG: Patient will respond to moderate level yes/no questions with 80% accuracy with moderate cues. STG: Patient will perform automatic speech task with 80% accuracy with moderate cues.        SPEECH LANGUAGE PATHOLOGY: DYSPHAGIA AND SPEECH-LANGUAGE/COGNITION: Daily Note 8    NAME/AGE/GENDER: Renzo Marin is a 67 y.o. male  DATE: 4/27/2021  PRIMARY DIAGNOSIS: [facility] cerebrovascular [facility] [I61.9]   6 Days Post-Op  ICD-10: Treatment Diagnosis: R13.12 Dysphagia, Oropharyngeal Phase  R47.1 Dysarthria and Anarthria  R41.841 Cognitive-Communication Deficit    RECOMMENDATIONS   DIET:    NPO with alternative means of nutrition (PEG placed by GI 4/21)    MEDICATIONS: Non-oral via PEG     ASPIRATION PRECAUTIONS  · Meticulous oral care Q 4 hours  · Suction PRN  · Elevate HOB     COMPENSATORY STRATEGIES/MODIFICATIONS  · Upright positioning during tube feedings and 30 min after      EDUCATION:  · Recommendations discussed with RN and patient and patient's daughter   CONTINUATION OF SKILLED SERVICES/MEDICAL NECESSITY:   Patient is expected to demonstrate progress in  swallow strength, swallow timeliness, swallow function, diet tolerance and swallow safety in order to  improve swallow safety, work toward diet advancement and decrease aspiration risk.  Patient is expected to demonstrate progress in expressive communication and receptive ability to decrease assistance required communication, increase independence with activities of daily living and increase communication with family/caregivers.  Patient continues to require skilled intervention due to dysphagia; expressive/receptive language deficits. RECOMMENDATIONS for CONTINUED SPEECH THERAPY: YES: Anticipate need for ongoing speech therapy during this hospitalization and at next level of care. ASSESSMENT   Dysphagia: ongoing oropharyngeal dysphagia with improving secretion management. Continue NPO with alternate means of nutrition. Suction PRN. PEG was placed 4/21 by GI.  RECOMMEND TREATMENT OF ORAL THRUSH AND AGGRESSIVE ORAL CARE due to high risk of aspiration pneumonia if poor oral hygiene persists. Language evaluation: not formally addressed as patient needing to get cleaned up by staff (nose bleed). Recommend continue ongoing speech therapy to address dysphagia, dysarthria, and cognitive linguistic impairment during this hospitalization AND next level of care. REHABILITATION POTENTIAL FOR STATED GOALS: Good    PLAN    FREQUENCY/DURATION: Continue to follow patient 3 times a week for duration of hospital stay to address above goals. - Recommendations for next treatment session: Next treatment session will address langauge treatment/dysphagia treatment    SUBJECTIVE   Patient repositioned in bed for comfort/start of session. Nose bleed (RN notified)    Problem List:  (Impairments causing functional limitations):  1. Oropharyngeal dysphagia  2.  Expressive/receptive language impairments    Orientation:   Person  Place  Time    Pain: Pain Scale 1: Numeric (0 - 10)  Pain Intensity 1: 2  Pain Location 1: Abdomen  Pain Intervention(s) 1: Medication (see MAR)    OBJECTIVE   Dysphagia treatment:   Patient seen for dysphagia treatment/exercises in efforts to improve swallow function/strength   No audible secretions. Mild oral secretions and +oral thrush. Oral care completed with toothpaste/brush/suction. Throat clearing x1. Effortful swallow x5 only today as staff came to clean patient up. Speech/language/cognitive treatment: Held. INTERDISCIPLINARY COLLABORATION: RN  PRECAUTIONS/ALLERGIES: Patient has no known allergies. Tool Used: Dysphagia Outcome and Severity Scale (DAYAMI)    Score Comments   Normal Diet  [] 7 With no strategies or extra time needed   Functional Swallow  [] 6 May have mild oral or pharyngeal delay   Mild Dysphagia  [] 5 Which may require one diet consistency restricted    Mild-Moderate Dysphagia  [] 4 With 1-2 diet consistencies restricted   Moderate Dysphagia  [] 3 With 2 or more diet consistencies restricted   Moderate-Severe Dysphagia  [] 2 With partial PO strategies (trials with ST only)   Severe Dysphagia  [] 1 With inability to tolerate any PO safely      Score:  Initial: 1 Most Recent: 1 (Date 04/27/21 )   Interpretation of Tool: The Dysphagia Outcome and Severity Scale (DAYAMI) is a simple, easy-to-use, 7-point scale developed to systematically rate the functional severity of dysphagia based on objective assessment and make recommendations for diet level, independence level, and type of nutrition. Tool Used: MODIFIED YAZMIN SCALE (mRS)   Score   No Symptoms  [] 0   No significant disability despite symptoms; able to carry out all usual duties and activities  [] 1   Slight disability; unable to carry out all previous activities but able to look after own affairs without assistance.    [] 2   Moderate disability; requiring some help but able to walk without assistance  [] 3   Moderately severe disability; unable to walk without assistance and unable to attend to own bodily needs without assistance  [] 4   Severe disability; bedridden, incontinent, and requiring constant nursing care and attention  [] 5      Score:  Initial: 5 Most recent: 1   Interpretation of Tool: The Modified [place] Scale is a 7-point scaled used to quantify level of disability as it relates to a patient's functional abilities.    SAFETY:  After treatment position/precautions:  · Upright in bed  · RN notified   · Call light within reach  · CNA present     Total Treatment Duration:   Time In: 2920  Time Out: Bashir [address], [address] CCC-SLP

## 2021-04-27 NOTE — DISCHARGE SUMMARY
[facility] Hospitalist Discharge Summary     Name:  Charles Atkins    Age:76 y.o.    Sex:male  :  1945       MRN:  [id number]       Admitting Physician: Shania Aiken MD Admit Date: 2021  5:50 PM   Attending Physician: Almaz Roland MD  Primary Care Physician: Braxton Suarez MD       Discharge Physician: Martinez Bethea MD  Discharge date: 21   Discharged Condition: Stable    Indication for Admission:   Chief Complaint   Patient presents with    Extremity Weakness        Reasons for hospitalization:  Hospital Problems as of 2021 Date Reviewed: 2021          Codes Class Noted - Resolved POA    Severe protein-calorie malnutrition ([facility] ICD-10-CM: E43  ICD-9-CM: 262  2021 - Present Yes        Dysphagia following cerebral infarction ICD-10-CM: I69.391  ICD-9-CM: 438.82  2021 - Present Yes        Elevated liver enzymes ICD-10-CM: R74.8  ICD-9-CM: 790.5  2021 - Present Yes        Elevated troponin ICD-10-CM: R77.8  ICD-9-CM: 790.6  2021 - Present Yes        * (Principal) Hemorrhagic cerebrovascular accident (CVA) ([facility] ICD-10-CM: I61.9  ICD-9-CM: 944  2021 - Present Yes        Acute left-sided weakness ICD-10-CM: R53.1  ICD-9-CM: 728.87  2021 - Present Yes        S/P PTCA (percutaneous transluminal coronary angioplasty) ICD-10-CM: Z98.61  ICD-9-CM: V45.82  Unknown - Present Yes        Chronic tuberculosis ICD-10-CM: A15.9  ICD-9-CM: 011.90  Unknown - Present Yes        Coronary atherosclerosis of native coronary vessel ICD-10-CM: I25.10  ICD-9-CM: 414.01  Unknown - Present Yes        Hyperlipidemia ICD-10-CM: E78.5  ICD-9-CM: 272.4  Unknown - Present Yes        Iron deficiency anemia ICD-10-CM: D50.9  ICD-9-CM: 280.9  2015 - Present Yes        Microcytic anemia ICD-10-CM: D50.9  ICD-9-CM: 280.9  2015 - Present     Overview Signed 2016  9:52 AM by Daryle Leatherwood     Iron deficiency                      Discharge Diagnosis: hemorrhagic CVA  Did Patient have Sepsis (YES OR NO): no      Hospital Course/Interval history:     65yo male with Hypertension, CAD, HLD, COPD, Hep C admitted on 4-2-2021 with acute right basal ganglia hemorrhage CVA. No intervention by neurosurgery initially, later developed hydrocephalus and underwent shunt placement on 4-7-2021. S/p Gtube. Assessment/Plan:    Hemorrhagic CVA   Hydrocephalus s/p shunt placement  - discharging to STR     Sepsis due to aspiration pneumonia   - Augmentin through 4/28     DENI  Resolved.      Hypertension   - cont current meds    Consults:   IP CONSULT TO PHYSIATRIST(REHAB MEDICINE)  IP CONSULT TO GASTROENTEROLOGY  IP CONSULT TO NEUROLOGY  IP CONSULT TO NEUROLOGY     Disposition: Skilled Nursing Facility  Diet:   DIET TUBE FEEDING  Code Status: Full Code      Follow up labs/imaging: none  Follow up with staff MD in 1 week  Pending labs/studies: none    Current Discharge Medication List      START taking these medications    Details   amLODIPine (NORVASC) 10 mg tablet 1 Tab by Per G Tube route daily. Qty: 30 Tab, Refills: 0      amoxicillin-clavulanate (AUGMENTIN) 875-125 mg per tablet 1 Tab by Per G Tube route every twelve (12) hours for 1 day. Qty: 2 Tab, Refills: 0         CONTINUE these medications which have CHANGED    Details   metoprolol tartrate (LOPRESSOR) 25 mg tablet Take 2 Tabs by mouth two (2) times a day. Qty: 60 Tab, Refills: 6         CONTINUE these medications which have NOT CHANGED    Details   cyclobenzaprine (FLEXERIL) 5 mg tablet Take 1 Tab by mouth two (2) times a day. Qty: 14 Tab, Refills: 0      aspirin 81 mg chewable tablet Take 1 Tab by mouth daily. nitroglycerin (NITROSTAT) 0.4 mg SL tablet 1 Tab by SubLINGual route every five (5) minutes as needed for Chest Pain. Qty: 2 Bottle, Refills: 4      atorvastatin (LIPITOR) 80 mg tablet Take 1 Tab by mouth nightly.   Qty: 30 Tab, Refills: 11             Medications Discontinued During This Encounter   Medication Reason    labetaloL (NORMODYNE;TRANDATE) injection 20 mg     niCARdipine in Saline (CARDENE) 25 MG/250 mL infusion kit DUPLICATE ORDER    prasugrel (EFFIENT) 10 mg tablet Side Effects    morphine injection 0.5 mg     metoprolol (LOPRESSOR) injection 5 mg     mannitol 25 % injection 20 g     metoprolol tartrate (LOPRESSOR) tablet 25 mg     atorvastatin (LIPITOR) tablet 40 mg     ceFAZolin (ANCEF) 2 g/20 mL in sterile water IV syringe     NOREPINephrine (LEVOPHED) 4 mg in 5% dextrose 250 mL infusion     lactated Ringers infusion     mannitol (OSMITROL) 20 g in Empty Bag     Vancomycin Pharmacy Intermittent Dosing     vancomycin (VANCOCIN) 1,000 mg in 0.9% sodium chloride 250 mL (VIAL-MATE)     amLODIPine (NORVASC) tablet 5 mg     amLODIPine (NORVASC) tablet 10 mg     insulin lispro (HUMALOG) injection     metoprolol tartrate (LOPRESSOR) tablet 25 mg     ceFAZolin (ANCEF) 2 g/20 mL in sterile water IV syringe     acetaminophen (TYLENOL) solution 650 mg     0.9% sodium chloride infusion     lactated Ringers infusion     lactated Ringers infusion     niCARdipine (CARDENE) 25 mg in 0.9% sodium chloride (MBP/ADV) 250 mL infusion     atorvastatin (LIPITOR) tablet 40 mg     amLODIPine (NORVASC) tablet 10 mg     metoprolol tartrate (LOPRESSOR) tablet 50 mg     acetaminophen (TYLENOL) solution 650 mg     HYDROcodone-acetaminophen (NORCO) 5-325 mg per tablet 1 Tab     HYDROcodone-acetaminophen (NORCO)  mg tablet 1 Tab     morphine injection 1 mg     oxyCODONE IR (ROXICODONE) tablet 5 mg     morphine injection 2 mg     HYDROcodone-acetaminophen (NORCO)  mg tablet 1 Tab     piperacillin-tazobactam (ZOSYN) 3.375 g in 0.9% sodium chloride (MBP/ADV) 100 mL MBP     0.9% sodium chloride infusion     dextrose 5 % - 0.45% NaCl infusion     piperacillin-tazobactam (ZOSYN) 3.375 g in 0.9% sodium chloride (MBP/ADV) 100 mL MBP     amoxicillin-clavulanate (AUGMENTIN) 875-125 mg per tablet 1 Tab     metoprolol tartrate (LOPRESSOR) 25 mg tablet REORDER         Follow Up Orders: Follow-up Appointments   Procedures    FOLLOW UP VISIT Appointment in: One Week     Standing Status:   Standing     Number of Occurrences:   1     Order Specific Question:   Appointment in     Answer: One Week         Follow-up Information     Follow up With Specialties Details Why Roshan Cortes [address] [place] [address] Vicente Ward yuli Davison4  [phone]    Other, MD Braxton    Patient can only remember the practice name and not the physician              Discharge Exam:    Patient Vitals for the past 24 hrs:   Temp Pulse Resp BP SpO2   04/27/21 0800 97.4 °F (36.3 °C) 60 17 113/67 100 %   04/27/21 0400 98.5 °F (36.9 °C) 62 18 113/67 98 %   04/27/21 0000 97.6 °F (36.4 °C) 62 18 122/73 100 %   04/26/21 2000 99 °F (37.2 °C) 74 16 130/73 99 %   04/26/21 1600 98.1 °F (36.7 °C) 77 19 127/74 99 %     Oxygen Therapy  O2 Sat (%): 100 % (04/27/21 0800)  Pulse via Oximetry: 76 beats per minute (04/21/21 1600)  O2 Device: None (Room air) (04/21/21 1500)  Skin Assessment: Clean, dry, & intact (04/21/21 0701)  Skin Protection for O2 Device: N/A (04/21/21 0701)  O2 Flow Rate (L/min): 3 l/min (04/21/21 1157)    Estimated body mass index is 16.74 kg/m² as calculated from the following:    Height as of this encounter: 5' 8\" (1.727 m). Weight as of this encounter: 49.9 kg (110 lb 1.6 oz). No intake or output data in the 24 hours ending 04/27/21 1201    *Note that automatically entered I/Os may not be accurate; dependent on patient compliance with collection and accurate  by assistants.     Physical Exam:   General:         arouses to voice  Lungs:             Clear to auscultation bilaterally   CV:                  Regular rate and rhythm with normal S1 and S2   Abdomen:        Soft, nontender, nondistended, +Gtube  Extremities:     No cyanosis clubbing or edema   Neuro:              mumbles in response to questioning         All Labs from Last 24 Hrs:  Recent Results (from the past 24 hour(s))   COVID-19 RAPID TEST    Collection Time: 04/26/21  4:43 PM   Result Value Ref Range    Specimen source Nasopharyngeal      COVID-19 rapid test Not detected NOTD     GLUCOSE, POC    Collection Time: 04/26/21  5:49 PM   Result Value Ref Range    Glucose (POC) 119 (H) 65 - 100 mg/dL    Performed by JarenJORDAN    GLUCOSE, POC    Collection Time: 04/27/21 12:30 AM   Result Value Ref Range    Glucose (POC) 110 (H) 65 - 100 mg/dL    Performed by [facility] GroupRADHA    METABOLIC PANEL, BASIC    Collection Time: 04/27/21  5:29 AM   Result Value Ref Range    Sodium 136 136 - 145 mmol/L    Potassium 4.1 3.5 - 5.1 mmol/L    Chloride 103 98 - 107 mmol/L    CO2 27 21 - 32 mmol/L    Anion gap 6 (L) 7 - 16 mmol/L    Glucose 109 (H) 65 - 100 mg/dL    BUN 19 8 - 23 MG/DL    Creatinine 0.63 (L) 0.8 - 1.5 MG/DL    GFR est AA >60 >60 ml/min/1.73m2    GFR est non-AA >60 >60 ml/min/1.73m2    Calcium 8.8 8.3 - 10.4 MG/DL   GLUCOSE, POC    Collection Time: 04/27/21  6:28 AM   Result Value Ref Range    Glucose (POC) 120 (H) 65 - 100 mg/dL    Performed by [facility]        All Micro Results     Procedure Component Value Units Date/Time    CULTURE, BLOOD [[id number]] Collected: 04/22/21 2219    Order Status: Completed Specimen: Blood Updated: 04/27/21 0945     Special Requests: --        RIGHT  ARM       Culture result: NO GROWTH 5 DAYS       COVID-19 RAPID TEST [[id number]] Collected: 04/26/21 1643    Order Status: Completed Specimen: Nasopharyngeal Updated: 04/26/21 1713     Specimen source Nasopharyngeal        COVID-19 rapid test Not detected        Comment:      The specimen is NEGATIVE for SARS-CoV-2, the novel coronavirus associated with COVID-19. A negative result does not rule out COVID-19.        This test has been authorized by the FDA under an Emergency Use Authorization (EUA) for use by authorized laboratories. Fact sheet for Healthcare Providers: [employer]nz  Fact sheet for Patients: [employer].       Methodology: Isothermal Nucleic Acid Amplification         CULTURE, BLOOD [[id number]] Collected: 04/09/21 1053    Order Status: Completed Specimen: Blood Updated: 04/14/21 0721     Special Requests: --        RIGHT  HAND       Culture result: NO GROWTH 5 DAYS       CULTURE, BLOOD [[id number]] Collected: 04/09/21 1058    Order Status: Completed Specimen: Blood Updated: 04/14/21 0721     Special Requests: --        RIGHT  Antecubital       Culture result: NO GROWTH 5 DAYS       COVID-19 RAPID TEST [[id number]] Collected: 04/07/21 1221    Order Status: Completed Specimen: Nasopharyngeal Updated: 04/07/21 1301     Specimen source Nasopharyngeal        COVID-19 rapid test Not detected        Comment:      The specimen is NEGATIVE for SARS-CoV-2, the novel coronavirus associated with COVID-19. A negative result does not rule out COVID-19. This test has been authorized by the FDA under an Emergency Use Authorization (EUA) for use by authorized laboratories. Fact sheet for Healthcare Providers: [employer]  Fact sheet for Patients: [employer].       Methodology: Isothermal Nucleic Acid Amplification               SARS-CoV-2 Lab Results  \"Novel Coronavirus\" Test: No results found for: COV2NT   \"Emergent Disease\" Test: No results found for: EDPR  \"SARS-COV-2\" Test: No results found for: XGCOVT  Rapid Test:   Lab Results   Component Value Date/Time    COVR Not detected 04/26/2021 04:43 PM            Diagnostic Imaging/Tests:   Xr Chest Sngl V    Result Date: 4/24/2021  1. Residual minimal infiltrate at left lung base. CPT code(s) 65256     Xr Chest Sngl V    Result Date: 4/22/2021  1. Some improvement in prior suspected lingular changes.  However, this is suspicious given its persistence. In addition, this demonstrates a central nodular component. If the patient has signs/symptoms of pneumonia, then appropriate treatment and follow-up chest x-ray would be recommended. If not, then a contrasted CT scan of the chest would be recommended to better characterize these changes. This report was made using voice transcription. Despite my best efforts to avoid any, transcription errors may persist. If there is any question about the accuracy of the report or need for clarification, then please call [phone], or text me through [employer] for clarification or correction. Ct Head Wo Cont    Result Date: 2021  1. No evolving changes. Only some evolution of prior hemorrhage in the right basal ganglia seen which appears slightly decreased in size. This report was made using voice transcription. Despite my best efforts to avoid any, transcription errors may persist. If there is any question about the accuracy of the report or need for clarification, then please call [phone], or text me through perfectserv for clarification or correction.          Echocardiogram/EKG results:  Results for orders placed or performed during the hospital encounter of 21   2D ECHO COMPLETE ADULT (TTE) W OR [address], [facility]  [phone]    Transthoracic Echocardiogram  2D, M-mode, Doppler, and Color Doppler    Patient: Ean Tuttle  MR #: [id number]  : [id number]  Age: 67 years  Gender: Male  Study date: 2021  Account #: [de-identified]  Height: 68 in  Weight: 133.8 lb  BSA: 1.72 mï¾²  Status:Routine  Location: [facility]  BP: 137/ 77    Allergies: NO KNOWN ALLERGENS    Sonographer:  TRINH Boyd  Group:  [facility]  Referring Physician:  Donna Myers DO  Reading Physician:  Dr. Patrick Gallego    INDICATIONS: TIA with transient neurological disturbance    PROCEDURE: This was a routine study. A transthoracic echocardiogram was  performed. The study included complete 2D imaging, M-mode, complete spectral  Doppler, and color Doppler. Intravenous contrast (Definity) was administered. Intravenous contrast (agitated saline) was administered. Image quality was  adequate. LEFT VENTRICLE: Size was normal. Systolic function was normal. Ejection  fraction was estimated in the range of 55 % to 60 %. There were no regional  wall motion abnormalities. Wall thickness was mildly increased. Left  ventricular diastolic function parameters were normal. Avg E/e': 4.54. RIGHT VENTRICLE: The size was normal. Systolic function was normal. The  tricuspid jet envelope definition was inadequate for estimation of RV   systolic  pressure. LEFT ATRIUM: Size was normal.    ATRIAL SEPTUM: Agitated saline contrast injection (bubble study) was   performed. There was no right-to-left shunt, at rest or induced by the Valsalva   maneuver. RIGHT ATRIUM: Size was normal.    SYSTEMIC VEINS: IVC: The inferior vena cava was normal in size. The  respirophasic change in diameter was more than 50%. AORTIC VALVE: There is mild aortic annular calcification. The valve was  trileaflet. Leaflets exhibited mild sclerosis. MITRAL VALVE: Valve structure was normal.    TRICUSPID VALVE: The valve structure was normal. There was trivial  regurgitation. PULMONIC VALVE: Not well visualized. PERICARDIUM: There was no pericardial effusion. AORTA: The root exhibited normal size. SUMMARY:    -  Left ventricle: Systolic function was normal. Ejection fraction was  estimated in the range of 55 % to 60 %. -  Atrial septum: Agitated saline contrast injection (bubble study) was  performed. There was no right-to-left shunt, at rest or induced by the   Valsalva  maneuver.     SYSTEM MEASUREMENT TABLES    2D  Ao Diam: 2.8 cm  LAEDV Index (A-L): 26.9 ml/m2  IVSd: 1.1 cm  LVIDd: 5.2 cm  LVPWd: 0.8 cm    PW  E/E' Avg: 4.5    Prepared and signed by    Dr. Neftali Ac 03-Apr-2021 22:44:08         EKG Results     Procedure 720 Value Units Date/Time    EKG, 12 LEAD, SUBSEQUENT [[id number]] Collected: 04/22/21 2114    Order Status: Completed Updated: 04/23/21 0648     Ventricular Rate 122 BPM      Atrial Rate 122 BPM      P-R Interval 126 ms      QRS Duration 78 ms      Q-T Interval 306 ms      QTC Calculation (Bezet) 436 ms      Calculated P Axis 56 degrees      Calculated R Axis -59 degrees      Calculated T Axis 77 degrees      Diagnosis --     !!! Poor data quality, interpretation may be adversely affected  likely  Sinus tachycardia  Left axis deviation  Nonspecific ST abnormality  Abnormal ECG  When compared with ECG of 22-APR-2021 21:14,  Premature ventricular complexes are no longer Present  Confirmed by Humphrey Posadas ([id number]) on 4/23/2021 6:47:52 AM      EKG, 12 LEAD, SUBSEQUENT [[id number]] Collected: 04/03/21 0821    Order Status: Completed Updated: 04/03/21 1227     Ventricular Rate 74 BPM      Atrial Rate 74 BPM      P-R Interval 160 ms      QRS Duration 94 ms      Q-T Interval 388 ms      QTC Calculation (Bezet) 430 ms      Calculated P Axis 71 degrees      Calculated R Axis -61 degrees      Calculated T Axis 88 degrees      Diagnosis --     Sinus rhythm with occasional Premature ventricular complexes  Left anterior fascicular block  ST & T wave abnormality, consider lateral ischemia  Abnormal ECG  When compared with ECG of 02-APR-2021 18:26,  Premature ventricular complexes are now Present  Left anterior fascicular block is now Present  Confirmed by Edith Sanderson ([id number]) on 4/3/2021 12:27:42 PM      Initial ECG [[id number]] Collected: 04/02/21 1826    Order Status: Completed Updated: 04/03/21 0732     Ventricular Rate 77 BPM      Atrial Rate 78 BPM      P-R Interval 156 ms      QRS Duration 96 ms      Q-T Interval 356 ms      QTC Calculation (Bezet) 402 ms      Calculated P Axis 76 degrees      Calculated R Axis 69 degrees      Calculated T Axis 79 degrees      Diagnosis --     Normal sinus rhythm  Normal ECG  When compared with ECG of 28-AUG-2017 21:42,  Questionable change in QRS axis  Nonspecific T wave abnormality, improved in Lateral leads  Confirmed by Suha Benz ([id number]) on 4/3/2021 7:32:01 AM      EKG 12 LEAD INITIAL [[id number]]     Order Status: Canceled           Results for orders placed or performed during the hospital encounter of 04/02/21   EKG, 12 LEAD, INITIAL   Result Value Ref Range    Ventricular Rate 77 BPM    Atrial Rate 78 BPM    P-R Interval 156 ms    QRS Duration 96 ms    Q-T Interval 356 ms    QTC Calculation (Bezet) 402 ms    Calculated P Axis 76 degrees    Calculated R Axis 69 degrees    Calculated T Axis 79 degrees    Diagnosis       Normal sinus rhythm  Normal ECG  When compared with ECG of 28-AUG-2017 21:42,  Questionable change in QRS axis  Nonspecific T wave abnormality, improved in Lateral leads  Confirmed by Suha Benz ([id number]) on 4/3/2021 7:32:01 AM     Results for orders placed or performed in visit on 04/22/16   AMB POC EKG ROUTINE W/ 12 LEADS, INTER & REP    Impression    Normal sinus rhythm small inferior Q in lead 3 no other ST  segment changes rate 75       Procedures done this admission:  Procedure(s):  ESOPHAGOGASTRODUODENOSCOPY (EGD)  PERCUTANEOUS ENDOSCOPIC GASTROSTOMY TUBE INSERTION/ 19/ 334        Time spent in patient discharge planning and coordination 34 minutes.       Signed By: Quinten Zamorano MD   [facility] Hospitalist Service    April 27, 2021  12:01 PM

## 2021-04-27 NOTE — PROGRESS NOTES
Pt is medically cleared for dc to room 410 at [facility] for STR services. RN to call report to #[phone]. Transport scheduled for 1530 through [employer]. SW spoke with pt's dtr, Magali, via phone, to notify her of pt's dc and transport time. She plans to visit the pt prior to his transport and requests a copy of the pt's dc med list.  SW notified RN who will provide her with the AVS.  SW remains available to assist as needed. Care Management Interventions  Mode of Transport at Discharge: BLS([facility])  Hospital Transport Time of Discharge: [address] (CM Consult): SNF  Partner SNF: Yes  Discharge Durable Medical Equipment: No  Physical Therapy Consult: Yes  Occupational Therapy Consult: Yes  Speech Therapy Consult: Yes  Current Support Network: Relative's Home, Family Lives Nearby(Pt lives with his brother and is normally independent with all ADL's. Daughter Briana Seen [phone] is also supportive.  )  Confirm Follow Up Transport: Family  The Plan for Transition of Care is Related to the Following Treatment Goals : Pt will need rehab services to return to his functional baseline.   The Patient and/or Patient Representative was Provided with a Choice of Provider and Agrees with the Discharge Plan?: Yes  Name of the Patient Representative Who was Provided with a Choice of Provider and Agrees with the Discharge Plan: pt/daughter  Freedom of Choice List was Provided with Basic Dialogue that Supports the Patient's Individualized Plan of Care/Goals, Treatment Preferences and Shares the Quality Data Associated with the Providers?: Yes  The [employer] Information Provided?: No  Discharge Location  Discharge Placement: Rehab Unit [facility])

## 2021-04-27 NOTE — PROGRESS NOTES
04/27/21 0700   NIH Stroke Scale   Interval Other (comment)  (Dual NIH w/ Chantelle Carter RN)   LOC 1   LOC Questions 0   LOC Commands 0   Best Gaze 0   Visual 0   Facial Palsy 1   Motor Right Arm 0   Motor Left Arm 4   Motor Right Leg 0   Motor Left Leg 4   Limb Ataxia 0   Sensory 2   Best Language 1   Dysarthria 1   Extinction and Inattention 1   Total 15

## 2021-04-30 VITALS
WEIGHT: 110.1 LBS | HEIGHT: 68 IN | TEMPERATURE: 97.7 F | OXYGEN SATURATION: 100 % | BODY MASS INDEX: 16.69 KG/M2 | HEART RATE: 59 BPM | RESPIRATION RATE: 16 BRPM | SYSTOLIC BLOOD PRESSURE: 117 MMHG | DIASTOLIC BLOOD PRESSURE: 68 MMHG

## 2021-05-18 ENCOUNTER — APPOINTMENT (OUTPATIENT)
Dept: GENERAL RADIOLOGY | Age: 76
End: 2021-05-18
Attending: EMERGENCY MEDICINE
Payer: MEDICARE

## 2021-05-18 ENCOUNTER — HOSPITAL ENCOUNTER (EMERGENCY)
Age: 76
Discharge: HOME | End: 2021-05-18
Attending: EMERGENCY MEDICINE
Payer: MEDICARE

## 2021-05-18 VITALS
WEIGHT: 110 LBS | RESPIRATION RATE: 16 BRPM | HEART RATE: 90 BPM | SYSTOLIC BLOOD PRESSURE: 113 MMHG | DIASTOLIC BLOOD PRESSURE: 67 MMHG | HEIGHT: 68 IN | TEMPERATURE: 97.7 F | OXYGEN SATURATION: 99 % | BODY MASS INDEX: 16.67 KG/M2

## 2021-05-18 DIAGNOSIS — K94.23 PEG TUBE MALFUNCTION (HCC): Primary | ICD-10-CM

## 2021-05-18 PROCEDURE — 75810000109 HC GASTRO TUBE CHANGE

## 2021-05-18 PROCEDURE — 99283 EMERGENCY DEPT VISIT LOW MDM: CPT

## 2021-05-18 PROCEDURE — 74011000636 HC RX REV CODE- 636: Performed by: EMERGENCY MEDICINE

## 2021-05-18 PROCEDURE — 74018 RADEX ABDOMEN 1 VIEW: CPT

## 2021-05-18 RX ADMIN — DIATRIZOATE MEGLUMINE AND DIATRIZOATE SODIUM 30 ML: 660; 100 LIQUID ORAL; RECTAL at 11:02

## 2021-05-18 NOTE — ED PROVIDER NOTES
Reyes Gabrielarose mary José Luis Vasquez is a 67 y.o. male seen on 5/18/2021 in the [facility] EMERGENCY DEPT in room ERA/A. History, review of systems and physical exam limited secondary to patient's neuro deficit from previous CVA    Chief Complaint   Patient presents with    Feeding Tube Problem     HPI: 58-year-old male with history of CVA presented to the emergency department with complaints of dislodged feeding tube. Per EMS, skilled nursing facility found patient's PEG tube in the bed with him when they checked on him this morning. Last time it was used was last night. EMS does not think that stoma still open as it is all scabbed over. 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 GHULAM. PET positive.  Navigational bronch 10/16/13--non-diagnositic     Near syncope 1/21/2015    NSTEMI (non-ST elevated myocardial infarction) ([facility] [address] 12/31/2014    Pulmonary tuberculosis     S/P PTCA (percutaneous transluminal coronary angioplasty)     Shortness of breath 2015    TB peritonitis 2014     Past Surgical History:   Procedure Laterality Date    HX OTHER SURGICAL      LUNG NODULE BIOPSY, TB    HX PTCA      AK ABDOMEN SURGERY PROC UNLISTED      hernia    AK CHEST SURGERY PROCEDURE UNLISTED  2013    cervical mediastinal bx    AK LEFT HEART CATH,PERCUTANEOUS  2014    Xience stents to 2nd OM and pCirc     Social History     Socioeconomic History    Marital status: SINGLE     Spouse name: Not on file    Number of children: Not on file    Years of education: Not on file    Highest education level: Not on file   Tobacco Use    Smoking status: Light Tobacco Smoker     Packs/day: 1.00     Years: 45.00     Pack years: 45.00     Types: Cigarettes    Smokeless tobacco: Never Used    Tobacco comment: QUIT    Substance and Sexual Activity    Alcohol use: No     Alcohol/week: 5.0 standard drinks     Types: 6 Cans of beer per week     Comment: QUIT     Drug use: Yes     Types: Marijuana   Social History Narrative    1/21/15:  PATIENT IS SINGLE, LIVES WITH BROTHER. RETIRED FROM [employer]. HAS SEVEN CHILDREN, 6 LOCAL. ONE DAUGHTER IN [place] IS AN RN. Prior to Admission Medications   Prescriptions Last Dose Informant Patient Reported? Taking? amLODIPine (NORVASC) 10 mg tablet   No No   Si Tab by Per G Tube route daily. aspirin 81 mg chewable tablet   Yes No   Sig: Take 1 Tab by mouth daily. atorvastatin (LIPITOR) 80 mg tablet   No No   Sig: Take 1 Tab by mouth nightly. cyclobenzaprine (FLEXERIL) 5 mg tablet   No No   Sig: Take 1 Tab by mouth two (2) times a day. metoprolol tartrate (LOPRESSOR) 25 mg tablet   No No   Sig: Take 2 Tabs by mouth two (2) times a day. nitroglycerin (NITROSTAT) 0.4 mg SL tablet   No No   Si Tab by SubLINGual route every five (5) minutes as needed for Chest Pain.       Facility-Administered Medications: None     No Known Allergies     PHYSICAL EXAM       Vitals:    21 1029 BP: 124/69   Pulse: 90   Resp: 16   Temp: 97.7 °F (36.5 °C)   SpO2: 99%    Vital signs were reviewed. Physical Exam  Vitals signs and nursing note reviewed. Constitutional:       General: He is not in acute distress. Appearance: He is not ill-appearing. HENT:      Head: Normocephalic and atraumatic. Mouth/Throat:      Mouth: Mucous membranes are dry. Eyes:      Extraocular Movements: Extraocular movements intact. Cardiovascular:      Rate and Rhythm: Normal rate and regular rhythm. Pulses: Normal pulses. Heart sounds: Normal heart sounds. Pulmonary:      Effort: Pulmonary effort is normal.      Breath sounds: Normal breath sounds. Abdominal:      Palpations: Abdomen is soft. Comments: Stoma on left upper quadrant with crusting and near occlusion. Nontender or erythematous. Musculoskeletal:      Comments: Limited movement and atrophy of the left   Skin:     General: Skin is warm and dry. Neurological:      Mental Status: He is alert. Mental status is at baseline. MEDICAL DECISION MAKING     ED Course:    No results found for this or any previous visit (from the past 8 hour(s)). Xr Abd (kub)    Result Date: 5/18/2021  Exam: Singleview abdomen. Indication: PEG tube placement Comparison: None. FINDINGS: There is a percutaneous gastrostomy tube which projects over the gastric body. Enteric contrast is administered through the tube which demonstrates opacification of the tube and stomach. There is no definitive extraluminal contrast material on this single limited view. Visualized bowel gas pattern is nonobstructive. Osteopenia. Multilevel lumbar spondylosis. 1. Percutaneous gastrostomy tube inserts on the gastric body. No definitive extra luminal contrast material to suggest leak.       MDM  Number of Diagnoses or Management Options  PEG tube malfunction ([address]  Diagnosis management comments: 48-year-old with necessity for PEG tube secondary to recent hemorrhagic CVA.  Patient PEG tube replaced as below without difficulty. X-ray obtained to confirm patient's placement. Patient will be discharged back to skilled nursing facility. Amount and/or Complexity of Data Reviewed  Tests in the radiology section of CPT®: ordered and reviewed    Patient Progress  Patient progress: improved    Other Procedure    Date/Time: 5/18/2021 11:31 AM  Performed by: Chay Zimmerman DO  Authorized by: Chay Zimmerman DO     Consent:     Consent given by:  Patient    Alternatives discussed:  Alternative treatment  Indications:     Indications:  PEG tube placement after PEG tube dislodged  Pre-procedure details:     Skin preparation:  Betadine  Anesthesia (see MAR for exact dosages): Anesthesia method:  None  Post-procedure details:     Patient tolerance of procedure: Tolerated well, no immediate complications  Comments:      Stoma dilated gently with cotton tip applicator. Unable to pass a 16 Vietnamese PEG tube. Able to pass a 14 Vietnamese PEG tube without difficulty. 5 cc of water placed in balloon. X-ray obtained to confirm PEG tube placement. Patient tolerated without any difficulty. Disposition:  Dishcharged  Diagnosis:     ICD-10-CM ICD-9-CM   1. PEG tube malfunction ([facility])  K94.23 536.42     ____________________________________________________________________  A portion of this note was generated using voice recognition dictation software. While the note has been reviewed for accuracy, please note certain words and phrases may not be transcribed as intended and some grammatical and/or typographical errors may be present.

## 2021-05-18 NOTE — ED TRIAGE NOTES
Pt arrived via EMS from [facility] with c/o feeding tube being dislodged. Facility noted feeding tube out around 0745 but isn't sure how long it has been out.

## 2021-05-18 NOTE — ED NOTES
I have reviewed discharge instructions with the patient. The patient verbalized understanding. Patient left ED via Discharge Method: stretcher to SNF with [facility]. Opportunity for questions and clarification provided. Patient given 0 scripts. To continue your aftercare when you leave the hospital, you may receive an automated call from our care team to check in on how you are doing. This is a free service and part of our promise to provide the best care and service to meet your aftercare needs.  If you have questions, or wish to unsubscribe from this service please call [phone]. Thank you for Choosing our [facility] Emergency Department.

## 2023-12-03 NOTE — PROGRESS NOTES
Problem: Falls - Risk of  Goal: *Absence of Falls  Document Ofelia Fall Risk and appropriate interventions in the flowsheet.    Outcome: Progressing Towards Goal  Fall Risk Interventions:              Medication Interventions: Patient to call before getting OOB, Teach patient to arise slowly information could not be obtained

## (undated) DEVICE — BINDER ABD M/L H12IN FOR 46-62IN WHT 4 SLD PNL DSGN HOOP

## (undated) DEVICE — CANNULA NSL ORAL AD FOR CAPNOFLEX CO2 O2 AIRLFE

## (undated) DEVICE — BLOCK BITE AD 60FR W/ VELC STRP ADDRESSES MOST PT AND

## (undated) DEVICE — CONNECTOR TBNG OD5-7MM O2 END DISP

## (undated) DEVICE — KENDALL RADIOLUCENT FOAM MONITORING ELECTRODE RECTANGULAR SHAPE: Brand: KENDALL

## (undated) DEVICE — KIT GASTMY PERC PEG PULL 20FR -- ENDOVIVE BX/2